# Patient Record
Sex: MALE | Race: WHITE | Employment: UNEMPLOYED | URBAN - METROPOLITAN AREA
[De-identification: names, ages, dates, MRNs, and addresses within clinical notes are randomized per-mention and may not be internally consistent; named-entity substitution may affect disease eponyms.]

---

## 2017-05-27 ENCOUNTER — HOSPITAL ENCOUNTER (INPATIENT)
Facility: CLINIC | Age: 32
LOS: 54 days | Discharge: HOME | End: 2017-07-21
Attending: EMERGENCY MEDICINE | Admitting: PSYCHIATRY & NEUROLOGY
Payer: MEDICAID

## 2017-05-27 DIAGNOSIS — Z72.0 TOBACCO ABUSE: ICD-10-CM

## 2017-05-27 DIAGNOSIS — F29 PSYCHOSIS, UNSPECIFIED PSYCHOSIS TYPE (H): ICD-10-CM

## 2017-05-27 DIAGNOSIS — T50.905A MEDICATION REACTION, INITIAL ENCOUNTER: Primary | ICD-10-CM

## 2017-05-27 DIAGNOSIS — F22 DELUSION (H): ICD-10-CM

## 2017-05-27 DIAGNOSIS — R09.81 NASAL CONGESTION: ICD-10-CM

## 2017-05-27 DIAGNOSIS — I10 BENIGN ESSENTIAL HYPERTENSION: ICD-10-CM

## 2017-05-27 DIAGNOSIS — H04.123 DRY EYES: ICD-10-CM

## 2017-05-27 LAB
ANION GAP SERPL CALCULATED.3IONS-SCNC: 8 MMOL/L (ref 3–14)
BUN SERPL-MCNC: 11 MG/DL (ref 7–30)
CALCIUM SERPL-MCNC: 9.4 MG/DL (ref 8.5–10.1)
CHLORIDE SERPL-SCNC: 107 MMOL/L (ref 94–109)
CO2 SERPL-SCNC: 25 MMOL/L (ref 20–32)
CREAT SERPL-MCNC: 0.76 MG/DL (ref 0.66–1.25)
GFR SERPL CREATININE-BSD FRML MDRD: ABNORMAL ML/MIN/1.7M2
GLUCOSE SERPL-MCNC: 147 MG/DL (ref 70–99)
POTASSIUM SERPL-SCNC: 3.7 MMOL/L (ref 3.4–5.3)
SODIUM SERPL-SCNC: 140 MMOL/L (ref 133–144)

## 2017-05-27 PROCEDURE — 25000128 H RX IP 250 OP 636

## 2017-05-27 PROCEDURE — 99285 EMERGENCY DEPT VISIT HI MDM: CPT | Mod: 25 | Performed by: EMERGENCY MEDICINE

## 2017-05-27 PROCEDURE — 99284 EMERGENCY DEPT VISIT MOD MDM: CPT | Mod: Z6 | Performed by: EMERGENCY MEDICINE

## 2017-05-27 PROCEDURE — 12400003 ZZH R&B MH CRITICAL UMMC

## 2017-05-27 PROCEDURE — 80048 BASIC METABOLIC PNL TOTAL CA: CPT | Performed by: EMERGENCY MEDICINE

## 2017-05-27 PROCEDURE — 25000128 H RX IP 250 OP 636: Performed by: EMERGENCY MEDICINE

## 2017-05-27 PROCEDURE — 96374 THER/PROPH/DIAG INJ IV PUSH: CPT | Performed by: EMERGENCY MEDICINE

## 2017-05-27 RX ORDER — LORAZEPAM 2 MG/ML
2 INJECTION INTRAMUSCULAR ONCE
Status: DISCONTINUED | OUTPATIENT
Start: 2017-05-27 | End: 2017-05-28

## 2017-05-27 RX ORDER — OLANZAPINE 10 MG/2ML
10 INJECTION, POWDER, FOR SOLUTION INTRAMUSCULAR ONCE
Status: DISCONTINUED | OUTPATIENT
Start: 2017-05-27 | End: 2017-05-27

## 2017-05-27 RX ORDER — HYDROXYZINE HYDROCHLORIDE 25 MG/1
25-50 TABLET, FILM COATED ORAL EVERY 4 HOURS PRN
Status: DISCONTINUED | OUTPATIENT
Start: 2017-05-27 | End: 2017-07-21 | Stop reason: HOSPADM

## 2017-05-27 RX ORDER — LORAZEPAM 2 MG/ML
INJECTION INTRAMUSCULAR
Status: COMPLETED
Start: 2017-05-27 | End: 2017-05-27

## 2017-05-27 RX ORDER — HALOPERIDOL 5 MG/ML
INJECTION INTRAMUSCULAR
Status: COMPLETED
Start: 2017-05-27 | End: 2017-05-27

## 2017-05-27 RX ORDER — HALOPERIDOL 5 MG/ML
5 INJECTION INTRAMUSCULAR ONCE
Status: COMPLETED | OUTPATIENT
Start: 2017-05-27 | End: 2017-05-27

## 2017-05-27 RX ADMIN — LORAZEPAM 2 MG: 2 INJECTION INTRAMUSCULAR; INTRAVENOUS at 21:00

## 2017-05-27 RX ADMIN — HALOPERIDOL 5 MG: 5 INJECTION INTRAMUSCULAR at 21:00

## 2017-05-27 RX ADMIN — HALOPERIDOL LACTATE 5 MG: 5 INJECTION, SOLUTION INTRAMUSCULAR at 21:00

## 2017-05-27 ASSESSMENT — ENCOUNTER SYMPTOMS
VOMITING: 0
AGITATION: 1
SHORTNESS OF BREATH: 0
NAUSEA: 0

## 2017-05-27 NOTE — IP AVS SNAPSHOT
MRN:[id number]                      After Visit Summary   5/27/2017    Jv Pate    MRN: [id number]           Thank you!     Thank you for choosing [facility] for your care. Our goal is always to provide you with excellent care.        Patient Information     Date Of Birth          1985        Designated Caregiver       Most Recent Value    Caregiver    Will someone help with your care after discharge? yes    Name of designated caregiver Mother    Phone number of caregiver unwilling to provide    Caregiver address unwilling to provide      About your hospital stay     You were admitted on:  May 27, 2017 You last received care in the:  [facility]    You were discharged on:  July 21, 2017       Who to Call     For medical emergencies, please call 911.  For non-urgent questions about your medical care, please call your primary care provider or clinic, None          Attending Provider     Provider Specialty    Elvin Gonzalez MD Emergency Medicine    Karan Diana MD Psychiatry    Mario Shah MD Psychiatry       Primary Care Provider    None Specified      Further instructions from your care team       Behavioral Discharge Planning and Instructions      Summary:  You were admitted to the [facility] after exhibiting mental health symptoms including homicidal ideation.  During your hospitalization, you met daily with the staff and were encouraged to attend all therapeutic programming. You met with the Clinical Treatment Coordinator and participated in your discharge planning. Medications were adjusted.      You were dually committed to the [facility] and the Commissioner of Human Services on June 16th, 2017.  You are being discharged on a Provisional Discharge Agreement to a medical unit until you are medically stable enough to transfer back to us.  You are also court ordered to take the medications the doctor ordered for  you.       Primary Diagnoses:   1.  Schizophrenia, paranoid type   2.  Noncompliance, nonadherence.   3.  Premorbid cluster B with likely antisocial traits.    Mental Health Follow-Up:  -At this time you are being Provisionally Discharged from [facility] to have your medical needs attended to.  However, you continue to be under commitment and will be transferred back to our unit upon medical stabilization.  At that time, we will determine when you are able to discharge.     - You have been committed through [facility] as Mentally Ill.  You have also been assigned a [facility] .    :   Jenna Khalil  Phone: [phone]  Fax:   HUC TO FAX AVS    Psychiatry:  You will see An Barahona upon discharge. (lin Dsouza is no longer there)   [facility]  [address] [place] [address]  Phone: [phone]  Fax: [phone]  HUC TO FAX AVS    Attend all scheduled appointments with your outpatient providers. Call at least 24 hours in advance if you need to reschedule an appointment to ensure continued access to your outpatient providers.   Major Treatments, Procedures and Findings:  You were provided with: a psychiatric assessment, medication evaluation and/or management, group therapy and milieu management    Symptoms to Report: feeling more aggressive, increased confusion, losing more sleep, mood getting worse or thoughts of suicide    Early warning signs can include: increased depression or anxiety sleep disturbances increased thoughts or behaviors of suicide or self-harm  increased unusual thinking, such as paranoia or hearing voices    Safety and Wellness:  Take all medicines as directed.  Make no changes unless your doctor suggests them.      Follow treatment recommendations.  Refrain from alcohol and non-prescribed drugs.  Ask your support system to help you reduce your access to items that could harm yourself or others. Items could include:  Firearms  Medicines  "(both prescribed and over-the-counter)  Knives and other sharp objects  Ropes and like materials  Car keys  If there is a concern for safety, call 911. If there is a concern for safety, call 911.    Resources:   Crisis Intervention: 329.268.3564 or 177-525-1434 (TTY: 676.609.7941).  Call anytime for help.  National [place] on Mental Illness (www.[place].eileen.org): 496.765.4250 or 263-478-1502.  [place] Association for Children's Mental Health (www.mac.org): 689.213.8842.  Alcoholics Anonymous (www.alcoholics-anonymous.org): Check your phone book for your local chapter.  Suicide Awareness Voices of Education (SAVE) (www.save.org): 898-847-GARH (8070)  National Suicide Prevention Line (www.mentalhealthmn.org): 438-200-TWJV (0286)  Mental Health Consumer/Survivor Network of [place] (www.mhcsn.net): 175.549.3038 or 878-220-3935  Mental Health Association of [place] (www.mentalhealth.org): 615.918.7084 or 507-185-1252  Self- Management and Recovery Training., [employer]-- Toll free: 570.878.5601  www.[employer].org  [facility] Crisis (COPE) Response - Adult 762 979-6725  [facility] Crisis Response - Adult 069 558-4631  [facility] Crisis Response 807 221-7286  Text 4 Life: txt \"LIFE\" to 97850 for immediate support and crisis intervention  Crisis text line: Text \"START\" to 034-562. Free, confidential, 24/7.  Crisis Intervention: 385.612.1999 or 180-028-6870. Call anytime for help.   [facility] Mental Health Crisis Team - Child: 630.527.1983  [facility] Mental Health Crisis Response Team - Child: [phone]  [facility] Mental Health Crisis: 2-107-383-7225     The treatment team has appreciated the opportunity to work with you. If you have any questions or concerns our unit number is [phone].          Pending Results     Date and Time Order Name Status Description    7/21/2017 1355 EKG 12-lead, complete Preliminary     7/21/2017 0834 HHV6 DNA Quant PCR In process     7/21/2017 0834 EBV " "DNA PCR Quantitative Whole Blood In process     2017 0834 CMV DNA quantification In process     2017 0030 Carbamazepine and epoxide free and total In process     2017 0030 Carbamazepine and 10 11 epoxide In process             Statement of Approval     Ordered          17 1505  I have reviewed and agree with all the recommendations and orders detailed in this document.  EFFECTIVE NOW     Approved and electronically signed by:  Karan Diana MD             Admission Information     Date & Time Provider Department Dept. Phone    2017 Mario Shah MD [address] [phone]      Your Vitals Were     Blood Pressure Pulse Temperature Respirations Height Weight    121/64 99 98.7  F (37.1  C) (Tympanic) 14 1.905 m (6' 3\") 108.4 kg (239 lb)    Pulse Oximetry BMI (Body Mass Index)                100% 29.87 kg/m2          MyChart Information     [employer] lets you send messages to your doctor, view your test results, renew your prescriptions, schedule appointments and more. To sign up, go to [email or web address] . Click on \"Log in\" on the left side of the screen, which will take you to the Welcome page. Then click on \"Sign up Now\" on the right side of the page.     You will be asked to enter the access code listed below, as well as some personal information. Please follow the directions to create your username and password.     Your access code is: [email or web address]  Expires: 2017  8:53 PM     Your access code will  in 90 days. If you need help or a new code, please call your [facility] clinic or [phone].        Care EveryWhere ID     This is your Care EveryWhere ID. This could be used by other organizations to access your [facility] medical records  [id number]        Equal Access to Services     PAULINE GEORGE : Megha Pedraza, guido kinsey, susy taylor. So [facility] [phone].    ATENCIÓN: Si mario mckeon " a brut disposición servicios gratuitos de asistencia lingüística. Ruchi calderon [phone].    We comply with applicable federal civil rights laws and [place] laws. We do not discriminate on the basis of race, color, national origin, age, disability sex, sexual orientation or gender identity.               Review of your medicines      UNREVIEWED medicines. Ask your doctor about these medicines        Dose / Directions    * ZYPREXA 15 MG tablet   Generic drug:  OLANZapine        1 TABLET DAILY   Refills:  0       * ZYPREXA 15 MG tablet   Generic drug:  OLANZapine        1 TABLET DAILY   Refills:  0       * Notice:  This list has 2 medication(s) that are the same as other medications prescribed for you. Read the directions carefully, and ask your doctor or other care provider to review them with you.             Protect others around you: Learn how to safely use, store and throw away your medicines at www.disposemymeds.org.             Medication List: This is a list of all your medications and when to take them. Check marks below indicate your daily home schedule. Keep this list as a reference.      Medications           Morning Afternoon Evening Bedtime As Needed    * ZYPREXA 15 MG tablet   1 TABLET DAILY   Generic drug:  OLANZapine                                * ZYPREXA 15 MG tablet   1 TABLET DAILY   Generic drug:  OLANZapine                                * Notice:  This list has 2 medication(s) that are the same as other medications prescribed for you. Read the directions carefully, and ask your doctor or other care provider to review them with you.

## 2017-05-27 NOTE — IP AVS SNAPSHOT
` `     [facility]: [phone]            Medication Administration Report for Jv Pate as of 07/21/17 1719   Legend:    Given Hold Not Given Due Canceled Entry Other Actions    Time Time (Time) Time  Time-Action       Inactive    Active    Linked        Medications 07/15/17 07/16/17 07/17/17 07/18/17 07/19/17 07/20/17 07/21/17    acetaminophen (TYLENOL) tablet 650 mg  Dose: 650 mg Freq: EVERY 4 HOURS PRN Route: PO  PRN Reasons: mild pain,fever  Start: 06/03/17 1248   Admin Instructions: Maximum acetaminophen dose from all sources = 75 mg/kg/day not to exceed 4 grams/day.               aspirin tablet 325 mg  Dose: 325 mg Freq: 2 TIMES DAILY PRN Route: PO  PRN Reason: moderate pain  Start: 06/06/17 1237              benztropine (COGENTIN) tablet 0.5-1 mg  Dose: 0.5-1 mg Freq: 2 TIMES DAILY PRN Route: PO  PRN Reason: agitation  PRN Comment: EPS  Start: 06/09/17 1422              carboxymethylcellulose (CELLUVISC/REFRESH LIQUIGEL) 1 % ophthalmic solution 1 drop  Dose: 1 drop Freq: 3 TIMES DAILY PRN Route: Both Eyes  PRN Reason: dry eyes  Start: 06/09/17 1430     2037 (1 drop)-Given        1425 (1 drop)-Given        0027 (1 drop)-Given        0310 (1 drop)-Given        2328 (1 drop)-Given            dimethicone-zinc oxide (EUCERIN) cream  Freq: 2 TIMES DAILY PRN Route: Top  PRN Reasons: other,dry skin  Start: 06/14/17 1036   Admin Instructions: Apply to right Forearm               diphenhydrAMINE (BENADRYL) injection 50 mg  Dose: 50 mg Freq: 3 TIMES DAILY PRN Route: IM  PRN Reasons: itching,other  PRN Comment: for agitation, give along with haldol and ativan  Start: 07/17/17 1538                                         Or  diphenhydrAMINE (BENADRYL) capsule 50 mg  Dose: 50 mg Freq: 3 TIMES DAILY PRN Route: PO  PRN Reason: itching  PRN Comment: for agitation, along with haldol and ativan  Start: 07/17/17 1538       0027 (50 mg)-Given        0215 (50 mg)-Given        1956 (50 mg)-Given        0904 (50 mg)-Given            haloperidol (HALDOL) tablet 5-10 mg  Dose: 5-10 mg Freq: 3 TIMES DAILY PRN Route: PO  PRN Reason: agitation  Start: 06/05/17 1546       0027 (10 mg)-Given             Or  haloperidol lactate (HALDOL) injection 5-10 mg  Dose: 5-10 mg Freq: 3 TIMES DAILY PRN Route: IM  PRN Reason: agitation  Start: 06/05/17 1546                     hydrocortisone (CORTAID) 1 % cream  Freq: 3 TIMES DAILY PRN Route: Top  PRN Reasons: rash,itching  Start: 07/18/17 2036   Admin Instructions: Apply to rash        2103 ( )-Given        0310 (10 g)-Given         0418 ( )-Given           hydrOXYzine (ATARAX) tablet 25-50 mg  Dose: 25-50 mg Freq: EVERY 4 HOURS PRN Route: PO  PRN Reason: anxiety  Start: 05/27/17 2213        (0104)-Not Given             lisinopril (PRINIVIL/ZESTRIL) tablet 20 mg  Dose: 20 mg Freq: DAILY Route: PO  Start: 06/01/17 1630   Admin Instructions: Hold for SBP <110     0848 (20 mg)-Given        0948 (20 mg)-Given        0823 (20 mg)-Given        0903 (20 mg)-Given        0856 (20 mg)-Given        0847 (20 mg)-Given        0901 (20 mg)-Given           lurasidone (LATUDA) tablet 40 mg  Dose: 40 mg Freq: DAILY Route: PO  Start: 07/15/17 0800    0848 (40 mg)-Given        0948 (40 mg)-Given        0824 (40 mg)-Given        0903 (40 mg)-Given        0857 (40 mg)-Given        0847 (40 mg)-Given        0902 (40 mg)-Given           multivitamin, therapeutic (THERA-VIT) tablet 1 tablet  Dose: 1 tablet Freq: DAILY Route: PO  Start: 06/24/17 0800    0848 (1 tablet)-Given        0948 (1 tablet)-Given        0824 (1 tablet)-Given        0903 (1 tablet)-Given        0856 (1 tablet)-Given        0847 (1 tablet)-Given        0901 (1 tablet)-Given           nicotine polacrilex (NICORETTE) gum 2 mg  Dose: 2 mg Freq: EVERY 1 HOUR PRN Route: BU  PRN Reason: smoking cessation  Start: 05/29/17 1735   Admin Instructions: Gum should be chewed slowly until it tingles, then placed between cheek and gum:  when tingle gone, repeat process  until tingle gone (about 30 minutes).      1643 (2 mg)-Given        2152 (2 mg)-Given        2054 (2 mg)-Given        0602 (2 mg)-Given       1959 (2 mg)-Given             predniSONE (DELTASONE) tablet 40 mg  Dose: 40 mg Freq: 2 TIMES DAILY WITH MEALS Route: PO  Start: 07/21/17 1800          [ ] 1800           QUEtiapine (SEROquel) tablet 200 mg  Dose: 200 mg Freq: AT BEDTIME Route: PO  Start: 07/10/17 2200    2055 (200 mg)-Given               2016 (200 mg)-Given        2055 (200 mg)-Given        2102 (200 mg)-Given        1959 (200 mg)-Given        1954 (200 mg)-Given        [ ] 2000           QUEtiapine (SEROquel) tablet 200 mg  Dose: 200 mg Freq: AT BEDTIME PRN Route: PO  PRN Comment: sleep  Start: 07/10/17 1057       0027 (200 mg)-Given        0105 (200 mg)-Given             sodium chloride (OCEAN) 0.65 % nasal spray 1 spray  Dose: 1 spray Freq: EVERY 1 HOUR PRN Route: BOTH NOSTRIL  PRN Reason: congestion  Start: 06/07/17 0840             Discontinued Medications  Medications 07/15/17 07/16/17 07/17/17 07/18/17 07/19/17 07/20/17 07/21/17         Dose: 200 mg Freq: 2 TIMES DAILY Route: PO  Start: 06/13/17 0800   End: 07/20/17 1130   Admin Instructions: DO NOT CRUSH.     0847 (200 mg)-Given       2000 (200 mg)-Given        0948 (200 mg)-Given       2016 (200 mg)-Given        0824 (200 mg)-Given       2055 (200 mg)-Given        0903 (200 mg)-Given       2102 (200 mg)-Given        0856 (200 mg)-Given       1959 (200 mg)-Given        0847 (200 mg)-Given       1130-Med Discontinued          Freq: 3 TIMES DAILY PRN Route: Top  PRN Reasons: rash,itching  Start: 07/18/17 1121   End: 07/18/17 2037   Admin Instructions: Apply to rash        2037-Med Discontinued

## 2017-05-28 LAB
ALBUMIN UR-MCNC: NEGATIVE MG/DL
AMPHETAMINES UR QL SCN: ABNORMAL
APPEARANCE UR: CLEAR
BACTERIA #/AREA URNS HPF: ABNORMAL /HPF
BARBITURATES UR QL: ABNORMAL
BENZODIAZ UR QL: ABNORMAL
BILIRUB UR QL STRIP: NEGATIVE
CANNABINOIDS UR QL SCN: ABNORMAL
COCAINE UR QL: ABNORMAL
COLOR UR AUTO: ABNORMAL
ETHANOL UR QL SCN: ABNORMAL
GLUCOSE UR STRIP-MCNC: NEGATIVE MG/DL
HGB UR QL STRIP: NEGATIVE
KETONES UR STRIP-MCNC: NEGATIVE MG/DL
LEUKOCYTE ESTERASE UR QL STRIP: ABNORMAL
NITRATE UR QL: NEGATIVE
OPIATES UR QL SCN: ABNORMAL
PH UR STRIP: 6.5 PH (ref 5–7)
RBC #/AREA URNS AUTO: 0 /HPF (ref 0–2)
SP GR UR STRIP: 1 (ref 1–1.03)
SQUAMOUS #/AREA URNS AUTO: 1 /HPF (ref 0–1)
URN SPEC COLLECT METH UR: ABNORMAL
UROBILINOGEN UR STRIP-MCNC: NORMAL MG/DL (ref 0–2)
WBC #/AREA URNS AUTO: 16 /HPF (ref 0–2)

## 2017-05-28 PROCEDURE — 81001 URINALYSIS AUTO W/SCOPE: CPT | Performed by: EMERGENCY MEDICINE

## 2017-05-28 PROCEDURE — 80307 DRUG TEST PRSMV CHEM ANLYZR: CPT | Performed by: EMERGENCY MEDICINE

## 2017-05-28 PROCEDURE — 80320 DRUG SCREEN QUANTALCOHOLS: CPT | Performed by: EMERGENCY MEDICINE

## 2017-05-28 PROCEDURE — 25000132 ZZH RX MED GY IP 250 OP 250 PS 637: Performed by: PSYCHIATRY & NEUROLOGY

## 2017-05-28 PROCEDURE — 12400003 ZZH R&B MH CRITICAL UMMC

## 2017-05-28 RX ORDER — ASENAPINE 5 MG/1
5 TABLET SUBLINGUAL 2 TIMES DAILY
Status: DISCONTINUED | OUTPATIENT
Start: 2017-05-28 | End: 2017-05-28

## 2017-05-28 RX ORDER — ASENAPINE 10 MG/1
10 TABLET SUBLINGUAL AT BEDTIME
Status: DISCONTINUED | OUTPATIENT
Start: 2017-05-28 | End: 2017-06-09

## 2017-05-28 RX ADMIN — ASENAPINE MALEATE 10 MG: 10 TABLET SUBLINGUAL at 19:04

## 2017-05-28 ASSESSMENT — ACTIVITIES OF DAILY LIVING (ADL)
DRESS: SCRUBS (BEHAVIORAL HEALTH)
LAUNDRY: UNABLE TO COMPLETE
DRESS: SCRUBS (BEHAVIORAL HEALTH)
GROOMING: HANDWASHING;INDEPENDENT
GROOMING: INDEPENDENT
ORAL_HYGIENE: INDEPENDENT
ORAL_HYGIENE: INDEPENDENT
LAUNDRY: UNABLE TO COMPLETE

## 2017-05-28 ASSESSMENT — ENCOUNTER SYMPTOMS
PHOTOPHOBIA: 0
ABDOMINAL PAIN: 0
DIAPHORESIS: 0
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
## 2017-05-28 NOTE — H&P
"CHIEF COMPLAINT:  Psychiatric issues including acting strange for a couple of days.  Homicidal ideation and intention, verbalized he \"could kill someone.\"      HISTORY OF PRESENT MEDICAL ILLNESS:  Jv Pate is a 32-year-old who resides [place] the family farm in [facility].  Father  in  from medical condition.  He was 30 years older than the mother at age 82.  His mother is still in the household.      THE PREMORBID AND ESTABLISHED MEDICAL AND PSYCHIATRIC DIAGNOSES:   1.  Schizophrenia, paranoid type, since .   2.  Psychosis, not otherwise specified, is the one patient prefers.   3.  He also offers homicidal ideation \"because of  year\" that and shows overt paranoid ideation of a delusional and grandiose and paranoid nature, that he is surrounded by terrorists and everything is monitored.  He does identify the homicidal thoughts toward \"the enemy.\"      Denies suicidal or self-injurious behavior.      It would appear the patient has additional medical condition of Lyme's disease.  Convinced she has this condition.      PAST MEDICAL HISTORY:  Not defined.        PAST PSYCHIATRIC HISTORY:  Schizophrenia, paranoid type.      Chart reviewed.  Previous hospitalization in 2007 by Dr. Li with the patient discharged directly to an IRTS facility under court commitment.  The patient had been in [facility] under commitment.  It would appear that this was extended and was renewed on at least 3 other occasions therefore for 2 years.      Remainder of the physical examination.  Heart disease in paternal grandfather.      PAST SURGICAL HISTORY:  None.      MEDICATIONS:     1.  Zyprexa indicates 50 mg b.i.d., has not been adherent to this.  Insisted he went off it because of weight gain.   2.  More recently, has been on Saphris at 20 mg per day.     3.  Additionally, also had a trial previously of Haldol and Ativan.      With reluctance admits to the fact that in the last weeks to months, " "\"occasionally I would have auditory hallucinations but nothing severe.  I would just hear voices, some people prodding me and asking if they were praying for me.\"      REVIEW OF SYSTEMS:  From a medical standpoint, 10-point system is entirely negative, but for the vital signs.        PROVISIONAL LABORATORY DATA:  Within normal limits.      Additionally, the patient has since admission information accrued from collateral information including the patient's family who stated that he was acting strange.  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  This is his father's land.  Supposed to be put in trust for his benefit, although he feels the mother is going behind his back.      He identifies that his outpatient evaluation and treatment has been followed by the following:  Rae Lawson, nurse practitioner and also that his primary care has been following him.  It is Dr. Cowan at [facility] that has diagnosed him to have Lyme's disease.      FAMILY DESCRIPTION AND CONSTELLATION:  The patient not prepared to discuss any additional items indicating that the father had 4 children from a previous relationship that never completely acknowledged him.  There are ages 50-65.  He also has a sister (age 33) from the same marriage.  No mention of her presence or whereabouts.      His education, occupation, finances and legal issues:  Not working.  Indicates he did go to high school in [place] in 2004.  Then, [employer] but never finished and the school closed.      LEGAL ISSUES:  Commitment only.      ETHNIC, SPIRITUAL AS WELL AS  HISTORY:  None.      He identifies his outpatient " psychiatrist, and then Rae Lawson NP, is the prescriber at [address] [place] [address], [phone].  Next appointment is in August.      FORMAL MENTAL STATUS EXAMINATION:  Showed the patient in his current status, isolative and avoidant in his room, always rather expansive and talking and communicating.  He does show himself in a rather quiet and reserved manner and thought.  No motor activity.  Handshake is rather weak and sweaty palms.  Level of activity appears reduced and his speech shows some degree of some latency and slowness and his volume is certainly reduced in articulation.  English is his primary language.  His affect and mood are certainly suppressed and repressed.  Sensorium:  He is alert and oriented in all 3 spheres to person, place and time.  Intellectual functioning is adequate.  Abstract ability is comprehensive.  His thought processes are well associated and structured.  Thought content shows overt delusional ideation with admission of auditory hallucination.  He has no suicidal but certainly homicidal ideation and intention.  Judgment, foresight and planning appears undermined.      DIAGNOSES DSM-5/ICD 10 INCLUDES:     1.  Schizophrenia, paranoid type with treatment-resistant psychosis.   2.  Noncompliance, nonadherence.   3.  Premorbid personality not identified.      ADMISSION SYMPTOMS:  The patient was restarted on his current medication regimen and the medications restarted include the followin.  Zyprexa 50 mg 1 q.a.m.     2.  The patient also insists that he actually has more recently been on Saphris and that the Zyprexa was discontinued because of the weight gain.  Thus, will start the patient on Saphris at 5 mg b.i.d.      Consideration for petition for commitment because of the patient's resistance to being cooperative and compliant and perhaps an attached Ross.         NEMESIO FERNANDEZ MD             D: 2017 14:20   T: 2017 16:47   MT:       Name:      IVETTE VIRGEN   MRN:      [id number]        Account:      [id number]   :      1985           Admitted:     [id number]      Document: [id number]

## 2017-05-28 NOTE — PLAN OF CARE
Problem: General Plan of Care (Inpatient Behavioral)  Goal: Team Discussion  Team Plan:   BEHAVIORAL TEAM DISCUSSION     Continued Stay Criteria/Rationale: Patient is newly admitted with symptoms of psychosis and homicidal ideation. Evaluation in process.  Plan: Provide a safe environment and therapeutic milieu. Encourage participation in unit program. Develop appropriate aftercare plan.  Participants: Rossy Lopez [facility]; Ernestina Knox RN  Summary/Recommendation: Psychiatric evaluation; medication evaluation; continued observation.  Medical/Physical: stable  Progress: minimal     Illness Management Recovery model: Personal Plan of Care     Patient has not yet completed Personal Plan of Care

## 2017-05-28 NOTE — ED PROVIDER NOTES
"  History     Chief Complaint   Patient presents with     Psychiatric Evaluation     acting strange for the last couple of days.      Homicidal Ideation     per report patient verbalized how would kill someone.     HPI  Jv Pate is a 32 year old male who presents to the Emergency Department via EMS for evaluation of psychosis and homicidal ideation. Patient states that he is here because \"the police ordered me here\". He is concerned that he is surrounded by terrorists and everything is monitored. He denies homicidal ideation except towards \"the enemy\". He denies pain or thoughts of self harm. He is prescribed Zyprexa which he takes as needed. He denies a history of psychosis and states that his previous behaviors were secondary to lyme's disease. He denies nausea, vomiting, or chest pain stating that he \"feels fine\".     PAST MEDICAL HISTORY  Past Medical History:   Diagnosis Date     Schizophrenia (H)      PAST SURGICAL HISTORY  History reviewed. No pertinent surgical history.  FAMILY HISTORY  Family History   Problem Relation Age of Onset     Neurologic Disorder Father      HEART DISEASE Paternal Grandfather      attack     SOCIAL HISTORY  Social History   Substance Use Topics     Smoking status: Current Every Day Smoker     Packs/day: 1.50     Smokeless tobacco: Not on file     Alcohol use No     MEDICATIONS  Current Facility-Administered Medications   Medication     LORazepam (ATIVAN) injection 2 mg     hydrOXYzine (ATARAX) tablet 25-50 mg     Current Outpatient Prescriptions   Medication     ZYPREXA 15 MG OR TABS     ZYPREXA 15 MG OR TABS     ALLERGIES  Allergies   Allergen Reactions     No Known Drug Allergies        I have reviewed the Medications, Allergies, Past Medical and Surgical History, and Social History in the Epic system.    Review of Systems   Constitutional: Negative for diaphoresis.   Eyes: Negative for photophobia.   Respiratory: Negative for shortness of breath.    Cardiovascular: " Negative for chest pain.   Gastrointestinal: Negative for abdominal pain, nausea and vomiting.   Psychiatric/Behavioral: Positive for agitation and behavioral problems. Negative for self-injury and suicidal ideas.       Physical Exam      Physical Exam   Constitutional: He appears well-developed and well-nourished. No distress.   Eyes: Right eye exhibits no discharge. Left eye exhibits no discharge.   Cardiovascular: Normal rate.    No murmur heard.  Pulmonary/Chest: Effort normal. No stridor. No respiratory distress. He has no wheezes.   Abdominal: Soft. There is no tenderness. There is no guarding.   Neurological: He is alert. No cranial nerve deficit.   Skin: He is not diaphoretic. No erythema.   Psychiatric: His mood appears anxious. His affect is angry. His speech is rapid and/or pressured. He is agitated, aggressive (verbally) and hyperactive. Thought content is paranoid. He expresses no homicidal and no suicidal ideation.       ED Course     Procedures    Results for orders placed or performed during the hospital encounter of 05/27/17 (from the past 24 hour(s))   Basic metabolic panel   Result Value Ref Range    Sodium 140 133 - 144 mmol/L    Potassium 3.7 3.4 - 5.3 mmol/L    Chloride 107 94 - 109 mmol/L    Carbon Dioxide 25 20 - 32 mmol/L    Anion Gap 8 3 - 14 mmol/L    Glucose 147 (H) 70 - 99 mg/dL    Urea Nitrogen 11 7 - 30 mg/dL    Creatinine 0.76 0.66 - 1.25 mg/dL    GFR Estimate >90  Non  GFR Calc   >60 mL/min/1.7m2    GFR Estimate If Black >90   GFR Calc   >60 mL/min/1.7m2    Calcium 9.4 8.5 - 10.1 mg/dL       Assessments & Plan (with Medical Decision Making)   Jv Pate is a 32 year old male who is presenting to the ED with symptoms consistent with psychosis. He denies SI/HI, but he is clearly not safe to leave the ED in the setting of his significant delusion.    Electrolytes wnl.    I have reviewed the nursing notes.    I have reviewed the findings, diagnosis,  plan and need for follow up with the patient.    New Prescriptions    No medications on file       Final diagnoses:   Psychosis, unspecified psychosis type   Delusion (H)   IDanica, am serving as a trained medical scribe to document services personally performed by Elvin Gonzalez MD, based on the provider's statements to me.   Elvin EAGLE MD, was physically present and have reviewed and verified the accuracy of this note documented by Danica Cedillo.      5/27/2017   [facility], [place], EMERGENCY DEPARTMENT     Elvin Gonzalez MD  05/28/17 0115

## 2017-05-28 NOTE — PROGRESS NOTES
05/28/17 0158   Patient Belongings   Did you bring any home meds/supplements to the hospital?  No   Patient Belongings clothing   Disposition of Belongings In patient locker: Black sweatshirt, black t shirt, boxers, socks, and belt.   Clothing Search Yes   Second Staff David     ADMISSION:  I am responsible for any personal items that are not sent to the safe or pharmacy. [facility] is not responsible for loss, theft or damage of any property in my possession.    Items Added 6/25/2017: sealed pack of cigarettes, sealed envelope containing $20, cigarette lighter.  Patient Signature _____________________ Date/Time _____________________    Staff Signature _______________________ Date/Time _____________________    2nd Staff person, if patient is unable/unwilling to sign  ___________________________________ Date/Time _____________________    DISCHARGE:  My personal items have been returned to me.   Patient Signature _____________________ Date/Time _____________________

## 2017-05-28 NOTE — PLAN OF CARE
Problem: Individualization  Goal: Patient Preferences  Illness Management Recovery model: Objectives     Patient will identify reason(s) for hospitalization from their perspective.  Patient will identify a minimum of three goals for discharge.  Patient will identify a minimum of three triggers that may increase their symptoms.  Patient will identify a minimum of three coping skills they can do to stay well.  Patient will identify their support system to demonstrate readiness for discharge.

## 2017-05-28 NOTE — PROGRESS NOTES
Early in the day patient appeared tense and agitated talking about the circumstances of his hold and when he would be able to leave the hospital. Staff was able to calm him and the remainder of the day he remained pleasant actively socializing and playing cards in the milieu.     05/28/17 1400   Behavioral Health   Hallucinations other (see comment)  (none noted)   Thinking confused;distractable   Orientation person: oriented;place: oriented;date: oriented;time: oriented;situation, disoriented   Memory baseline memory   Insight poor   Judgement impaired   Eye Contact at examiner   Affect full range affect;tense   Mood mood is calm;irritable;anxious   Physical Appearance/Attire attire appropriate to age and situation   Hygiene neglected grooming - unclean body, hair, teeth   Suicidality other (see comments)  (none stated or observed)   Self Injury other (see comment)  (none stated or observed)   Activity isolative;withdrawn   Speech clear;coherent   Medication Sensitivity no stated side effects;no observed side effects   Psychomotor / Gait balanced;steady   Sleep/Rest/Relaxation   Day/Evening Time Hours up all shift   Coping/Psychosocial   Verbalized Emotional State frustration   Supportive Measures active listening utilized;positive reinforcement provided;relaxation techniques promoted;problem solving facilitated;verbalization of feelings encouraged   Trust Relationship/Rapport questions encouraged;reassurance provided;questions answered;emotional support provided;empathic listening provided;care explained;choices provided;thoughts/feelings acknowledged   Activities of Daily Living   Hygiene/Grooming handwashing;independent   Oral Hygiene independent   Dress scrubs (behavioral health)   Laundry unable to complete   Room Organization independent   Activity   Activity Level of Assistance independent
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
## 2017-05-29 PROCEDURE — 12400003 ZZH R&B MH CRITICAL UMMC

## 2017-05-29 PROCEDURE — 25000132 ZZH RX MED GY IP 250 OP 250 PS 637: Performed by: PSYCHIATRY & NEUROLOGY

## 2017-05-29 RX ORDER — QUETIAPINE FUMARATE 200 MG/1
200 TABLET, FILM COATED ORAL EVERY 4 HOURS PRN
Status: DISCONTINUED | OUTPATIENT
Start: 2017-05-29 | End: 2017-06-07

## 2017-05-29 RX ADMIN — QUETIAPINE FUMARATE 200 MG: 200 TABLET, FILM COATED ORAL at 20:52

## 2017-05-29 RX ADMIN — QUETIAPINE FUMARATE 200 MG: 200 TABLET, FILM COATED ORAL at 02:30

## 2017-05-29 RX ADMIN — ASENAPINE MALEATE 10 MG: 10 TABLET SUBLINGUAL at 20:52

## 2017-05-29 ASSESSMENT — ACTIVITIES OF DAILY LIVING (ADL)
ORAL_HYGIENE: INDEPENDENT
DRESS: SCRUBS (BEHAVIORAL HEALTH)
GROOMING: HANDWASHING;SHOWER;INDEPENDENT
LAUNDRY: UNABLE TO COMPLETE
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
## 2017-05-29 NOTE — PROGRESS NOTES
"Pt has been awake and quite restless.  He has repeatedly filled his glass with ice water and has used the toilet X5 over the pas 90 minutes.  He was approached regarding taking medication to help him sleep and pt responded by saying \"shut the hell up\".  After a short time he came out asking \"so what are you going to give me for sleep?\"  He was offered Vistaril which he adamantly refused.  He was questioned about what has helped him in the past and he responded by saying \"how the hell should I know I'm not a pharmacist\".  Staff attempted to name various antipsychotic medications that he may have taken in the past however he claimed to have an allergy to each named med.  He was asked if he had ever been on Seroquel and would he be willing to take this medication.  Pt was agreeable.  Dr Lord was called and medications were reviewed. An order for Seroquel was obtained.  Pt examined sealed medication packet before accepting the Seroquel PRN.  Will continue to monitor pt for decreased agitation.    "

## 2017-05-30 PROCEDURE — 99232 SBSQ HOSP IP/OBS MODERATE 35: CPT | Performed by: PSYCHIATRY & NEUROLOGY

## 2017-05-30 PROCEDURE — 25000132 ZZH RX MED GY IP 250 OP 250 PS 637: Performed by: PSYCHIATRY & NEUROLOGY

## 2017-05-30 PROCEDURE — 12400003 ZZH R&B MH CRITICAL UMMC

## 2017-05-30 PROCEDURE — 90853 GROUP PSYCHOTHERAPY: CPT

## 2017-05-30 PROCEDURE — 97150 GROUP THERAPEUTIC PROCEDURES: CPT | Mod: GO

## 2017-05-30 RX ORDER — ASENAPINE 5 MG/1
5 TABLET SUBLINGUAL DAILY
Status: DISCONTINUED | OUTPATIENT
Start: 2017-05-31 | End: 2017-06-04

## 2017-05-30 RX ADMIN — QUETIAPINE FUMARATE 200 MG: 200 TABLET, FILM COATED ORAL at 08:10

## 2017-05-30 RX ADMIN — ASENAPINE MALEATE 10 MG: 10 TABLET SUBLINGUAL at 20:45

## 2017-05-30 ASSESSMENT — ACTIVITIES OF DAILY LIVING (ADL)
GROOMING: INDEPENDENT
ORAL_HYGIENE: INDEPENDENT
LAUNDRY: UNABLE TO COMPLETE
DRESS: STREET CLOTHES;INDEPENDENT
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
## 2017-05-30 NOTE — PROGRESS NOTES
05/30/17 2015   Debriefing   Debriefing DO     Patient was able to contract for safety. He took po PRN medications. He continues to make delusional statements.
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
## 2017-05-30 NOTE — PROGRESS NOTES
"Pt has been awake since 0100 with nearly continuous requests for food, various fluids, blankets, use of the BR, etc.  He has also spent time pacing the hallway.  He has been offered PRN medication X2 but has refused PRN Seroquel saying that he has already slept for the night.  When he was asked to return to his room pt became very hostile and loud.  He made additional requests in demanding, intimidating tone which were denied due to pt's behavior.  Pt was asked to remain in his room because his behavior seemed to be escalating he responded by yelling \"stay out of my bubble\".  After shouting pt spit at this staff.  Additional staff were called at this point as a show of force and limits were placed any further aggression.  Pt is now lying on his bed quietly.    "

## 2017-05-30 NOTE — PROGRESS NOTES
This writer called in the petition to [facility] for MI commitment.  At this time we are not doing a almanzar.  Dr. Shah may chose to at a later date.  Exhibit A and Examiner's statement, along with collateral faxed into [facility] at Fax: [phone].

## 2017-05-30 NOTE — PROGRESS NOTES
"This patient refused to sign an POLLY for anyone (asked for one for his mother), \"unless it's discharge papers he won't sign anything.\"   "

## 2017-05-30 NOTE — PROGRESS NOTES
"Pt stated that he has \"been in this experiment for years.\" Pt asked writer \"Do you believe in Chani Valero?\"   "

## 2017-05-30 NOTE — PLAN OF CARE
Problem: Psychotic Symptoms  Intervention: Social and Therapeutic Interv (Psychotic Symptoms)     Pt attended 2 of 2 OT groups.  Little to say during discussion, stating he was planning to sign a 12 hour intent to leave.  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  Engaged in activity, which was a repetitive mandala painting, and seemed to enjoy this.  Was not angry or agitated in group, though states he has his medical team outside of here, and he doesn't need to be here.

## 2017-05-30 NOTE — PROGRESS NOTES
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 I don't know what the outside world is like but she put me in here when she was old.\"   "

## 2017-05-30 NOTE — PROGRESS NOTES
"[facility], [place]   Psychiatric Progress Note        Interim History:   The patient's care was discussed with the treatment team during the daily team meeting and/or staff's chart notes were reviewed.  Staff report patient continue to have bout of agitation and responding to internal stimuli. Not cooperative with care but compliant with medications. Demanding discharge, refused to sign POLLY. Slept 2 and 3 hours past 2 nights. Tense and threatening. Poor insight and dismissive of symptoms.     Met patient with [facility] and staff. The patient was tense and evasive. Exhibited very poor insight, believes that he takes Saphris for sleep, demanded discharge, dismissive of symptoms and events led to admission. Refused to sign POLLY and not willing to permit staff to call his mother to discuss safety. Dismissive of proposed medication changes. Denied symptoms and mental illness. Denied dep, anx, SI and HI. Denied hallucinations and racing thoughts. Became argumentative about discharge. I ended the encounter to avoid further escalation.     Discussed medications and care plan.       Medications:       [START ON 5/31/2017] asenapine  5 mg Sublingual Daily     asenapine  10 mg Sublingual At Bedtime          Allergies:     Allergies   Allergen Reactions     No Known Drug Allergies           Labs:   No results found for this or any previous visit (from the past 24 hour(s)).       Psychiatric Examination:     Vitals:    05/27/17 2132 05/27/17 2351 05/28/17 0100 05/29/17 0740   BP: (!) 155/104 149/81 151/87    Pulse: 104      Resp: 20 16 16 16   Temp: 97.7  F (36.5  C) 98.9  F (37.2  C) 98.9  F (37.2  C) 97.8  F (36.6  C)   TempSrc: Oral Oral Oral Tympanic   SpO2: 95% 99% 98%    Weight: 109.3 kg (241 lb)  108.9 kg (240 lb)    Height:   1.905 m (6' 3\")        Weight is 240 lbs 0 oz  Body mass index is 30 kg/(m^2).    Appearance: adequately groomed, appeared as age stated, awake, alert and mild " "distress  Attitude:  evasive, guarded and somewhat cooperative  Eye Contact:  intense  Mood:  \"OK\"  Affect:  intensity is exaggerated and guarded  Speech:  clear, coherent and normal prosody  Psychomotor Behavior:  no evidence of tardive dyskinesia, dystonia, or tics and intact station, gait and muscle tone  Throught Process:  illogical and concrete  Associations:  no loose associations  Thought Content:  no evidence of suicidal ideation or homicidal ideation, no auditory hallucinations present, no visual hallucinations present, patient appears to be responding to internal stimuli and paranoia suspected.   Insight:  limited  Judgement:  limited  Oriented to:  time, person, and place  Attention Span and Concentration:  intact  Recent and Remote Memory:  intact         Precautions:     Behavioral Orders   Procedures     Code 1 - Restrict to Unit     Routine Programming     As clinically indicated     Sexual precautions     Status 15     Every 15 minutes.          Diagnoses:     1.  Schizophrenia, paranoid type   2.  Noncompliance, nonadherence.   3.  Premorbid cluster B with likely antisocial traits.           Plan:     Medications:  1.  Saphris: restated on admission at 10 mg daily. Add 5 mg qhs.   2.  PRNs: Vistaril for anxiety and Seroquel of agitation.     Legal Status and Disposition:  1.  The patient was admitted on a 72hr hold. Petition for civil commitment was filed. May consider Ross if refused medications changes.  2.  Disposition to be determined pending clinical response.          Addendum:  -  Placed on a 72hr hold as hold paper from ED department could not be located.   "

## 2017-05-30 NOTE — PROGRESS NOTES
"Early in the evening patient appeared tense and was asking staff for the phone number for SSI, stating \"I am getting screwed out of my SSI\" but he wasn't able to further elaborate and seemed to calm when staff reassured him to speak with his  about the concern. The remainder of the evening he was social with staff and peers in the milieu and playing board games often smiling. He was dismissive of his psychiatric symptoms and was unwilling to talk about his hospitalization and kept mentioning he will be leaving as soon as his hold is up.     05/29/17 2101   Behavioral Health   Hallucinations denies / not responding to hallucinations   Thinking intact   Orientation person: oriented;place: oriented;date: oriented;time: oriented;situation, disoriented   Memory baseline memory   Insight poor   Judgement impaired   Eye Contact at examiner   Affect tense;full range affect;irritable   Mood mood is calm   Physical Appearance/Attire attire appropriate to age and situation   Hygiene well groomed   Suicidality other (see comments)  (none stated or observed)   Self Injury other (see comment)  (none stated or observed)   Activity other (see comment)  (social in [facility])   Speech clear;coherent   Medication Sensitivity no stated side effects;no observed side effects   Psychomotor / Gait balanced;steady;paces   Sleep/Rest/Relaxation   Day/Evening Time Hours up all shift   Coping/Psychosocial   Verbalized Emotional State disbelief;frustration   Supportive Measures active listening utilized;positive reinforcement provided;relaxation techniques promoted;verbalization of feelings encouraged   Trust Relationship/Rapport care explained;choices provided;emotional support provided;empathic listening provided;questions answered;questions encouraged;reassurance provided;thoughts/feelings acknowledged   Daily Care   Activity up ad jomar   Patient Performed Hygiene shower;shampoo   Activities of Daily Living   Hygiene/Grooming " handwashing;shower;independent   Oral Hygiene independent   Dress scrubs (behavioral health)   Laundry unable to complete   Room Organization independent   Activity   Activity Level of Assistance independent   Behavioral Health Interventions   Psychotic Symptoms maintain safety precautions;maintain safe secure environment;simple, clear language;monitor confusion, memory loss, decision making ability and reorient / intervent as needed

## 2017-05-30 NOTE — PROGRESS NOTES
"Pt asked this writer \"So when did you sign up to work this experiment? Have you ever been in a psychological experiment?\" Pt repeatedly asking when he is getting out and when the doctor will be in to see him. Pt asked \"Is this room supposed to hold people?\"   "

## 2017-05-30 NOTE — PROGRESS NOTES
"Pt was in the milieu.  He remains frustrated at being here.  \"Now they want to commit me!  I don't get it, why they think I need to be committed.  I didn't do anything wrong.\"  "

## 2017-05-31 PROCEDURE — 90853 GROUP PSYCHOTHERAPY: CPT

## 2017-05-31 PROCEDURE — 12400003 ZZH R&B MH CRITICAL UMMC

## 2017-05-31 PROCEDURE — 25000132 ZZH RX MED GY IP 250 OP 250 PS 637: Performed by: PSYCHIATRY & NEUROLOGY

## 2017-05-31 PROCEDURE — 97150 GROUP THERAPEUTIC PROCEDURES: CPT | Mod: GO

## 2017-05-31 PROCEDURE — 99232 SBSQ HOSP IP/OBS MODERATE 35: CPT | Performed by: PSYCHIATRY & NEUROLOGY

## 2017-05-31 RX ADMIN — ASENAPINE MALEATE 10 MG: 10 TABLET SUBLINGUAL at 20:31

## 2017-05-31 ASSESSMENT — ACTIVITIES OF DAILY LIVING (ADL)
LAUNDRY: UNABLE TO COMPLETE
DRESS: INDEPENDENT
GROOMING: INDEPENDENT
ORAL_HYGIENE: INDEPENDENT
DRESS: INDEPENDENT
HYGIENE/GROOMING: INDEPENDENT
ORAL_HYGIENE: INDEPENDENT

## 2017-05-31 NOTE — PROGRESS NOTES
[facility], [place]   Psychiatric Progress Note        Interim History:   The patient's care was discussed with the treatment team during the daily team meeting and/or staff's chart notes were reviewed.  Staff report patient continues to have bout of agitation and responding to internal stimuli. Paranoid and guarded. Made threatening statement toward this provider and exhibited threatening demeanor. Not cooperative with care and refused Saphris this AM. Mother spoke with RN and expressed concerns about Lyme disease. IM team consulted. Affect is tense and irritable. Demanding discharge, poor insight and denies mental illness. Sleeping poorly. Demanding coffee frequently.     Code green was called and met patient with several team member due to threats to harm this provider. The patient initially denied making threats but later offered an apology when denied when confronted. He was very dismissive and demanded discharge. He had a very poor insight and difficulty comprehending current legal status. He denied dep, anxi, racing thoughts and hallucinations. Internal stimuli and paranoia suspected. He believes that he is taking Saphris for insomnia. I reviewed diagnosis and recommended management. He agree to increase Saphris but not fully receptive to other medication changes. He denied SI, LOYDA and HI. I explained current legal status and informed him that he is waiting to be evaluated by court examiner.     Discussed medications and care plan.       Medications:       asenapine  5 mg Sublingual Daily     asenapine  10 mg Sublingual At Bedtime          Allergies:     Allergies   Allergen Reactions     No Known Drug Allergies           Labs:   No results found for this or any previous visit (from the past 24 hour(s)).       Psychiatric Examination:     Vitals:    05/27/17 2132 05/27/17 2351 05/28/17 0100 05/29/17 0740   BP: (!) 155/104 149/81 151/87    Pulse: 104      Resp: 20 16 16 16   Temp:  "97.7  F (36.5  C) 98.9  F (37.2  C) 98.9  F (37.2  C) 97.8  F (36.6  C)   TempSrc: Oral Oral Oral Tympanic   SpO2: 95% 99% 98%    Weight: 109.3 kg (241 lb)  108.9 kg (240 lb)    Height:   1.905 m (6' 3\")        Weight is 240 lbs 0 oz  Body mass index is 30 kg/(m^2).    Appearance: adequately groomed, appeared as age stated, awake, alert and mild distress  Attitude:  evasive, guarded and somewhat cooperative  Eye Contact:  intense  Mood:  \"Fine\"  Affect:  intensity is heightened and guarded  Speech:  clear, coherent and normal prosody  Psychomotor Behavior:  no evidence of tardive dyskinesia, dystonia, or tics and intact station, gait and muscle tone  Throught Process:  illogical and concrete  Associations:  no loose associations  Thought Content:  no evidence of suicidal ideation or homicidal ideation, no auditory hallucinations present, no visual hallucinations present, patient appears to be responding to internal stimuli and paranoia suspected.   Insight:  limited  Judgement:  limited  Oriented to:  time, person, and place  Attention Span and Concentration:  intact  Recent and Remote Memory:  intact         Precautions:     Behavioral Orders   Procedures     Code 1 - Restrict to Unit     Routine Programming     As clinically indicated     Sexual precautions     Status 15     Every 15 minutes.          Diagnoses:     1.  Schizophrenia, paranoid type   2.  Noncompliance, nonadherence.   3.  Premorbid cluster B with likely antisocial traits.           Plan:     Medications:  1.  Saphris: restated on admission at 10 mg daily. Add 5 mg qhs.   2.  PRNs: Vistaril for anxiety and Seroquel of agitation.     Legal Status and Disposition:  1.  The patient was admitted on a 72hr hold. Petition for civil commitment was filed. May consider Ross if refused medications changes.  2.  Disposition to be determined pending clinical response.            "

## 2017-05-31 NOTE — PROGRESS NOTES
[facility] screener came to the unit to screen the patient.  She informed me that the pt's address fall's within [facility]'s jurisdiction.  I called this in yesterday, provided the address etc, it showed as [facility] on our directory. They apologized about their  not catching this mistake.      This writer called [facility] to screen the pt and then faxed all of the information to them.  They will staff this and get back to us about when a screener will be coming to meet with the pt.     They apologized for not catching this prior to sending someone.

## 2017-05-31 NOTE — PROGRESS NOTES
"This writer was walking to go see a different patient when Jv blocked my path stating, \"You need to get the papers to sign me out of here.\"  I told him that I was actually on my way to speak with a patient and would speak with him after that. He said, \"No, you'll speak to me now.\"  He claims I haven't talked to him at all.  I said that I was with the doctor yesterday when they met.  He said, \"yeah, but you didn't talk.\"   I ended the conversation and wasn't able to see the patient I was on my way to see because of his agitation.     He is demanding to leave.   "

## 2017-05-31 NOTE — PROGRESS NOTES
Spoke with pt's mom Silvana.  She was calling to speak with her son but ended up sharing a great deal of information with writer.  She reports that patient has Lyme Disease.  Most recently she reports that pt has been complaining of not feeling well physically and also was having difficulty sleeping. He is seen at [facility] in [place] @ [phone].  He was seeing Rae Lawson but she is no longer at that clinic.  He reportedly liked working with her.  Mom is uncertain of the name of the staff member at [facility] that saw him most recently.  Jv did talk to the clinic about sleep and they ordered Zyprexa.  Pt has also recently developed a rash that is on both sides of his forehead. The cause was unknown and he eventually received acupuncture which help clear it up a little. Jv saw Dr Figueroa for his Lyme disease and he is one of the Drs who prescribed antibiotics.  He is currently seeing Drs Jana and  Luca (unknown spelling) in [place] for the Lyme disease treatment.Mom reported that when Jv was on antibiotics for the Lyme disease he did not have problems with his mental health status.  She reports that he received the antibiotic Rocephin.  Mom talked at length about the Lyme disease and her hopes for possible treatments in the future. She discussed Stem Cell surgery.    Overall mom talked very positively about Jv and described many years of him living with her and things going very well.

## 2017-05-31 NOTE — PLAN OF CARE
Problem: Psychotic Symptoms  Intervention: Social and Therapeutic Interv (Psychotic Symptoms)     Pt attended 2 of 2 OT groups today, even coming, and staying in group when he was the only person.  Polite and cooperative, telling writer he really enjoys group time.  Has continued to paint mandala's on rocks, commenting again he finds them really relaxing and he likes the creativity.  Has not made any complaints about being in the hospital to writer today.    Goal: Work with pt to increase awareness of how symptoms can impact performance on goal-directed tasks and life management skills. Will provide opportunities to build on coping strategies to manage symptoms during hospitalization and after d/c.

## 2017-05-31 NOTE — SIGNIFICANT EVENT
"In conversation with this writer Jv demanded to leave the care of Dr. Shah stating \"He's not my doctor.\" Jv proceeded to make a specific threat against Dr. Shah, stating \"I'll whip his ass for him. You keep him away from me.\"  "

## 2017-05-31 NOTE — PROGRESS NOTES
"Pt refused his scheduled morning medication. Pt stated \"I only take that medication for sleep.\" RN explained that the medication was prescribed by his doctor and was not for sleep. Pt stated, \"He's not my doctor. I'm just waiting to see my .\" RN tried again and pt continued to refuse.   "

## 2017-06-01 PROCEDURE — 25000132 ZZH RX MED GY IP 250 OP 250 PS 637: Performed by: NURSE PRACTITIONER

## 2017-06-01 PROCEDURE — 97150 GROUP THERAPEUTIC PROCEDURES: CPT | Mod: GO

## 2017-06-01 PROCEDURE — 99223 1ST HOSP IP/OBS HIGH 75: CPT | Performed by: NURSE PRACTITIONER

## 2017-06-01 PROCEDURE — 25000132 ZZH RX MED GY IP 250 OP 250 PS 637: Performed by: PSYCHIATRY & NEUROLOGY

## 2017-06-01 PROCEDURE — 99232 SBSQ HOSP IP/OBS MODERATE 35: CPT | Performed by: PSYCHIATRY & NEUROLOGY

## 2017-06-01 PROCEDURE — 12400003 ZZH R&B MH CRITICAL UMMC

## 2017-06-01 PROCEDURE — 99207 ZZC CONSULT E&M CHANGED TO INITIAL LEVEL: CPT | Performed by: NURSE PRACTITIONER

## 2017-06-01 RX ORDER — LISINOPRIL 20 MG/1
20 TABLET ORAL DAILY
Status: DISCONTINUED | OUTPATIENT
Start: 2017-06-01 | End: 2017-07-21 | Stop reason: HOSPADM

## 2017-06-01 RX ADMIN — QUETIAPINE FUMARATE 200 MG: 200 TABLET, FILM COATED ORAL at 21:46

## 2017-06-01 RX ADMIN — LISINOPRIL 20 MG: 20 TABLET ORAL at 16:28

## 2017-06-01 RX ADMIN — ASENAPINE MALEATE 10 MG: 10 TABLET SUBLINGUAL at 21:46

## 2017-06-01 RX ADMIN — ASENAPINE MALEATE 5 MG: 5 TABLET SUBLINGUAL at 07:45

## 2017-06-01 ASSESSMENT — ACTIVITIES OF DAILY LIVING (ADL)
HYGIENE/GROOMING: INDEPENDENT
DRESS: INDEPENDENT;SCRUBS (BEHAVIORAL HEALTH)
LAUNDRY: UNABLE TO COMPLETE
DRESS: SCRUBS (BEHAVIORAL HEALTH);INDEPENDENT
HYGIENE/GROOMING: INDEPENDENT
ORAL_HYGIENE: INDEPENDENT
ORAL_HYGIENE: INDEPENDENT

## 2017-06-01 NOTE — PROGRESS NOTES
Pt awake most of the night. Several requests for snacks and beverages. Easily directable (encouraged to get some sleep). Pleasant and conversational at times, slightly confrontational at others (asked why staff have certain privileges that pt's don't). Appears tense, hypervigilant. Sitting in chair watching hallway when out of room. Declined several offers of med for sleep and/or anxiety. Total sleep overnight 1 hour. q 15 min checks, assault and sexual precautions.

## 2017-06-01 NOTE — PROGRESS NOTES
Pt's /99 and HR 70. Pt denies any symptoms. Internal medicine paged. Lisinopril ordered. Will continue to assess closely.

## 2017-06-01 NOTE — PROGRESS NOTES
Pt received Lisinopril 20 mg and his BP improved slightly after 90 minutes. He denies any s/s.  Will continue to assess closely.

## 2017-06-01 NOTE — PROGRESS NOTES
Pt became confrontational with RN when he was asked to pace in lounge 2 only. Required firm redirection with 3 staff standing by for pt to step back so pod doors could be closed.

## 2017-06-01 NOTE — PROGRESS NOTES
This writer provided petition to impose treatment and medication to the screener Jenna Khalil while she was here to screen the pt. Copy placed in chart and on scanning board.

## 2017-06-01 NOTE — PROGRESS NOTES
"[facility], [place]   Psychiatric Progress Note        Interim History:   The patient's care was discussed with the treatment team during the daily team meeting and/or staff's chart notes were reviewed.  Staff report patient continues to be tense and irritable. Preoccupied, responding to internal stimuli, but thoughts a bit more organized. No major outbursts and redirectable. Compliant with medications but need significant prompting. Slept 1 hr last night. Poor insight and denies mental illness.     Met patient with staff. He was significantly more calm and offered an apology for earlier demeanor. Dismissive of symptoms and mental illness. Noted tolerating medications well. Denied all symptoms and alleged that he is sleeping well. Receptive to continuing medications. Denied SI, LOYDA and HI. Focused on discharge. Believes that meeting with pre-petition examiner went well.     Discussed medications and care plan.       Medications:       asenapine  5 mg Sublingual Daily     asenapine  10 mg Sublingual At Bedtime          Allergies:     Allergies   Allergen Reactions     No Known Drug Allergies           Labs:   No results found for this or any previous visit (from the past 24 hour(s)).       Psychiatric Examination:     Vitals:    05/27/17 2132 05/27/17 2351 05/28/17 0100 05/29/17 0740   BP: (!) 155/104 149/81 151/87    Pulse: 104      Resp: 20 16 16 16   Temp: 97.7  F (36.5  C) 98.9  F (37.2  C) 98.9  F (37.2  C) 97.8  F (36.6  C)   TempSrc: Oral Oral Oral Tympanic   SpO2: 95% 99% 98%    Weight: 109.3 kg (241 lb)  108.9 kg (240 lb)    Height:   1.905 m (6' 3\")        Weight is 240 lbs 0 oz  Body mass index is 30 kg/(m^2).    Appearance: adequately groomed, appeared as age stated, awake, alert and mild distress  Attitude:  evasive, guarded and more cooperative  Eye Contact:  intense  Mood:  \"OK\".  Affect:  less tense.  and guarded  Speech:  clear, coherent and normal prosody  Psychomotor " Behavior:  no evidence of tardive dyskinesia, dystonia, or tics and intact station, gait and muscle tone  Throught Process:  concrete and evasive  Associations:  no loose associations  Thought Content:  no evidence of suicidal ideation or homicidal ideation, no auditory hallucinations present, no visual hallucinations present, patient appears to be responding to internal stimuli and paranoia suspected.   Insight:  limited  Judgement:  limited  Oriented to:  time, person, and place  Attention Span and Concentration:  intact  Recent and Remote Memory:  intact         Precautions:     Behavioral Orders   Procedures     Code 1 - Restrict to Unit     Routine Programming     As clinically indicated     Sexual precautions     Status 15     Every 15 minutes.          Diagnoses:     1.  Schizophrenia, paranoid type   2.  Noncompliance, nonadherence.   3.  Premorbid cluster B with likely antisocial traits.           Plan:     Medications:  1.  Saphris: restated on admission at 10 mg daily. Add 5 mg qhs.   2.  PRNs: Vistaril for anxiety and Seroquel of agitation.     -- IM consult, re: Lyme management.     Legal Status and Disposition:  1.  The patient was admitted on a 72hr hold. Petition for civil commitment with Ross was filed. Ross-ed medications included Saphris, Zyprexa, Risperdal and Haldol.    2.  Disposition to be determined pending clinical response. Discharge will be granted once active psychosis resolved and safety established in the community.

## 2017-06-01 NOTE — CONSULTS
[facility]  Internal Medicine Initial Visit      Jv Pate MRN# [id number]   YOB: 1985 Age: 32 year old   Date of Admission: 5/27/2017  PCP: Rae Lawson  Date of Service: 6/1/2017    Referring Provider: Behavioral Health - Mario Shah MD  Reason for Visit: Concern about neurologic lyme, facial rash.          Assessment and Recommendations:     Jv Pate is a 32 year old male with a history of schizophrenia, paranoid type, premorbid cluster B likely antisocial traits who was admitted and being followed by psychiatry for HI and behavioral changes. IM was asked to see this patient for patient being concerned about lyme disease.     1. Schizophrenia, paranoid type with acute decompensation   Defer to primary psychiatry team    2. H/o lymes disease. No early or early disseminated findings such as viral prodrome, erythema migrans or lymphadenopathy noted on exam to suggest need for antibiotic therapies. Patient with a diagnosis of neurologic lyme disease and recommendation previously has been made for spinal tap to analyze CSF for antibodies or DNA. Cannot find records that this has been completed and per neurology note from 2013 it was recommended not to pursue this. Patient lacks numbness, weakness, facial nerve palsy, visual disturbances, HAs or neck pains to suggest acute exacerbation of neurologic lyme at this time.   - Per curbside with ID and up to date, serologic evaluation of lyme titer not beneficial without early lyme disease symptoms.  - Per ID, no link between lyme and schizophrenia.   - If psychiatry feels psychiatric decline does not seem to match mental health picture, further organic w/u may be indicated. This would consist of brain MRI, neuro consult for spinal tap. Based on clinical exam, do not feel neurologic lyme is impacting current mental state.     3. H/o contact dermatitis. Per patient, MAGDALENA shampoo to left side of forehead, face. Symptoms  "present \"years ago\". Resolved without further use of MAGDALENA products. No active rash today.      4. HTN, poorly controlled. OP records reveal use of lisinopril in past. SBP 140s-150s this admission. Elevation could be r/t adrenergic stress response as a result of psychiatry decompensation.   - Vitals per unit routine. Contact IM if SBP > 160 or DBP > 100    Currently, medically stable and I will be happy to follow up and see again for any intercurrent medical issues. Thank you for the opportunity to be a part of this patient's care. Attempted to call patient's mother x 2 to discuss my findings but unable to connect.     IM will sign off.     Marge Diallo, JASON, CNP  [facility] [place]  Hospitalist Service           History of Present Illness:     History is obtained from the patient and medical record.     Jv Pate is a 32 year old male with a history of schizophrenia, paranoid type, premorbid cluster B likely antisocial traits and h/o lymes disease who was admitted and being followed by psychiatry for HI and behavioral changes. IM was asked to see this patient for patient being concerned about lyme disease causing his schizophrenia.     Per chart review, patient has a diagnosis of lyme disease and neurologic lyme syndrome. Per chart, managed by a Jessica Caputo in [place], [place]. Per H&P, PCP has been following. Unable to find any recent PCP visits since 2013. Unclear when first diagnosed but appears to have had rocephin interventions via PICC in 2008 and 2009. Per psychiatry, patient and mother state sig improvement in mental health symptoms with this intervention. Subsequently had evaluation at [place] by ID 2011 with antibiotic treatment and recommendation for f/u CSF evaluation. S/p neurology evaluation 2013 at [facility] with findings of past MRI 2005 without findings suggestive of CNS lyme and given the paucity of symptoms, did not feel spinal tap necessary.     Per patient, he states " "he feels \"great\". He does not feel he is having any problems with lyme. Denies any paresthesias, HA, neuropathy, neck pain, tremors, visual changes, difficulty with speech or swallowing or pain symptoms. No recent fevers. Denies body aches, chills. No recent tick bites. Last bit on lower back and no rash noted following. Unclear how long ago this tick bit was. Verbalizes that this medical exam is \"not necessary\".           Review of Systems:     Constitutional: negative for fever/chills or recent weight changes   Eyes: negative for vision changes   Ears/Nose/Throat: negative for ear pain, sore throat, nasal or sinus congestion   Cardiovascular: negative for chest pain or palpitations   Respiratory: negative for cough or shortness of breath   Gastrointestinal: negative for abdominal pain, N/V, diarrhea or constipation   Genitourinary: negative for dysuria, urinary urgency or frequency   Musculoskeletal: negative for joint pains   Neurologic: negative for headaches. No numbness, tingling, weakness of extremities   Skin: negative for acute rashes or wounds. States reaction to MAGDALENA shampoo years ago affecting his left face and forehead.   Endocrine: negative for polydipsia, polyphagia, polyuria; negative for heat/cold intolerance           Past Medical History:   PMH has been reviewed and updated with visit today.     Past Medical History:   Diagnosis Date     Cluster B personality disorder     antisocial traits.      H/o Lyme disease     Working with Dr. Karan Caputo from [place], [place]. [facility] with diagnosis of neurologic lyme disease.      Schizophrenia (H)     paranoid type             Past Surgical History:   PSH has been reviewed and updated with visit today.     Past Surgical History:   Procedure Laterality Date     NO HISTORY OF SURGERY               Social History:   Patient's social history has been reviewed and updated today.     Social History     Social History     Marital status: Single     Spouse name: N/A     " Number of children: N/A     Years of education: N/A     Occupational History     Not on file.     Social History Main Topics     Smoking status: Current Every Day Smoker     Packs/day: 1.50     Smokeless tobacco: Not on file     Alcohol use No     Drug use: No     Sexual activity: Not on file     Other Topics Concern     Not on file     Social History Narrative    Intake 17:         Information obtained from chart review.               His education, occupation, finances and legal issues:  Not working.  Indicates he did go to high school in [place] in .  Then, [employer] but never finished and the school closed.        Records indicate tobacco use.     Tox on admission + THC.               Family History:   Patient's family history has been reviewed and updated today.     Family History   Problem Relation Age of Onset     Neurologic Disorder Father      HEART DISEASE Paternal Grandfather      attack     Family Status   Relation Status     Mother Alive     Father Alive     Maternal Grandmother Alive     Maternal Grandfather      Paternal Grandmother      Paternal Grandfather      Brother Alive     Brother Alive     Brother Alive     Brother Alive             Allergies:   Patient's allergies have been reviewed and updated today.     Allergies   Allergen Reactions     No Known Drug Allergies              Medications:     Current Facility-Administered Medications   Medication     asenapine (SAPHRIS) sublingual tablet 5 mg     QUEtiapine (SEROquel) tablet 200 mg     nicotine polacrilex (NICORETTE) gum 2 mg     asenapine (SAPHRIS) sublingual tablet 10 mg     hydrOXYzine (ATARAX) tablet 25-50 mg            Physical Exam:     Vitals are reviewed. No vitals since  noted. Intemrittent tachycardia present. SBP borderline elevated 140s-150s.     GENERAL: Non-toxic appearing in NAMD.   HEENT: Anicteric sclera. PERRLa intact. Mucous membranes moist. No thrush. Normal neck  movements. No lymphadenopathy.   CV: RRR. S1, S2. No murmurs appreciated.   RESPIRATORY: Effort normal at rest and with talking activity. Lungs CTAB with no wheezing, rales, rhonchi. On RA.   GI: Abdomen soft and not distended.   NEUROLOGICAL: No involuntary movements. A&Ox 3. Calm. Facial symmetry intact. No confusion. Very disinterested in exam. Adequate sensation. Tinels and phalens sign negative. Unable to complete DTRs. No nuchal rigidity.   MUSC: Skeletalconfiguration appears normal. Muscle mass appropriate for age. Joint appearance without acute swelling, warmth, redness. Ambulates with ease. Hand grasp equal bilaterally.   EXTREMITIES: No peripheral edema.   SKIN: No jaundice. No rashes or sores to exposed body areas and to back.           Data:   CBC:  Recent Labs   Lab Test  12/10/10   1240   WBC  8.7   RBC  5.59   HGB  17.4   HCT  50.9   MCV  91   MCH  31.1   MCHC  34.2   RDW  13.2   PLT  162       CMP:  Recent Labs   Lab Test  05/27/17   2134   12/10/10   0555   NA  140   < >  139   POTASSIUM  3.7   < >  3.9   CHLORIDE  107   < >  102   EB  9.4   < >  9.7   CO2  25   < >  25   BUN  11   < >  17   CR  0.76   < >  0.79   GLC  147*   < >  96   AST   --    --   25   ALT   --    --   12   BILITOTAL   --    --   0.3   ALBUMIN   --    --   4.6   PROTTOTAL   --    --   7.2   ALKPHOS   --    --   78    < > = values in this interval not displayed.       TSH:  TSH   Date Value Ref Range Status   04/13/2006 1.45 0.4 - 5.0 mU/L Final     Tox screen: + THC  HCG: N/A    Unresulted Labs Ordered in the Past 30 Days of this Admission     No orders found from 3/28/2017 to 5/28/2017.

## 2017-06-01 NOTE — PLAN OF CARE
"Problem: Psychotic Symptoms  Intervention: Social and Therapeutic Interv (Psychotic Symptoms)     Pt attended the morning OT group, eager to work on a project, though said he probably wouldn't keep it as \"I don't really like to look at things and have memories of this place\" with a laugh.  Pleasant, generally quiet, though responds in a friendly way when spoken to.        "

## 2017-06-01 NOTE — PLAN OF CARE
"Problem: Psychotic Symptoms  Goal: Psychotic Symptoms  Signs and symptoms of listed problems will be absent or manageable.   Outcome: Improving  Pt was out in the milieu for most of the shift. Pt appears flat and blunted but when engaging in conversation pt appears brighter and more talkative. Writer played several games of cards with Jv. Pt seems to understand the games but at times was a little slow to respond. Pt says \"I am depressed about being here and anxious because I just want to go home.\" Pt did not rate anxiety or depression. Pt was calm and pleasant. Pt denies SI/SIB and HI. Pt said \"I would never hurt anyone unless they hurt me first.\" Pt was med compliant. Pt also asked writer \"If I'm released tomorrow could you give me a ride home?\" Explained to pt that is not allowed here, pt was accepting of this answer. Will continue to monitor.       "

## 2017-06-02 PROCEDURE — 25000132 ZZH RX MED GY IP 250 OP 250 PS 637: Performed by: PSYCHIATRY & NEUROLOGY

## 2017-06-02 PROCEDURE — 90853 GROUP PSYCHOTHERAPY: CPT

## 2017-06-02 PROCEDURE — 99207 ZZC NO CHARGE VISIT/PATIENT NOT SEEN: CPT | Performed by: NURSE PRACTITIONER

## 2017-06-02 PROCEDURE — 12400003 ZZH R&B MH CRITICAL UMMC

## 2017-06-02 PROCEDURE — 25000132 ZZH RX MED GY IP 250 OP 250 PS 637: Performed by: NURSE PRACTITIONER

## 2017-06-02 RX ADMIN — LISINOPRIL 20 MG: 20 TABLET ORAL at 08:38

## 2017-06-02 RX ADMIN — QUETIAPINE FUMARATE 200 MG: 200 TABLET, FILM COATED ORAL at 21:41

## 2017-06-02 RX ADMIN — ASENAPINE MALEATE 10 MG: 10 TABLET SUBLINGUAL at 20:01

## 2017-06-02 RX ADMIN — ASENAPINE MALEATE 5 MG: 5 TABLET SUBLINGUAL at 08:38

## 2017-06-02 ASSESSMENT — ACTIVITIES OF DAILY LIVING (ADL)
ORAL_HYGIENE: INDEPENDENT
GROOMING: INDEPENDENT
DRESS: SCRUBS (BEHAVIORAL HEALTH)
GROOMING: INDEPENDENT
ORAL_HYGIENE: INDEPENDENT
DRESS: SCRUBS (BEHAVIORAL HEALTH)

## 2017-06-02 NOTE — PROGRESS NOTES
Pt's /101 HR 95. Pt denies any s/s or any physical discomfort. Doctor Gustavo was paged and he told writer to recheck vital signs in an hour or two, and to call him if BP is still elevated. Will continue to monitor closely for mental and physical status change.

## 2017-06-02 NOTE — PROGRESS NOTES
06/01/17 1965   Behavioral Health   Hallucinations denies / not responding to hallucinations   Thinking poor concentration;distractable   Orientation person: oriented;place: oriented   Memory baseline memory   Insight poor   Judgement impaired   Eye Contact at examiner   Affect full range affect   Mood mood is calm   Physical Appearance/Attire appears stated age;attire appropriate to age and situation;neat   Hygiene well groomed   Suicidality other (see comments)  (none noted)   Self Injury other (see comment)  (none noted)   Activity other (see comment)  (normal activity)   Speech clear;coherent   Medication Sensitivity no stated side effects;no observed side effects   Psychomotor / Gait balanced;steady   Activities of Daily Living   Hygiene/Grooming independent   Oral Hygiene independent   Dress scrubs (behavioral health);independent   Laundry unable to complete   Room Organization independent     Pt was present in milieu and social with peers and staff. Pt stated that he feels better. Denied, SI, SIB, hallucinations and HI. No behavioral concerns.

## 2017-06-02 NOTE — PROGRESS NOTES
[facility] paperwork was faxed to us and served upon the pt.  He has a copy.   Preliminary examination:  @ 9am  Final Hearin/16 @ 1:30pm.     Pt on a court hold.

## 2017-06-02 NOTE — PLAN OF CARE
Problem: Psychotic Symptoms  Goal: Psychotic Symptoms  Signs and symptoms of listed problems will be absent or manageable.   Outcome: Declining  Pt continues to have symptoms of psychosis. Pt has declined from presentation yesterday. Pt was tense and irritable this morning. Pt was also very paranoid. He was asking bizarre, demanding questions of the staff. Pt was irritable and uncooperative. Pt slammed his door. Pt also made some threatening gestures. Pt's memory seemed impaired as he asked the same questions multiple times. Pt took his morning medications but was very suspicious throughout. Pt has intense, staring eye contact. Pt became more calm after this. Pt was generally appropriate and more calm throughout the rest of the shift. Pt was given his court hold paperwork and was offered an opportunity to discuss it, which he declined. Pt refused further interview at this time. Pt reports no physical health issues or side effects from medications at this time. Pt's BP remains high, medicine is following.

## 2017-06-02 NOTE — PROGRESS NOTES
Brief Internal Medicine Note:    Patient with known h/o HTN and past lisinopril use. BP with elevation since admission but peaked last evening 181/99 with lisinopril resumption 6/1/17. Patient asymptomatic.     A/P  HTN. BP much improved.   - Continue lisinopril.   - Medicine will follow with you.     Marge Diallo DNP, CNP  [facility]

## 2017-06-02 NOTE — PROGRESS NOTES
"Highly paranoid b/h this AM. Approached for morning vitals with glaring stare and tense body posture. Began demanding what the phone on the wall was for: \"Is that for sending code?!\" Why aren't you wearing gloves? I\"ll forgive you this time.\" Walked out of room and began intimidating female staff member who was monitoring another pt. As this author approached, he again began glaring and refused to move away from staff he had been intimidating. At this point, he eventually stood, after being redirected to his room to calm, slowly walked past staff, glaring and posturing. RN informed. Hostile when informed his AM meds were available. Began arguing with RN regarding the timing of his meds and meals. Was offered his breakfast first to alleviate his concerns. Was intimidating and belligerent upon entering room to deliver his tray. Made threatening comment about staff being in his room.      "

## 2017-06-03 PROCEDURE — 25000132 ZZH RX MED GY IP 250 OP 250 PS 637: Performed by: PSYCHIATRY & NEUROLOGY

## 2017-06-03 PROCEDURE — 25000132 ZZH RX MED GY IP 250 OP 250 PS 637: Performed by: EMERGENCY MEDICINE

## 2017-06-03 PROCEDURE — 25000132 ZZH RX MED GY IP 250 OP 250 PS 637: Performed by: NURSE PRACTITIONER

## 2017-06-03 PROCEDURE — 12400003 ZZH R&B MH CRITICAL UMMC

## 2017-06-03 RX ORDER — ACETAMINOPHEN 325 MG/1
650 TABLET ORAL EVERY 4 HOURS PRN
Status: DISCONTINUED | OUTPATIENT
Start: 2017-06-03 | End: 2017-07-21 | Stop reason: HOSPADM

## 2017-06-03 RX ADMIN — ASENAPINE MALEATE 5 MG: 5 TABLET SUBLINGUAL at 08:45

## 2017-06-03 RX ADMIN — QUETIAPINE FUMARATE 200 MG: 200 TABLET, FILM COATED ORAL at 02:45

## 2017-06-03 RX ADMIN — LISINOPRIL 20 MG: 20 TABLET ORAL at 08:45

## 2017-06-03 RX ADMIN — QUETIAPINE FUMARATE 200 MG: 200 TABLET, FILM COATED ORAL at 15:08

## 2017-06-03 RX ADMIN — QUETIAPINE FUMARATE 200 MG: 200 TABLET, FILM COATED ORAL at 20:14

## 2017-06-03 RX ADMIN — ASENAPINE MALEATE 10 MG: 10 TABLET SUBLINGUAL at 18:52

## 2017-06-03 RX ADMIN — HYDROXYZINE HYDROCHLORIDE 25 MG: 25 TABLET ORAL at 10:48

## 2017-06-03 RX ADMIN — QUETIAPINE FUMARATE 200 MG: 200 TABLET, FILM COATED ORAL at 08:02

## 2017-06-03 ASSESSMENT — ACTIVITIES OF DAILY LIVING (ADL)
GROOMING: INDEPENDENT
LAUNDRY: UNABLE TO COMPLETE
DRESS: SCRUBS (BEHAVIORAL HEALTH)
GROOMING: HANDWASHING;INDEPENDENT
DRESS: SCRUBS (BEHAVIORAL HEALTH);INDEPENDENT
ORAL_HYGIENE: INDEPENDENT
LAUNDRY: WITH SUPERVISION

## 2017-06-03 NOTE — PLAN OF CARE
"Problem: Psychotic Symptoms  Goal: Psychotic Symptoms  Signs and symptoms of listed problems will be absent or manageable.   Outcome: No Change  RN ASSESSMENT   Patient presents visible in the milieu, and only social with select peers. He continues to demonstrates periodic agitation and hostility toward staff. Dismissive, derogatory,  and rude remarks towards staff.  When redirected patient defers his behavior to mental illness stating, \" I am the mentally ill here! I have rights!\"  PRN of seroquel given early in a.m, due to significant agitation and threatening remarks towards staff. Refused VS assessment. Denied suicidal and homicidal thoughts. Mood irritable. Insight into situation remains poor.  Sleep remains poor.  Per chart review patient has been sleeping average of 5.5 hours per night.   Current legal status court hold. Refer to case manger notes for discharge planing and recommendations. Continue with current treatment plan and recommendations. Continue to monitor and reassess symptoms. Monitor response to medications. Monitor progress towards treatment goals. Encourage groups and participation.      "

## 2017-06-03 NOTE — PROGRESS NOTES
"Pt restless at 0200. Asked for snack. Still awake at 0230. Offered med for sleep, pt chose Seroquel 200 mg but was then reluctant to take it. \"I'm allergic to this you know, it doesn't help and it makes you hungry and fat. I'm very uncomfortable with this. I've been here more than three days you know\". Emphatic and irritable but not confrontational. Pt took the Seroquel at 0245. Appeared to be asleep by 0315. Total sleep overnight 5.5 hours. q15 min checks, assault and sexual precautions.  "

## 2017-06-03 NOTE — PROGRESS NOTES
"Jv was visible in the milieu. Jv and this writer sat down to play a card game; Jv then reported that he was too tired and not able to concentrate on the game. In conversation Jv stated \"I just need to listen to the doctors, take my meds, and listen to the staff. Then I can go back to living my life.\" Jv was not verbally aggressive during this shift. He made odd statements and stared intensely at female staff members.       06/02/17 7370   Behavioral Health   Hallucinations denies / not responding to hallucinations   Thinking poor concentration   Orientation person: oriented;place: oriented;date: oriented;time: oriented   Memory baseline memory   Insight admits / accepts   Judgement impaired   Eye Contact at examiner;staring   Affect full range affect   Mood mood is calm   Physical Appearance/Attire attire appropriate to age and situation   Hygiene well groomed   Suicidality other (see comments)  (none reported)   Self Injury other (see comment)  (none observed)   Activity withdrawn   Speech clear;coherent   Coping/Psychosocial   Verbalized Emotional State acceptance   Activities of Daily Living   Hygiene/Grooming independent   Oral Hygiene independent   Dress scrubs (behavioral health)   Room Organization independent     "

## 2017-06-03 NOTE — PROGRESS NOTES
"Agreed to vitals this AM but once sitting became combative, hostile and verbally abusive. Rambling, nonsensical statements. Author tried to calm and reassure. Pt yelled, \"Don't talk!\", then left the room.  "

## 2017-06-03 NOTE — PROGRESS NOTES
"Another staff member brought breakfast to his room per his request. Upon entering room pt threatened: \"Don't come in her-next time-or else!\"  "

## 2017-06-04 PROCEDURE — 99207 ZZC NO CHARGE VISIT/PATIENT NOT SEEN: CPT | Performed by: NURSE PRACTITIONER

## 2017-06-04 PROCEDURE — 25000132 ZZH RX MED GY IP 250 OP 250 PS 637: Performed by: NURSE PRACTITIONER

## 2017-06-04 PROCEDURE — 12400003 ZZH R&B MH CRITICAL UMMC

## 2017-06-04 PROCEDURE — 25000132 ZZH RX MED GY IP 250 OP 250 PS 637: Performed by: EMERGENCY MEDICINE

## 2017-06-04 PROCEDURE — 25000132 ZZH RX MED GY IP 250 OP 250 PS 637: Performed by: PSYCHIATRY & NEUROLOGY

## 2017-06-04 RX ORDER — HALOPERIDOL 5 MG/1
5 TABLET ORAL
Status: COMPLETED | OUTPATIENT
Start: 2017-06-04 | End: 2017-06-04

## 2017-06-04 RX ORDER — ASENAPINE 10 MG/1
10 TABLET SUBLINGUAL 2 TIMES DAILY
Status: DISCONTINUED | OUTPATIENT
Start: 2017-06-04 | End: 2017-06-04

## 2017-06-04 RX ORDER — DIPHENHYDRAMINE HCL 50 MG
50 CAPSULE ORAL
Status: COMPLETED | OUTPATIENT
Start: 2017-06-04 | End: 2017-06-04

## 2017-06-04 RX ORDER — LORAZEPAM 2 MG/1
2 TABLET ORAL
Status: COMPLETED | OUTPATIENT
Start: 2017-06-04 | End: 2017-06-04

## 2017-06-04 RX ORDER — ASENAPINE 10 MG/1
10 TABLET SUBLINGUAL DAILY
Status: DISCONTINUED | OUTPATIENT
Start: 2017-06-04 | End: 2017-06-05

## 2017-06-04 RX ADMIN — HALOPERIDOL 5 MG: 5 TABLET ORAL at 18:21

## 2017-06-04 RX ADMIN — DIPHENHYDRAMINE HYDROCHLORIDE 50 MG: 50 CAPSULE ORAL at 18:21

## 2017-06-04 RX ADMIN — ASENAPINE MALEATE 10 MG: 10 TABLET SUBLINGUAL at 20:15

## 2017-06-04 RX ADMIN — ACETAMINOPHEN 650 MG: 325 TABLET, FILM COATED ORAL at 08:11

## 2017-06-04 RX ADMIN — ASENAPINE MALEATE 5 MG: 5 TABLET SUBLINGUAL at 08:11

## 2017-06-04 RX ADMIN — QUETIAPINE FUMARATE 200 MG: 200 TABLET, FILM COATED ORAL at 17:09

## 2017-06-04 RX ADMIN — LISINOPRIL 20 MG: 20 TABLET ORAL at 08:11

## 2017-06-04 RX ADMIN — QUETIAPINE FUMARATE 200 MG: 200 TABLET, FILM COATED ORAL at 03:27

## 2017-06-04 RX ADMIN — LORAZEPAM 2 MG: 2 TABLET ORAL at 18:21

## 2017-06-04 ASSESSMENT — ACTIVITIES OF DAILY LIVING (ADL)
GROOMING: HANDWASHING;INDEPENDENT
LAUNDRY: WITH SUPERVISION
ORAL_HYGIENE: INDEPENDENT
DRESS: SCRUBS (BEHAVIORAL HEALTH);INDEPENDENT

## 2017-06-04 NOTE — PROGRESS NOTES
"Verbally abusive, frequent derogatory statements directed at staff, demanding, and hostile. Insults loudly over staff when attempts are made to redirect his intimidating b/h. Yells \"stop staring\" when no one is looking at him. When attempts are made to de-escalate his b/h, he states: \"I'm disabled, you can't tell me what to do. Stop talking!\"  "

## 2017-06-04 NOTE — PLAN OF CARE
"Problem: Psychotic Symptoms  Goal: Psychotic Symptoms  Signs and symptoms of listed problems will be absent or manageable.   Outcome: No Change  RN ASSESSMENT   No changes since previous assessment. Remains hostile and abrasive with staff. Needed multiple redirections for yelling and cursing at staff, and flicking staff off unprovoked.  Affect blunted and tense. Denies need to be hospitalized and having any mental health concerns. Stated angrily, \" You are all grossly misinformed about my diagnosis! Schizophrenia? \"  Denied suicidal and homicidal thoughts. Mood labile, mostly irritable. Insight into situation remains poor. Per chart review patient has been sleeping average of 5 hours per night. Compliant with scheduled medications. Patient signed POLLY for his mother this shift.  Current legal status Court Hold.  Refer to case manger notes for discharge planing and recommendations. Continue with current treatment plan and recommendations. Continue to monitor and reassess symptoms. Monitor response to medications. Monitor progress towards treatment goals. Encourage groups and participation.      "

## 2017-06-04 NOTE — PROGRESS NOTES
"   06/04/17 1746   Seclusion or Restraint Order   In Person Face to Face Assessment Conducted Yes-Eval of pt's immediate situation, reaction to intervention, complete review of systems assessment, behavioral assessment & review/assessment of hx, drugs & meds, recent labs, etc, behavioral condition, need to continue/terminate restraint/seclusion   Patient Experienced No adverse physical outcome from seclusion/restraint initiation   Continuation of Seclus/Restraint indicated at this time Yes     Pt unable to acknowledge his intimidating, threatening and hostile behaviors. States \"staff are just mad because someone with mental illness is far superior to them.\" Demands to be discharged to \"long-term or [facility].\" States he would rather be at either of those facilities and notes he is going to court tomorrow, and going to [place] after. When writer attempts to explain the court process to pt, and remind him he will most likely be returning to the facility, pt states \"Not if I do something to get put in long-term.\" When writer asks for clarification pt yawns, and smiles, and tells writer \"you can go now.\" Assigned RN notified of pts vague threat and concerns for aggression at court.   "

## 2017-06-04 NOTE — PROGRESS NOTES
"Mother contacted the unit. Inquired about patients discharge planning. 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 She expressed concern that provider has not been in coordination with patients OP providers. 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 Patient mother stated that when Jv was treated with antibiotics he did not had any symptoms. Mother would like provider to contact her ( Silvana Pate [phone]) on Monday to discuss disease management plan.   "

## 2017-06-04 NOTE — PROGRESS NOTES
Brief internal medicine note    H/o HTN with lisinopril use. Elevated BP this admission but not on therapy. With lisinopril resumption, BP much improved.    IM will sign off.     Please page the internal medicine job code pager if intercurrent medical issues arise during this hospitalization. Thank you for the involving me in the care of this patient.   Marge Diallo CNP  [facility]

## 2017-06-05 PROCEDURE — 12400003 ZZH R&B MH CRITICAL UMMC

## 2017-06-05 PROCEDURE — 99232 SBSQ HOSP IP/OBS MODERATE 35: CPT | Performed by: PSYCHIATRY & NEUROLOGY

## 2017-06-05 PROCEDURE — 25000132 ZZH RX MED GY IP 250 OP 250 PS 637: Performed by: PSYCHIATRY & NEUROLOGY

## 2017-06-05 PROCEDURE — 25000132 ZZH RX MED GY IP 250 OP 250 PS 637: Performed by: NURSE PRACTITIONER

## 2017-06-05 RX ORDER — OLANZAPINE 5 MG/1
5-10 TABLET ORAL 3 TIMES DAILY PRN
Status: DISCONTINUED | OUTPATIENT
Start: 2017-06-05 | End: 2017-06-05

## 2017-06-05 RX ORDER — DIPHENHYDRAMINE HCL 50 MG
50 CAPSULE ORAL 3 TIMES DAILY PRN
Status: DISCONTINUED | OUTPATIENT
Start: 2017-06-05 | End: 2017-07-17

## 2017-06-05 RX ORDER — OLANZAPINE 10 MG/2ML
5-10 INJECTION, POWDER, FOR SOLUTION INTRAMUSCULAR 3 TIMES DAILY PRN
Status: DISCONTINUED | OUTPATIENT
Start: 2017-06-05 | End: 2017-06-05

## 2017-06-05 RX ORDER — HALOPERIDOL 5 MG/1
5 TABLET ORAL 3 TIMES DAILY PRN
Status: DISCONTINUED | OUTPATIENT
Start: 2017-06-05 | End: 2017-06-05

## 2017-06-05 RX ORDER — LORAZEPAM 0.5 MG/1
0.5 TABLET ORAL 3 TIMES DAILY PRN
Status: DISCONTINUED | OUTPATIENT
Start: 2017-06-05 | End: 2017-06-07

## 2017-06-05 RX ORDER — DIPHENHYDRAMINE HYDROCHLORIDE 50 MG/ML
50 INJECTION INTRAMUSCULAR; INTRAVENOUS 3 TIMES DAILY PRN
Status: DISCONTINUED | OUTPATIENT
Start: 2017-06-05 | End: 2017-07-17

## 2017-06-05 RX ORDER — HALOPERIDOL 5 MG/ML
5 INJECTION INTRAMUSCULAR 3 TIMES DAILY PRN
Status: DISCONTINUED | OUTPATIENT
Start: 2017-06-05 | End: 2017-06-05

## 2017-06-05 RX ORDER — HALOPERIDOL 5 MG/ML
5-10 INJECTION INTRAMUSCULAR 3 TIMES DAILY PRN
Status: DISCONTINUED | OUTPATIENT
Start: 2017-06-05 | End: 2017-07-21 | Stop reason: HOSPADM

## 2017-06-05 RX ORDER — LURASIDONE HYDROCHLORIDE 20 MG/1
20 TABLET, FILM COATED ORAL DAILY
Status: DISCONTINUED | OUTPATIENT
Start: 2017-06-05 | End: 2017-06-06

## 2017-06-05 RX ORDER — LORAZEPAM 2 MG/ML
0.5 INJECTION INTRAMUSCULAR 3 TIMES DAILY PRN
Status: DISCONTINUED | OUTPATIENT
Start: 2017-06-05 | End: 2017-06-07

## 2017-06-05 RX ORDER — HALOPERIDOL 5 MG/1
5-10 TABLET ORAL 3 TIMES DAILY PRN
Status: DISCONTINUED | OUTPATIENT
Start: 2017-06-05 | End: 2017-07-21 | Stop reason: HOSPADM

## 2017-06-05 RX ADMIN — QUETIAPINE FUMARATE 200 MG: 200 TABLET, FILM COATED ORAL at 03:58

## 2017-06-05 RX ADMIN — QUETIAPINE FUMARATE 200 MG: 200 TABLET, FILM COATED ORAL at 11:25

## 2017-06-05 RX ADMIN — LISINOPRIL 20 MG: 20 TABLET ORAL at 07:23

## 2017-06-05 RX ADMIN — ASENAPINE MALEATE 10 MG: 10 TABLET SUBLINGUAL at 07:23

## 2017-06-05 RX ADMIN — LURASIDONE HYDROCHLORIDE 20 MG: 20 TABLET, FILM COATED ORAL at 14:07

## 2017-06-05 RX ADMIN — ASENAPINE MALEATE 10 MG: 10 TABLET SUBLINGUAL at 19:47

## 2017-06-05 RX ADMIN — QUETIAPINE FUMARATE 200 MG: 200 TABLET, FILM COATED ORAL at 16:12

## 2017-06-05 ASSESSMENT — ACTIVITIES OF DAILY LIVING (ADL)
GROOMING: HANDWASHING;INDEPENDENT
DRESS: INDEPENDENT;SCRUBS (BEHAVIORAL HEALTH)
ORAL_HYGIENE: INDEPENDENT
LAUNDRY: UNABLE TO COMPLETE

## 2017-06-05 NOTE — PROGRESS NOTES
"Patient approached writer and stated that he had had a positive mantoux test \"back in 2009 or 2010\" when he was \"in care home\", and requested a repeat mantoux. Patient does not show any symptoms of tuberculosis. Writer informed internal med. Will continue to monitor and assess.   "

## 2017-06-05 NOTE — PROGRESS NOTES
"After report, writer attempted to assess patient for tachycardia, elevated temperature and complaints of \"feeling like crap all over.\" Patient was pacing the loya, attempting to take a shower even though his ride for court was due any minute. Patient was not following redirection attempts by writer to assess patient. Patient was presenting as angry, hostile, swearing at and disrespectful toward staff, demanding that writer put \"into writing\" that writer did not let patient shower. Patient's ride then showed up and patient left with them before writer could alert physician to physical symptoms. Patient had declined PRN tylenol.   "

## 2017-06-05 NOTE — PROGRESS NOTES
"Pt calmed down, appeared sedated and took prn haldol 5 mg, benadryl 50 mg and ativan 2 mg shortly after his bedtime medication. He contracted for safety and promised to have appropriate behavior in the [facility] area. Pt verbalized understanding of the reason why he was put into seclusion, and said that he would prevent such behavior from repeating by \" Listen to and follow directions.\"     "

## 2017-06-05 NOTE — PLAN OF CARE
Problem: General Plan of Care (Inpatient Behavioral)  Goal: Team Discussion  Team Plan:   BEHAVIORAL TEAM DISCUSSION     Participants: Dr. Mario Shah, Sandy Everett RN, Francoise WARE, [facility] and Rae Rowan OT/R  Progress: pt continues to be belligerent and hostile on the unit.  Psychosis still present  Continued Stay Criteria/Rationale: pt is court process for MI w/ almanzar.  Pt had his preliminary exam this morning.   Medical/Physical: see medical chart  Precautions:   Behavioral Orders   Procedures     Code 1 - Restrict to Unit     Routine Programming       As clinically indicated     Sexual precautions     Status 15       Every 15 minutes.     Plan: continue to assess and provide care through court process.  Pt will need aftercare follow-up.   Rationale for change in precautions or plan: no change warranted at this time.

## 2017-06-05 NOTE — PROGRESS NOTES
1. What PRN did patient receive? Anti-Psychotic (Zyprexa/Thorazine/Haldol/Risperdal/Seroquel/Abilify)    2. What was the patient doing that led to the PRN medication? Agitation and Other (threats toward others)    3. Did they require R/S? NO    4. Side effects to PRN medication? None    5. After 1 Hour, patient appeared: Calmer

## 2017-06-05 NOTE — PROGRESS NOTES
"[facility], [place]   Psychiatric Progress Note        Interim History:   The patient's care was discussed with the treatment team during the daily team meeting and/or staff's chart notes were reviewed.  Staff report patient is irritable and belligerent. Code was called due to increased agitation yesterday. Compliant with scheduled medications with prompting. Sleeping poorly, 3 and 5.5hrs past 2 nights. attended court exam today. Dismissive of symptoms. Demanding, threatening and pressured.    Met patient with staff. He was significantly more calm and cooperative. Frustrated with court hearing. Dismissive of symptoms and noted that he is doing well. Denied SI, Edith and HI. Denied hallucinations and paranoia. Discussed several neuroleptics and past trials. Eventually agreed to consider Latuda.  Focussed on discharge.      Discussed medications and care plan.       Medications:       asenapine  10 mg Sublingual Daily     lisinopril  20 mg Oral Daily     asenapine  10 mg Sublingual At Bedtime          Allergies:     Allergies   Allergen Reactions     No Known Drug Allergies           Labs:   No results found for this or any previous visit (from the past 24 hour(s)).       Psychiatric Examination:     Vitals:    06/01/17 2041 06/02/17 0800 06/04/17 0811 06/05/17 0723   BP: 140/84  134/85 138/82   Pulse:       Resp:  18  16   Temp:  97.5  F (36.4  C)  99.2  F (37.3  C)   TempSrc:  Tympanic  Tympanic   SpO2:    97%   Weight:       Height:           Weight is 240 lbs 0 oz  Body mass index is 30 kg/(m^2).    Appearance: awake, alert, adequately groomed, appeared as age stated and no apparent distress  Attitude:  cooperative, evasive and guarded  Eye Contact:  intense  Mood:  \"OK\".  Affect:  less tense and a bit more engaged.   Speech:  clear, coherent and normal prosody  Psychomotor Behavior:  no evidence of tardive dyskinesia, dystonia, or tics and intact station, gait and muscle tone  Throught " Process:  linear but evasive  Associations:  no loose associations  Thought Content:  no evidence of suicidal ideation or homicidal ideation, no auditory hallucinations present, no visual hallucinations present, patient appears to be responding to internal stimuli and paranoia suspected.   Insight:  limited  Judgement:  limited  Oriented to:  time, person, and place  Attention Span and Concentration:  intact  Recent and Remote Memory:  intact         Precautions:     Behavioral Orders   Procedures     Code 1 - Restrict to Unit     Routine Programming     As clinically indicated     Sexual precautions     Status 15     Every 15 minutes.          Diagnoses:     1.  Schizophrenia, paranoid type   2.  Noncompliance, nonadherence.   3.  Premorbid cluster B with likely antisocial traits.           Plan:     Medications:  1.  Saphris: restated on admission and titrated to 10 mg BID. Dose was later lowered with plan to cross taper to Latuda due to lack of efficacy.    2.  PRNs: Vistaril for anxiety and Seroquel for restlessness and haldol with Benadryl and Ativan for agitation and psychosis.   3.  Latuda: start at 20 mg daily, with goals to titrate pending clinical response and tolerability.     -- IM consult consult completed,  re: Lyme management.     Legal Status and Disposition:  1.  The patient was admitted on a 72hr hold. Petition for civil commitment with [place] was filed. Ross-ed medications included Saphris, Zyprexa, Risperdal and Haldol (will add Latuda to Ross)   2.  Disposition to be determined pending clinical response. Discharge will be granted once active psychosis resolved and safety established in the community.

## 2017-06-06 LAB — WBC # BLD AUTO: 6.2 10E9/L (ref 4–11)

## 2017-06-06 PROCEDURE — 25000132 ZZH RX MED GY IP 250 OP 250 PS 637: Performed by: PSYCHIATRY & NEUROLOGY

## 2017-06-06 PROCEDURE — 85048 AUTOMATED LEUKOCYTE COUNT: CPT | Performed by: NURSE PRACTITIONER

## 2017-06-06 PROCEDURE — 25000132 ZZH RX MED GY IP 250 OP 250 PS 637: Performed by: NURSE PRACTITIONER

## 2017-06-06 PROCEDURE — 99232 SBSQ HOSP IP/OBS MODERATE 35: CPT | Performed by: PSYCHIATRY & NEUROLOGY

## 2017-06-06 PROCEDURE — 36415 COLL VENOUS BLD VENIPUNCTURE: CPT | Performed by: NURSE PRACTITIONER

## 2017-06-06 PROCEDURE — 90853 GROUP PSYCHOTHERAPY: CPT

## 2017-06-06 PROCEDURE — 12400003 ZZH R&B MH CRITICAL UMMC

## 2017-06-06 RX ORDER — LURASIDONE HYDROCHLORIDE 40 MG/1
40 TABLET, FILM COATED ORAL DAILY
Status: DISCONTINUED | OUTPATIENT
Start: 2017-06-07 | End: 2017-06-07

## 2017-06-06 RX ORDER — ASPIRIN 325 MG
325 TABLET ORAL 2 TIMES DAILY PRN
Status: DISCONTINUED | OUTPATIENT
Start: 2017-06-06 | End: 2017-07-21 | Stop reason: HOSPADM

## 2017-06-06 RX ADMIN — ACETAMINOPHEN 650 MG: 325 TABLET, FILM COATED ORAL at 05:41

## 2017-06-06 RX ADMIN — QUETIAPINE FUMARATE 200 MG: 200 TABLET, FILM COATED ORAL at 01:32

## 2017-06-06 RX ADMIN — ASENAPINE MALEATE 10 MG: 10 TABLET SUBLINGUAL at 19:35

## 2017-06-06 RX ADMIN — ASPIRIN 325 MG ORAL TABLET 325 MG: 325 PILL ORAL at 12:58

## 2017-06-06 RX ADMIN — ASPIRIN 325 MG ORAL TABLET 325 MG: 325 PILL ORAL at 23:02

## 2017-06-06 RX ADMIN — LORAZEPAM 0.5 MG: 0.5 TABLET ORAL at 12:58

## 2017-06-06 RX ADMIN — LORAZEPAM 0.5 MG: 0.5 TABLET ORAL at 23:02

## 2017-06-06 RX ADMIN — QUETIAPINE FUMARATE 200 MG: 200 TABLET, FILM COATED ORAL at 16:17

## 2017-06-06 RX ADMIN — QUETIAPINE FUMARATE 200 MG: 200 TABLET, FILM COATED ORAL at 10:16

## 2017-06-06 RX ADMIN — DIPHENHYDRAMINE HYDROCHLORIDE 50 MG: 50 CAPSULE ORAL at 16:51

## 2017-06-06 RX ADMIN — LISINOPRIL 20 MG: 20 TABLET ORAL at 07:04

## 2017-06-06 RX ADMIN — LURASIDONE HYDROCHLORIDE 20 MG: 20 TABLET, FILM COATED ORAL at 07:04

## 2017-06-06 ASSESSMENT — ACTIVITIES OF DAILY LIVING (ADL)
ORAL_HYGIENE: INDEPENDENT
DRESS: SCRUBS (BEHAVIORAL HEALTH)
GROOMING: INDEPENDENT
ORAL_HYGIENE: INDEPENDENT
DRESS: SCRUBS (BEHAVIORAL HEALTH);INDEPENDENT
GROOMING: INDEPENDENT
LAUNDRY: UNABLE TO COMPLETE

## 2017-06-06 NOTE — PROGRESS NOTES
[facility], [place]   Psychiatric Progress Note        Interim History:   The patient's care was discussed with the treatment team during the daily team meeting and/or staff's chart notes were reviewed.  Staff report patient continue to be labile. calm but irritable and verbally hostile at times. Slept only 1.5hrs. Compliant with medications. Demanding, attention seeking and somatic. Verbally hostile toward selected staff.     Met patient with staff. He had several complaint about unit's staff and rules. He asked for a pass to see the dentist but refused offer to set up a f/u appointment. He noted that he is tolerating medications well and receptive to proposed changes. He noted feeling dep and anx but denied racing thoughts, hallucinations and paranoia. He believes that he is sleeping well. He had difficulty comprehending current legal status and argumentative about events let to hospitalization. He denied SI, LOYDA and HI.       I spoke with patient's mother at length. She demanded to start the patient on IV antibiotic for Lyme disease. I reviewed clinical finding and IM team recommendations. She was quite argumentative and demanded to speak with IM team provider. She believes that patient has chronic lyme diease and does not believe that he suffer from mental illness. I reviewed care plan and medications with her. I also offered to set up f/us with outpateint providers.       Discussed medications and care plan.       Medications:       lurasidone  20 mg Oral Daily     lisinopril  20 mg Oral Daily     asenapine  10 mg Sublingual At Bedtime          Allergies:     Allergies   Allergen Reactions     No Known Drug Allergies           Labs:   No results found for this or any previous visit (from the past 24 hour(s)).       Psychiatric Examination:     Vitals:    06/04/17 0811 06/05/17 0723 06/05/17 1418 06/06/17 0659   BP: 134/85 138/82  143/72   Pulse:   126 71   Resp:  16  16   Temp:   99.2  F (37.3  C)  99.3  F (37.4  C)   TempSrc:  Tympanic  Oral   SpO2:  97%     Weight:       Height:           Weight is 240 lbs 0 oz  Body mass index is 30 kg/(m^2).    Appearance: awake, alert, adequately groomed, appeared as age stated and no apparent distress  Attitude:  cooperative, evasive and guarded  Eye Contact:  intense  Mood:  anxious and depressed  Affect:  less tense and a bit more engaged.   Speech:  clear, coherent and normal prosody  Psychomotor Behavior:  no evidence of tardive dyskinesia, dystonia, or tics and intact station, gait and muscle tone  Throught Process:  linear but evasive  Associations:  no loose associations  Thought Content:  no evidence of suicidal ideation or homicidal ideation, no auditory hallucinations present, no visual hallucinations present, patient appears to be responding to internal stimuli and dismissive of delusional thoughts and paranoia suspected.   Insight:  limited  Judgement:  limited  Oriented to:  time, person, and place  Attention Span and Concentration:  intact  Recent and Remote Memory:  intact         Precautions:     Behavioral Orders   Procedures     Assault precautions     Code 1 - Restrict to Unit     Routine Programming     As clinically indicated     Sexual precautions     Status 15     Every 15 minutes.          Diagnoses:     1.  Schizophrenia, paranoid type   2.  Noncompliance, nonadherence.   3.  Premorbid cluster B with likely antisocial traits.           Plan:     Medications:  1.  Saphris: restated on admission and titrated to 10 mg BID. Dose was later lowered with plan to cross taper to Latuda due to lack of efficacy.    2.  PRNs: Vistaril for anxiety and Seroquel for restlessness and haldol with Benadryl and Ativan for agitation and psychosis.   3.  Latuda: started and increased to 40 mg daily, with goals to titrate pending clinical response and tolerability.     -- IM consult consult completed,  re: Lyme management.     Legal Status and  Disposition:  1.  The patient was admitted on a 72hr hold. Petition for civil commitment with [place] was filed. Ross-ed medications included Saphris, Zyprexa, Risperdal and Haldol (will add Latuda to Ross)   2.  Disposition to be determined pending clinical response. Discharge will be granted once active psychosis resolved and safety established in the community.

## 2017-06-06 NOTE — PROGRESS NOTES
This writer at the request of Dr. Shah sent in an amended Ross order.  He wanted to add Latuda.  We took off Risperdal and added Latuda to the Ross.  This was [facility]ed [facility] the [facility] on 6/5/17

## 2017-06-06 NOTE — PROGRESS NOTES
This writer returned call to pt's Mother Silvana Huff - [phone].  The pt called her and told her that we were transferring him to [facility].  I told her that there is no word to us about this at all.  It could be a delusion on his part.  She also went on to discuss her opinion about the pt's symptoms and Lyme's disease.  I explained that an internal medicine doctor had seen him.  At that point.  Dr. Shah was available and had a long conversation with her and explained everything to her at length.      He had to end the conversation as she continued to go on about the Lyme disease vs mental illness.

## 2017-06-06 NOTE — PROGRESS NOTES
"This writer spoke to pt's mother Silvana Pate for 23 minutes this afternoon.  This is the 2nd time I have spoken to her today and she has called multiple times today and spoke with nursing staff.  She also spoke to Dr. Shah earlier.  Dr. Hayward pointed her in the direction of internal medicine and she had a conversation with that doctor as well.  Therefore, multiple medical staff have had conversations with her and answered questions.     She was very accusatory on the phone about us not wanting to hear about his symptoms from 2008 and having his history.  I told her that we do have the records and know about his mental health and medical history etc.  She has the impression that if we \"would just treat the Lyme disease his psychosis would disappear and he wouldn't need commitment.\"  I explained again, that I am not a nurse or a doctor and cannot answer about those things.      When I asked if she would like me to have nursing call her she would go onto another topic changing the subject multiple times without answering the question.     She asked this writer to collect all of the pt's medical records from other hospitals/psychiatrists for him. I explained that was not my role.  She said, \"Don't you care about his hx?\"  I explained that the doctor was treating his current symptoms and if he felt it was appropriate he could request those.  She did not seem pleased with this answer.    When I ended the conversation I told her I could speak with daily to update her.  She stated, \"well it seems like you don't want to talk to me.\"  I told her I am happy to speak with her, but reiterated that I am unable to answer things about medication and questions that are more appropriate for the medical staff.  This writer had to end the conversation by thanking her and saying, \"I'm hanging up now.\"  She was still talking.   "

## 2017-06-06 NOTE — PROGRESS NOTES
"Pt interrupted this writer when I was speaking with another patient stating, \"your gonna talk to me now.  I've been waiting to see you.\"  I asked him to wait until I finished my conversation. He went back to his room.  I then knocked on his door and asked what he wanted to discuss. He said, \"I want to talk out in the hallway.\"  He said, \"I want you go get me an POLLY.\"  (pt filled out multiple POLLY's yesterday and was fixated on this with the nurse.)  I asked what the POLLY was for and how I could help with tx planning.  He said, \"Your my  right?  You do what I tell you to do.  I need you to fill out POLLY's for me.\"  I told him I would give him the POLLY's to fill out.  He then said disdainfully, \"I'm disabled you have to do it for me.\"  I explained that there are a lot of disabled people that are able to fill out POLLY''s.  He spit out, \"Your fired as my .\"    He has been belligerent, rude and dismissive every time I have talked to him.  He has been somewhat targeting me while on the unit.  He has blocked my path, has been impolite and intrusive whenever he sees me.    "

## 2017-06-06 NOTE — PROGRESS NOTES
Pt's mother called again earlier this afternoon, and spoke with SAIDA Caro. She was asking about pt's u tox results, as well as reiterating her concerns of earlier today.

## 2017-06-06 NOTE — PROGRESS NOTES
"Pt's mother called earlier this am. She is requesting a medical work up for pt-asking about his WBC, and if his Lyme Disease has been re evaluated. She is wondering if he should be put back on an atb, and taken off of the antipsychotics-to evaluate if the psychosis is d/t the Lyme's. Reassured mom that this info would be given to pt's Dr. She is requesting a call from him. Pt is also requesting \"some better medication for my tooth pain, and a dentist appt;\" he also wants eye gtts. Will notify  at team mtg.  "

## 2017-06-06 NOTE — PROGRESS NOTES
"Pt remains irritable.  Pt is upset at being here.  Believes that \"locking me up is totally unjustifiable.  I've done nothing wrong.\"  Pt denies anx, dep, SI, SIB.  Pt is in the commitment process.  He has tooth pain + is upset he is not allowed to see a dentist.  Pt is argumentative + attempts to engage staff in power struggles. Pt continued to talk disrespectfully to staff.  Pt rates anx/dep 7 of 10.  \"It's pretty bad.\"  "

## 2017-06-06 NOTE — PLAN OF CARE
"Problem: Psychotic Symptoms  Intervention: Social and Therapeutic Interv (Psychotic Symptoms)     Pt attended the morning OT group, coming a few minutes late.  Generally remains friendly with writer, thanking me occasionally for having group, and letting me know I'm a \"ray of sunshine\" around this place.  Presents as tense with other patients, staff, and nursing student.       There was only one other person in group, Jv immediately asks him if he's an American, \"because you don't look it\".  The peer replies he's half Vietnamese, and pt comments something about [place], and starts asking if he's a citizen, how he learned English, etc, and pt tells Jv he was born here.  Pt was then redirected as the peer appeared uncomfortable.     Toward the end of group, nursing student mentioned she needed to go meet her 'cohort', and pt started asking about the meaning of the word, plural version, which led to questions more about grammar.  Intense with what would have been more of a light-hearted conversation.  Pt then started asking opinion-type questions, but when an opinion was given, pt would respond as if the opinion was wrong, and it was more of a fact type question.  Overall, not hostile, yet didn't appear relaxed.     Pt makes mention [place] ([facility]) and that he was removed from his house, taken here, and heading there.  When asked why he thinks that, he just rambled on not making clear sense.        "

## 2017-06-06 NOTE — PROGRESS NOTES
Visible in milieu majority of the evening appeared calm and occasionally social with staff and peers. He was active playing cards and chess throughout the evening which he stated as a good coping mechanism     06/05/17 2101   Behavioral Health   Hallucinations denies / not responding to hallucinations   Thinking distractable;paranoid   Orientation person: oriented;place: oriented;date: oriented;time: oriented   Memory baseline memory   Insight poor   Judgement impaired   Eye Contact at examiner   Affect full range affect   Mood mood is calm;irritable   Physical Appearance/Attire attire appropriate to age and situation   Hygiene well groomed   Suicidality other (see comments)  (none stated or observed)   Self Injury other (see comment)  (none stated or observed)   Activity isolative;other (see comment)  (often keeping to self in milieu but was active in games )   Speech clear;coherent   Medication Sensitivity no stated side effects;no observed side effects   Psychomotor / Gait balanced;steady   Overt Aggression Scale   Verbal Aggression 0   Aggression against Property 0   Auto-Aggression 0   Physical Aggression 0   Overt Aggression Total Score 0   Sleep/Rest/Relaxation   Day/Evening Time Hours up all shift   Coping/Psychosocial   Verbalized Emotional State frustration   Supportive Measures active listening utilized;positive reinforcement provided;problem solving facilitated;verbalization of feelings encouraged   Trust Relationship/Rapport care explained;questions encouraged;questions answered;empathic listening provided;emotional support provided;reassurance provided;thoughts/feelings acknowledged   Daily Care   Activity up ad jomar   Activities of Daily Living   Hygiene/Grooming handwashing;independent   Oral Hygiene independent   Dress independent;scrubs (behavioral health)   Laundry unable to complete   Room Organization independent   Activity   Activity Level of Assistance independent   Behavioral Health  Interventions   Psychotic Symptoms maintain safety precautions;maintain safe secure environment;simple, clear language;monitor confusion, memory loss, decision making ability and reorient / intervent as needed   Social and Therapeutic Interventions (Psychotic Symptoms) encourage socialization with peers;encourage participation in therapeutic groups and milieu activities   . When staff attempted to check in with patient about his care he appeared tense and dismissive requesting that he did not want to talk about it. When staff changed the subject he seemed to calm quickly. He stated he is frustrated that he has to wait here so long and was concerned that his future hearing will not go in his favor to be released from the hospital.

## 2017-06-06 NOTE — DISCHARGE INSTRUCTIONS
Behavioral Discharge Planning and Instructions      Summary:  You were admitted to the [facility] after exhibiting mental health symptoms including homicidal ideation.  During your hospitalization, you met daily with the staff and were encouraged to attend all therapeutic programming. You met with the Clinical Treatment Coordinator and participated in your discharge planning. Medications were adjusted.      You were dually committed to the [facility] and the [facility] of [facility] Services on June 16th, 2017.  You are being discharged on a Provisional Discharge Agreement to a medical unit until you are medically stable enough to transfer back to us.  You are also court ordered to take the medications the doctor ordered for you.       Primary Diagnoses:   1.  Schizophrenia, paranoid type   2.  Noncompliance, nonadherence.   3.  Premorbid cluster B with likely antisocial traits.    Mental Health Follow-Up:  -At this time you are being Provisionally Discharged from [facility] to have your medical needs attended to.  However, you continue to be under commitment and will be transferred back to our unit upon medical stabilization.  At that time, we will determine when you are able to discharge.     - You have been committed through [facility] as Mentally Ill.  You have also been assigned a [facility] .    :   Jenna Khalil  Phone: [phone]  Fax:   [facility] [place] [facility]    Psychiatry:  You will see An Barahona upon discharge. (lin sDouza is no longer there)   [facility]  [address] [place] [address]  Phone: [phone]  Fax: [phone]  [facility] [place] [facility]    Attend all scheduled appointments with your outpatient providers. Call at least 24 hours in advance if you need to reschedule an appointment to ensure continued access to your outpatient providers.   Major Treatments, Procedures and Findings:  You  were provided with: a psychiatric assessment, medication evaluation and/or management, group therapy and milieu management    Symptoms to Report: feeling more aggressive, increased confusion, losing more sleep, mood getting worse or thoughts of suicide    Early warning signs can include: increased depression or anxiety sleep disturbances increased thoughts or behaviors of suicide or self-harm  increased unusual thinking, such as paranoia or hearing voices    Safety and Wellness:  Take all medicines as directed.  Make no changes unless your doctor suggests them.      Follow treatment recommendations.  Refrain from alcohol and non-prescribed drugs.  Ask your support system to help you reduce your access to items that could harm yourself or others. Items could include:  Firearms  Medicines (both prescribed and over-the-counter)  Knives and other sharp objects  Ropes and like materials  Car keys  If there is a concern for safety, call 911. If there is a concern for safety, call 911.    Resources:   Crisis Intervention: 287.502.5188 or 114-788-9545 (TTY: 751.458.1742).  Call anytime for help.  National [place] on Mental Illness (www.[place].eileen.org): 695.744.3415 or 741-070-4572.  [place] Association for Children's Mental Health (www.mac.org): 141.608.8165.  Alcoholics Anonymous (www.alcoholics-anonymous.org): Check your phone book for your local chapter.  Suicide Awareness Voices of Education (SAVE) (www.save.org): 715-707-WLHQ (9577)  National Suicide Prevention Line (www.mentalhealthmn.org): 761-716-ZNSP (4109)  Mental Health Consumer/Survivor Network of [place] (www.mhcsn.net): 712.329.5564 or 323-196-1745  Mental Health Association of [place] (www.mentalhealth.org): 168.698.6954 or 035-632-2614  Self- Management and Recovery Training., SMART-- Toll free: 353.671.7910  www.[employer].org  [facility] [place] Crisis (COPE) Response - Adult 485 935-4427  [facility] Crisis Response - Adult 499 933-7742  [facility] Crisis  "Response 715 211-0710  Text 4 Life: txt \"LIFE\" to 99928 for immediate support and crisis intervention  Crisis text line: Text \"START\" to 018-955. Free, confidential, 24/7.  Crisis Intervention: 137.410.5436 or 946-213-8100. Call anytime for help.   [facility] Mental [facility] Crisis Team - Child: [phone]  [facility] Mental Health Crisis Response Team - Child: [phone]  [facility] Mental [facility] Crisis: [phone]     The treatment team has appreciated the opportunity to work with you. If you have any questions or concerns our unit number is [phone].        "

## 2017-06-06 NOTE — PROVIDER NOTIFICATION
Patient is requested natural tears (normal saline) eye drop for his dry eyes overnight. He would be grateful if you did order. Patient slept only 1.5 hours overnight!    Patient's mother called this morning and requested an audience/consultation with his provider; she has a signed  release of information and wants to discuss patient's history of present illness as well as his prognosis. She also expressed some theories that led to the exacerbation of symptoms that she would rather discuss with his doctor.

## 2017-06-06 NOTE — PROGRESS NOTES
"Patient's mother called and is requesting to read Jv's medical record. She asked, \"how can I read the records if visiting is from 7 to 8 and the medical records is not open at that time.\" It was explained to her that they patient can request his records when he leaves and that she cannot do this for him unless she is his guardian.   "

## 2017-06-07 PROCEDURE — 25000132 ZZH RX MED GY IP 250 OP 250 PS 637: Performed by: NURSE PRACTITIONER

## 2017-06-07 PROCEDURE — 25000132 ZZH RX MED GY IP 250 OP 250 PS 637: Performed by: PSYCHIATRY & NEUROLOGY

## 2017-06-07 PROCEDURE — 12400003 ZZH R&B MH CRITICAL UMMC

## 2017-06-07 PROCEDURE — 90853 GROUP PSYCHOTHERAPY: CPT

## 2017-06-07 PROCEDURE — 99232 SBSQ HOSP IP/OBS MODERATE 35: CPT | Performed by: PSYCHIATRY & NEUROLOGY

## 2017-06-07 RX ORDER — LURASIDONE HYDROCHLORIDE 80 MG/1
80 TABLET, FILM COATED ORAL DAILY
Status: DISCONTINUED | OUTPATIENT
Start: 2017-06-08 | End: 2017-06-09

## 2017-06-07 RX ORDER — CARBAMAZEPINE 200 MG/1
200 TABLET ORAL 2 TIMES DAILY
Status: DISCONTINUED | OUTPATIENT
Start: 2017-06-07 | End: 2017-06-07

## 2017-06-07 RX ORDER — QUETIAPINE FUMARATE 50 MG/1
50 TABLET, FILM COATED ORAL EVERY 4 HOURS PRN
Status: DISCONTINUED | OUTPATIENT
Start: 2017-06-07 | End: 2017-06-26

## 2017-06-07 RX ORDER — CARBAMAZEPINE 100 MG/1
100 TABLET, EXTENDED RELEASE ORAL 2 TIMES DAILY
Status: DISCONTINUED | OUTPATIENT
Start: 2017-06-07 | End: 2017-06-12

## 2017-06-07 RX ADMIN — LURASIDONE HYDROCHLORIDE 40 MG: 40 TABLET, FILM COATED ORAL at 08:08

## 2017-06-07 RX ADMIN — ASENAPINE MALEATE 10 MG: 10 TABLET SUBLINGUAL at 19:26

## 2017-06-07 RX ADMIN — CARBAMAZEPINE 100 MG: 100 TABLET, EXTENDED RELEASE ORAL at 19:27

## 2017-06-07 RX ADMIN — LORAZEPAM 0.5 MG: 0.5 TABLET ORAL at 05:36

## 2017-06-07 RX ADMIN — ASPIRIN 325 MG ORAL TABLET 325 MG: 325 PILL ORAL at 05:36

## 2017-06-07 RX ADMIN — QUETIAPINE FUMARATE 50 MG: 50 TABLET, FILM COATED ORAL at 21:48

## 2017-06-07 RX ADMIN — LISINOPRIL 20 MG: 20 TABLET ORAL at 08:08

## 2017-06-07 RX ADMIN — CARBOXYMETHYLCELLULOSE SODIUM 1 DROP: 10 GEL OPHTHALMIC at 11:06

## 2017-06-07 RX ADMIN — QUETIAPINE FUMARATE 200 MG: 200 TABLET, FILM COATED ORAL at 13:27

## 2017-06-07 RX ADMIN — QUETIAPINE FUMARATE 200 MG: 200 TABLET, FILM COATED ORAL at 09:07

## 2017-06-07 RX ADMIN — CARBOXYMETHYLCELLULOSE SODIUM 1 DROP: 10 GEL OPHTHALMIC at 19:26

## 2017-06-07 RX ADMIN — QUETIAPINE FUMARATE 200 MG: 200 TABLET, FILM COATED ORAL at 00:45

## 2017-06-07 ASSESSMENT — ACTIVITIES OF DAILY LIVING (ADL)
DRESS: SCRUBS (BEHAVIORAL HEALTH);INDEPENDENT
ORAL_HYGIENE: INDEPENDENT
GROOMING: INDEPENDENT
ORAL_HYGIENE: INDEPENDENT
DRESS: SCRUBS (BEHAVIORAL HEALTH)
LAUNDRY: UNABLE TO COMPLETE
GROOMING: INDEPENDENT

## 2017-06-07 NOTE — PLAN OF CARE
Problem: Psychotic Symptoms  Goal: Psychotic Symptoms  Signs and symptoms of listed problems will be absent or manageable.   Outcome: No Change  Pt continues to be rude and demanding of staff.  When his requests are not immediately met pt verbally insults staff.  He once again told staff that he did not get what he order for dinner.  When writer showed pt a copy of the menu that he filled out, and how the food that he ordered was on his tray, pt told writer that wasn't his menu and that I clearly had no idea what I was talking about.  Pt requests PRN's regularly, saying that he's anxious or in pain, but is unable to clarify where the pain is, or what specific symptoms of anxiety that he's experiencing.  Pt becomes agitated when writer tries to clarify why he needs his requested PRN's.  Pt unwilling to further process with writer.

## 2017-06-07 NOTE — PROGRESS NOTES
"Patient continues to be oppositional, defiant, angry, and attempts to intimidate staff. This writer redirected his intrusive behaviors. Later when writer inquired about having a 1:1 with patient,  patient responded: \"I've got nothing to talk to you about.\" He then added: \"For $3,000.00 dollars a day I would expect someone to talk to me.\"     06/07/17 1422   Behavioral Health   Hallucinations denies / not responding to hallucinations   Thinking paranoid;other (see comment)  (\"I don't want him to do my vitals\")   Orientation person: oriented;place: oriented   Memory baseline memory   Insight poor   Judgement impaired   Eye Contact staring;at examiner  (With intent to intimidate)   Affect angry;blunted, flat;tense;irritable   Mood anxious;labile;irritable;grandiose   Physical Appearance/Attire untidy;disheveled   Hygiene neglected grooming - unclean body, hair, teeth   Suicidality other (see comments)  (Will not speak with staff)   Self Injury other (see comment)  (None observed)   Activity restless;isolative   Speech clear   Medication Sensitivity no stated side effects;no observed side effects   Psychomotor / Gait balanced;steady   Substance Withdrawal Interventions   Social and Therapeutic Interventions (Substance Withdrawal) encourage effective boundaries with peers   Overt Aggression Scale   Verbal Aggression 0   Aggression against Property 0   Auto-Aggression 0   Physical Aggression 0   Overt Aggression Total Score 0   Sleep/Rest/Relaxation   Day/Evening Time Hours resting in bed;napping   Number of hours napping 2 hours   Number of hours resting in bed 2 hours   Psycho Education   Type of Intervention 1:1 intervention   Response refuses   Hours 0.5   Treatment Detail Triggers   Daily Care   Activity up ad jomar   Activities of Daily Living   Hygiene/Grooming independent   Oral Hygiene independent   Dress scrubs (behavioral health)   Laundry unable to complete   Room Organization independent   Activity   Activity " Level of Assistance independent   Behavioral Health Interventions   Psychotic Symptoms redirection of intrusive behaviors   Social and Therapeutic Interventions (Psychotic Symptoms) encourage effective boundaries with peers

## 2017-06-07 NOTE — PROGRESS NOTES
8:30am: Pt s Mother left a lengthy vm on my phone.  She requested a phone call from the attending Psychiatrist.  She stated that she wants him to consult with up to 5 different doctors ( experts ) on Lyme Disease and Psychosis.   She has already spoken to the attending Psychiatrist about these concerns.    Mother also brought up multiple journal articles regarding Lyme disease.  She again requested his medical records from years ago be reviewed.  She also wants copies of all of his records.  I have  explained to her that she needs to go through medical records in order to obtain anything.  She is also not his legal guardian and he is an adult and it is doubtful they will give her anything unless the pt okay s this.      3:05pm:  Pt's mother left another lengthy message and I returned her phone call.   I expressed that I was happy to answer questions regarding his aftercare plan and appointments.  She asked what they were and when so I explained them.   Pt's mother again asked me to pass on the numerous doctors she left on my voicemail to Dr. Shah as she hopes he will speak with them.  The expert that she is referring to is Dr. Mickey Monte from [facility] in [place].   He is the director of the [facility] and director of the [facility] at [facility].    I explained that I would pass on the information to the attending psychiatrist which I have done.  Mother asked about discharge.  I explained he would be here at least until his final commitment hearing which is on 6/16 @ 1:30pm.  His discharge will depend on his symptomology next week and the findings of the court.  Mother confirmed understanding of this.

## 2017-06-07 NOTE — PROGRESS NOTES
[facility], [place]   Psychiatric Progress Note        Interim History:   The patient's care was discussed with the treatment team during the daily team meeting and/or staff's chart notes were reviewed.  Staff report patient continues to be labile and verbally hostile toward staff, but often receptive to verbal de-escalation and PRN medications.Slept 3hrs last night. Compliant with medications. Demanding, attention seeking and attempted to instigate others. Eating well. The patient's mother calling often and demanding.     Met patient with staff. He was dismissive of symptoms and behavior on the unit. He denied dep, racing thoughts and irritability. Noted feeling anxious and stated that Seroquel been helpful. He denied hallucinations and paranoia. Denied SI, LOYDA and HI. The patient exhibited medications seeking behavior. He believes that he slept well and appetite intact. He tells me that he did well on Depakote on the past but developed GI symptoms including diarrhea. He agreed to start on Tegretol after I reviewed medication profile. He noted tolerating current medications well and receptive to increasing Latuda dose.    Discussed medications and care plan.       Medications:       carboxymethylcellulose  1 drop Both Eyes TID     [START ON 6/8/2017] lurasidone  80 mg Oral Daily     carBAMazepine  100 mg Oral BID     lisinopril  20 mg Oral Daily     asenapine  10 mg Sublingual At Bedtime          Allergies:     Allergies   Allergen Reactions     No Known Drug Allergies           Labs:   No results found for this or any previous visit (from the past 24 hour(s)).       Psychiatric Examination:     Vitals:    06/05/17 0723 06/05/17 1418 06/06/17 0659 06/06/17 2004   BP: 138/82  143/72 100/43   Pulse:  126 71 83   Resp: 16  16    Temp: 99.2  F (37.3  C)  99.3  F (37.4  C)    TempSrc: Tympanic  Oral    SpO2: 97%      Weight:       Height:           Weight is 240 lbs 0 oz  Body mass index is  30 kg/(m^2).    Appearance: awake, alert, adequately groomed, appeared as age stated and no apparent distress  Attitude:  cooperative and evasive  Eye Contact:  intense  Mood:  anxious  Affect:  less tense and a bit more engaged.   Speech:  clear, coherent and normal prosody  Psychomotor Behavior:  no evidence of tardive dyskinesia, dystonia, or tics and intact station, gait and muscle tone  Throught Process:  goal oriented and  evasive  Associations:  no loose associations  Thought Content:  no evidence of suicidal ideation or homicidal ideation, no auditory hallucinations present, no visual hallucinations present, patient appears to be responding to internal stimuli and dismissive of delusional thoughts and paranoia suspected.   Insight:  limited  Judgement:  limited  Oriented to:  time, person, and place  Attention Span and Concentration:  intact  Recent and Remote Memory:  intact         Precautions:     Behavioral Orders   Procedures     Assault precautions     Code 1 - Restrict to Unit     Routine Programming     As clinically indicated     Sexual precautions     Status 15     Every 15 minutes.          Diagnoses:     1.  Schizophrenia, paranoid type. (r/o bipolar type 1, rodrigo with psychosis)  2.  Noncompliance, nonadherence.   3.  cluster B with likely antisocial traits.           Plan:     Medications:  1.  Saphris: restated on admission and titrated to 10 mg BID. Dose was later lowered with plan to cross taper to Latuda due to lack of efficacy.    2.  PRNs: Vistaril for anxiety and Seroquel for restlessness and haldol with Benadryl for agitation and psychosis. PRN ativan was discontinued.   3.  Latuda: started and titrated to 80 mg daily, with goals to titrate pending clinical response and tolerability.   4.  Tegretol: start at 100 mg BID. Plan to obtain level in 4-5 days and adjust accordingly.     -- IM consult consult completed,  re: Lyme management.     Legal Status and Disposition:  1.  The patient  was admitted on a 72hr hold. Petition for civil commitment with [place] was filed. Ross-ed medications included Saphris, Zyprexa, Risperdal and Haldol (will add Latuda to Ross)   2.  Disposition to be determined pending clinical response. Discharge will be granted once active psychosis resolved and safety established in the community.

## 2017-06-07 NOTE — PROGRESS NOTES
Brief Medicine Note    Asked by Psychiatry to call patient's mother, Silvana Pate in regards to her concern about Lyme's disease in patient. Internal Medicine was consulted on this patient on 6/1 (please see comprehensive note by Janine Diallo DNP on that date for details).  In short, sneha has a medical history of schizophrenia, paranoid type as well as premorbid cluster B likely antisocial personality traits who is being admitted for HI and behavioral changes.  In terms of his history of Lymes disease, he demonstrates no early or early disseminated findings such as viral prodrome, erythema migrans or lymphadenopathy on exam.  WBC checked and WNL: (6.2).  Afebrile.  I spoke with patient;s mother >45 minutes in regards to his prior diagnosis of neurologic Lyme disease.  She is insistent that his current sym[toms are fully 2/2 untreated vs a recurrence of his neurologic Lymes disease.  She is requesting the psychiatry team to take the patient off of his stabilizing psychiatric medications and for the IM team to start the patient on IV Rocephin for a 6 week course to monitor his MS at that time.  She is convinced that his psychiatric symptoms will resolve with the addition of Rocephin IV.  Upon extensive chart review, it does appear that the patient carries a history of neurologic lyme disease for which he was intermittently treated with IV Ceftriaxone and courses of oral Minocycline.  At one time, it was recommended that patient undergo an LP to analyze CSF for antibodies or DNA, per mother, the patient refused this test and per neurology note in 2013, it was recommended not to pursue this.  Per MARY ALICE Diallo's note on 6/1, she discussed this case with ID consultation and they felt there was no role for IV or PO antibiotics and that serologic evaluation of lyme titer would not be beneficial without early Lymes disease symptoms.  They also noted that there is no link between schizophrenia and Lymes disease at this time.  Despite  reinforcing this to the mother, she continued to insist that we put her son on IV antibiotics and take him off of his psychiatric medications while he is admitted on station 12.  I politely reinforced that his current clinical symptoms, VS and labs do not reflect an active infection for Lymes disease and that his current presentation does not warrant IV antibiotics.  In addition, it would be unsafe for the patient to be taken off of his stabilizing psychiatric medications as he presented with HI and behavior changes.      I spoke with ID again regarding patient.  They again reinforced that unless there are clinical signs of early Lymes disease, serology would not be helpful and that treatment with IV or PO antibiotics would not be indicated.  I spoke with psychiatry staff who reinforced that he believes the patient;s current mood and mental state are fully 2/2 his psychiatric illness rather than an untreated medical problem.  If, at any time, patient;s clinical status were to change (ie he spiked a fever or WBC or began to exhibit clinical signs of Lymes disease) we would pursue further diagnostic studies.  Also, if psychiatry felt, at any time, that patient;s psychiatric decline were 2/2 anything other than a primary phychiatric illness, we would also pursue further workup which would include MRI brain, neuro consult ID consult, and likely LP for CSF analysis.  Based on current clinical exam, again do not feel that neurologic lyme is impacting current mental state.  Despite this, mother again insisted on IV antibiotics.  I offered to refer her to our ID specialists as an OP but she declined.  She asked me to call a specialist in [place], [place] in order to get this physicians opinion.  She was unable to give me the name of this MD and I informed her that that type of discussion would be a violation of patient;s right to privacy under HIPPA.  I referred her to [facility] OP ID clinic if she would like them to see her son on  "an OP basis and discuss the issue further.  I also encouraged her to get a second opinion once patient is discharged if she feels this is necessary.  Of note, she continued to mention a Dr. Karan Caputo with  \"[facility] [facility] as the provider he most recently saw regarding his Lymes disease.      Nichol Antonio, [facility]  Hospitalist Service  Pager [phone]    "

## 2017-06-08 PROCEDURE — 25000132 ZZH RX MED GY IP 250 OP 250 PS 637: Performed by: PSYCHIATRY & NEUROLOGY

## 2017-06-08 PROCEDURE — 12400003 ZZH R&B MH CRITICAL UMMC

## 2017-06-08 PROCEDURE — 25000132 ZZH RX MED GY IP 250 OP 250 PS 637: Performed by: NURSE PRACTITIONER

## 2017-06-08 PROCEDURE — 97150 GROUP THERAPEUTIC PROCEDURES: CPT | Mod: GO

## 2017-06-08 RX ADMIN — CARBOXYMETHYLCELLULOSE SODIUM 1 DROP: 10 GEL OPHTHALMIC at 08:00

## 2017-06-08 RX ADMIN — LISINOPRIL 20 MG: 20 TABLET ORAL at 08:00

## 2017-06-08 RX ADMIN — DIPHENHYDRAMINE HYDROCHLORIDE 50 MG: 50 CAPSULE ORAL at 00:38

## 2017-06-08 RX ADMIN — QUETIAPINE FUMARATE 50 MG: 50 TABLET, FILM COATED ORAL at 05:34

## 2017-06-08 RX ADMIN — ASPIRIN 325 MG ORAL TABLET 325 MG: 325 PILL ORAL at 00:37

## 2017-06-08 RX ADMIN — ASENAPINE MALEATE 10 MG: 10 TABLET SUBLINGUAL at 19:21

## 2017-06-08 RX ADMIN — CARBAMAZEPINE 100 MG: 100 TABLET, EXTENDED RELEASE ORAL at 19:21

## 2017-06-08 RX ADMIN — LURASIDONE HYDROCHLORIDE 80 MG: 80 TABLET, FILM COATED ORAL at 08:00

## 2017-06-08 RX ADMIN — CARBAMAZEPINE 100 MG: 100 TABLET, EXTENDED RELEASE ORAL at 08:00

## 2017-06-08 RX ADMIN — HALOPERIDOL 10 MG: 5 TABLET ORAL at 00:59

## 2017-06-08 ASSESSMENT — ACTIVITIES OF DAILY LIVING (ADL)
GROOMING: INDEPENDENT
LAUNDRY: UNABLE TO COMPLETE
DRESS: SCRUBS (BEHAVIORAL HEALTH)
ORAL_HYGIENE: INDEPENDENT

## 2017-06-08 NOTE — PLAN OF CARE
Problem: Psychotic Symptoms  Intervention: Social and Therapeutic Interv (Psychotic Symptoms)     Pt attended the morning OT group, generally kept to himself.  Thanked peers when complimented on his project, though didn't initiate any conversation.

## 2017-06-08 NOTE — PLAN OF CARE
Problem: Psychotic Symptoms  Goal: Psychotic Symptoms  Signs and symptoms of listed problems will be absent or manageable.   Outcome: Therapy, progress toward functional goals is gradual  RN Assessment     Patient has had several increased requests of staff mainly on the evening shifts. He has decreased his insulting behaviors towards staff. He has not slept adequately and has been increasingly sleeping during the day.  He denies having any mental health symptoms. Denies AH/VH. Denies depression/SI/SIB. Jv offers no acute medical concerns.  Eating and drinking in good amounts. Patient has been mediation compliant.

## 2017-06-08 NOTE — PROGRESS NOTES
Pt has been in and out of milieu.  Social with select peers.  Pt irritable with staff if staff did not do exactly what he wanted, when he wanted.  Pt would try to be nice to staff to get his way; if it did not work, he would escalate verbally.  Pt had difficulty concentrating to play card game with peer.     06/07/17 2200   Behavioral Health   Hallucinations denies / not responding to hallucinations   Thinking paranoid;poor concentration  (unable to focus to play card game)   Orientation person: oriented;place: oriented;date: oriented   Memory baseline memory   Insight poor   Judgement impaired   Eye Contact staring   Affect blunted, flat;angry;irritable   Mood anxious;labile;irritable   Physical Appearance/Attire untidy;appears stated age   Hygiene neglected grooming - unclean body, hair, teeth   Suicidality (denies)   Self Injury (denies)   Activity restless  (in and out of milieu, social with select peers)   Speech clear   Medication Sensitivity no observed side effects   Psychomotor / Gait balanced;steady

## 2017-06-08 NOTE — PROGRESS NOTES
Pt had received 50 mg Seroquel at about 2200 last lindsey, pt reported little effect from it and believed that he had received hydroxyzine in error. Asking for help with sleep, anxiety and pain at 0015. Given 325 mg ASA for R knee pain and 50 mg Benadryl for sleep at 0040. Pt returned shortly after with report of increased anxiety. Pt pacing, restless and frustrated. Given 10 mg Haldol to assist with sleep, anxiety and frustration. Pt reported no relief of pain, stated that Tylenol is not helpful and he believes he should not take it for liver health. Pt had beverages, in and out of room frequently, appeared to be attempting to sleep. Given 50 mg Seroquel at 0530 per request. Total sleep overnight, zero hours. Pt stated this AM that Seroquel helps him with anxiety and pain and therefore facilitates sleep. He also believes that better pain control would help him sleep. Has been cooperative and easily directable. Appears tired, moving slowly. Frustrated but polite and appreciative. Stated that he is working on anxiety control tips that he has learned. 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

## 2017-06-09 PROCEDURE — 99232 SBSQ HOSP IP/OBS MODERATE 35: CPT | Performed by: PSYCHIATRY & NEUROLOGY

## 2017-06-09 PROCEDURE — 25000132 ZZH RX MED GY IP 250 OP 250 PS 637: Performed by: NURSE PRACTITIONER

## 2017-06-09 PROCEDURE — 25000132 ZZH RX MED GY IP 250 OP 250 PS 637: Performed by: PSYCHIATRY & NEUROLOGY

## 2017-06-09 PROCEDURE — 12400003 ZZH R&B MH CRITICAL UMMC

## 2017-06-09 RX ORDER — BENZTROPINE MESYLATE 0.5 MG/1
.5-1 TABLET ORAL 2 TIMES DAILY PRN
Status: DISCONTINUED | OUTPATIENT
Start: 2017-06-09 | End: 2017-07-21 | Stop reason: HOSPADM

## 2017-06-09 RX ADMIN — CARBAMAZEPINE 100 MG: 100 TABLET, EXTENDED RELEASE ORAL at 19:34

## 2017-06-09 RX ADMIN — LISINOPRIL 20 MG: 20 TABLET ORAL at 07:22

## 2017-06-09 RX ADMIN — QUETIAPINE FUMARATE 50 MG: 50 TABLET, FILM COATED ORAL at 22:02

## 2017-06-09 RX ADMIN — DIPHENHYDRAMINE HYDROCHLORIDE 50 MG: 50 CAPSULE ORAL at 07:28

## 2017-06-09 RX ADMIN — CARBOXYMETHYLCELLULOSE SODIUM 1 DROP: 10 GEL OPHTHALMIC at 14:13

## 2017-06-09 RX ADMIN — LURASIDONE HYDROCHLORIDE 80 MG: 80 TABLET, FILM COATED ORAL at 07:21

## 2017-06-09 RX ADMIN — DIPHENHYDRAMINE HYDROCHLORIDE 50 MG: 50 CAPSULE ORAL at 23:04

## 2017-06-09 RX ADMIN — CARBAMAZEPINE 100 MG: 100 TABLET, EXTENDED RELEASE ORAL at 07:21

## 2017-06-09 ASSESSMENT — ACTIVITIES OF DAILY LIVING (ADL)
ORAL_HYGIENE: INDEPENDENT
ORAL_HYGIENE: INDEPENDENT
DRESS: SCRUBS (BEHAVIORAL HEALTH)
DRESS: SCRUBS (BEHAVIORAL HEALTH)
GROOMING: INDEPENDENT
GROOMING: INDEPENDENT

## 2017-06-09 NOTE — PROGRESS NOTES
"Jv reports being in a \"pretty good\" mood. His affect appeared guarded at times. No verbal aggression or signs of disorganized thinking observed by this writer. Jv reports some memory loss, stating that it is hard for him to remember names.        06/09/17 1716   Behavioral Health   Hallucinations denies / not responding to hallucinations   Thinking poor concentration   Orientation person: oriented;place: oriented;date: oriented;time: oriented   Memory new learning, recall loss   Insight admits / accepts   Judgement impaired   Eye Contact staring   Affect tense   Mood mood is calm   Physical Appearance/Attire attire appropriate to age and situation   Hygiene well groomed   Suicidality other (see comments)  (none reported)   Self Injury other (see comment)  (none observed)   Activity withdrawn   Speech clear;coherent   Coping/Psychosocial   Verbalized Emotional State acceptance   Activities of Daily Living   Hygiene/Grooming independent   Oral Hygiene independent   Dress scrubs (behavioral health)   Room Organization independent     "

## 2017-06-09 NOTE — PROGRESS NOTES
"[facility], [place]   Psychiatric Progress Note        Interim History:   The patient's care was discussed with the treatment team during the daily team meeting and/or staff's chart notes were reviewed.  Staff report patient continues to be attenetion seeking and verbally hostile toward staff but his demeanor improved greatly today. His mother continues to call and very demanding. Slept only 3hrs, but staff reported that patient sleeps on and off thoughts out the day and evening shifts. Compliant with medications. Less distracted and more on task. Often responds to verbal de-escalation and PRN medications. Eating well.     The patient is greatly more calm today and was pleasant and polite. Noted that he is feeling better and \"I feel more calm\" and racing thoughts subsided. Tolerating medications well, but noted that he has had some \"twiching\". Agreed to proposed medications changes and care plan. Denied hallucinations and paranoia. Racing thoughts subsided and anxiety improved. No dep, SI, LOYDA and HI. Sleep is better, and appetite is intact.     Discussed medications and care plan. I spoke with  and we discussed care plan and provided updates.        Medications:       [START ON 6/10/2017] lurasidone  120 mg Oral Daily     carboxymethylcellulose  1 drop Both Eyes TID     carBAMazepine  100 mg Oral BID     lisinopril  20 mg Oral Daily          Allergies:     Allergies   Allergen Reactions     No Known Drug Allergies           Labs:   No results found for this or any previous visit (from the past 24 hour(s)).       Psychiatric Examination:     Vitals:    06/06/17 0659 06/06/17 2004 06/08/17 0700 06/09/17 0700   BP: 143/72 100/43     Pulse: 71 83  67   Resp: 16  16    Temp: 99.3  F (37.4  C)  98.1  F (36.7  C) 97.6  F (36.4  C)   TempSrc: Oral  Tympanic Tympanic   SpO2:       Weight:       Height:           Weight is 240 lbs 0 oz  Body mass index is 30 " kg/(m^2).    Appearance: awake, alert, adequately groomed, appeared as age stated and no apparent distress  Attitude:  cooperative and evasive  Eye Contact:  intense  Mood:  anxious  Affect:  less tense and a bit more engaged.   Speech:  clear, coherent and normal prosody  Psychomotor Behavior:  no evidence of tardive dyskinesia, dystonia, or tics and intact station, gait and muscle tone  Throught Process:  goal oriented and  evasive  Associations:  no loose associations  Thought Content:  no evidence of suicidal ideation or homicidal ideation, no auditory hallucinations present, no visual hallucinations present, patient appears to be responding to internal stimuli and dismissive of delusional thoughts and paranoia suspected.   Insight:  limited  Judgement:  limited  Oriented to:  time, person, and place  Attention Span and Concentration:  intact  Recent and Remote Memory:  intact         Precautions:     Behavioral Orders   Procedures     Assault precautions     Code 1 - Restrict to Unit     Routine Programming     As clinically indicated     Sexual precautions     Status 15     Every 15 minutes.          Diagnoses:     1.  Schizophrenia, paranoid type. (r/o bipolar type 1, rodrigo with psychosis)  2.  Noncompliance, nonadherence.   3.  cluster B with likely antisocial traits.           Plan:     Medications:  1.  Saphris: restated on admission and titrated to 10 mg BID but later cross tapered to Latuda due to lack of efficacy.    2.  PRNs: Vistaril for anxiety and Seroquel for restlessness and haldol with Benadryl for agitation and psychosis. PRN ativan was discontinued.   3.  Latuda: started and titrated to 120 mg daily, with goals to further titrate pending clinical response and tolerability.   4.  Tegretol: start at 100 mg BID.    5.  Add Cogentin 0.5-1 mg BID PRN for EPS.     -- IM consult consult completed,  re: Lyme management.   -- Tegretol, CBC and CMP on 6/11. Please call this provider with results.      Legal Status and Disposition:  1.  The patient was admitted on a 72hr hold. Petition for civil commitment with [facility] was filed. Ross-ed medications included Saphris, Zyprexa, Risperdal and Haldol (will add Latuda to Ross). Final hearing is on 6/16.   2.  Disposition to be determined pending clinical response. Discharge will be granted once active psychosis resolved and safety established in the community.

## 2017-06-09 NOTE — PROGRESS NOTES
"Received a call from patient's mother, Silvana, who requested an update on his current presentation, progress toward stabilization, and current medication regimen.  Update given.  Silvana has many concerns related to Jv's care, and she is requesting a call back from Dr. Shah to review her multitude of questions and concerns.  She is reachable at [phone] (she prefers to be reached on this line, as it has \"message taping capabilities\"); [phone] (home); [phone] (cell).    "

## 2017-06-10 PROCEDURE — 12400003 ZZH R&B MH CRITICAL UMMC

## 2017-06-10 PROCEDURE — 25000132 ZZH RX MED GY IP 250 OP 250 PS 637: Performed by: NURSE PRACTITIONER

## 2017-06-10 PROCEDURE — 90853 GROUP PSYCHOTHERAPY: CPT

## 2017-06-10 PROCEDURE — 25000132 ZZH RX MED GY IP 250 OP 250 PS 637: Performed by: PSYCHIATRY & NEUROLOGY

## 2017-06-10 RX ADMIN — CARBAMAZEPINE 100 MG: 100 TABLET, EXTENDED RELEASE ORAL at 08:18

## 2017-06-10 RX ADMIN — DIPHENHYDRAMINE HYDROCHLORIDE 50 MG: 50 CAPSULE ORAL at 20:37

## 2017-06-10 RX ADMIN — QUETIAPINE FUMARATE 50 MG: 50 TABLET, FILM COATED ORAL at 22:04

## 2017-06-10 RX ADMIN — LISINOPRIL 20 MG: 20 TABLET ORAL at 08:18

## 2017-06-10 RX ADMIN — CARBOXYMETHYLCELLULOSE SODIUM 1 DROP: 10 GEL OPHTHALMIC at 22:17

## 2017-06-10 RX ADMIN — LURASIDONE HYDROCHLORIDE 120 MG: 80 TABLET, FILM COATED ORAL at 08:18

## 2017-06-10 RX ADMIN — CARBAMAZEPINE 100 MG: 100 TABLET, EXTENDED RELEASE ORAL at 20:36

## 2017-06-10 ASSESSMENT — ACTIVITIES OF DAILY LIVING (ADL)
ORAL_HYGIENE: INDEPENDENT
GROOMING: INDEPENDENT
DRESS: SCRUBS (BEHAVIORAL HEALTH)
ORAL_HYGIENE: INDEPENDENT
HYGIENE/GROOMING: INDEPENDENT
DRESS: SCRUBS (BEHAVIORAL HEALTH);INDEPENDENT
LAUNDRY: UNABLE TO COMPLETE

## 2017-06-10 NOTE — PROGRESS NOTES
06/09/17 2200   Behavioral Health   Hallucinations denies / not responding to hallucinations   Thinking poor concentration   Orientation person: oriented;place: oriented   Memory baseline memory   Insight poor   Judgement impaired   Eye Contact staring   Affect tense   Mood mood is calm   Physical Appearance/Attire attire appropriate to age and situation   Hygiene other (see comment)  (adequate )   Suicidality other (see comments)  (none stated nor observed )   Self Injury other (see comment)  (none stated nor observed )   Activity other (see comment)  (in milieu)   Speech clear;coherent   Medication Sensitivity no stated side effects   Psychomotor / Gait steady;balanced   Jv was less tense this evening than on the previous shifts. He was respectful to staff and was social in the milieu playing cards with peers. He did seem withdrawn though. No behavioral concerns from Jv this evening.

## 2017-06-10 NOTE — PLAN OF CARE
"Problem: Psychotic Symptoms  Intervention: Social and Therapeutic Interv (Psychotic Symptoms)     Pt attended the morning OT group.  Appeared more genuinely relaxed, pt commented he's doing well, and \"I think the doctor really got it right with my medications, I feel like I'm getting back to myself\".  Friendly and easy going attitude and demeanor.         "

## 2017-06-11 LAB
ALBUMIN SERPL-MCNC: 4.2 G/DL (ref 3.4–5)
ALP SERPL-CCNC: 81 U/L (ref 40–150)
ALT SERPL W P-5'-P-CCNC: 72 U/L (ref 0–70)
ANION GAP SERPL CALCULATED.3IONS-SCNC: 12 MMOL/L (ref 3–14)
AST SERPL W P-5'-P-CCNC: 33 U/L (ref 0–45)
BASOPHILS # BLD AUTO: 0 10E9/L (ref 0–0.2)
BASOPHILS NFR BLD AUTO: 0.3 %
BILIRUB SERPL-MCNC: 0.7 MG/DL (ref 0.2–1.3)
BUN SERPL-MCNC: 12 MG/DL (ref 7–30)
CALCIUM SERPL-MCNC: 9.5 MG/DL (ref 8.5–10.1)
CARBAMAZEPINE SERPL-MCNC: 3.9 MG/L (ref 4–12)
CHLORIDE SERPL-SCNC: 104 MMOL/L (ref 94–109)
CO2 SERPL-SCNC: 23 MMOL/L (ref 20–32)
CREAT SERPL-MCNC: 0.71 MG/DL (ref 0.66–1.25)
DIFFERENTIAL METHOD BLD: NORMAL
EOSINOPHIL # BLD AUTO: 0.2 10E9/L (ref 0–0.7)
EOSINOPHIL NFR BLD AUTO: 2.2 %
ERYTHROCYTE [DISTWIDTH] IN BLOOD BY AUTOMATED COUNT: 12.6 % (ref 10–15)
GFR SERPL CREATININE-BSD FRML MDRD: ABNORMAL ML/MIN/1.7M2
GLUCOSE SERPL-MCNC: 94 MG/DL (ref 70–99)
HCT VFR BLD AUTO: 49.6 % (ref 40–53)
HGB BLD-MCNC: 16.6 G/DL (ref 13.3–17.7)
IMM GRANULOCYTES # BLD: 0 10E9/L (ref 0–0.4)
IMM GRANULOCYTES NFR BLD: 0.3 %
LYMPHOCYTES # BLD AUTO: 3.1 10E9/L (ref 0.8–5.3)
LYMPHOCYTES NFR BLD AUTO: 41.7 %
MCH RBC QN AUTO: 29.4 PG (ref 26.5–33)
MCHC RBC AUTO-ENTMCNC: 33.5 G/DL (ref 31.5–36.5)
MCV RBC AUTO: 88 FL (ref 78–100)
MONOCYTES # BLD AUTO: 0.8 10E9/L (ref 0–1.3)
MONOCYTES NFR BLD AUTO: 10.4 %
NEUTROPHILS # BLD AUTO: 3.3 10E9/L (ref 1.6–8.3)
NEUTROPHILS NFR BLD AUTO: 45.1 %
NRBC # BLD AUTO: 0 10*3/UL
NRBC BLD AUTO-RTO: 0 /100
PLATELET # BLD AUTO: 234 10E9/L (ref 150–450)
POTASSIUM SERPL-SCNC: 4.1 MMOL/L (ref 3.4–5.3)
PROT SERPL-MCNC: 7.8 G/DL (ref 6.8–8.8)
RBC # BLD AUTO: 5.64 10E12/L (ref 4.4–5.9)
SODIUM SERPL-SCNC: 139 MMOL/L (ref 133–144)
WBC # BLD AUTO: 7.4 10E9/L (ref 4–11)

## 2017-06-11 PROCEDURE — 36415 COLL VENOUS BLD VENIPUNCTURE: CPT | Performed by: PSYCHIATRY & NEUROLOGY

## 2017-06-11 PROCEDURE — 12400003 ZZH R&B MH CRITICAL UMMC

## 2017-06-11 PROCEDURE — 80053 COMPREHEN METABOLIC PANEL: CPT | Performed by: PSYCHIATRY & NEUROLOGY

## 2017-06-11 PROCEDURE — 25000132 ZZH RX MED GY IP 250 OP 250 PS 637: Performed by: PSYCHIATRY & NEUROLOGY

## 2017-06-11 PROCEDURE — 85025 COMPLETE CBC W/AUTO DIFF WBC: CPT | Performed by: PSYCHIATRY & NEUROLOGY

## 2017-06-11 PROCEDURE — 80156 ASSAY CARBAMAZEPINE TOTAL: CPT | Performed by: PSYCHIATRY & NEUROLOGY

## 2017-06-11 PROCEDURE — 25000132 ZZH RX MED GY IP 250 OP 250 PS 637: Performed by: NURSE PRACTITIONER

## 2017-06-11 RX ADMIN — DIPHENHYDRAMINE HYDROCHLORIDE 50 MG: 50 CAPSULE ORAL at 22:07

## 2017-06-11 RX ADMIN — CARBOXYMETHYLCELLULOSE SODIUM 1 DROP: 10 GEL OPHTHALMIC at 16:53

## 2017-06-11 RX ADMIN — CARBAMAZEPINE 100 MG: 100 TABLET, EXTENDED RELEASE ORAL at 22:06

## 2017-06-11 RX ADMIN — CARBAMAZEPINE 100 MG: 100 TABLET, EXTENDED RELEASE ORAL at 08:23

## 2017-06-11 RX ADMIN — LISINOPRIL 20 MG: 20 TABLET ORAL at 08:23

## 2017-06-11 RX ADMIN — LURASIDONE HYDROCHLORIDE 120 MG: 80 TABLET, FILM COATED ORAL at 08:23

## 2017-06-11 RX ADMIN — QUETIAPINE FUMARATE 50 MG: 50 TABLET, FILM COATED ORAL at 22:07

## 2017-06-11 ASSESSMENT — ACTIVITIES OF DAILY LIVING (ADL)
DRESS: SCRUBS (BEHAVIORAL HEALTH)
GROOMING: INDEPENDENT
LAUNDRY: UNABLE TO COMPLETE
DRESS: SCRUBS (BEHAVIORAL HEALTH)
ORAL_HYGIENE: INDEPENDENT
ORAL_HYGIENE: INDEPENDENT
HYGIENE/GROOMING: INDEPENDENT

## 2017-06-11 NOTE — PROGRESS NOTES
06/10/17 7110   Behavioral Health   Hallucinations denies / not responding to hallucinations   Thinking intact   Orientation person: oriented;place: oriented   Memory baseline memory   Insight poor   Judgement impaired   Eye Contact at examiner;staring   Affect blunted, flat   Mood mood is calm   Physical Appearance/Attire appears stated age;attire appropriate to age and situation;neat   Hygiene well groomed   Suicidality other (see comments)  (none noted)   Self Injury other (see comment)  (none noted)   Activity withdrawn   Speech clear;coherent   Medication Sensitivity no stated side effects;no observed side effects   Psychomotor / Gait balanced;steady   Overt Aggression Scale   Verbal Aggression 0   Aggression against Property 0   Auto-Aggression 0   Physical Aggression 0   Overt Aggression Total Score 0   Activities of Daily Living   Hygiene/Grooming independent   Oral Hygiene independent   Dress scrubs (behavioral health);independent   Laundry unable to complete   Room Organization independent     Pt was in and out of the milieu during the evening. Pt was withdrawn, although respectful and cooperative upon approach. Pt presented as calm in affect. No behavioral concerns.

## 2017-06-12 PROCEDURE — 12400003 ZZH R&B MH CRITICAL UMMC

## 2017-06-12 PROCEDURE — 25000132 ZZH RX MED GY IP 250 OP 250 PS 637: Performed by: PSYCHIATRY & NEUROLOGY

## 2017-06-12 PROCEDURE — 25000132 ZZH RX MED GY IP 250 OP 250 PS 637: Performed by: NURSE PRACTITIONER

## 2017-06-12 PROCEDURE — 99232 SBSQ HOSP IP/OBS MODERATE 35: CPT | Performed by: PSYCHIATRY & NEUROLOGY

## 2017-06-12 PROCEDURE — 97150 GROUP THERAPEUTIC PROCEDURES: CPT | Mod: GO

## 2017-06-12 RX ORDER — CARBAMAZEPINE 100 MG/1
200 TABLET, EXTENDED RELEASE ORAL 2 TIMES DAILY
Status: DISCONTINUED | OUTPATIENT
Start: 2017-06-13 | End: 2017-07-20

## 2017-06-12 RX ADMIN — CARBAMAZEPINE 100 MG: 100 TABLET, EXTENDED RELEASE ORAL at 08:26

## 2017-06-12 RX ADMIN — LISINOPRIL 20 MG: 20 TABLET ORAL at 08:26

## 2017-06-12 RX ADMIN — CARBAMAZEPINE 100 MG: 100 TABLET, EXTENDED RELEASE ORAL at 20:05

## 2017-06-12 RX ADMIN — LURASIDONE HYDROCHLORIDE 120 MG: 80 TABLET, FILM COATED ORAL at 08:26

## 2017-06-12 ASSESSMENT — ACTIVITIES OF DAILY LIVING (ADL)
DRESS: INDEPENDENT
ORAL_HYGIENE: INDEPENDENT
GROOMING: INDEPENDENT

## 2017-06-12 NOTE — PROGRESS NOTES
Slightly less acerbic in conversations with staff. When speaking with peers he appears to dominate the conversation and becomes irritable when he is spoken over.     06/11/17 7018   Behavioral Health   Hallucinations denies / not responding to hallucinations   Thinking intact   Orientation person: oriented;place: oriented;date: oriented   Memory baseline memory   Insight poor   Judgement impaired   Eye Contact at examiner  (Some intensity)   Affect irritable   Mood irritable   Physical Appearance/Attire neat   Hygiene well groomed   Suicidality other (see comments)  (Denies)   Self Injury other (see comment)  (No SIB)   Activity restless;other (see comment)  (in milieu)   Speech clear;coherent   Medication Sensitivity no stated side effects;no observed side effects   Psychomotor / Gait balanced;steady   Substance Withdrawal Interventions   Social and Therapeutic Interventions (Substance Withdrawal) encourage effective boundaries with peers   Overt Aggression Scale   Verbal Aggression 0   Aggression against Property 0   Auto-Aggression 0   Physical Aggression 0   Overt Aggression Total Score 0   Sleep/Rest/Relaxation   Day/Evening Time Hours up all shift   Psycho Education   Type of Intervention 1:1 intervention   Response participates, initiates socially appropriate   Hours 0.5   Treatment Detail Self Reflection   Activities of Daily Living   Hygiene/Grooming independent   Oral Hygiene independent   Dress scrubs (behavioral health)   Laundry unable to complete   Room Organization independent   Activity   Activity Level of Assistance independent   Behavioral Health Interventions   Psychotic Symptoms reality orientation;simple, clear language;decrease environmental stimulation   Social and Therapeutic Interventions (Psychotic Symptoms) encourage effective boundaries with peers

## 2017-06-12 NOTE — PROGRESS NOTES
"[facility], [place]   Psychiatric Progress Note        Interim History:   The patient's care was discussed with the treatment team during the daily team meeting and/or staff's chart notes were reviewed.  Staff report patient is significantly more calm and irritability resolved. Compliant with medications and care. Over heard saying that he is trying to drink too much water to \"flush out\" medications. Behavior is more appropriate. Slept 5-6 hrs. No SI or LOYDA. No overt psychosis or confusion. Independent with ADL.     The patient was pleasant and cooperative. Noted that he is feeling better. Denied hallucinations and paranoia. Noted tolerating medications well. No SI, LOYDA or HI. Concerned about court hearing. Receptive to proposed medication changes. Noted sleeping better and appetite intact.     Discussed medications and care plan.           lurasidone  120 mg Oral Daily     carBAMazepine  100 mg Oral BID     lisinopril  20 mg Oral Daily          Allergies:     Allergies   Allergen Reactions     No Known Drug Allergies           Labs:   No results found for this or any previous visit (from the past 24 hour(s)).       Psychiatric Examination:     Vitals:    06/10/17 0700 06/11/17 0823 06/11/17 0834 06/12/17 0727   BP: 135/90 (!) 149/98     Pulse: 75      Resp:   16 16   Temp: 97.5  F (36.4  C)  97.4  F (36.3  C) 97.7  F (36.5  C)   TempSrc: Tympanic  Tympanic Tympanic   SpO2:       Weight:    110.2 kg (243 lb)   Height:           Weight is 243 lbs 0 oz  Body mass index is 30.37 kg/(m^2).    Appearance: awake, alert, adequately groomed, appeared as age stated and no apparent distress  Attitude:  cooperative  Eye Contact:  fair  Mood:  better  Affect:  appropriate and in normal range and intensity is normal  Speech:  clear, coherent and normal prosody  Psychomotor Behavior:  no evidence of tardive dyskinesia, dystonia, or tics and intact station, gait and muscle tone  Throught Process:  goal " oriented  Associations:  no loose associations  Thought Content:  no evidence of suicidal ideation or homicidal ideation, no auditory hallucinations present, no visual hallucinations present and paranoia subsided.   Insight:  fair  Judgement:  fair  Oriented to:  time, person, and place  Attention Span and Concentration:  intact  Recent and Remote Memory:  intact         Precautions:     Behavioral Orders   Procedures     Assault precautions     Code 1 - Restrict to Unit     Routine Programming     As clinically indicated     Sexual precautions     Status 15     Every 15 minutes.          Diagnoses:     1.  Schizophrenia, paranoid type. (r/o bipolar type 1, rodrigo with psychosis)  2.  Noncompliance, nonadherence.   3.  cluster B with likely antisocial traits.           Plan:     Medications:  1.  Saphris: restated on admission and titrated to 10 mg BID but later cross tapered to Latuda due to lack of efficacy.    2.  PRNs: Vistaril for anxiety and Seroquel for restlessness and haldol with Benadryl for agitation and psychosis. PRN ativan was discontinued.   3.  Latuda: started and titrated to 120 mg daily, with goals to further titrate pending clinical response and tolerability.   4.  Tegretol: start at 100 mg BID, level was 5/1 on 6/11 and dose increased to 200 mg BID.    5.  Cogentin was added at 0.5-1 mg BID PRN for EPS.     -- IM consult consult completed,  re: Lyme management.     Legal Status and Disposition:  1.  The patient was admitted on a 72hr hold. Petition for civil commitment with [employer] was filed. Ross-ed medications included Saphris, Zyprexa, Risperdal and Haldol (will add Latuda to Ross). Final hearing is on 6/16.   2.  Disposition to be determined pending clinical response. Discharge will be granted once active psychosis resolved and safety established in the community.

## 2017-06-13 PROCEDURE — 25000132 ZZH RX MED GY IP 250 OP 250 PS 637: Performed by: NURSE PRACTITIONER

## 2017-06-13 PROCEDURE — 99232 SBSQ HOSP IP/OBS MODERATE 35: CPT | Performed by: PSYCHIATRY & NEUROLOGY

## 2017-06-13 PROCEDURE — 25000132 ZZH RX MED GY IP 250 OP 250 PS 637: Performed by: PSYCHIATRY & NEUROLOGY

## 2017-06-13 PROCEDURE — 12400003 ZZH R&B MH CRITICAL UMMC

## 2017-06-13 RX ADMIN — QUETIAPINE FUMARATE 50 MG: 50 TABLET, FILM COATED ORAL at 00:41

## 2017-06-13 RX ADMIN — QUETIAPINE FUMARATE 50 MG: 50 TABLET, FILM COATED ORAL at 22:08

## 2017-06-13 RX ADMIN — DIPHENHYDRAMINE HYDROCHLORIDE 50 MG: 50 CAPSULE ORAL at 00:41

## 2017-06-13 RX ADMIN — LURASIDONE HYDROCHLORIDE 120 MG: 80 TABLET, FILM COATED ORAL at 09:00

## 2017-06-13 RX ADMIN — CARBAMAZEPINE 200 MG: 100 TABLET, EXTENDED RELEASE ORAL at 20:30

## 2017-06-13 RX ADMIN — CARBAMAZEPINE 200 MG: 100 TABLET, EXTENDED RELEASE ORAL at 09:00

## 2017-06-13 RX ADMIN — LISINOPRIL 20 MG: 20 TABLET ORAL at 09:00

## 2017-06-13 RX ADMIN — DIPHENHYDRAMINE HYDROCHLORIDE 50 MG: 50 CAPSULE ORAL at 22:08

## 2017-06-13 ASSESSMENT — ACTIVITIES OF DAILY LIVING (ADL)
ORAL_HYGIENE: INDEPENDENT
DRESS: SCRUBS (BEHAVIORAL HEALTH);INDEPENDENT
GROOMING: INDEPENDENT
LAUNDRY: WITH SUPERVISION
DRESS: SCRUBS (BEHAVIORAL HEALTH)
GROOMING: HANDWASHING;SHOWER;INDEPENDENT
ORAL_HYGIENE: INDEPENDENT

## 2017-06-13 NOTE — PLAN OF CARE
Problem: Psychotic Symptoms  Goal: Psychotic Symptoms  Signs and symptoms of listed problems will be absent or manageable.      Pt spends his time between his room and the lounge; describes his mood as good. Pt reports that he wants to go home where he can grow and cook his own food. Pt also reports that he feels like the haldol, benadryl and ativan that he received has helped him with his thinking. Pt affect is blunted but brightens upon approach. Denies any auditory hallucinations and suicidal ideations. Pt does not appear to be responding to internal stimuli. Denies any anxiety and depressive symptoms. Denies any physical issues. No behavioral issues noted. No medication s/e reported or observed.   Plan: Status 15s; Build trust with pt. Continue to build on strengths. Encourage healthy coping.

## 2017-06-13 NOTE — PROGRESS NOTES
This writer spoke to Jv. He was pleasant and cooperative.  He asked if I would check on his psychiatric provider appointment for him through [employer].  I explained that I have already set up new Psychiatry appointments for him and he will have a copy of that when he d/c's.  He thanked me and would like to check in again tomorrow.

## 2017-06-13 NOTE — PROGRESS NOTES
"[facility], [place]   Psychiatric Progress Note        Interim History:   The patient's care was discussed with the treatment team during the daily team meeting and/or staff's chart notes were reviewed.  Staff report patient is calm and irritability resolved. Compliant with care and medications. Sleeping  well. Behavior is generally appropriate. Attending groups sporadically. Focused on discharge.  Independent with ADL.     The patient noted that he is doing \"better\". Tolerating medications well. No depr, anxiety or racing thoughts. Denied SI, LOYDA and HI. Denied hallucinations and paranoia. Believes that he is sleeping well and stated that Benadryl is helpful. Appetite intact. Hoping to discharge after court hearing.     Discussed medications and care plan.           carBAMazepine  200 mg Oral BID     lurasidone  120 mg Oral Daily     lisinopril  20 mg Oral Daily          Allergies:     Allergies   Allergen Reactions     No Known Drug Allergies           Labs:   No results found for this or any previous visit (from the past 24 hour(s)).       Psychiatric Examination:     Vitals:    06/11/17 0834 06/12/17 0727 06/12/17 1906 06/13/17 0806   BP:   143/80    Pulse:   99    Resp: 16 16  16   Temp: 97.4  F (36.3  C) 97.7  F (36.5  C) 98.6  F (37  C) 97.1  F (36.2  C)   TempSrc: Tympanic Tympanic Tympanic Tympanic   SpO2:   100%    Weight:  110.2 kg (243 lb)     Height:           Weight is 243 lbs 0 oz  Body mass index is 30.37 kg/(m^2).    Appearance: awake, alert, adequately groomed, appeared as age stated and no apparent distress  Attitude:  cooperative  Eye Contact:  good  Mood:  better  Affect:  appropriate and in normal range and intensity is normal  Speech:  clear, coherent and normal prosody  Psychomotor Behavior:  no evidence of tardive dyskinesia, dystonia, or tics and intact station, gait and muscle tone  Throught Process:  goal oriented  Associations:  no loose associations  Thought " Content:  no evidence of suicidal ideation or homicidal ideation, no auditory hallucinations present, no visual hallucinations present and paranoia resolved.   Insight:  fair  Judgement:  fair  Oriented to:  time, person, and place  Attention Span and Concentration:  intact  Recent and Remote Memory:  intact         Precautions:     Behavioral Orders   Procedures     Assault precautions     Code 1 - Restrict to Unit     Routine Programming     As clinically indicated     Sexual precautions     Status 15     Every 15 minutes.          Diagnoses:     1.  Schizophrenia, paranoid type. (r/o bipolar type 1, rodrigo with psychosis)  2.  Noncompliance, nonadherence.   3.  cluster B with likely antisocial traits.           Plan:     Medications:  1.  Saphris: restated on admission and titrated to 10 mg BID but later cross tapered to Latuda due to lack of efficacy.    2.  PRNs: Vistaril for anxiety and Seroquel for restlessness and haldol with Benadryl for agitation and psychosis. PRN ativan was discontinued.   3.  Latuda: started and titrated to 120 mg daily, with goals to further titrate pending clinical response and tolerability.   4.  Tegretol: start at 100 mg BID, level was 5/1 on 6/11 and dose increased to 200 mg BID.    5.  Cogentin was added at 0.5-1 mg BID PRN for EPS.     -- recheck Tegretol, CMP and CBC on 6/16     -- IM consult consult completed,  re: Lyme management.     Legal Status and Disposition:  1.  The patient was admitted on a 72hr hold. Petition for civil commitment with [employer] was filed. Ross-ed medications included Saphris, Zyprexa, Risperdal and Haldol (will add Latuda to Ross). Final hearing is on 6/16.   2.  Disposition to be determined pending clinical response. Discharge will be granted once active psychosis resolved and safety established in the community.

## 2017-06-14 PROCEDURE — 99232 SBSQ HOSP IP/OBS MODERATE 35: CPT | Performed by: PSYCHIATRY & NEUROLOGY

## 2017-06-14 PROCEDURE — 12400003 ZZH R&B MH CRITICAL UMMC

## 2017-06-14 PROCEDURE — 25000132 ZZH RX MED GY IP 250 OP 250 PS 637: Performed by: NURSE PRACTITIONER

## 2017-06-14 PROCEDURE — 90853 GROUP PSYCHOTHERAPY: CPT

## 2017-06-14 PROCEDURE — 25000132 ZZH RX MED GY IP 250 OP 250 PS 637: Performed by: PSYCHIATRY & NEUROLOGY

## 2017-06-14 PROCEDURE — 97150 GROUP THERAPEUTIC PROCEDURES: CPT | Mod: GO

## 2017-06-14 RX ADMIN — CARBAMAZEPINE 200 MG: 100 TABLET, EXTENDED RELEASE ORAL at 08:08

## 2017-06-14 RX ADMIN — LISINOPRIL 20 MG: 20 TABLET ORAL at 08:08

## 2017-06-14 RX ADMIN — CARBAMAZEPINE 200 MG: 100 TABLET, EXTENDED RELEASE ORAL at 20:40

## 2017-06-14 RX ADMIN — LURASIDONE HYDROCHLORIDE 120 MG: 80 TABLET, FILM COATED ORAL at 08:08

## 2017-06-14 ASSESSMENT — ACTIVITIES OF DAILY LIVING (ADL)
DRESS: INDEPENDENT
DRESS: SCRUBS (BEHAVIORAL HEALTH)
ORAL_HYGIENE: INDEPENDENT
GROOMING: INDEPENDENT
ORAL_HYGIENE: INDEPENDENT
GROOMING: INDEPENDENT

## 2017-06-14 NOTE — PLAN OF CARE
Problem: General Plan of Care (Inpatient Behavioral)  Goal: Team Discussion  Team Plan:   BEHAVIORAL TEAM DISCUSSION     Participants: Dr. Mario Shah, Mohsen Mendoza RN, [facility] WARE, [facility] HANS and Rae Rowan OT/R  Progress: Pt symptoms have cleared some, he is less paranoid and irritable.   Continued Stay Criteria/Rationale: Pt has his final commitment hearing on 6/16.    Medical/Physical: see medical chart  Precautions:   Behavioral Orders   Procedures     Assault precautions     Code 1 - Restrict to Unit     Routine Programming       As clinically indicated     Sexual precautions     Status 15       Every 15 minutes.     Plan: pt to go to final hearing, assess after that. He will follow-up with Psychiatry and d/c home is likely  Rationale for change in precautions or plan: no change warranted at this time.

## 2017-06-14 NOTE — PROGRESS NOTES
[facility], [place]   Psychiatric Progress Note        Interim History:   The patient's care was discussed with the treatment team during the daily team meeting and/or staff's chart notes were reviewed.  Staff report patient is calm and irritability resolved. Visible and engaged on approach. No behavioral issues. Compliant with care and medications. Slept 6hrs.  Attending groups sporadically.  Independent with ADL.     The patient was pleasant and engaged. Noted that irritability and racing thoughts resolved. Denied dep, anx and hallucinations. He believes that he is ready to return home and willing to continue outpatient care and medications. Denied SI and LOYDA. Noted tolerating medications and well. Sleep and appetite intact.     Discussed medications and care plan.           carBAMazepine  200 mg Oral BID     lurasidone  120 mg Oral Daily     lisinopril  20 mg Oral Daily          Allergies:     Allergies   Allergen Reactions     No Known Drug Allergies           Labs:   No results found for this or any previous visit (from the past 24 hour(s)).       Psychiatric Examination:     Vitals:    06/12/17 0727 06/12/17 1906 06/13/17 0806 06/14/17 0738   BP:  143/80     Pulse:  99     Resp: 16  16 16   Temp: 97.7  F (36.5  C) 98.6  F (37  C) 97.1  F (36.2  C) 98.4  F (36.9  C)   TempSrc: Tympanic Tympanic Tympanic Tympanic   SpO2:  100%     Weight: 110.2 kg (243 lb)   111.1 kg (245 lb)   Height:           Weight is 245 lbs 0 oz  Body mass index is 30.62 kg/(m^2).    Appearance: awake, alert, adequately groomed, appeared as age stated and no apparent distress  Attitude:  cooperative  Eye Contact:  good  Mood:  good  Affect:  appropriate and in normal range and intensity is normal  Speech:  clear, coherent and normal prosody  Psychomotor Behavior:  no evidence of tardive dyskinesia, dystonia, or tics and intact station, gait and muscle tone  Throught Process:  goal oriented  Associations:  no  loose associations  Thought Content:  no evidence of suicidal ideation or homicidal ideation, no auditory hallucinations present, no visual hallucinations present and paranoia resolved.   Insight:  fair and improving  Judgement:  fair and improving  Oriented to:  time, person, and place  Attention Span and Concentration:  intact  Recent and Remote Memory:  intact         Precautions:     Behavioral Orders   Procedures     Assault precautions     Code 1 - Restrict to Unit     Routine Programming     As clinically indicated     Sexual precautions     Status 15     Every 15 minutes.          Diagnoses:     1.  Schizophrenia, paranoid type. (r/o bipolar type 1, rodrigo with psychosis)  2.  Noncompliance, nonadherence.   3.  cluster B with likely antisocial traits.           Plan:     Medications:  1.  Saphris: restated on admission and titrated to 10 mg BID but later cross tapered to Latuda due to lack of efficacy.    2.  PRNs: Vistaril for anxiety and Seroquel for restlessness and haldol with Benadryl for agitation and psychosis. PRN ativan was discontinued.   3.  Latuda: started and titrated to 120 mg daily, with goals to further titrate pending clinical response and tolerability.   4.  Tegretol: start at 100 mg BID, level was 5/1 on 6/11 and dose increased to 200 mg BID.    5.  Cogentin was added at 0.5-1 mg BID PRN for EPS.     -- recheck Tegretol, CMP and CBC on 6/16     -- IM consult consult completed,  re: Lyme management.     Legal Status and Disposition:  1.  The patient was admitted on a 72hr hold. Petition for civil commitment with [employer] was filed. Ross-ed medications included Saphris, Zyprexa, Risperdal and Haldol (will add Latuda to Ross). Final hearing is on 6/16.   2.  Disposition to be determined pending clinical response. Discharge will be granted once active psychosis resolved and safety established in the community.
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
## 2017-06-14 NOTE — PROGRESS NOTES
06/13/17 2200   Behavioral Health   Hallucinations denies / not responding to hallucinations   Thinking intact   Orientation person: oriented;place: oriented;date: oriented   Memory baseline memory   Insight poor   Judgement impaired   Eye Contact at examiner   Affect blunted, flat;tense   Mood mood is calm   Physical Appearance/Attire attire appropriate to age and situation   Hygiene other (see comment)  (adequate )   Suicidality other (see comments)  (none stated nor observed )   Self Injury other (see comment)  (none stated nor observed )   Activity withdrawn   Speech clear;coherent   Medication Sensitivity no stated side effects   Psychomotor / Gait balanced;steady   Jv was social in the milieu this evening with his peers, but still seemed withdrawn. He was tense at times. No SI or SIB stated or observed. No inappropriate behavior or concerns this evening from Jv. His affect was blunted, flat and tense.

## 2017-06-14 NOTE — PLAN OF CARE
"Problem: Psychotic Symptoms  Intervention: Social and Therapeutic Interv (Psychotic Symptoms)     Pt attended 3 of 3 OT groups.  Appears to be in a good mood, though when he speaks, tone is voice is very tracy.  Gives unsolicited advice to peers, though instead of presenting things as a suggestion, he speaks as if there is only one way to do whatever he is explaining.     While talking 1:1, pt would often refer to \"my people\" and with clarification these are the people who give him orders.  This went into a long conversation about ancestors that was difficult to follow, and it seemed as if pt was making things up as the story went on.        "

## 2017-06-15 PROCEDURE — 25000132 ZZH RX MED GY IP 250 OP 250 PS 637: Performed by: NURSE PRACTITIONER

## 2017-06-15 PROCEDURE — 25000132 ZZH RX MED GY IP 250 OP 250 PS 637: Performed by: PSYCHIATRY & NEUROLOGY

## 2017-06-15 PROCEDURE — H2032 ACTIVITY THERAPY, PER 15 MIN: HCPCS

## 2017-06-15 PROCEDURE — 12400003 ZZH R&B MH CRITICAL UMMC

## 2017-06-15 PROCEDURE — 97150 GROUP THERAPEUTIC PROCEDURES: CPT | Mod: GO

## 2017-06-15 PROCEDURE — 90853 GROUP PSYCHOTHERAPY: CPT

## 2017-06-15 RX ADMIN — QUETIAPINE FUMARATE 50 MG: 50 TABLET, FILM COATED ORAL at 01:02

## 2017-06-15 RX ADMIN — LISINOPRIL 20 MG: 20 TABLET ORAL at 08:32

## 2017-06-15 RX ADMIN — DIPHENHYDRAMINE HYDROCHLORIDE 50 MG: 50 CAPSULE ORAL at 01:02

## 2017-06-15 RX ADMIN — CARBAMAZEPINE 200 MG: 100 TABLET, EXTENDED RELEASE ORAL at 20:05

## 2017-06-15 RX ADMIN — CARBAMAZEPINE 200 MG: 100 TABLET, EXTENDED RELEASE ORAL at 08:32

## 2017-06-15 RX ADMIN — LURASIDONE HYDROCHLORIDE 120 MG: 80 TABLET, FILM COATED ORAL at 08:32

## 2017-06-15 RX ADMIN — CARBOXYMETHYLCELLULOSE SODIUM 1 DROP: 10 GEL OPHTHALMIC at 07:16

## 2017-06-15 ASSESSMENT — ACTIVITIES OF DAILY LIVING (ADL)
DRESS: SCRUBS (BEHAVIORAL HEALTH)
GROOMING: INDEPENDENT
ORAL_HYGIENE: INDEPENDENT
DRESS: SCRUBS (BEHAVIORAL HEALTH);INDEPENDENT
GROOMING: INDEPENDENT
ORAL_HYGIENE: INDEPENDENT

## 2017-06-15 NOTE — PROGRESS NOTES
Pt visible in milieu all shift, withdrawn. His goal is to stay calm in preparation for court tomorrow. Pt hope to discharge tomorrow after court so he could smoke.  Reports feeling optimistic about life, denies SI/SIB. States  He feels a little anxious about going to court tomorrow, rated his anxiety at a 2 or 3/10. Pt also reports feeling agitated due to not being able to go outside and smoke, rated his agitation at a 2 or 3/10.  No nutritional concerns.        06/15/17 1434   Behavioral Health   Hallucinations denies / not responding to hallucinations   Thinking delusional;poor concentration   Orientation person: oriented;place: oriented;date: oriented;time: oriented   Memory baseline memory   Insight poor   Judgement impaired   Eye Contact at examiner   Affect blunted, flat   Mood mood is calm   Physical Appearance/Attire attire appropriate to age and situation   Hygiene well groomed   Suicidality other (see comments)  (Pt denies.)   Self Injury other (see comment)  (Pt denies.)   Activity withdrawn;other (see comment)  (Pt visible in milieu, pacing.)   Speech clear;coherent   Medication Sensitivity no stated side effects;no observed side effects   Psychomotor / Gait balanced;steady   Psycho Education   Type of Intervention 1:1 intervention   Response participates, initiates socially appropriate   Hours 0.5   Treatment Detail (1:1 check in.)   Activities of Daily Living   Hygiene/Grooming independent   Oral Hygiene independent   Dress scrubs (behavioral health)   Room Organization independent   Activity   Activity Level of Assistance independent   Behavioral Health Interventions   Psychotic Symptoms maintain safety precautions   Social and Therapeutic Interventions (Psychotic Symptoms) encourage participation in therapeutic groups and milieu activities

## 2017-06-15 NOTE — PROGRESS NOTES
P/C l/m to schedule appointment for aftercare with Rae Dsouza NP, [phone]., she is no longer at [facility] .  S has appointments, but they do not have appointments at Rea Dsouza's office.    P/C with Mother, she reported Rae Dsouza has left [facility] she gave new number [phone].  Call mother back after above contact with Rae Dosuza's office.  Told her waiting to hear back from Rae Dsouza's office.  She does not know the name of the clinic.    Rae Dsouza's office returned call schedule appointment.

## 2017-06-15 NOTE — PROGRESS NOTES
P/C CCM Jenna Khalil from [facility].  Discuss medications.  They are going for full commitment based upon their examiner's recommendation.

## 2017-06-15 NOTE — PLAN OF CARE
"Problem: Psychotic Symptoms  Goal: Psychotic Symptoms  Signs and symptoms of listed problems will be absent or manageable.   Outcome: Improving     Pt presents was in and out of his room. Called this writer and said \" I feel bored in here. I do just fine at home What am I doing here, anyway? \" Pt was receptive to reassurances. Pt denies visual, auditory and suicidal ideations. Denies any physical issues. Pt has a calm mood and takes his medications readily. No behavioral issues noted. No medication s/e reported or observed.   Plan: Status 15s; Build trust with pt. Continue to build on strengths. Encourage adequate hygiene and healthy coping.              "
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
## 2017-06-16 LAB
ALBUMIN SERPL-MCNC: 4.1 G/DL (ref 3.4–5)
ALP SERPL-CCNC: 85 U/L (ref 40–150)
ALT SERPL W P-5'-P-CCNC: 34 U/L (ref 0–70)
ANION GAP SERPL CALCULATED.3IONS-SCNC: 10 MMOL/L (ref 3–14)
AST SERPL W P-5'-P-CCNC: 14 U/L (ref 0–45)
BASOPHILS # BLD AUTO: 0 10E9/L (ref 0–0.2)
BASOPHILS NFR BLD AUTO: 0.3 %
BILIRUB SERPL-MCNC: 0.4 MG/DL (ref 0.2–1.3)
BUN SERPL-MCNC: 10 MG/DL (ref 7–30)
CALCIUM SERPL-MCNC: 9.4 MG/DL (ref 8.5–10.1)
CARBAMAZEPINE SERPL-MCNC: 6.9 MG/L (ref 4–12)
CHLORIDE SERPL-SCNC: 106 MMOL/L (ref 94–109)
CO2 SERPL-SCNC: 25 MMOL/L (ref 20–32)
CREAT SERPL-MCNC: 0.71 MG/DL (ref 0.66–1.25)
DIFFERENTIAL METHOD BLD: NORMAL
EOSINOPHIL # BLD AUTO: 0.2 10E9/L (ref 0–0.7)
EOSINOPHIL NFR BLD AUTO: 3 %
ERYTHROCYTE [DISTWIDTH] IN BLOOD BY AUTOMATED COUNT: 12.4 % (ref 10–15)
GFR SERPL CREATININE-BSD FRML MDRD: NORMAL ML/MIN/1.7M2
GLUCOSE SERPL-MCNC: 80 MG/DL (ref 70–99)
HCT VFR BLD AUTO: 47.4 % (ref 40–53)
HGB BLD-MCNC: 16.2 G/DL (ref 13.3–17.7)
IMM GRANULOCYTES # BLD: 0 10E9/L (ref 0–0.4)
IMM GRANULOCYTES NFR BLD: 0.2 %
LYMPHOCYTES # BLD AUTO: 2.5 10E9/L (ref 0.8–5.3)
LYMPHOCYTES NFR BLD AUTO: 41.2 %
MCH RBC QN AUTO: 30.2 PG (ref 26.5–33)
MCHC RBC AUTO-ENTMCNC: 34.2 G/DL (ref 31.5–36.5)
MCV RBC AUTO: 88 FL (ref 78–100)
MONOCYTES # BLD AUTO: 0.7 10E9/L (ref 0–1.3)
MONOCYTES NFR BLD AUTO: 10.7 %
NEUTROPHILS # BLD AUTO: 2.7 10E9/L (ref 1.6–8.3)
NEUTROPHILS NFR BLD AUTO: 44.6 %
NRBC # BLD AUTO: 0 10*3/UL
NRBC BLD AUTO-RTO: 0 /100
PLATELET # BLD AUTO: 220 10E9/L (ref 150–450)
POTASSIUM SERPL-SCNC: 4 MMOL/L (ref 3.4–5.3)
PROT SERPL-MCNC: 7.5 G/DL (ref 6.8–8.8)
RBC # BLD AUTO: 5.37 10E12/L (ref 4.4–5.9)
SODIUM SERPL-SCNC: 141 MMOL/L (ref 133–144)
WBC # BLD AUTO: 6.1 10E9/L (ref 4–11)

## 2017-06-16 PROCEDURE — 80156 ASSAY CARBAMAZEPINE TOTAL: CPT | Performed by: PSYCHIATRY & NEUROLOGY

## 2017-06-16 PROCEDURE — 12400003 ZZH R&B MH CRITICAL UMMC

## 2017-06-16 PROCEDURE — 25000132 ZZH RX MED GY IP 250 OP 250 PS 637: Performed by: PSYCHIATRY & NEUROLOGY

## 2017-06-16 PROCEDURE — 99232 SBSQ HOSP IP/OBS MODERATE 35: CPT | Performed by: PSYCHIATRY & NEUROLOGY

## 2017-06-16 PROCEDURE — 25000132 ZZH RX MED GY IP 250 OP 250 PS 637: Performed by: NURSE PRACTITIONER

## 2017-06-16 PROCEDURE — 80053 COMPREHEN METABOLIC PANEL: CPT | Performed by: PSYCHIATRY & NEUROLOGY

## 2017-06-16 PROCEDURE — 85025 COMPLETE CBC W/AUTO DIFF WBC: CPT | Performed by: PSYCHIATRY & NEUROLOGY

## 2017-06-16 PROCEDURE — 36415 COLL VENOUS BLD VENIPUNCTURE: CPT | Performed by: PSYCHIATRY & NEUROLOGY

## 2017-06-16 RX ADMIN — DIPHENHYDRAMINE HYDROCHLORIDE 50 MG: 50 CAPSULE ORAL at 02:14

## 2017-06-16 RX ADMIN — QUETIAPINE FUMARATE 50 MG: 50 TABLET, FILM COATED ORAL at 02:14

## 2017-06-16 RX ADMIN — CARBAMAZEPINE 200 MG: 100 TABLET, EXTENDED RELEASE ORAL at 09:25

## 2017-06-16 RX ADMIN — LISINOPRIL 20 MG: 20 TABLET ORAL at 09:25

## 2017-06-16 RX ADMIN — LURASIDONE HYDROCHLORIDE 120 MG: 80 TABLET, FILM COATED ORAL at 09:25

## 2017-06-16 RX ADMIN — CARBAMAZEPINE 200 MG: 100 TABLET, EXTENDED RELEASE ORAL at 21:09

## 2017-06-16 ASSESSMENT — ACTIVITIES OF DAILY LIVING (ADL)
DRESS: SCRUBS (BEHAVIORAL HEALTH)
ORAL_HYGIENE: INDEPENDENT
GROOMING: INDEPENDENT

## 2017-06-16 NOTE — PROGRESS NOTES
"Behavioral Health  Note    Behavioral Health  Spirituality Group Note    [address]    Name: Jv Pate YOB: 1985   MRN: [id number] Age: 32 year old      Patient attended -led group, which included discussion of spirituality, coping with illness and building resilience.    Patient attended group for 0.5 hrs.    The patient actively participated in group discussion. Jv and I did an activity about his spiritual framework and a loving-kindness blessing. 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 In the loving-kindness blessing he repeated words like \"strong\" \"change\" and \"win\" as goals/hopes. He shared that he is Muslim via writing a Muslim fish symbol on his paper, and was sightly vague/secretive in discussion around this topic. He did share that he only follow [employer] when he needs help, and \"relaxes\" when he is doing better.      Marge Fournier MDiv, [facility]  Staff   Pager [phone]          "
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
## 2017-06-16 NOTE — PROGRESS NOTES
"During check-in with Pt grecia, Pt told elaborate stories that were somewhat difficult to follow.  He was also unable to answer questions when asked further about topics he was discussing.    \"The police issued me an order for ALS.  ALS is Advanced Life Support.  Its from the .  They train ALS to go ahead of them.\"  When discussing where Pt will live upon discharge, he discussed living with his mom until he saves enough money to buy farmland.  He then stated, \"A farm accident is a good way to spread a body.  Farm accidents happen all the time and kill people.  Like in a wood  and make it look like an old farm accident, then spread it over 40 acres so no one wants to buy it because there's a body in it.  Then there's not an owner and no one wants it and I can buy it cheap.\"  When directly asked if he has ever done this or plans to do something like this, Pt refused to answer, instead just staring writer down, as if to intimidate.  "

## 2017-06-16 NOTE — PLAN OF CARE
Problem: Psychotic Symptoms  Goal: Psychotic Symptoms  Signs and symptoms of listed problems will be absent or manageable.   Outcome: No Change  Pt continues to have symptoms of psychosis. Pt has no change in the last 48 hours. Pt has been appropriate and social this shift. Pt left for court and has not returned at change of shift. Pt endorsed anxiety about the court appearance and a strong desire to leave the hospital soon. Pt's affect is blunted and he has remained calm. Pt's thinking seems generally intact this shift. Pt's insight remains poor and his judgement impaired. Pt has made no aggressive or threatening statements this shift. Pt endorsed no physical health symptoms or side effects from medications this shift. Pt declined vitals assessment this shift.

## 2017-06-16 NOTE — PLAN OF CARE
"Problem: Psychotic Symptoms  Goal: Psychotic Symptoms  Signs and symptoms of listed problems will be absent or manageable.      Pt was quiet, isolative to his room for most of the shift. Pt appears responsive to internal stimuli but he denies it. Pt reports that he wants to live near his mother upon discharge. Pt had a conversation with a nursing staff in which he told her that he wanted to murder a farmer who lives near his mother so he can buy his farmland for cheap upon discharge. The following paragraph is the nursing staff's recollection of the conversation she had with the patient:      \" When discussing where Pt will live upon discharge, he discussed living with his mom until he saves enough money to buy farmland.  He then stated, \"A farm accident is a good way to spread a body.  Farm accidents happen all the time and kill people.  Like in a wood  and make it look like an old farm accident, then spread it over 40 acres so no one wants to buy it because there's a body in it.  Then there's not an owner and no one wants it and I can buy it cheap.\"  When directly asked if he has ever done this or plans to do something like this, Pt refused to answer, instead just staring writer down, as if to intimidate.\"      Pt's attending doctor was notified of this.       "

## 2017-06-17 PROCEDURE — 25000132 ZZH RX MED GY IP 250 OP 250 PS 637: Performed by: PSYCHIATRY & NEUROLOGY

## 2017-06-17 PROCEDURE — 12400003 ZZH R&B MH CRITICAL UMMC

## 2017-06-17 PROCEDURE — 25000132 ZZH RX MED GY IP 250 OP 250 PS 637: Performed by: NURSE PRACTITIONER

## 2017-06-17 RX ADMIN — CARBOXYMETHYLCELLULOSE SODIUM 1 DROP: 10 GEL OPHTHALMIC at 02:52

## 2017-06-17 RX ADMIN — DIPHENHYDRAMINE HYDROCHLORIDE 50 MG: 50 CAPSULE ORAL at 01:51

## 2017-06-17 RX ADMIN — QUETIAPINE FUMARATE 50 MG: 50 TABLET, FILM COATED ORAL at 22:20

## 2017-06-17 RX ADMIN — CARBAMAZEPINE 200 MG: 100 TABLET, EXTENDED RELEASE ORAL at 20:08

## 2017-06-17 RX ADMIN — QUETIAPINE FUMARATE 50 MG: 50 TABLET, FILM COATED ORAL at 01:51

## 2017-06-17 RX ADMIN — HALOPERIDOL 10 MG: 5 TABLET ORAL at 01:51

## 2017-06-17 RX ADMIN — LURASIDONE HYDROCHLORIDE 120 MG: 80 TABLET, FILM COATED ORAL at 08:36

## 2017-06-17 RX ADMIN — LISINOPRIL 20 MG: 20 TABLET ORAL at 08:36

## 2017-06-17 RX ADMIN — DIPHENHYDRAMINE HYDROCHLORIDE 50 MG: 50 CAPSULE ORAL at 22:20

## 2017-06-17 RX ADMIN — CARBOXYMETHYLCELLULOSE SODIUM 1 DROP: 10 GEL OPHTHALMIC at 08:35

## 2017-06-17 RX ADMIN — CARBAMAZEPINE 200 MG: 100 TABLET, EXTENDED RELEASE ORAL at 08:36

## 2017-06-17 ASSESSMENT — ACTIVITIES OF DAILY LIVING (ADL)
LAUNDRY: UNABLE TO COMPLETE
DRESS: SCRUBS (BEHAVIORAL HEALTH)
GROOMING: INDEPENDENT
ORAL_HYGIENE: INDEPENDENT

## 2017-06-17 NOTE — PROGRESS NOTES
Pt woke and came to nursing shortly before 0200 requesting medication for pain, sleep, and anxiety. Pt calm and appropriate, however appeared tense. Bendaryl, Tylenol, and Haldol PRNs given. Pt then returned an hour later requesting eye drops. Pt slept for 5 hours on night shift. No behavioral concerns.

## 2017-06-18 PROCEDURE — 12400003 ZZH R&B MH CRITICAL UMMC

## 2017-06-18 PROCEDURE — 25000132 ZZH RX MED GY IP 250 OP 250 PS 637: Performed by: PSYCHIATRY & NEUROLOGY

## 2017-06-18 PROCEDURE — 25000132 ZZH RX MED GY IP 250 OP 250 PS 637: Performed by: NURSE PRACTITIONER

## 2017-06-18 RX ADMIN — CARBOXYMETHYLCELLULOSE SODIUM 1 DROP: 10 GEL OPHTHALMIC at 21:19

## 2017-06-18 RX ADMIN — LURASIDONE HYDROCHLORIDE 120 MG: 80 TABLET, FILM COATED ORAL at 08:43

## 2017-06-18 RX ADMIN — CARBAMAZEPINE 200 MG: 100 TABLET, EXTENDED RELEASE ORAL at 19:45

## 2017-06-18 RX ADMIN — HALOPERIDOL 10 MG: 5 TABLET ORAL at 13:07

## 2017-06-18 RX ADMIN — DIPHENHYDRAMINE HYDROCHLORIDE 50 MG: 50 CAPSULE ORAL at 21:16

## 2017-06-18 RX ADMIN — LISINOPRIL 20 MG: 20 TABLET ORAL at 08:43

## 2017-06-18 RX ADMIN — DIPHENHYDRAMINE HYDROCHLORIDE 50 MG: 50 CAPSULE ORAL at 13:07

## 2017-06-18 RX ADMIN — QUETIAPINE FUMARATE 50 MG: 50 TABLET, FILM COATED ORAL at 21:16

## 2017-06-18 RX ADMIN — CARBAMAZEPINE 200 MG: 100 TABLET, EXTENDED RELEASE ORAL at 08:43

## 2017-06-18 ASSESSMENT — ACTIVITIES OF DAILY LIVING (ADL)
DRESS: SCRUBS (BEHAVIORAL HEALTH)
ORAL_HYGIENE: INDEPENDENT
DRESS: SCRUBS (BEHAVIORAL HEALTH);INDEPENDENT
GROOMING: INDEPENDENT
LAUNDRY: UNABLE TO COMPLETE
ORAL_HYGIENE: INDEPENDENT
GROOMING: INDEPENDENT

## 2017-06-18 NOTE — PROGRESS NOTES
Jv was isolative in his room and was  minimally interactive with peers and staff when he was out of his room. Pt was giggling and laughing to himself in his room.        06/17/17 2155   Behavioral Health   Hallucinations appears responding   Thinking poor concentration   Orientation place: oriented;person: oriented;date: oriented   Memory baseline memory   Insight poor   Judgement impaired   Eye Contact at examiner   Affect other (see comments);blunted, flat   Mood depressed;mood is calm   Physical Appearance/Attire attire appropriate to age and situation   Hygiene other (see comment)  (Adequate)   Suicidality other (see comments)  (None stated none observed)   Self Injury other (see comment)  (None stated none observed)   Activity isolative;withdrawn   Speech clear;coherent   Psychomotor / Gait balanced;steady   Activities of Daily Living   Hygiene/Grooming independent   Oral Hygiene independent   Dress scrubs (behavioral health)   Laundry unable to complete   Room Organization independent   Behavioral Health Interventions   Psychotic Symptoms maintain safety precautions;monitor need to revise level of observation;maintain safe secure environment;reality orientation;simple, clear language;decrease environmental stimulation;redirection of intrusive behaviors;redirection of aggressive behaviors;establish therapeutic relationship;provide emotional support;build upon strengths;monitor need for prn medication   Social and Therapeutic Interventions (Psychotic Symptoms) encourage socialization with peers;encourage effective boundaries with peers

## 2017-06-18 NOTE — PROGRESS NOTES
"   06/18/17 1517   Debriefing   Debriefing DO   Does patient understand why the event happened? Yes   Does patient agree to safe behaviors? Yes   What can we do differently so this doesn't happen again? Other (comment)  (Patient states, \"I'll stay quiet until I'm discharged!\".)   Plan of care reviewed and modified Yes     Patient continues to deny that he was acting in an aggressive manner; however, he is now willing to agree to safe behaviors.  He reports that he has \"no hard feelings\" toward unit staff.  He no longer appears agitated, and he appears less physically tense.  Will continue to monitor closely.  "

## 2017-06-18 NOTE — PROGRESS NOTES
"Attempted to process patient out of seclusion. Jv stated, \"Well I am just getting bored here so I will stay in here until I go to detention. I haven't threatened anyone here, staff are liars.\"  Jv was again given discontinuation criteria. He responded. \"Remeber Alessandra, we can still be friends and secrets don't keep friends.\"   "

## 2017-06-18 NOTE — PROGRESS NOTES
Standing at door berating staff and continuing to attempt to draw into a debate. Defiant about events leading to BCS and continues to blame staff. Pt loudly talking over staff when attempt is made to respond to his concerns. Appears to continue to have minimal insight into his current mental health status or behaviors that would make it safe for him to return to the unit.

## 2017-06-18 NOTE — PROGRESS NOTES
"   06/18/17 1315   Justification   Clinical Justification Others     Patient presents as hostile, threatening, and demanding.  He asked about his lunch, and he became quite agitated when informed that the meal cart arrived on the unit about 30 minutes ago.  He stated, \"What the hell!  Why didn't you tell me?  It's your responsibility to let me know when my food gets here!\".  Attempted to provide verbal de-escalation, reassurance, and support; however, patient is quite dismissive of unit staff and was not receptive to this intervention.  He was offered a PRN medication to reduce his mounting agitation, and this offer was met with a refusal.  Observed patient walk over to the meal cart and then return to his room without a meal tray in hand.  Checked the meal cart and noted that patient had already eaten his lunch.  Knocked on patient's door and asked him whether he had enough to eat.  At this point, the patient quickly emerged from his room and began to make menacing gestures (pointing a finger inches from writer's face while asking, \"do you want to fight?\" and informing writer that \"you better get the fuck away from me!\") and posturing in an aggressive manner in very close proximity to writer, invading writer's space and continuing to invade this space as writer quickly backed away from patient. Attempted to verbally redirect patient to refrain from this aggressive, threatening behavior.  Patient would not respond to this directive, and he continued to approach writer in an aggressive manner.  At this point, a \"code 21 with security alert\" was paged overhead.  Patient cooperated with walking to seclusion, and he was given oral doses of PRN Haldol 10 mg and Benadryl 50 mg.  While walking to seclusion, patient continued to stare intensely at writer and engage in menacing gestures.  Notified on-call psychiatrist, Dr. Madden.  Order for seclusion obtained with 13:15 start time.  No apparent injury occurred to staff or " patient during the initiation of seclusion.  Will continue to monitor closely.

## 2017-06-18 NOTE — PROGRESS NOTES
06/18/17 1315   Seclusion or Restraint Order   In Person Face to Face Assessment Conducted Yes-Eval of pt's immediate situation, reaction to intervention, complete review of systems assessment, behavioral assessment & review/assessment of hx, drugs & meds, recent labs, etc, behavioral condition, need to continue/terminate restraint/seclusion   Patient walked to seclusion without incident. No s/s of acute distress noted.

## 2017-06-18 NOTE — PROGRESS NOTES
"Chart review of seclusion documentation reveals that patient has presented as calm and quiet.  As such, attempted to assess patient for dangerousness and engage in patient debriefing; however, patient continues to present as agitated and threatening, taking no accountability for the aggressive behaviors leading up to his seclusion event.  Patient stated, \"Why did you threaten me?  Why were you trying to intimidate me?\".  When attempting to answer his questions, he would immediately talk over writer and demand that writer \"stop talking\" and \"shut up\".  His agitation was clearly mounting.  He was staring at writer intensely.  He then stated, \"You better stop trying to instigate me or I'll come right through that door!\".  Given his current presentation (persistent threats and refusal to take any accountability for his aggressive behaviors), it is clear that patient continues to meet criteria to remain in seclusion.  "

## 2017-06-19 PROCEDURE — 25000132 ZZH RX MED GY IP 250 OP 250 PS 637: Performed by: PSYCHIATRY & NEUROLOGY

## 2017-06-19 PROCEDURE — 25000132 ZZH RX MED GY IP 250 OP 250 PS 637: Performed by: NURSE PRACTITIONER

## 2017-06-19 PROCEDURE — 99233 SBSQ HOSP IP/OBS HIGH 50: CPT | Performed by: PSYCHIATRY & NEUROLOGY

## 2017-06-19 PROCEDURE — 25000128 H RX IP 250 OP 636: Performed by: PSYCHIATRY & NEUROLOGY

## 2017-06-19 PROCEDURE — 25000132 ZZH RX MED GY IP 250 OP 250 PS 637: Performed by: EMERGENCY MEDICINE

## 2017-06-19 PROCEDURE — 12400003 ZZH R&B MH CRITICAL UMMC

## 2017-06-19 RX ADMIN — LURASIDONE HYDROCHLORIDE 120 MG: 80 TABLET, FILM COATED ORAL at 08:28

## 2017-06-19 RX ADMIN — CARBAMAZEPINE 200 MG: 100 TABLET, EXTENDED RELEASE ORAL at 19:42

## 2017-06-19 RX ADMIN — LISINOPRIL 20 MG: 20 TABLET ORAL at 08:28

## 2017-06-19 RX ADMIN — HALOPERIDOL LACTATE 10 MG: 5 INJECTION, SOLUTION INTRAMUSCULAR at 09:53

## 2017-06-19 RX ADMIN — CARBAMAZEPINE 200 MG: 100 TABLET, EXTENDED RELEASE ORAL at 08:28

## 2017-06-19 RX ADMIN — DIPHENHYDRAMINE HYDROCHLORIDE 50 MG: 50 CAPSULE ORAL at 20:23

## 2017-06-19 RX ADMIN — QUETIAPINE FUMARATE 50 MG: 50 TABLET, FILM COATED ORAL at 20:23

## 2017-06-19 RX ADMIN — DIPHENHYDRAMINE HYDROCHLORIDE 50 MG: 50 INJECTION, SOLUTION INTRAMUSCULAR; INTRAVENOUS at 09:53

## 2017-06-19 RX ADMIN — HALOPERIDOL 10 MG: 5 TABLET ORAL at 21:18

## 2017-06-19 ASSESSMENT — ACTIVITIES OF DAILY LIVING (ADL)
LAUNDRY: UNABLE TO COMPLETE
GROOMING: INDEPENDENT
ORAL_HYGIENE: INDEPENDENT
DRESS: SCRUBS (BEHAVIORAL HEALTH)

## 2017-06-19 NOTE — PROVIDER NOTIFICATION
06/19/17 0940   Seclusion or Restraint Order   In Person Face to Face Assessment Conducted Yes-Eval of pt's immediate situation, reaction to intervention, complete review of systems assessment, behavioral assessment & review/assessment of hx, drugs & meds, recent labs, etc, behavioral condition, need to continue/terminate restraint/seclusion   Patient Experienced No adverse physical outcome from seclusion/restraint initiation   Continuation of Seclus/Restraint indicated at this time Yes   RN face to face assessment completed. Patient verbally threatening to staff and displaying posturing gestures. Multiple attempts to de escalate patient were attempted. Seclusion warranted at this time for safety. No s/s of injury observed. MD notifed. Continue to monitor and assess.

## 2017-06-19 NOTE — PROGRESS NOTES
"   06/19/17 0930   Justification   Clinical Justification Others     Patient became verbally aggressive and threatening while demanding immediate discharge.  He was perseverating on the expiration of his 72-hour hold- indicating significant thought disorganization given that he has been on a court hold for several weeks and is now committed and Jarvised.  Unit staff attempted to offer reassurance, support, and verbal de-escalation; however, he was not receptive to these interventions.  He was becoming increasingly agitated and threatening.  When unit staff advised him that his verbally aggressive statements and posturing demeanor appeared to be quite threatening, he stated \"I don't care\" while continuing to make demands to be immediately discharged.  When this writer, who is the Charge RN on the unit, attempted to verbally de-escalate patient and encourage him to await the administration of PRN medications in his room, he began to charge at writer while making menacing gestures and stating \"You don't talk! You get the hell away from me!\".  It became quite clear that least restrictive measures were not sufficient to reduce his aggressive behavior.  As such, a \"code 21 with security alert\" was paged overhead.  Once responding staff arrived, the patient was given the opportunity to walk to seclusion; however, he refused to cooperate with this process and security staff were forced to take control using BCS interventions.  The patient was then placed on the backboard and gurney and taken to seclusion.  Once in the seclusion room, he received IM injections of PRN Haldol 10 mg and Benadryl 50 mg.  No apparent injury occurred during the initiation of seclusion.  Notified Dr. Diana, obtained order for seclusion with 09:30 start time.  Will continue to monitor closely.  "

## 2017-06-19 NOTE — PROVIDER NOTIFICATION
Seclusion discontinued at this time. Debriefing with patient occurred, and patient agrees to safe behaviors, and verbalizes the events that led up to seclusion, and agrees to utilize staff and available resources in the future.

## 2017-06-19 NOTE — PROGRESS NOTES
After patient released from seclusion he went directly to bed and has been sleeping since.     06/19/17 1417   Behavioral Health   Hallucinations other (see comment)  (Did not endorse)   Thinking paranoid;poor concentration   Orientation person: oriented;place: oriented;date: oriented   Memory baseline memory   Insight poor   Judgement impaired   Eye Contact staring;at examiner  (Intense)   Affect angry;blunted, flat;tense;irritable   Mood anxious;labile;irritable   Physical Appearance/Attire untidy   Hygiene neglected grooming - unclean body, hair, teeth;other (see comment)  (Patient able to provide adequate ADl's)   Suicidality other (see comments)  (No SI comments)   Self Injury other (see comment)  (No SIB observed)   Activity restless   Speech pressured;other (see comments)  (Angry)   Medication Sensitivity no stated side effects;no observed side effects   Psychomotor / Gait balanced;steady   Substance Withdrawal Interventions   Social and Therapeutic Interventions (Substance Withdrawal) encourage effective boundaries with peers   Overt Aggression Scale   Verbal Aggression 1   Aggression against Property 0   Auto-Aggression 0   Physical Aggression 4   Overt Aggression Total Score 5   Sleep/Rest/Relaxation   Day/Evening Time Hours napping;resting in bed   Number of hours napping 4 hours  (2 in seclusion)   Number of hours resting in bed 2 hours   Psycho Education   Type of Intervention 1:1 intervention   Response unavailable   Hours 0.5   Treatment Detail Triggers  (In seclusion then sleeping after)   Daily Care   Activity up ad jomar   Activities of Daily Living   Hygiene/Grooming independent   Oral Hygiene independent   Dress scrubs (behavioral health)   Laundry unable to complete   Room Organization independent   Activity   Activity Level of Assistance independent   Significant Event   Significant Event Aggressive/violent behavior   Behavioral Health Interventions   Psychotic Symptoms maintain safety  precautions;decrease environmental stimulation;redirection of intrusive behaviors   Social and Therapeutic Interventions (Psychotic Symptoms) encourage effective boundaries with peers

## 2017-06-19 NOTE — PROGRESS NOTES
Pt struggled with sleep on night shift. Pt was awake when overnight staff arrived, was pacing loya and talking with various staff. Pt appeared tense, however no behavioral issues were seen. Pt slept briefly during the night, 1.5 hours, then woke and resumes pacing of hallways.

## 2017-06-19 NOTE — PROGRESS NOTES
Early in the evening patient was able to process out of seclusion. He was still dismissive of the events that led up to the seclusion but agreed to stay safe in the milieu. He was active in the milieu playing cards and socializing with peers and staff. The majority of the conversations he had with other patients were friendly and sometimes empathetic but it was noticed that as the conversations progressed he often seemed to change to the direction of the conversation often seeming more demeaning and interrogating which often seemed to make other patients uncomfortable. He was accepting of checking in with staff but was dismissive and showed little interest in talking about his care and would change the subject.     06/18/17 2207   Behavioral Health   Hallucinations other (see comment)  (none stated or observed)   Thinking paranoid;poor concentration   Orientation place: oriented;date: oriented;person: oriented;time: oriented   Memory baseline memory   Insight poor   Judgement impaired   Eye Contact at examiner   Affect tense;irritable   Mood irritable   Physical Appearance/Attire attire appropriate to age and situation   Hygiene well groomed   Suicidality other (see comments)  (none stated or observed)   Self Injury other (see comment)  (none stated or observed)   Activity (active and social in the milieu)   Speech clear;coherent   Medication Sensitivity no stated side effects;no observed side effects   Psychomotor / Gait balanced;steady   Overt Aggression Scale   Verbal Aggression 0   Aggression against Property 0   Auto-Aggression 0   Physical Aggression 0   Overt Aggression Total Score 0   Sleep/Rest/Relaxation   Day/Evening Time Hours up all shift   Coping/Psychosocial   Verbalized Emotional State acceptance;frustration   Supportive Measures active listening utilized;decision-making supported;positive reinforcement provided;relaxation techniques promoted;problem solving facilitated;verbalization of feelings  encouraged   Trust Relationship/Rapport care explained;choices provided;emotional support provided;empathic listening provided;questions answered;questions encouraged;reassurance provided;thoughts/feelings acknowledged   Daily Care   Activity up ad jomar   Patient Performed Hygiene shower;dressed   Activities of Daily Living   Hygiene/Grooming independent   Oral Hygiene independent   Dress scrubs (behavioral health)   Laundry unable to complete   Room Organization independent   Activity   Activity Level of Assistance independent   Behavioral Health Interventions   Psychotic Symptoms maintain safety precautions;maintain safe secure environment;simple, clear language;encourage participation / independence with adls;monitor confusion, memory loss, decision making ability and reorient / intervent as needed

## 2017-06-20 PROCEDURE — 97150 GROUP THERAPEUTIC PROCEDURES: CPT | Mod: GO

## 2017-06-20 PROCEDURE — 12400003 ZZH R&B MH CRITICAL UMMC

## 2017-06-20 PROCEDURE — 36415 COLL VENOUS BLD VENIPUNCTURE: CPT | Performed by: PSYCHIATRY & NEUROLOGY

## 2017-06-20 PROCEDURE — 25000132 ZZH RX MED GY IP 250 OP 250 PS 637: Performed by: NURSE PRACTITIONER

## 2017-06-20 PROCEDURE — 90853 GROUP PSYCHOTHERAPY: CPT

## 2017-06-20 PROCEDURE — 25000132 ZZH RX MED GY IP 250 OP 250 PS 637: Performed by: PSYCHIATRY & NEUROLOGY

## 2017-06-20 PROCEDURE — 99232 SBSQ HOSP IP/OBS MODERATE 35: CPT | Performed by: PSYCHIATRY & NEUROLOGY

## 2017-06-20 RX ADMIN — CARBAMAZEPINE 200 MG: 100 TABLET, EXTENDED RELEASE ORAL at 19:51

## 2017-06-20 RX ADMIN — LURASIDONE HYDROCHLORIDE 120 MG: 80 TABLET, FILM COATED ORAL at 08:27

## 2017-06-20 RX ADMIN — HALOPERIDOL 10 MG: 5 TABLET ORAL at 21:02

## 2017-06-20 RX ADMIN — DIPHENHYDRAMINE HYDROCHLORIDE 50 MG: 50 CAPSULE ORAL at 19:51

## 2017-06-20 RX ADMIN — LISINOPRIL 20 MG: 20 TABLET ORAL at 08:27

## 2017-06-20 RX ADMIN — QUETIAPINE FUMARATE 50 MG: 50 TABLET, FILM COATED ORAL at 19:51

## 2017-06-20 RX ADMIN — CARBAMAZEPINE 200 MG: 100 TABLET, EXTENDED RELEASE ORAL at 08:27

## 2017-06-20 ASSESSMENT — ACTIVITIES OF DAILY LIVING (ADL)
HYGIENE/GROOMING: INDEPENDENT
LAUNDRY: UNABLE TO COMPLETE
DRESS: SCRUBS (BEHAVIORAL HEALTH);INDEPENDENT
ORAL_HYGIENE: INDEPENDENT
GROOMING: INDEPENDENT
DRESS: SCRUBS (BEHAVIORAL HEALTH)
ORAL_HYGIENE: INDEPENDENT

## 2017-06-20 NOTE — PROGRESS NOTES
Mom of patient (Silvana) called asking about how the remainder of Jv's day went once seclusion was completed.  Advised her per notes in chart.  Mom spoke for several minutes as to how she felt antibiotics could help Jv as she feels the psychotic behavior could be related to lyme disease.  States Rocephin has helped with the psychosis in the past.  Talks about Dr. Mickey Monte at [facility] and his work with antibiotics and lyme disease.  Listened to Silvana and also advised her to call Heidi MAXWELL with further questions and concerns.

## 2017-06-20 NOTE — PROGRESS NOTES
Patient has been isolative and withdrawn this unit. Was somewhat restless and pacing the halls. He is irritable at times. He did ask for PRNS this evening and believed the first ones did not work and asked for more. He was cooperative when asking him to wait and was pleasant when given meds. He ate dinner and retreated back to his room for awhile. Asked how his day was and he said he had a tough morning (he was in seclusion), but had been sleeping and it was getting better for him. Overall a good evening. Will continue to monitor and assess.

## 2017-06-20 NOTE — PLAN OF CARE
Problem: Psychotic Symptoms  Goal: Social and Therapeutic (Psychotic Symptoms)  Signs and symptoms of listed problems will be absent or manageable.         Pt attended 2.5 of 3.0 scheduled OT sessions today.  Pt.attended a Goal Setting session and actively participated in a discussion of guidelines for setting goals.  Pt. Identified a priority goal and action steps to accomplish that goal. Pt. Actively participated in goal directed task session. The task group involved using unfamiliar supplies to create individual projects.   Pt was instructed on the sequence of how to use the materials.  Pt was involved in the sharing of supplies. Pt was organized and creative in his efforts.  Pt.was cooperative and pleasant throughout session.

## 2017-06-20 NOTE — PROGRESS NOTES
This writer called pt's newly assigned [facility]  - Jenna Khalil.  Left vm. Asked her to call me to discuss case aftercare etc.  Her direct line is [phone]

## 2017-06-20 NOTE — PROGRESS NOTES
"[facility], [place]   Psychiatric Progress Note        Interim History:   The patient's care was discussed with the treatment team during the daily team meeting and/or staff's chart notes were reviewed.  Staff reports that patient continues to be appear tense, but seems to be working hard to be pleasant.    Patient reports that he is fine and ready to go. When I indicate that I have concerns due to recent seclusions and his homicidal statements last week, he dismisses this. He states that he did not say anything. He then states, \"well, I'm a schizophrenic and on meds now, so I'm okay.\" He has very little insight and takes no ownership for his current situation.         carBAMazepine  200 mg Oral BID     lurasidone  120 mg Oral Daily     lisinopril  20 mg Oral Daily          Allergies:     Allergies   Allergen Reactions     No Known Drug Allergies           Labs:   No results found for this or any previous visit (from the past 24 hour(s)).       Psychiatric Examination:     Vitals:    06/18/17 0800 06/18/17 0843 06/19/17 0700 06/20/17 0700   BP:  140/89     Pulse:       Resp: 14   16   Temp: 98.4  F (36.9  C)  97.6  F (36.4  C) 98  F (36.7  C)   TempSrc:   Tympanic    SpO2:       Weight: 111.6 kg (246 lb)   111.1 kg (245 lb)   Height:           Weight is 245 lbs 0 oz  Body mass index is 30.62 kg/(m^2).    Appearance: awake, alert, adequately groomed, dressed in hospital scrubs and appeared as age stated  Attitude:  somewhat cooperative  Eye Contact:  fair  Mood:  better  Affect:  more controlled  Speech:  clear, coherent and normal prosody  Psychomotor Behavior:  no evidence of tardive dyskinesia, dystonia, or tics and intact station, gait and muscle tone  Throught Process:  goal oriented  Associations:  no loose associations  Thought Content:  no auditory hallucinations present, no visual hallucinations present and ongoing delusional beliefs  Insight:  limited  Judgement:  " limited  Oriented to:  time, person, and place  Attention Span and Concentration:  intact  Recent and Remote Memory:  intact         Precautions:     Behavioral Orders   Procedures     Assault precautions     Code 1 - Restrict to Unit     Routine Programming     As clinically indicated     Sexual precautions     Status 15     Every 15 minutes.          Diagnoses:     1.  Schizophrenia, paranoid type. (r/o bipolar type 1, jaz with psychosis). Jaz resolving.   2.  Noncompliance, nonadherence.   3.  cluster B with likely antisocial traits.           Plan:     Medications:  1.  Saphris: restated on admission and titrated to 10 mg BID but later cross tapered to Latuda due to lack of efficacy.  2.  PRNs: Vistaril for anxiety and Seroquel for restlessness and haldol with Benadryl for agitation and psychosis. PRN ativan was discontinued.   3.  Latuda: started and titrated to 120 mg daily, with goals to further titrate pending clinical response and tolerability.   Currently unclear benefit from lurasidone as well. Will likely change when [facility] order is returned.  4.  Tegretol: start at 100 mg BID, level was 5.1 on 6/11 and dose increased to 200 mg BID.  Tegretol level was 6.9 on 6/16.  5.  Cogentin was added at 0.5-1 mg BID PRN for EPS.        -- IM consult consult completed,  re: Lyme management. They have recommended to intervention at this time due to lack of neurological findings. This was based on their conversation with infectious disease specialist.    Legal Status and Disposition:  1.  The patient was admitted on a 72hr hold. Petition for civil commitment with [facility] was filed. [facility]-ed medications included Saphris, Zyprexa, Risperdal and Haldol (will add Latuda to [facility]). Final hearing was on 6/16. Await paperwork from court.  2.  Disposition to be determined pending clinical response. Discharge will be granted once active psychosis resolved and safety established in the community.          Addendum: I  attempted to phone the patient's mother at the number provided by staff and after several rings it went to a fax machine. I also use the number in the chart but there was no answer.

## 2017-06-20 NOTE — PROGRESS NOTES
[facility], [place]   Psychiatric Progress Note        Interim History:   The patient's care was discussed with the treatment team during the daily team meeting and/or staff's chart notes were reviewed.  Staff indicate that the patient continues to be agitated an threatening. He required seclusion over the weekend and again this AM due to threatening behavior while demanding to leave due to believe that 72-hour hold was over.    I saw the patient while in seclusion. He indicated that his hold was over. He denied that he was committed and upon realizing he wasn't going to convince me that he could leave, he told me that I am not his doctor and refused to speak to me further.         carBAMazepine  200 mg Oral BID     lurasidone  120 mg Oral Daily     lisinopril  20 mg Oral Daily          Allergies:     Allergies   Allergen Reactions     No Known Drug Allergies           Labs:   No results found for this or any previous visit (from the past 24 hour(s)).       Psychiatric Examination:     Vitals:    06/17/17 1900 06/18/17 0800 06/18/17 0843 06/19/17 0700   BP: 136/84  140/89    Pulse: 98      Resp: 16 14     Temp:  98.4  F (36.9  C)  97.6  F (36.4  C)   TempSrc:    Tympanic   SpO2:       Weight:  111.6 kg (246 lb)     Height:           Weight is 246 lbs 0 oz  Body mass index is 30.75 kg/(m^2).    Appearance: awake, alert, adequately groomed, dressed in hospital scrubs and appeared as age stated  Attitude:  somewhat cooperative  Eye Contact:  fair  Mood:  angry  Affect:  labile and reactive  Speech:  clear, coherent and normal prosody  Psychomotor Behavior:  no evidence of tardive dyskinesia, dystonia, or tics and intact station, gait and muscle tone  Throught Process:  goal oriented  Associations:  no loose associations  Thought Content:  no auditory hallucinations present, no visual hallucinations present and ongoing delusional beliefs  Insight:  limited  Judgement:  limited  Oriented to:   time, person, and place  Attention Span and Concentration:  intact  Recent and Remote Memory:  intact         Precautions:     Behavioral Orders   Procedures     Assault precautions     Code 1 - Restrict to Unit     Routine Programming     As clinically indicated     Sexual precautions     Status 15     Every 15 minutes.          Diagnoses:     1.  Schizophrenia, paranoid type. (r/o bipolar type 1, jaz with psychosis). Jaz resolving.   2.  Noncompliance, nonadherence.   3.  cluster B with likely antisocial traits.           Plan:     Medications:  1.  Saphris: restated on admission and titrated to 10 mg BID but later cross tapered to Latuda due to lack of efficacy.  2.  PRNs: Vistaril for anxiety and Seroquel for restlessness and haldol with Benadryl for agitation and psychosis. PRN ativan was discontinued.   3.  Latuda: started and titrated to 120 mg daily, with goals to further titrate pending clinical response and tolerability.   Currently unclear benefit from lurasidone as well. Will likely change when [facility] order is returned.  4.  Tegretol: start at 100 mg BID, level was 5.1 on 6/11 and dose increased to 200 mg BID.  Tegretol level was 6.9 on 6/16.  5.  Cogentin was added at 0.5-1 mg BID PRN for EPS.        -- IM consult consult completed,  re: Lyme management.     Legal Status and Disposition:  1.  The patient was admitted on a 72hr hold. Petition for civil commitment with [facility] was filed. [facility]-ed medications included Saphris, Zyprexa, Risperdal and Haldol (will add Latuda to [facility]). Final hearing on 6/16. Await paperwork from court.  2.  Disposition to be determined pending clinical response. Discharge will be granted once active psychosis resolved and safety established in the community.

## 2017-06-20 NOTE — PROGRESS NOTES
10:46am - at this time this writer has received 12 calls and voicemails from this pt's Mother Silvana Pate.     Starting at 6:53am was the first message - each message she ran out of time on the voicemail.  It sounds as if she is reading from a book about encephalitis and lyme disease and symptoms.  She talks about antibiotic treatment for lymes etc.     I have told her in the past that I am not a medical professional and cannot answer these questions for her. She continues to call me over and over even after I have told her I cannot answer these questions for her.      Dr. Diana the attending Psychiatrist is aware of the situation. I also forwarded messages to Alyce Slade and Glenna Valdez; (this writer's supervisors) as I am unable to continue to assist her with the medical questions she has.

## 2017-06-21 PROCEDURE — 90853 GROUP PSYCHOTHERAPY: CPT

## 2017-06-21 PROCEDURE — 12400003 ZZH R&B MH CRITICAL UMMC

## 2017-06-21 PROCEDURE — 25000132 ZZH RX MED GY IP 250 OP 250 PS 637: Performed by: EMERGENCY MEDICINE

## 2017-06-21 PROCEDURE — 25000132 ZZH RX MED GY IP 250 OP 250 PS 637: Performed by: PSYCHIATRY & NEUROLOGY

## 2017-06-21 PROCEDURE — 97150 GROUP THERAPEUTIC PROCEDURES: CPT | Mod: GO

## 2017-06-21 PROCEDURE — 99232 SBSQ HOSP IP/OBS MODERATE 35: CPT | Performed by: PSYCHIATRY & NEUROLOGY

## 2017-06-21 PROCEDURE — 25000132 ZZH RX MED GY IP 250 OP 250 PS 637: Performed by: NURSE PRACTITIONER

## 2017-06-21 RX ADMIN — QUETIAPINE FUMARATE 50 MG: 50 TABLET, FILM COATED ORAL at 21:02

## 2017-06-21 RX ADMIN — QUETIAPINE FUMARATE 50 MG: 50 TABLET, FILM COATED ORAL at 04:29

## 2017-06-21 RX ADMIN — HALOPERIDOL 10 MG: 5 TABLET ORAL at 04:29

## 2017-06-21 RX ADMIN — CARBAMAZEPINE 200 MG: 100 TABLET, EXTENDED RELEASE ORAL at 21:02

## 2017-06-21 RX ADMIN — HYDROXYZINE HYDROCHLORIDE 50 MG: 25 TABLET ORAL at 13:35

## 2017-06-21 RX ADMIN — LURASIDONE HYDROCHLORIDE 120 MG: 80 TABLET, FILM COATED ORAL at 08:34

## 2017-06-21 RX ADMIN — DIPHENHYDRAMINE HYDROCHLORIDE 50 MG: 50 CAPSULE ORAL at 04:29

## 2017-06-21 RX ADMIN — LISINOPRIL 20 MG: 20 TABLET ORAL at 08:34

## 2017-06-21 RX ADMIN — CARBAMAZEPINE 200 MG: 100 TABLET, EXTENDED RELEASE ORAL at 08:36

## 2017-06-21 ASSESSMENT — ACTIVITIES OF DAILY LIVING (ADL)
ORAL_HYGIENE: INDEPENDENT
DRESS: SCRUBS (BEHAVIORAL HEALTH)
HYGIENE/GROOMING: INDEPENDENT
LAUNDRY: UNABLE TO COMPLETE

## 2017-06-21 NOTE — PROGRESS NOTES
[facility], [place]   Psychiatric Progress Note        Interim History:   The patient's care was discussed with the treatment team during the daily team meeting and/or staff's chart notes were reviewed.  Staff reports that patient has been in better control since his seclusion on 6/19. No acute issues overnight. He continues to minimize symptoms and demonstrates a lack of insight.    Patient reports that he will sometimes hear voices. He reports that most recently they were more like prayers and internal thoughts. He states that he first noticed these voices around age 19. He is much more calm and conversational today. He hopes that we can work on a discharge plan soon.         carBAMazepine  200 mg Oral BID     lurasidone  120 mg Oral Daily     lisinopril  20 mg Oral Daily          Allergies:     Allergies   Allergen Reactions     No Known Drug Allergies           Labs:   No results found for this or any previous visit (from the past 24 hour(s)).       Psychiatric Examination:     Vitals:    06/18/17 0843 06/19/17 0700 06/20/17 0700 06/21/17 0700   BP: 140/89      Pulse:       Resp:   16 14   Temp:  97.6  F (36.4  C) 98  F (36.7  C) 97.8  F (36.6  C)   TempSrc:  Tympanic     SpO2:       Weight:   111.1 kg (245 lb)    Height:           Weight is 245 lbs 0 oz  Body mass index is 30.62 kg/(m^2).    Appearance: awake, alert, adequately groomed, dressed in hospital scrubs and appeared as age stated  Attitude:  cooperative  Eye Contact:  good  Mood:  euthymic  Affect:  less guarded, more controlled  Speech:  clear, coherent and normal prosody  Psychomotor Behavior:  no evidence of tardive dyskinesia, dystonia, or tics and intact station, gait and muscle tone  Throught Process:  goal oriented  Associations:  no loose associations  Thought Content:  endorses some intermittent hallucinations. He denies suicidal or homicidal thoughts.  Insight:  limited  Judgement:  improving  Oriented to:  time,  person, and place  Attention Span and Concentration:  intact  Recent and Remote Memory:  intact   Fund of Knowledge: Adequate         Precautions:     Behavioral Orders   Procedures     Assault precautions     Code 1 - Restrict to Unit     Routine Programming     As clinically indicated     Sexual precautions     Status 15     Every 15 minutes.          Diagnoses:     1.  Schizophrenia, paranoid type.   2.  Noncompliance, nonadherence.   3.  Cluster B with antisocial traits.           Plan:     Medications:  1.  Saphris: restated on admission and titrated to 10 mg BID but later cross tapered to Latuda due to lack of efficacy.  2.  PRNs: Vistaril for anxiety and Seroquel for restlessness and haldol with Benadryl for agitation and psychosis. PRN ativan was discontinued.   3.  Latuda: started and titrated to 120 mg daily, with goals to further titrate pending clinical response and tolerability. He appears to be improving as of 6/20.  4.  Tegretol: start at 100 mg BID, level was 5.1 on 6/11 and dose increased to 200 mg BID.  Tegretol level was 6.9 on 6/16.  5.  Cogentin was added at 0.5-1 mg BID PRN for EPS.        -- IM consult consult completed,  re: Lyme management. They have recommended to intervention at this time due to lack of neurological findings. This was based on their conversation with infectious disease specialist.    Legal Status and Disposition:  1.  Commitment with [facility] for Saphris, Zyprexa, haloperidol, Latuda, and risperidone.  2.  Will work with county  on [facility] placement.

## 2017-06-21 NOTE — PROGRESS NOTES
Court paperwork arrived today from [facility] regarding the MI/CD Commitment w/ jenelle.  Pt will need to be provisionally discharged from our unit when he is d/c'd.  Copy of court paperwork given to the pt.

## 2017-06-21 NOTE — PLAN OF CARE
"Problem: Psychotic Symptoms  Goal: Psychotic Symptoms  Signs and symptoms of listed problems will be absent or manageable.   Outcome: Improving  Patient presents as guarded and paranoid with a blunted affect.  He is much less irritable on approach today.  He provides very minimal information during the mental status examination, providing mostly one or two-word replies to questions.  He denies that he is experiencing any psychotic symptoms.  No overtly delusional or grandiose statements were overheard today.  Jv was more visible in the milieu, social with select peers.  At one point, he was observed sitting down with a couple peers to play a game of cards.  No episodes of agitation or aggressive behavior today, although he was placed in seclusion for aggressive/ threatening behavior as recently as yesterday, 6/19/17.  He accepted his HS medication, Tegretol, without incident.  He requested PRN medications (Benadryl, Seroquel) \"to help stay calm\".  He later approached the medication room window, requested additional medication, and was given a dose of PRN Haldol 10 mg.  He denies any adverse side effects from his current regimen.  He denies any acute physical concerns.  His only request this evening was that Dr. Diana get in contact with his mother.  Advised vJ that Dr. Diana attempted to reach her on two different numbers today without success.      "

## 2017-06-21 NOTE — PLAN OF CARE
Problem: Psychotic Symptoms  Goal: Social and Therapeutic (Psychotic Symptoms)  Signs and symptoms of listed problems will be absent or manageable.      Pt attended 30 minute session of OT Clinic today.  Organized in his efforts with structured tasks.  Pt.was cooperative and pleasant throughout session.

## 2017-06-21 NOTE — PROGRESS NOTES
Late Entry from yesterday's phone calls received from Jv's mother Silvana Pate. This writer consulted with her direct supervisors and Gavi from [employer] about the frequency of calls and length of calls left on my voicemail.      Silvana Pate started leaving me voicemails yesterday morning (6/20/2017) at 6:53am. She left 12 voicemails in total, one right after the other until she ran out of time on each call. My voicemail box is full from her messages.  She calls, talks incessantly until she runs out of time, calls right back and does the same thing; one after the other. She would talk until the voicemail cut her off and then call back immediatly and start where she left off.  She is perseverating on articles from the infectious disease society and reading them on my voicemail.  She engaged in perseverative rambling about lyme's disease, encephalopathy and IV antibiotics.      This writer has relayed to the Mother multiple times that I am not a medical professional, I am a  and cannot continue to have her call and fill my voicemail box with information from infectious diseases society articles etc. Dr. Diana has tried to reach her and her number answered like a fax machine.  The OP CCM also stated that when she tried to reach the Mother that it was a fax number.  When Jv was first admitted this writer told the Mother I would speak to her 1x/day (when she was calling often).  However, the pt is stabilizing and this writer feels that it is more appropriate at this time to work with the patient directly and his outpatient  on his outpatient plan.     It is unproductive for this writer to continue to have conversations with mother about medical issues that are out of my scope of practice.

## 2017-06-21 NOTE — PROGRESS NOTES
This writer spoke to pt's CCM Jenna Khalil (Phone: [phone]).  She is making referrals to [facility] and also some [facility].  [facility] is ideal, but wait time is lengthy sometimes.  She will call me at the end of the day to inform me of where he is on the wait list.

## 2017-06-22 PROCEDURE — 12400003 ZZH R&B MH CRITICAL UMMC

## 2017-06-22 PROCEDURE — H2032 ACTIVITY THERAPY, PER 15 MIN: HCPCS

## 2017-06-22 PROCEDURE — 25000132 ZZH RX MED GY IP 250 OP 250 PS 637: Performed by: PSYCHIATRY & NEUROLOGY

## 2017-06-22 PROCEDURE — 25000132 ZZH RX MED GY IP 250 OP 250 PS 637: Performed by: NURSE PRACTITIONER

## 2017-06-22 PROCEDURE — 97150 GROUP THERAPEUTIC PROCEDURES: CPT | Mod: GO

## 2017-06-22 RX ADMIN — LISINOPRIL 20 MG: 20 TABLET ORAL at 08:52

## 2017-06-22 RX ADMIN — CARBAMAZEPINE 200 MG: 100 TABLET, EXTENDED RELEASE ORAL at 19:34

## 2017-06-22 RX ADMIN — QUETIAPINE FUMARATE 50 MG: 50 TABLET, FILM COATED ORAL at 21:22

## 2017-06-22 RX ADMIN — DIPHENHYDRAMINE HYDROCHLORIDE 50 MG: 50 CAPSULE ORAL at 22:26

## 2017-06-22 RX ADMIN — CARBAMAZEPINE 200 MG: 100 TABLET, EXTENDED RELEASE ORAL at 08:51

## 2017-06-22 RX ADMIN — LURASIDONE HYDROCHLORIDE 120 MG: 80 TABLET, FILM COATED ORAL at 08:52

## 2017-06-22 RX ADMIN — QUETIAPINE FUMARATE 50 MG: 50 TABLET, FILM COATED ORAL at 01:58

## 2017-06-22 RX ADMIN — HALOPERIDOL 10 MG: 5 TABLET ORAL at 22:26

## 2017-06-22 ASSESSMENT — ACTIVITIES OF DAILY LIVING (ADL)
ORAL_HYGIENE: INDEPENDENT
GROOMING: INDEPENDENT
DRESS: SCRUBS (BEHAVIORAL HEALTH)
LAUNDRY: UNABLE TO COMPLETE
GROOMING: INDEPENDENT
ORAL_HYGIENE: INDEPENDENT
DRESS: SCRUBS (BEHAVIORAL HEALTH)

## 2017-06-22 NOTE — PROGRESS NOTES
Jv was calm, cooperative, and respectful entire shift.  Another patient who is quite mentally impaired came up and spilled his coffee on his leg, Jv responded very calmly.  He didn't get upset at all. He merely requested to take a shower.

## 2017-06-22 NOTE — PROGRESS NOTES
"Patients mother contacted the unit. She expressed concern that patient is becoming confused and stated that she spoke with the pharmacist who informed her that confusion was one of the side effects of Latuda. No acute confusion was noted upon my interactions with patient. Mother spoke in lengths about patients misdiagnosis with psychiatric disorder and psychiatric symptoms masking the Lyme disease. Stated, \" If  Dr. 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 She also attempted to obtain the number to inpatient infectious disease provider.   "

## 2017-06-22 NOTE — PROGRESS NOTES
Pt overall had a good shift this evening, and was pleasant and cooperative in the milieu and with staff. Pt paced the halls and was social with other patients. Pt was overhead speaking on the phone making grandiose and delusional statements. No behavioral concerns to be noted and pt stated he is looking forward to possible discharge in a week or two.        06/21/17 2100   Behavioral Health   Hallucinations denies / not responding to hallucinations   Thinking paranoid;delusional   Orientation person: oriented;place: oriented;date: oriented;time: oriented   Memory baseline memory   Insight poor   Judgement impaired   Eye Contact at examiner   Affect blunted, flat;tense   Mood mood is calm   Physical Appearance/Attire untidy   Hygiene neglected grooming - unclean body, hair, teeth   Suicidality (WDL) WDL   Self Injury (WDL) WDL   Activity (WDL) WDL   Speech (WDL) WDL   Psychomotor Gait (WDL) WDL   Activities of Daily Living   Hygiene/Grooming independent   Oral Hygiene independent   Dress scrubs (behavioral health)   Laundry unable to complete   Room Organization independent

## 2017-06-22 NOTE — PLAN OF CARE
"Problem: Psychotic Symptoms  Goal: Psychotic Symptoms  Signs and symptoms of listed problems will be absent or manageable.   Outcome: No Change  Pt presents as calm and pleasant, interacting appropriately with a peer. Still displaying some delusions and grandiosity, tells writer is growing \"tons of tobacco to sell to a famous cigarette \" . Denies psychotic symptoms. Ask writer plan for discharge and indicates would prefer to return home but open to recommendations. Denies paranoia, denies SI, HI. Reports good sleep and appetite. Noted adequate hygiene.Behavior not a problem tonight.      "

## 2017-06-22 NOTE — PROGRESS NOTES
Jv  participated in 30 minutes of OT clinic this a.m and was able to initiate task and ask for help as needed. Worked with an art tool that he was familiar with and enjoys (Spi[employer]raph). Socialized casually with peer and this writer. Agreeable and appropriate while present in the group.

## 2017-06-22 NOTE — PROGRESS NOTES
This writer called [employer] [facility] and moved pt's Psychiatric Appointment back.  He had an appt next week that I moved to July 6th @ 3pm.

## 2017-06-22 NOTE — PLAN OF CARE
Problem: General Plan of Care (Inpatient Behavioral)  Goal: Team Discussion  Team Plan:   BEHAVIORAL TEAM DISCUSSION     Participants: Paris Moy RN, HANS- Francoise Newberry LMFT, [facility]   Progress: Pt has stabilized some on medication, still needs   Continued Stay Criteria/Rationale: pt still needs to stabilized mental health symptoms further  Medical/Physical: see medical chart  Precautions:   Behavioral Orders   Procedures     Assault precautions     Code 1 - Restrict to Unit     Routine Programming       As clinically indicated     Sexual precautions     Status 15       Every 15 minutes.     Plan: work with OP team to find appropriate options for this pt  Rationale for change in precautions or plan: no change warranted

## 2017-06-23 PROCEDURE — 99232 SBSQ HOSP IP/OBS MODERATE 35: CPT | Performed by: PSYCHIATRY & NEUROLOGY

## 2017-06-23 PROCEDURE — 12400003 ZZH R&B MH CRITICAL UMMC

## 2017-06-23 PROCEDURE — 97150 GROUP THERAPEUTIC PROCEDURES: CPT | Mod: GO

## 2017-06-23 PROCEDURE — 25000132 ZZH RX MED GY IP 250 OP 250 PS 637: Performed by: PSYCHIATRY & NEUROLOGY

## 2017-06-23 PROCEDURE — 90853 GROUP PSYCHOTHERAPY: CPT

## 2017-06-23 PROCEDURE — 25000132 ZZH RX MED GY IP 250 OP 250 PS 637: Performed by: NURSE PRACTITIONER

## 2017-06-23 PROCEDURE — 99207 ZZC CDG-MDM COMPONENT: MEETS LOW - DOWN CODED: CPT | Performed by: PSYCHIATRY & NEUROLOGY

## 2017-06-23 PROCEDURE — H2032 ACTIVITY THERAPY, PER 15 MIN: HCPCS

## 2017-06-23 RX ADMIN — CARBAMAZEPINE 200 MG: 100 TABLET, EXTENDED RELEASE ORAL at 08:53

## 2017-06-23 RX ADMIN — LISINOPRIL 20 MG: 20 TABLET ORAL at 08:53

## 2017-06-23 RX ADMIN — LURASIDONE HYDROCHLORIDE 120 MG: 80 TABLET, FILM COATED ORAL at 08:53

## 2017-06-23 RX ADMIN — CARBAMAZEPINE 200 MG: 100 TABLET, EXTENDED RELEASE ORAL at 19:38

## 2017-06-23 RX ADMIN — QUETIAPINE FUMARATE 50 MG: 50 TABLET, FILM COATED ORAL at 19:38

## 2017-06-23 ASSESSMENT — ACTIVITIES OF DAILY LIVING (ADL)
HYGIENE/GROOMING: INDEPENDENT
ORAL_HYGIENE: INDEPENDENT
DRESS: SCRUBS (BEHAVIORAL HEALTH)
LAUNDRY: UNABLE TO COMPLETE

## 2017-06-23 NOTE — PROGRESS NOTES
This writer spoke to Jenna Khalil - phone: [phone].  Pt was placed on [facility] on Wednesday, June 21st.  At that time he was 7th on the priority list.  She stated that this moves rather quickly most times, she anticipates possibly 1-2 weeks for him to get in. She plans to check in Monday w/ them to see if the list has moved at all.  If it hasn't then we will also refer to [facility] for back up.

## 2017-06-23 NOTE — PLAN OF CARE
Problem: Psychotic Symptoms  Goal: Social and Therapeutic (Psychotic Symptoms)  Signs and symptoms of listed problems will be absent or manageable.      Attended 1.0  Life Skills activity group. Comprehended direction, participated appropriately, cooperative.

## 2017-06-23 NOTE — PROGRESS NOTES
Patient appears calm and social with others. Patient denies SI/SIB. However, patient told he is concerned about other patient, and some of the patient in unit is avoiding him. Staff told patient to focus more on self. Patient states he is depressed (5/10) and anxious (7/10) to hear from his mother that his animals (geese/ducks) are dead as no one was there to take care of them. Patient added he wants to smoke when he is in stress, but does not want any nicotine patch or gums, which are available in the unit. Patient does not want to be in this unit so is trying his best to get out this unit, by attending and participating in groups.

## 2017-06-23 NOTE — PROGRESS NOTES
This writer returned pt's Mother's phone call. She left me a voicemail wanting to verify that the pt's psychiatry appt was set at his Psychiatrist's new clinic vs where she used to work ([facility]).  Informed her that yes he is set up in [place] and provided date and time of appt.  Explained as well that Jv is stabilizing nicely and I can speak with and work with him on his d/c plan.

## 2017-06-24 PROCEDURE — 25000132 ZZH RX MED GY IP 250 OP 250 PS 637: Performed by: NURSE PRACTITIONER

## 2017-06-24 PROCEDURE — 25000132 ZZH RX MED GY IP 250 OP 250 PS 637: Performed by: PSYCHIATRY & NEUROLOGY

## 2017-06-24 PROCEDURE — 12400003 ZZH R&B MH CRITICAL UMMC

## 2017-06-24 RX ORDER — MULTIVITAMIN,THERAPEUTIC
1 TABLET ORAL DAILY
Status: DISCONTINUED | OUTPATIENT
Start: 2017-06-24 | End: 2017-07-21 | Stop reason: HOSPADM

## 2017-06-24 RX ADMIN — LISINOPRIL 20 MG: 20 TABLET ORAL at 09:34

## 2017-06-24 RX ADMIN — CARBAMAZEPINE 200 MG: 100 TABLET, EXTENDED RELEASE ORAL at 09:34

## 2017-06-24 RX ADMIN — QUETIAPINE FUMARATE 50 MG: 50 TABLET, FILM COATED ORAL at 20:37

## 2017-06-24 RX ADMIN — LURASIDONE HYDROCHLORIDE 120 MG: 80 TABLET, FILM COATED ORAL at 09:34

## 2017-06-24 RX ADMIN — HALOPERIDOL 10 MG: 5 TABLET ORAL at 23:19

## 2017-06-24 RX ADMIN — DIPHENHYDRAMINE HYDROCHLORIDE 50 MG: 50 CAPSULE ORAL at 23:19

## 2017-06-24 RX ADMIN — CARBOXYMETHYLCELLULOSE SODIUM 1 DROP: 10 GEL OPHTHALMIC at 21:04

## 2017-06-24 RX ADMIN — CARBAMAZEPINE 200 MG: 100 TABLET, EXTENDED RELEASE ORAL at 19:49

## 2017-06-24 RX ADMIN — MULTIPLE VITAMINS W/ MINERALS TAB 1 TABLET: TAB at 09:34

## 2017-06-24 ASSESSMENT — ACTIVITIES OF DAILY LIVING (ADL)
GROOMING: INDEPENDENT
LAUNDRY: UNABLE TO COMPLETE
ORAL_HYGIENE: INDEPENDENT
DRESS: SCRUBS (BEHAVIORAL HEALTH)

## 2017-06-24 NOTE — PROGRESS NOTES
[facility], [place]   Psychiatric Progress Note        Interim History:   The patient's care was discussed with the treatment team during the daily team meeting and/or staff's chart notes were reviewed.  Staff reports that the patient has been in good control. He appears to be showing improvement over the last few days.    I met with patient who indicates that he remains upbeat about [facility] placement. He hopes that he will be able to get fresh air and possibly smoke at these facilities.    I returned the mother's phone call. This time I was able to get in touch with her. Total conversation time was 45 minutes. The mother was fixated on the patient's issues being due to Lyme disease. She reports that every time he is getting antibiotics he clears, and not too long after stopping he gets worse. She claims to have spoke to various experts around the [place]. When I asked if she expected us to treat with antibiotics on a continuous and indefinite basis, she indicates that 6 weeks on and 6 weeks off is likely fine, but again, provides numerous accounts of the patient decompensating just a week after months of treatment. I indicate that I have read recent articles that discuss treatment of chronic Lyme without corresponding lab and physical findings leading to increases in complications including C. Diff or other antibiotic resistant infections. She dismisses this. She claims that the only way to prove that the antibiotics are the treatment of choice is to try them to show that he improves. I indicate that he is not currently taking them and that the ID team was called by internal medicine, and they recommend not further workup or treatment. I inform her that he is improving now with the neuroleptics, so by her criteria of a successful diagnosis by medication trial, we have discovered the cause. Again she is dismissive of this and only seems to think that antibiotics can be tested in this  manner. She wants an MRI to show enlarged ventricles, which she thinks indicates Lyme. I inform her that this can be a finding in schizophrenia as well and therefore, this would not be a diagnostic finding. For the second half of our conversation I repeatedly informed the patient's mother that I will not be prescribing antibiotics due to his improvement and the recommendations of our medicine and infectious disease service. Despite this, she continually brought this up as her belief of the cause. She even went so far as to hint that I should stop the neuroleptics and then treat the psychosis with antibiotics since this would be unsafe outside of the hospital. I informed her that I will not stop a treatment that appears to be effective. She also indicated that during a past hospitalization he did not show improvement for 2.5 weeks on neuroleptics, but then improved after several days of antibiotics. I informed her that this would be consistent with response to a neuroleptic medication as they often take several weeks to take effect. She then expressed that concern that our charting was incorrect that her son was not dangerous. I read her the account on 6/15 of his plan to save money then kill the neighbor in order to get the land cheap. She indicated that he saw this in a movie and was just describing a what he saw. I told her that he was quite clear that this was something he was contemplating and that the source of the idea was not important as he was claiming this is something that he thought about, not something that he simply saw in a movie. She again indicated that we were simply wrong in our charting and wanted to know what the training level of the person was that wrote it and claimed that me believing it was hearsay and that I should take his word for it. Then she circled back to Lyme disease. I eventually had to terminate the phone call due to lack of progress and time.         carBAMazepine  200 mg Oral  BID     lurasidone  120 mg Oral Daily     lisinopril  20 mg Oral Daily          Allergies:     Allergies   Allergen Reactions     No Known Drug Allergies           Labs:   No results found for this or any previous visit (from the past 24 hour(s)).       Psychiatric Examination:     Vitals:    06/22/17 0900 06/23/17 0735 06/23/17 0853 06/23/17 1920   BP:   (!) 140/93 120/82   Pulse:    102   Resp: 16 16     Temp: 98.1  F (36.7  C) 98.5  F (36.9  C)     TempSrc: Oral Tympanic     SpO2:       Weight:       Height:           Weight is 245 lbs 0 oz  Body mass index is 30.62 kg/(m^2).    Appearance: awake, alert, adequately groomed, dressed in hospital scrubs and appeared as age stated  Attitude:  cooperative  Eye Contact:  good  Mood:  euthymic  Affect:  some mild guarding, but overall improved  Speech:  clear, coherent and normal prosody  Psychomotor Behavior:  no evidence of tardive dyskinesia, dystonia, or tics and intact station, gait and muscle tone  Throught Process:  goal oriented  Associations:  no loose associations  Thought Content:  endorses some intermittent hallucinations. He denies suicidal or homicidal thoughts.  Insight:  limited  Judgement:  improving  Oriented to:  time, person, and place  Attention Span and Concentration:  intact  Recent and Remote Memory:  intact   Fund of Knowledge: Adequate         Precautions:     Behavioral Orders   Procedures     Assault precautions     Code 1 - Restrict to Unit     Routine Programming     As clinically indicated     Sexual precautions     Status 15     Every 15 minutes.          Diagnoses:     1.  Schizophrenia, paranoid type.   2.  Noncompliance, nonadherence.   3.  Cluster B with antisocial traits.           Plan:     Medications:  1.  Saphris: restated on admission and titrated to 10 mg BID but later cross tapered to Latuda due to lack of efficacy.  2.  PRNs: Vistaril for anxiety and Seroquel for restlessness and haldol with Benadryl for agitation and  psychosis. PRN ativan was discontinued.   3.  Latuda: started and titrated to 120 mg daily, with goals to further titrate pending clinical response and tolerability. He appears to be improving as of 6/20.  4.  Tegretol: start at 100 mg BID, level was 5.1 on 6/11 and dose increased to 200 mg BID.  Tegretol level was 6.9 on 6/16.  5.  Cogentin was added at 0.5-1 mg BID PRN for EPS.        -- IM consult consult completed,  re: Lyme management. They have recommended to intervention at this time due to lack of neurological findings. This was based on their conversation with infectious disease specialist.    Legal Status and Disposition:  1.  Commitment with [facility] for Saphris, Zyprexa, haloperidol, Latuda, and risperidone.  2.  Will work with [facility]  on [facility] placement. Given his statements about homicidal thoughts toward neighbor, I would be uncomfortable discharging this patient home so soon after improvement of symptoms. Time in the hospital to allow full effect of the medications is warranted.

## 2017-06-24 NOTE — PLAN OF CARE
Problem: Psychotic Symptoms  Goal: Psychotic Symptoms  Signs and symptoms of listed problems will be absent or manageable.   Outcome: Improving  48 nursing assessment completed. Patient awake and visible throughout the shift. Patient attending and participating in groups, and social with staff and peers in the milieu. Patient presents with a bright, full range affect. No perseveration on discharge, or dysregulated behavior observed. Medication compliant. Denies SI/SIB. Continue to monitor and assess.

## 2017-06-25 PROCEDURE — 25000132 ZZH RX MED GY IP 250 OP 250 PS 637: Performed by: NURSE PRACTITIONER

## 2017-06-25 PROCEDURE — 25000132 ZZH RX MED GY IP 250 OP 250 PS 637: Performed by: EMERGENCY MEDICINE

## 2017-06-25 PROCEDURE — 12400003 ZZH R&B MH CRITICAL UMMC

## 2017-06-25 PROCEDURE — 25000132 ZZH RX MED GY IP 250 OP 250 PS 637: Performed by: PSYCHIATRY & NEUROLOGY

## 2017-06-25 RX ADMIN — QUETIAPINE FUMARATE 50 MG: 50 TABLET, FILM COATED ORAL at 21:50

## 2017-06-25 RX ADMIN — DIPHENHYDRAMINE HYDROCHLORIDE 50 MG: 50 CAPSULE ORAL at 23:02

## 2017-06-25 RX ADMIN — LISINOPRIL 20 MG: 20 TABLET ORAL at 08:06

## 2017-06-25 RX ADMIN — CARBAMAZEPINE 200 MG: 100 TABLET, EXTENDED RELEASE ORAL at 08:03

## 2017-06-25 RX ADMIN — HALOPERIDOL 5 MG: 5 TABLET ORAL at 01:02

## 2017-06-25 RX ADMIN — HALOPERIDOL 5 MG: 5 TABLET ORAL at 23:02

## 2017-06-25 RX ADMIN — CARBAMAZEPINE 200 MG: 100 TABLET, EXTENDED RELEASE ORAL at 20:53

## 2017-06-25 RX ADMIN — MULTIPLE VITAMINS W/ MINERALS TAB 1 TABLET: TAB at 08:06

## 2017-06-25 RX ADMIN — LURASIDONE HYDROCHLORIDE 120 MG: 80 TABLET, FILM COATED ORAL at 08:04

## 2017-06-25 RX ADMIN — QUETIAPINE FUMARATE 50 MG: 50 TABLET, FILM COATED ORAL at 00:25

## 2017-06-25 RX ADMIN — DIPHENHYDRAMINE HYDROCHLORIDE 50 MG: 50 CAPSULE ORAL at 01:02

## 2017-06-25 ASSESSMENT — ACTIVITIES OF DAILY LIVING (ADL)
ORAL_HYGIENE: INDEPENDENT
ORAL_HYGIENE: INDEPENDENT
GROOMING: INDEPENDENT
GROOMING: INDEPENDENT;HANDWASHING
DRESS: SCRUBS (BEHAVIORAL HEALTH)
DRESS: SCRUBS (BEHAVIORAL HEALTH);INDEPENDENT
LAUNDRY: UNABLE TO COMPLETE

## 2017-06-25 NOTE — PROGRESS NOTES
Pt present and active in milieu this evening. Pt paced halls most of evening and was social with peers. No behavioral concerns to report this evening.      06/24/17 2056   Behavioral Health   Hallucinations denies / not responding to hallucinations   Thinking poor concentration;distractable   Orientation person: oriented;place: oriented;date: oriented;time: oriented   Memory baseline memory   Insight poor   Judgement impaired   Eye Contact at examiner   Affect full range affect   Mood mood is calm   Physical Appearance/Attire attire appropriate to age and situation;appears stated age   Hygiene well groomed   Suicidality other (see comments)  (none noted)   Self Injury other (see comment)  (none noted)   Activity restless   Speech clear;tangential   Medication Sensitivity no observed side effects   Psychomotor / Gait balanced;steady;paces   Activities of Daily Living   Hygiene/Grooming independent   Oral Hygiene independent   Dress scrubs (behavioral health)   Laundry unable to complete   Room Organization independent   Behavioral Health Interventions   Psychotic Symptoms maintain safety precautions;monitor need to revise level of observation;maintain safe secure environment;provide emotional support;establish therapeutic relationship;assist with developing & utilizing healthy coping strategies;build upon strengths;monitor need for prn medication   Social and Therapeutic Interventions (Psychotic Symptoms) encourage socialization with peers;encourage effective boundaries with peers;encourage participation in therapeutic groups and milieu activities

## 2017-06-25 NOTE — PROGRESS NOTES
"   06/25/17 1330   Significant Event   Significant Event Other (see comments)  (shift summary)   Pt had a positive shift. He was out in the milieu and social with peers and staff. He was pleasant and polite. Speech content was not notable for any bizarre content. He laughed appropriately. Pt was encouraging of other peers and showed insight when he explained to another pt, \"I'm schizophrenic.\" Pt inquired as to why Dr. Shah was no longer listed as his doctor, and stated that he wanted it to be \"passed along\" that he would like Dr. Shah to remain his doctor. Pt had no other requests or complaints.   "

## 2017-06-26 PROCEDURE — 25000132 ZZH RX MED GY IP 250 OP 250 PS 637: Performed by: PSYCHIATRY & NEUROLOGY

## 2017-06-26 PROCEDURE — 90853 GROUP PSYCHOTHERAPY: CPT

## 2017-06-26 PROCEDURE — 25000132 ZZH RX MED GY IP 250 OP 250 PS 637: Performed by: EMERGENCY MEDICINE

## 2017-06-26 PROCEDURE — 25000132 ZZH RX MED GY IP 250 OP 250 PS 637: Performed by: NURSE PRACTITIONER

## 2017-06-26 PROCEDURE — 12400003 ZZH R&B MH CRITICAL UMMC

## 2017-06-26 PROCEDURE — H2032 ACTIVITY THERAPY, PER 15 MIN: HCPCS

## 2017-06-26 PROCEDURE — 99233 SBSQ HOSP IP/OBS HIGH 50: CPT | Performed by: PSYCHIATRY & NEUROLOGY

## 2017-06-26 RX ORDER — QUETIAPINE FUMARATE 100 MG/1
100 TABLET, FILM COATED ORAL EVERY 4 HOURS PRN
Status: DISCONTINUED | OUTPATIENT
Start: 2017-06-26 | End: 2017-07-10

## 2017-06-26 RX ADMIN — DIPHENHYDRAMINE HYDROCHLORIDE 50 MG: 50 CAPSULE ORAL at 22:16

## 2017-06-26 RX ADMIN — MULTIPLE VITAMINS W/ MINERALS TAB 1 TABLET: TAB at 08:45

## 2017-06-26 RX ADMIN — CARBAMAZEPINE 200 MG: 100 TABLET, EXTENDED RELEASE ORAL at 08:45

## 2017-06-26 RX ADMIN — QUETIAPINE FUMARATE 50 MG: 50 TABLET, FILM COATED ORAL at 01:38

## 2017-06-26 RX ADMIN — HYDROXYZINE HYDROCHLORIDE 50 MG: 25 TABLET ORAL at 22:47

## 2017-06-26 RX ADMIN — CARBAMAZEPINE 200 MG: 100 TABLET, EXTENDED RELEASE ORAL at 20:28

## 2017-06-26 RX ADMIN — ASPIRIN 325 MG ORAL TABLET 325 MG: 325 PILL ORAL at 17:38

## 2017-06-26 RX ADMIN — QUETIAPINE FUMARATE 100 MG: 100 TABLET, FILM COATED ORAL at 21:37

## 2017-06-26 RX ADMIN — CARBOXYMETHYLCELLULOSE SODIUM 1 DROP: 10 GEL OPHTHALMIC at 20:28

## 2017-06-26 RX ADMIN — LURASIDONE HYDROCHLORIDE 120 MG: 80 TABLET, FILM COATED ORAL at 08:45

## 2017-06-26 RX ADMIN — LISINOPRIL 20 MG: 20 TABLET ORAL at 08:45

## 2017-06-26 RX ADMIN — CARBOXYMETHYLCELLULOSE SODIUM 1 DROP: 10 GEL OPHTHALMIC at 21:37

## 2017-06-26 RX ADMIN — HALOPERIDOL 10 MG: 5 TABLET ORAL at 22:47

## 2017-06-26 ASSESSMENT — ACTIVITIES OF DAILY LIVING (ADL)
ORAL_HYGIENE: INDEPENDENT
DRESS: SCRUBS (BEHAVIORAL HEALTH)
LAUNDRY: UNABLE TO COMPLETE
ORAL_HYGIENE: INDEPENDENT
GROOMING: INDEPENDENT
HYGIENE/GROOMING: INDEPENDENT;SHOWER
DRESS: SCRUBS (BEHAVIORAL HEALTH)

## 2017-06-26 NOTE — PLAN OF CARE
"Problem: Psychotic Symptoms  Intervention: Social and Therapeutic Interv (Psychotic Symptoms)     Pt attended 1 of 2 OT groups today.  Cooperative in group, stated he was hoping to d/c home, which he thinks Dr. Cerda would have approved, but states \"Dr. Diana didn't like that I was talking about things I saw on Netflix\".     Pleasant, though uses a tracy voice as he makes suggestions to peers.  Writer needs to often interject as more vulnerable peers tend to listen to whatever he says, even with insignificant things.        "

## 2017-06-26 NOTE — PROGRESS NOTES
Up and visible in the milieu majority of the evening. He was isolative with staff and other patients only having short verbal interactions but was pleasant, calm, and approachable. He spent the majority of the evening pacing stating that it was good exercise. He was short upon check in with staff and admits and accepts to his mental illness but not very responsive into his progress or symptoms today.     06/25/17 6693   Behavioral Health   Hallucinations denies / not responding to hallucinations   Thinking distractable   Orientation person: oriented;place: oriented;date: oriented;time: oriented   Memory baseline memory   Insight admits / accepts;poor   Judgement impaired   Eye Contact at examiner   Affect blunted, flat   Mood mood is calm   Physical Appearance/Attire attire appropriate to age and situation   Hygiene well groomed   Suicidality other (see comments)  (none stated or observed)   Self Injury other (see comment)  (none stated or observed)   Activity isolative;restless   Speech clear;coherent   Medication Sensitivity no stated side effects;no observed side effects   Psychomotor / Gait paces;balanced;steady   Overt Aggression Scale   Verbal Aggression 0   Aggression against Property 0   Auto-Aggression 0   Physical Aggression 0   Overt Aggression Total Score 0   Sleep/Rest/Relaxation   Day/Evening Time Hours up all shift   Coping/Psychosocial   Supportive Measures active listening utilized;relaxation techniques promoted   Trust Relationship/Rapport empathic listening provided;emotional support provided;questions answered;questions encouraged;reassurance provided;thoughts/feelings acknowledged   Daily Care   Activity up ad jomar   Patient Performed Hygiene shower   Activities of Daily Living   Hygiene/Grooming independent;handwashing   Oral Hygiene independent   Dress scrubs (behavioral health);independent   Laundry unable to complete   Room Organization independent   Activity   Activity Level of Assistance  independent   Behavioral Health Interventions   Psychotic Symptoms maintain safety precautions;maintain safe secure environment;simple, clear language;monitor confusion, memory loss, decision making ability and reorient / intervent as needed   Social and Therapeutic Interventions (Psychotic Symptoms) encourage socialization with peers;encourage effective boundaries with peers;encourage participation in therapeutic groups and milieu activities

## 2017-06-26 NOTE — PLAN OF CARE
Problem: Psychotic Symptoms  Goal: Psychotic Symptoms  Signs and symptoms of listed problems will be absent or manageable.   Outcome: No Change  Pt continues to have symptoms of psychosis. Pt's symptoms are generally negative in nature at this time. Pt has shown no evidence of overt delusions, hallucinations, paranoia or agitation this shift. Pt has been socially appropriate this shift. Pt has been active in the milieu. Pt was calm and cooperative. Pt was medication compliant. Pt's thinking seems intact with limitations in concentration. Pt's insight remains poor but improved over previous days working with this RN. Pt seems more accepting of his illness and need for medication. Pt's judgement is potentially still impaired. Pt denies physical health symptoms or side effects from medication at this time. Pt VS were WDL this shift.

## 2017-06-27 PROCEDURE — 25000132 ZZH RX MED GY IP 250 OP 250 PS 637: Performed by: PSYCHIATRY & NEUROLOGY

## 2017-06-27 PROCEDURE — 12400003 ZZH R&B MH CRITICAL UMMC

## 2017-06-27 PROCEDURE — 25000132 ZZH RX MED GY IP 250 OP 250 PS 637: Performed by: NURSE PRACTITIONER

## 2017-06-27 PROCEDURE — 90853 GROUP PSYCHOTHERAPY: CPT

## 2017-06-27 PROCEDURE — 99233 SBSQ HOSP IP/OBS HIGH 50: CPT | Performed by: PSYCHIATRY & NEUROLOGY

## 2017-06-27 RX ORDER — QUETIAPINE FUMARATE 200 MG/1
400 TABLET, FILM COATED ORAL
Status: DISCONTINUED | OUTPATIENT
Start: 2017-06-27 | End: 2017-07-10

## 2017-06-27 RX ADMIN — LISINOPRIL 20 MG: 20 TABLET ORAL at 08:09

## 2017-06-27 RX ADMIN — DIPHENHYDRAMINE HYDROCHLORIDE 50 MG: 50 CAPSULE ORAL at 21:30

## 2017-06-27 RX ADMIN — CARBAMAZEPINE 200 MG: 100 TABLET, EXTENDED RELEASE ORAL at 19:55

## 2017-06-27 RX ADMIN — LURASIDONE HYDROCHLORIDE 120 MG: 80 TABLET, FILM COATED ORAL at 08:08

## 2017-06-27 RX ADMIN — ASPIRIN 325 MG ORAL TABLET 325 MG: 325 PILL ORAL at 16:56

## 2017-06-27 RX ADMIN — QUETIAPINE FUMARATE 400 MG: 200 TABLET ORAL at 21:45

## 2017-06-27 RX ADMIN — MULTIPLE VITAMINS W/ MINERALS TAB 1 TABLET: TAB at 08:09

## 2017-06-27 RX ADMIN — CARBOXYMETHYLCELLULOSE SODIUM 1 DROP: 10 GEL OPHTHALMIC at 14:28

## 2017-06-27 RX ADMIN — CARBAMAZEPINE 200 MG: 100 TABLET, EXTENDED RELEASE ORAL at 08:09

## 2017-06-27 ASSESSMENT — ACTIVITIES OF DAILY LIVING (ADL)
DRESS: INDEPENDENT
ORAL_HYGIENE: INDEPENDENT
ORAL_HYGIENE: INDEPENDENT
DRESS: SCRUBS (BEHAVIORAL HEALTH)
LAUNDRY: UNABLE TO COMPLETE
LAUNDRY: UNABLE TO COMPLETE
GROOMING: INDEPENDENT
HYGIENE/GROOMING: INDEPENDENT

## 2017-06-27 NOTE — PROGRESS NOTES
This writer met with Jv today and explained to him what we are hopeful for with the [facility] and he is waiting patiently at this point.

## 2017-06-27 NOTE — PROGRESS NOTES
[facility], [place]   Psychiatric Progress Note        Interim History:   The patient's care was discussed with the treatment team during the daily team meeting and/or staff's chart notes were reviewed.  Staff reports that the patient appears tense and blunted with some guarding, but overall much improved from a week or so ago.    The patient is hopeful that he will be able to leave soon. He asks about going home. I remind him of the plan for [facility] and why. He doesn't like this plan much, but understands and agrees. He was asking RN for increase in Seroquel over weekend per verbal report to me today.         multivitamin, therapeutic  1 tablet Oral Daily     carBAMazepine  200 mg Oral BID     lurasidone  120 mg Oral Daily     lisinopril  20 mg Oral Daily          Allergies:     Allergies   Allergen Reactions     No Known Drug Allergies           Labs:   No results found for this or any previous visit (from the past 24 hour(s)).       Psychiatric Examination:     Vitals:    06/23/17 1920 06/24/17 0752 06/25/17 0822 06/26/17 0845   BP: 120/82 133/79     Pulse: 102 82     Resp:  16 16 16   Temp:  97.9  F (36.6  C) 98.4  F (36.9  C) 98.3  F (36.8  C)   TempSrc:  Tympanic Tympanic Oral   SpO2:       Weight:  111.1 kg (245 lb)     Height:           Weight is 245 lbs 0 oz  Body mass index is 30.62 kg/(m^2).    Appearance: awake, alert, adequately groomed, dressed in hospital scrubs and appeared as age stated  Attitude:  cooperative  Eye Contact:  good  Mood:  euthymic  Affect:  guarded  Speech:  clear, coherent and normal prosody  Psychomotor Behavior:  no evidence of tardive dyskinesia, dystonia, or tics and intact station, gait and muscle tone  Throught Process:  goal oriented  Associations:  no loose associations  Thought Content:  endorses some intermittent hallucinations. He denies suicidal or homicidal thoughts.  Insight:  limited  Judgement:  improving  Oriented to:  time, person, and  place  Attention Span and Concentration:  intact  Recent and Remote Memory:  intact   Fund of Knowledge: Adequate         Precautions:     Behavioral Orders   Procedures     Assault precautions     Code 1 - Restrict to Unit     Routine Programming     As clinically indicated     Sexual precautions     Status 15     Every 15 minutes.          Diagnoses:     1.  Schizophrenia, paranoid type.   2.  Noncompliance, nonadherence.   3.  Cluster B with antisocial traits.           Plan:     Medications:  1.  Saphris: restated on admission and titrated to 10 mg BID but later cross tapered to Latuda due to lack of efficacy.  2.  PRNs: Vistaril for anxiety and Seroquel for restlessness and haldol with Benadryl for agitation and psychosis. PRN ativan was discontinued.  --Increase Seroquel PRN on 6/26  3.  Latuda: started and titrated to 120 mg daily, with goals to further titrate pending clinical response and tolerability. He appears to be improving as of 6/20.  4.  Tegretol: start at 100 mg BID, level was 5.1 on 6/11 and dose increased to 200 mg BID.  Tegretol level was 6.9 on 6/16.  5.  Cogentin was added at 0.5-1 mg BID PRN for EPS.        -- IM consult consult completed,  re: Lyme management. They have recommended to intervention at this time due to lack of neurological findings. This was based on their conversation with infectious disease specialist.    Legal Status and Disposition:  1.  Commitment with [facility] for Saphris, Zyprexa, haloperidol, Latuda, and risperidone.  2.  Will work with county  on [facility] placement. Given his statements about homicidal thoughts toward neighbor, I would be uncomfortable discharging this patient home so soon after improvement of symptoms. Time in the hospital (or [facility]) to allow full effect of the medications is warranted.

## 2017-06-27 NOTE — PROGRESS NOTES
[facility], [place]   Psychiatric Progress Note        Interim History:   The patient's care was discussed with the treatment team during the daily team meeting and/or staff's chart notes were reviewed.  Staff reports that the patient is growing more upset over duration of his stay.    Patient indicates that he feels somewhat irritable about being here. He states that in the past he has done well with Seroquel 400 mg TID. He would like his evening dose increased and to remain on oral lurasidone.         multivitamin, therapeutic  1 tablet Oral Daily     carBAMazepine  200 mg Oral BID     lurasidone  120 mg Oral Daily     lisinopril  20 mg Oral Daily          Allergies:     Allergies   Allergen Reactions     No Known Drug Allergies           Labs:   No results found for this or any previous visit (from the past 24 hour(s)).       Psychiatric Examination:     Vitals:    06/24/17 0752 06/25/17 0822 06/26/17 0845 06/27/17 0739   BP: 133/79      Pulse: 82      Resp: 16 16 16 16   Temp: 97.9  F (36.6  C) 98.4  F (36.9  C) 98.3  F (36.8  C) 97.9  F (36.6  C)   TempSrc: Tympanic Tympanic Oral Tympanic   SpO2:       Weight: 111.1 kg (245 lb)      Height:           Weight is 245 lbs 0 oz  Body mass index is 30.62 kg/(m^2).    Appearance: awake, alert, adequately groomed, dressed in hospital scrubs and appeared as age stated  Attitude:  cooperative  Eye Contact:  good  Mood:  irritable  Affect:  guarded and tense  Speech:  clear, coherent and normal prosody  Psychomotor Behavior:  no evidence of tardive dyskinesia, dystonia, or tics, intact station, gait and muscle tone and pacing the unit  Throught Process:  goal oriented  Associations:  no loose associations  Thought Content:  endorses some intermittent hallucinations. He denies suicidal or homicidal thoughts.  Insight:  limited  Judgement:  fair  Oriented to:  time, person, and place  Attention Span and Concentration:  intact  Recent and Remote  Memory:  intact   Fund of Knowledge: Adequate         Precautions:     Behavioral Orders   Procedures     Assault precautions     Code 1 - Restrict to Unit     Routine Programming     As clinically indicated     Sexual precautions     Status 15     Every 15 minutes.          Diagnoses:     1.  Schizophrenia, paranoid type.   2.  Noncompliance, nonadherence.   3.  Cluster B with antisocial traits.           Plan:     Medications:  1.  Saphris: restated on admission and titrated to 10 mg BID but later cross tapered to Latuda due to lack of efficacy.  2.  PRNs: Vistaril for anxiety and Seroquel for restlessness and haldol with Benadryl for agitation and psychosis. PRN ativan was discontinued.  --Add Seroquel 400 mg qHS PRN for sleep.  3.  Latuda: started and titrated to 120 mg daily, with goals to further titrate pending clinical response and tolerability. He appears to be improving as of 6/20.  4.  Tegretol: start at 100 mg BID, level was 5.1 on 6/11 and dose increased to 200 mg BID.  Tegretol level was 6.9 on 6/16.  5.  Cogentin was added at 0.5-1 mg BID PRN for EPS.        -- IM consult consult completed,  re: Lyme management. They have recommended to intervention at this time due to lack of neurological findings. This was based on their conversation with infectious disease specialist.    Legal Status and Disposition:  1.  Commitment with [facility] for Saphris, Zyprexa, haloperidol, Latuda, and risperidone.  2.  Will work with [facility]  on [facility] placement. Given his statements about homicidal thoughts toward neighbor, I would be uncomfortable discharging this patient home so soon after improvement of symptoms. Time in the hospital (or [facility]) to allow full effect of the medications is warranted.

## 2017-06-27 NOTE — PROGRESS NOTES
Pt's mood was calm. Pt spent a majority of the shift in the milieu socializing with others. Pt had appropriate behaviors with staff and others. Pt ate dinner in the milieu. Pt showered.      06/26/17 2115   Behavioral Health   Hallucinations denies / not responding to hallucinations   Thinking delusional;poor concentration   Orientation person: oriented;place: oriented;date: oriented;time: oriented   Memory baseline memory   Insight poor   Judgement impaired   Eye Contact at examiner   Affect full range affect   Mood mood is calm   Physical Appearance/Attire attire appropriate to age and situation   Hygiene well groomed   Suicidality other (see comments)  (none reported)   Self Injury other (see comment)  (none reported/none observed)   Activity other (see comment)  (normal, sociable)   Speech coherent;clear   Medication Sensitivity no observed side effects;no stated side effects   Psychomotor / Gait balanced;steady   Activities of Daily Living   Hygiene/Grooming independent;shower   Oral Hygiene independent   Dress scrubs (behavioral health)   Laundry unable to complete   Room Organization independent   Activity   Activity Level of Assistance independent   Behavioral Health Interventions   Psychotic Symptoms maintain safety precautions;monitor need to revise level of observation;maintain safe secure environment;reality orientation;simple, clear language;decrease environmental stimulation;redirection of intrusive behaviors;redirection of aggressive behaviors;assist patient in developing safety plan;assist patient in following safety plan;encourage nutrition and hydration;encourage participation / independence with adls;provide emotional support;establish therapeutic relationship;assist with developing & utilizing healthy coping strategies;build upon strengths;assess patient response to medication;monitor need for prn medication;monitor confusion, memory loss, decision making ability and reorient / intervent as  needed   Social and Therapeutic Interventions (Psychotic Symptoms) encourage participation in therapeutic groups and milieu activities;encourage effective boundaries with peers;encourage socialization with peers

## 2017-06-27 NOTE — PROGRESS NOTES
Pt's mood was calm. Pt had appropriate behaviors with staff and others. Pt ate lunch and breakfast in the milieu. Pt spent a majority of the shift socializing with others in the milieu. Pt was observed smiling to self in while pacing the halls.      06/27/17 1440   Behavioral Health   Hallucinations denies / not responding to hallucinations   Thinking intact;poor concentration   Orientation time: oriented;date: oriented;place: oriented;person: oriented   Memory baseline memory   Insight poor   Judgement impaired   Eye Contact at examiner   Affect full range affect   Mood mood is calm   Physical Appearance/Attire attire appropriate to age and situation   Hygiene well groomed   Suicidality other (see comments)  (none reported)   Self Injury other (see comment)  (none reported/none observed)   Activity other (see comment)  (sociable)   Speech coherent;clear   Medication Sensitivity no observed side effects;no stated side effects   Psychomotor / Gait balanced;steady   Activities of Daily Living   Hygiene/Grooming independent   Oral Hygiene independent   Dress scrubs (behavioral health)   Laundry unable to complete   Room Organization independent   Activity   Activity Level of Assistance independent   Behavioral Health Interventions   Psychotic Symptoms maintain safety precautions;monitor need to revise level of observation;maintain safe secure environment;reality orientation;redirection of intrusive behaviors;decrease environmental stimulation;simple, clear language;assist patient in following safety plan;redirection of aggressive behaviors;assist patient in developing safety plan;encourage nutrition and hydration;encourage participation / independence with adls;provide emotional support;establish therapeutic relationship;assist with developing & utilizing healthy coping strategies;build upon strengths;monitor need for prn medication;monitor confusion, memory loss, decision making ability and reorient / intervent as  needed   Social and Therapeutic Interventions (Psychotic Symptoms) encourage participation in therapeutic groups and milieu activities;encourage effective boundaries with peers;encourage socialization with peers

## 2017-06-27 NOTE — PROGRESS NOTES
This writer left a voicemail for Jenna Don pt's CCM to ask if she has heard anything about the [facility] referral and wait list.  Asked for return call.

## 2017-06-28 PROCEDURE — 25000132 ZZH RX MED GY IP 250 OP 250 PS 637: Performed by: PSYCHIATRY & NEUROLOGY

## 2017-06-28 PROCEDURE — 97150 GROUP THERAPEUTIC PROCEDURES: CPT | Mod: GO

## 2017-06-28 PROCEDURE — 99232 SBSQ HOSP IP/OBS MODERATE 35: CPT | Performed by: PSYCHIATRY & NEUROLOGY

## 2017-06-28 PROCEDURE — 12400003 ZZH R&B MH CRITICAL UMMC

## 2017-06-28 PROCEDURE — 90853 GROUP PSYCHOTHERAPY: CPT

## 2017-06-28 PROCEDURE — 25000132 ZZH RX MED GY IP 250 OP 250 PS 637: Performed by: NURSE PRACTITIONER

## 2017-06-28 RX ADMIN — LURASIDONE HYDROCHLORIDE 120 MG: 80 TABLET, FILM COATED ORAL at 07:19

## 2017-06-28 RX ADMIN — LISINOPRIL 20 MG: 20 TABLET ORAL at 07:19

## 2017-06-28 RX ADMIN — MULTIPLE VITAMINS W/ MINERALS TAB 1 TABLET: TAB at 07:19

## 2017-06-28 RX ADMIN — CARBAMAZEPINE 200 MG: 100 TABLET, EXTENDED RELEASE ORAL at 20:11

## 2017-06-28 RX ADMIN — CARBAMAZEPINE 200 MG: 100 TABLET, EXTENDED RELEASE ORAL at 07:19

## 2017-06-28 RX ADMIN — QUETIAPINE FUMARATE 400 MG: 200 TABLET ORAL at 22:25

## 2017-06-28 NOTE — PLAN OF CARE
Problem: Psychotic Symptoms  Intervention: Social and Therapeutic Interv (Psychotic Symptoms)     Pt attended 3 of 3 OT groups.  Participates in verbal groups, though speaks about things in very general terms, and seems unable to give more specific or personal information when asked direct questions.  Seems to focus on one peer during discussion group and will give very direct, though odd feedback until redirected.  Did share he is looking forward to d/c soon, and lamented on having spent nearly 4 years total in the hospital during his twenties.     Interested in learning a new bracelet knotting technique during OT clinic, caught on quickly as he talked about some familiarity with fly fishing knots.

## 2017-06-28 NOTE — PROGRESS NOTES
This writer returned phone call from [facility] where the pt will be seen.  They were asking for the pt's newly assigned [facility] CM.  I provided Jenna Khalil's phone number.

## 2017-06-28 NOTE — PROGRESS NOTES
Patient very appropriate on the unit and pleasant. Patient did introduce himself to this writer several times and revisit conversational topics numerous times as well. Patient visible on the unit and calm. No SI or SIB to report

## 2017-06-28 NOTE — PLAN OF CARE
Problem: Psychotic Symptoms  Goal: Psychotic Symptoms  Signs and symptoms of listed problems will be absent or manageable.   Outcome: Improving  RN Assessment     Jv is improved since admission. He is medication compliant. No episodes of seclusion for 48 hours. He attends groups and participates. He is attentive to his ADL's. Denies depression/SI. Denies AH/VH. He is able to make his needs known. He follows unit rules and staff redirection. He is hopeful for discharge soon. He offers no medical concerns at this time.

## 2017-06-29 PROCEDURE — 12400003 ZZH R&B MH CRITICAL UMMC

## 2017-06-29 PROCEDURE — 25000132 ZZH RX MED GY IP 250 OP 250 PS 637: Performed by: PSYCHIATRY & NEUROLOGY

## 2017-06-29 PROCEDURE — 25000132 ZZH RX MED GY IP 250 OP 250 PS 637: Performed by: NURSE PRACTITIONER

## 2017-06-29 PROCEDURE — 90853 GROUP PSYCHOTHERAPY: CPT

## 2017-06-29 PROCEDURE — 97150 GROUP THERAPEUTIC PROCEDURES: CPT | Mod: GO

## 2017-06-29 PROCEDURE — H2032 ACTIVITY THERAPY, PER 15 MIN: HCPCS

## 2017-06-29 RX ADMIN — CARBAMAZEPINE 200 MG: 100 TABLET, EXTENDED RELEASE ORAL at 20:21

## 2017-06-29 RX ADMIN — MULTIPLE VITAMINS W/ MINERALS TAB 1 TABLET: TAB at 08:10

## 2017-06-29 RX ADMIN — QUETIAPINE FUMARATE 400 MG: 200 TABLET ORAL at 22:58

## 2017-06-29 RX ADMIN — CARBAMAZEPINE 200 MG: 100 TABLET, EXTENDED RELEASE ORAL at 08:10

## 2017-06-29 RX ADMIN — LISINOPRIL 20 MG: 20 TABLET ORAL at 08:10

## 2017-06-29 RX ADMIN — CARBOXYMETHYLCELLULOSE SODIUM 1 DROP: 10 GEL OPHTHALMIC at 20:21

## 2017-06-29 RX ADMIN — DIPHENHYDRAMINE HYDROCHLORIDE 50 MG: 50 CAPSULE ORAL at 22:58

## 2017-06-29 RX ADMIN — LURASIDONE HYDROCHLORIDE 120 MG: 80 TABLET, FILM COATED ORAL at 08:10

## 2017-06-29 ASSESSMENT — ACTIVITIES OF DAILY LIVING (ADL)
ORAL_HYGIENE: INDEPENDENT
DRESS: SCRUBS (BEHAVIORAL HEALTH)
ORAL_HYGIENE: INDEPENDENT
DRESS: INDEPENDENT
GROOMING: INDEPENDENT
HYGIENE/GROOMING: INDEPENDENT

## 2017-06-29 NOTE — PROGRESS NOTES
Behavioral Health  Note    Behavioral Health  Spirituality Group Note    [address]    Name: Jv Pate YOB: 1985   MRN: [id number] Age: 32 year old      Patient attended -led group, which included discussion of spirituality, coping with illness and building resilience.    Patient attended group for 0.5 hrs.    The patient actively participated in group discussion and patient demonstrated an appreciation of topic's application for their personal circumstances.      Marge Fournier MDiv, [facility]  Staff   Pager [phone]

## 2017-06-29 NOTE — PROGRESS NOTES
[facility], [place]   Psychiatric Progress Note        Interim History:   The patient's care was discussed with the treatment team during the daily team meeting and/or staff's chart notes were reviewed.  Staff reports that the patient is doing well. He attends groups. He denies problems.    The patient is thankful for the increased Seroquel at night. He is less intense appearing and reports that he feels more in control today. He thanks me for working with him and trying to help him.         multivitamin, therapeutic  1 tablet Oral Daily     carBAMazepine  200 mg Oral BID     lurasidone  120 mg Oral Daily     lisinopril  20 mg Oral Daily          Allergies:     Allergies   Allergen Reactions     No Known Drug Allergies           Labs:   No results found for this or any previous visit (from the past 24 hour(s)).       Psychiatric Examination:     Vitals:    06/25/17 0822 06/26/17 0845 06/27/17 0739 06/28/17 0730   BP:    134/80   Pulse:    80   Resp: 16 16 16 16   Temp: 98.4  F (36.9  C) 98.3  F (36.8  C) 97.9  F (36.6  C) 97.6  F (36.4  C)   TempSrc: Tympanic Oral Tympanic Tympanic   SpO2:       Weight:       Height:           Weight is 245 lbs 0 oz  Body mass index is 30.62 kg/(m^2).    Appearance: awake, alert, adequately groomed, dressed in hospital scrubs and appeared as age stated  Attitude:  cooperative  Eye Contact:  good  Mood:  better and good  Affect:  less tense  Speech:  clear, coherent and normal prosody  Psychomotor Behavior:  no evidence of tardive dyskinesia, dystonia, or tics, intact station, gait and muscle tone and pacing the unit  Throught Process:  goal oriented  Associations:  no loose associations  Thought Content:  endorses some intermittent hallucinations. He denies suicidal or homicidal thoughts.  Insight:  limited  Judgement:  fair  Oriented to:  time, person, and place  Attention Span and Concentration:  intact  Recent and Remote Memory:  intact   Fund of  Knowledge: Adequate         Precautions:     Behavioral Orders   Procedures     Assault precautions     Code 1 - Restrict to Unit     Routine Programming     As clinically indicated     Sexual precautions     Status 15     Every 15 minutes.          Diagnoses:     1.  Schizophrenia, paranoid type.   2.  Noncompliance, nonadherence.   3.  Cluster B with antisocial traits.           Plan:     Medications:  1.  Saphris: restated on admission and titrated to 10 mg BID but later cross tapered to Latuda due to lack of efficacy.  2.  PRNs: Vistaril for anxiety and Seroquel for restlessness and haldol with Benadryl for agitation and psychosis. PRN ativan was discontinued.  --Continue Seroquel 400 mg qHS PRN for sleep.  3.  Latuda: started and titrated to 120 mg daily, with goals to further titrate pending clinical response and tolerability. He appears to be improving as of 6/20.  4.  Tegretol: start at 100 mg BID, level was 5.1 on 6/11 and dose increased to 200 mg BID.  Tegretol level was 6.9 on 6/16.  5.  Cogentin was added at 0.5-1 mg BID PRN for EPS.        -- IM consult consult completed,  re: Lyme management. They have recommended to intervention at this time due to lack of neurological findings. This was based on their conversation with infectious disease specialist.    Legal Status and Disposition:  1.  Commitment with [facility] for Saphris, Zyprexa, haloperidol, Latuda, and risperidone.  2.  Will work with [facility]  on [facility] placement. Given his statements about homicidal thoughts toward neighbor, I would be uncomfortable discharging this patient home so soon after improvement of symptoms. Time in the hospital (or [facility]) to allow full effect of the medications is warranted.

## 2017-06-29 NOTE — PROGRESS NOTES
06/28/17 6619   Behavioral Health   Hallucinations denies / not responding to hallucinations   Thinking distractable;delusional   Orientation place: oriented;date: oriented   Memory baseline memory   Judgement impaired   Eye Contact at examiner   Affect blunted, flat   Mood mood is calm   Physical Appearance/Attire attire appropriate to age and situation;neat   Hygiene well groomed   Suicidality other (see comments)  (Pt denies)   Self Injury chronic thoughts with no stated plan  (Pt denies)   Activity withdrawn   Speech flight of ideas;rambling;clear   Medication Sensitivity no stated side effects;no observed side effects   Psychomotor / Gait balanced;steady     Pt reports day's goal as reflecting on positive passed experiences to giggle about. He further mentions having a good day. Pt also reports having no side effects from medications and eating all meals offered. He admits awaiting to transfer outside the [facility] to continue treatment. Pt did mention having some negative thoughts in the past but nothing like SI or SIB.    Observationally, pt flat of coherent in thoughts and ideas. He seems obsessed with hand washing and general cleanliness. Overall, Pt had a good napping in between meals and chatting a bit with peers in the lounge.

## 2017-06-29 NOTE — PLAN OF CARE
Problem: Psychotic Symptoms  Intervention: Social and Therapeutic Interv (Psychotic Symptoms)     Pt attended 2 of 2 OT groups.  Cooperative with activity, social with writer and peers, didn't bring up any specific concerns or stressors.

## 2017-06-29 NOTE — PROGRESS NOTES
06/29/17 1500   Behavioral Health   Hallucinations denies / not responding to hallucinations   Thinking distractable   Orientation person: oriented;place: oriented;date: oriented   Memory baseline memory   Insight poor   Judgement impaired   Eye Contact at examiner   Affect blunted, flat   Mood mood is calm   Physical Appearance/Attire attire appropriate to age and situation   Hygiene (adequate)   Suicidality (none reported)   Self Injury (none reported)   Activity (attended group)   Speech clear   Medication Sensitivity no stated side effects   Psychomotor / Gait balanced;steady   Activities of Daily Living   Hygiene/Grooming independent   Oral Hygiene independent   Dress independent   Room Organization independent   Activity   Activity Level of Assistance independent   Pt was visible in milieu and attended groups today.  Pt was calm and cooperative with staff members.  Pt had little interactions with others during group but was present and participated in activity.  Pt reported no SI/SIB, no medication side effects reported.

## 2017-06-30 PROCEDURE — 99232 SBSQ HOSP IP/OBS MODERATE 35: CPT | Performed by: PSYCHIATRY & NEUROLOGY

## 2017-06-30 PROCEDURE — 25000132 ZZH RX MED GY IP 250 OP 250 PS 637: Performed by: PSYCHIATRY & NEUROLOGY

## 2017-06-30 PROCEDURE — 90853 GROUP PSYCHOTHERAPY: CPT

## 2017-06-30 PROCEDURE — 25000132 ZZH RX MED GY IP 250 OP 250 PS 637: Performed by: NURSE PRACTITIONER

## 2017-06-30 PROCEDURE — 12400003 ZZH R&B MH CRITICAL UMMC

## 2017-06-30 RX ADMIN — CARBAMAZEPINE 200 MG: 100 TABLET, EXTENDED RELEASE ORAL at 08:04

## 2017-06-30 RX ADMIN — LURASIDONE HYDROCHLORIDE 120 MG: 80 TABLET, FILM COATED ORAL at 08:05

## 2017-06-30 RX ADMIN — CARBOXYMETHYLCELLULOSE SODIUM 1 DROP: 10 GEL OPHTHALMIC at 23:55

## 2017-06-30 RX ADMIN — CARBAMAZEPINE 200 MG: 100 TABLET, EXTENDED RELEASE ORAL at 20:21

## 2017-06-30 RX ADMIN — DIPHENHYDRAMINE HYDROCHLORIDE 50 MG: 50 CAPSULE ORAL at 23:16

## 2017-06-30 RX ADMIN — MULTIPLE VITAMINS W/ MINERALS TAB 1 TABLET: TAB at 08:05

## 2017-06-30 RX ADMIN — LISINOPRIL 20 MG: 20 TABLET ORAL at 08:05

## 2017-06-30 RX ADMIN — QUETIAPINE FUMARATE 400 MG: 200 TABLET ORAL at 23:16

## 2017-06-30 ASSESSMENT — ACTIVITIES OF DAILY LIVING (ADL)
ORAL_HYGIENE: INDEPENDENT
LAUNDRY: UNABLE TO COMPLETE
DRESS: SCRUBS (BEHAVIORAL HEALTH);INDEPENDENT
HYGIENE/GROOMING: INDEPENDENT

## 2017-06-30 NOTE — PROGRESS NOTES
We have not heard about [facility] at this time.  The pt was asking if he could just d/c home.  I explained that we would hope for him to d/c to the [facility].  He was going to talk to his psychiatrist about it.

## 2017-06-30 NOTE — PROGRESS NOTES
06/29/17 2200   Behavioral [facility]   Hallucinations denies / not responding to hallucinations   Thinking distractable   Orientation time: oriented;date: oriented;place: oriented;person: oriented   Memory baseline memory   Insight poor   Judgement impaired   Eye Contact at examiner   Affect blunted, flat   Mood mood is calm   Physical Appearance/Attire attire appropriate to age and situation   Hygiene well groomed   Suicidality other (see comments)  (none stated or observed)   Self Injury other (see comment)  (none stated or observed)   Activity other (see comment)  (present in milieu)   Speech clear;coherent   Medication Sensitivity no stated side effects;no observed side effects   Psychomotor / Gait balanced;steady   Activities of Daily Living   Hygiene/Grooming independent   Oral Hygiene independent   Dress scrubs (behavioral health)   Room Organization independent   Activity   Activity Level of Assistance independent     Pt stated he was having a good day and was frequently part of the milieu, generally drawing quietly by himself. Pt had no visitors. No SI/SIB observed or stated.

## 2017-06-30 NOTE — PLAN OF CARE
Problem: Psychotic Symptoms  Intervention: Social and Therapeutic Interv (Psychotic Symptoms)     Pt participated in the OT leisure group.  Able to learn new game, demonstrated understanding and was able to use strategy and problem solving skills.  Appropriate social interactions with others.

## 2017-06-30 NOTE — PROGRESS NOTES
[facility], [place]   Psychiatric Progress Note        Interim History:   The patient's care was discussed with the treatment team during the daily team meeting and/or staff's chart notes were reviewed.  Staff reports that the patient has continued to behave appropriately on the unit. Still appears internally preoccupied and blunted at times.    Patient asks if I could write a doctor's recommendation that he have a cigarette on the way to [facility]. I indicate that my recommendation is that he not smoke, so I won't do that. He then asks jokingly if I can give him a recommendation for marijuana then. He denies medication side effects.         multivitamin, therapeutic  1 tablet Oral Daily     carBAMazepine  200 mg Oral BID     lurasidone  120 mg Oral Daily     lisinopril  20 mg Oral Daily          Allergies:     Allergies   Allergen Reactions     No Known Drug Allergies           Labs:   No results found for this or any previous visit (from the past 24 hour(s)).       Psychiatric Examination:     Vitals:    06/27/17 0739 06/28/17 0730 06/29/17 0700 06/30/17 0800   BP:  134/80     Pulse:  80     Resp: 16 16 14 16   Temp: 97.9  F (36.6  C) 97.6  F (36.4  C) 98.3  F (36.8  C) 98.1  F (36.7  C)   TempSrc: Tympanic Tympanic  Tympanic   SpO2:       Weight:   110.7 kg (244 lb)    Height:           Weight is 244 lbs 0 oz  Body mass index is 30.5 kg/(m^2).    Appearance: awake, alert, adequately groomed, dressed in hospital scrubs and appeared as age stated  Attitude:  cooperative  Eye Contact:  good  Mood:  mild irritability under surface  Affect:  superficially happy, but tense under  Speech:  clear, coherent and normal prosody  Psychomotor Behavior:  no evidence of tardive dyskinesia, dystonia, or tics, intact station, gait and muscle tone and pacing the unit  Throught Process:  goal oriented  Associations:  no loose associations  Thought Content:  endorses some intermittent hallucinations. He  denies suicidal or homicidal thoughts.  Insight:  limited  Judgement:  fair  Oriented to:  time, person, and place  Attention Span and Concentration:  intact  Recent and Remote Memory:  intact   Fund of Knowledge: Adequate         Precautions:     Behavioral Orders   Procedures     Assault precautions     Code 1 - Restrict to Unit     Routine Programming     As clinically indicated     Sexual precautions     Status 15     Every 15 minutes.          Diagnoses:     1.  Schizophrenia, paranoid type.   2.  Noncompliance, nonadherence.   3.  Cluster B with antisocial traits.           Plan:     Medications:  1.  Saphris: restated on admission and titrated to 10 mg BID but later cross tapered to Latuda due to lack of efficacy.  2.  PRNs: Vistaril for anxiety and Seroquel for restlessness and haldol with Benadryl for agitation and psychosis. PRN ativan was discontinued.  --Continue Seroquel 400 mg qHS PRN for sleep.  3.  Latuda: started and titrated to 120 mg daily, with goals to further titrate pending clinical response and tolerability. He appears to be improving as of 6/20.  4.  Tegretol: start at 100 mg BID, level was 5.1 on 6/11 and dose increased to 200 mg BID.  Tegretol level was 6.9 on 6/16.  5.  Cogentin was added at 0.5-1 mg BID PRN for EPS.        -- IM consult consult completed,  re: Lyme management. They have recommended to intervention at this time due to lack of neurological findings. This was based on their conversation with infectious disease specialist.    Legal Status and Disposition:  1.  Commitment with [facility] for Saphris, Zyprexa, haloperidol, Latuda, and risperidone.  2.  Will work with [facility]  on [facility] placement. Given his statements about homicidal thoughts toward neighbor, I would be uncomfortable discharging this patient home so soon after improvement of symptoms. Time in the hospital (or [facility]) to allow full effect of the medications is warranted.

## 2017-06-30 NOTE — PLAN OF CARE
Problem: General Plan of Care (Inpatient Behavioral)  Goal: Team Discussion  Team Plan:   BEHAVIORAL TEAM DISCUSSION     Participants: Dr. Karan Diana, Alessandra Archibald RN, Francosie WARE, [facility] and Rae Rowan OT/R  Progress: Pt has made improvements in his presentation and continues to exhibit decent behaviors on the unit.    Continued Stay Criteria/Rationale: Awaiting placement @ [facility] type of setting due to threats he has made regarding his neighbor   Medical/Physical: see medical chart  Precautions:   Behavioral Orders   Procedures     Assault precautions     Code 1 - Restrict to Unit     Routine Programming       As clinically indicated     Sexual precautions     Status 15       Every 15 minutes.     Plan: pt is moving up the list at [facility], we are hopefull for the next 1-2 weeks for placement.  Will continue to check in with CCM   Rationale for change in precautions or plan: no changes warranted at this time

## 2017-07-01 PROCEDURE — 25000132 ZZH RX MED GY IP 250 OP 250 PS 637: Performed by: PSYCHIATRY & NEUROLOGY

## 2017-07-01 PROCEDURE — 12400003 ZZH R&B MH CRITICAL UMMC

## 2017-07-01 PROCEDURE — 25000132 ZZH RX MED GY IP 250 OP 250 PS 637: Performed by: NURSE PRACTITIONER

## 2017-07-01 RX ADMIN — CARBOXYMETHYLCELLULOSE SODIUM 1 DROP: 10 GEL OPHTHALMIC at 16:15

## 2017-07-01 RX ADMIN — DIPHENHYDRAMINE HYDROCHLORIDE 50 MG: 50 CAPSULE ORAL at 21:41

## 2017-07-01 RX ADMIN — CARBAMAZEPINE 200 MG: 100 TABLET, EXTENDED RELEASE ORAL at 19:19

## 2017-07-01 RX ADMIN — CARBAMAZEPINE 200 MG: 100 TABLET, EXTENDED RELEASE ORAL at 08:29

## 2017-07-01 RX ADMIN — LURASIDONE HYDROCHLORIDE 120 MG: 80 TABLET, FILM COATED ORAL at 08:29

## 2017-07-01 RX ADMIN — MULTIPLE VITAMINS W/ MINERALS TAB 1 TABLET: TAB at 08:29

## 2017-07-01 RX ADMIN — CARBOXYMETHYLCELLULOSE SODIUM 1 DROP: 10 GEL OPHTHALMIC at 12:24

## 2017-07-01 RX ADMIN — CARBOXYMETHYLCELLULOSE SODIUM 1 DROP: 10 GEL OPHTHALMIC at 19:19

## 2017-07-01 RX ADMIN — LISINOPRIL 20 MG: 20 TABLET ORAL at 08:29

## 2017-07-01 RX ADMIN — QUETIAPINE FUMARATE 400 MG: 200 TABLET ORAL at 21:41

## 2017-07-01 ASSESSMENT — ACTIVITIES OF DAILY LIVING (ADL)
HYGIENE/GROOMING: INDEPENDENT
DRESS: SCRUBS (BEHAVIORAL HEALTH);INDEPENDENT
LAUNDRY: UNABLE TO COMPLETE
ORAL_HYGIENE: INDEPENDENT
ORAL_HYGIENE: INDEPENDENT
DRESS: SCRUBS (BEHAVIORAL HEALTH);INDEPENDENT
GROOMING: INDEPENDENT

## 2017-07-01 NOTE — PROGRESS NOTES
"   06/30/17 6049   Behavioral Health   Hallucinations denies / not responding to hallucinations   Thinking intact   Orientation person: oriented;place: oriented   Memory baseline memory   Insight poor   Judgement impaired   Eye Contact at examiner   Affect blunted, flat   Mood depressed   Physical Appearance/Attire appears stated age;attire appropriate to age and situation;neat   Hygiene well groomed   Suicidality other (see comments)  (none noted)   Self Injury other (see comment)  (none noted)   Activity withdrawn   Speech clear;coherent   Medication Sensitivity no stated side effects;no observed side effects   Psychomotor / Gait balanced;steady   Overt Aggression Scale   Verbal Aggression 0   Aggression against Property 0   Auto-Aggression 0   Physical Aggression 0   Overt Aggression Total Score 0   Activities of Daily Living   Hygiene/Grooming independent   Oral Hygiene independent   Dress scrubs (behavioral health);independent   Laundry unable to complete   Room Organization independent     Pt was more seen to be isolative to his room, although seen in lounge throughout shift as well. Pt told author that he was \"depressed\" today, due to his mother's health. Respectful and cooperative throughout shift. No behavioral issues.  "

## 2017-07-01 NOTE — PLAN OF CARE
Problem: Psychotic Symptoms  Goal: Psychotic Symptoms  Signs and symptoms of listed problems will be absent or manageable.   Outcome: Improving  Patient is alert and fully oriented.  He is calm, pleasant, and cooperative on approach.  He no longer seems to be struggling with irritability or paranoia.  He denies that he is experiencing any psychotic symptoms.  No overtly delusional statements were overheard this shift.  He tolerated some very intrusive behavior from a male peer (C.J.) this afternoon and was able to refrain from any aggressive behavior; he simply redirected the peer while remaining calm.  He has been compliant with all of his scheduled medications, and no adverse side effects have been reported or observed.  Apart from his c/o dry eyes (relieved by a dose of PRN Celluvisc), he denies any acute physical concerns.

## 2017-07-01 NOTE — PROGRESS NOTES
Patient was awake at about midnight for PRN eye drops and snacks and went back to bed immediately after. He slept for 6.5 hours tonight.

## 2017-07-02 PROCEDURE — 25000132 ZZH RX MED GY IP 250 OP 250 PS 637: Performed by: PSYCHIATRY & NEUROLOGY

## 2017-07-02 PROCEDURE — 25000132 ZZH RX MED GY IP 250 OP 250 PS 637: Performed by: NURSE PRACTITIONER

## 2017-07-02 PROCEDURE — 12400003 ZZH R&B MH CRITICAL UMMC

## 2017-07-02 RX ADMIN — CARBOXYMETHYLCELLULOSE SODIUM 1 DROP: 10 GEL OPHTHALMIC at 20:09

## 2017-07-02 RX ADMIN — MULTIPLE VITAMINS W/ MINERALS TAB 1 TABLET: TAB at 08:36

## 2017-07-02 RX ADMIN — LURASIDONE HYDROCHLORIDE 120 MG: 80 TABLET, FILM COATED ORAL at 08:36

## 2017-07-02 RX ADMIN — QUETIAPINE FUMARATE 400 MG: 200 TABLET ORAL at 22:30

## 2017-07-02 RX ADMIN — CARBAMAZEPINE 200 MG: 100 TABLET, EXTENDED RELEASE ORAL at 08:36

## 2017-07-02 RX ADMIN — LISINOPRIL 20 MG: 20 TABLET ORAL at 08:36

## 2017-07-02 RX ADMIN — CARBOXYMETHYLCELLULOSE SODIUM 1 DROP: 10 GEL OPHTHALMIC at 07:06

## 2017-07-02 RX ADMIN — CARBAMAZEPINE 200 MG: 100 TABLET, EXTENDED RELEASE ORAL at 20:08

## 2017-07-02 RX ADMIN — DIPHENHYDRAMINE HYDROCHLORIDE 50 MG: 50 CAPSULE ORAL at 22:30

## 2017-07-02 ASSESSMENT — ACTIVITIES OF DAILY LIVING (ADL)
DRESS: SCRUBS (BEHAVIORAL HEALTH)
ORAL_HYGIENE: INDEPENDENT
GROOMING: INDEPENDENT
LAUNDRY: UNABLE TO COMPLETE
GROOMING: SHOWER;INDEPENDENT
DRESS: SCRUBS (BEHAVIORAL HEALTH)
ORAL_HYGIENE: INDEPENDENT

## 2017-07-02 NOTE — PROGRESS NOTES
"   07/01/17 2158   Behavioral [facility]   Hallucinations denies / not responding to hallucinations   Thinking intact   Orientation person: oriented;place: oriented   Memory baseline memory   Insight poor   Judgement impaired   Eye Contact at examiner   Affect blunted, flat   Mood mood is calm;depressed   Physical Appearance/Attire appears stated age;attire appropriate to age and situation;neat   Hygiene well groomed   Suicidality other (see comments)  (none noted)   Self Injury other (see comment)  (none noted)   Activity withdrawn   Speech clear;coherent   Medication Sensitivity no stated side effects;no observed side effects   Psychomotor / Gait balanced;steady   Overt Aggression Scale   Verbal Aggression 0   Aggression against Property 0   Auto-Aggression 0   Physical Aggression 0   Overt Aggression Total Score 0   Activities of Daily Living   Hygiene/Grooming independent   Oral Hygiene independent   Dress scrubs (behavioral health);independent   Laundry unable to complete   Room Organization independent     Pt was in and out of the lounge throughout the shift. When asked how he was doing, pt responded, \"Ehh I'm okay.\" Pt presented as depressed, and was fairly guarded.  "

## 2017-07-02 NOTE — PROGRESS NOTES
"   07/02/17 1322   Significant Event   Significant Event Other (see comments)  (shift summary)   Pt had an unremarkable shift. He was out in the lounge watching tv and socializing with peers and staff appropriately. In 1:1 with writer he stated, \"You should chart that I'm ready to go home.\" He had no other complaints or requests. No overt MI sx.   "

## 2017-07-03 PROCEDURE — 25000132 ZZH RX MED GY IP 250 OP 250 PS 637: Performed by: NURSE PRACTITIONER

## 2017-07-03 PROCEDURE — 12400003 ZZH R&B MH CRITICAL UMMC

## 2017-07-03 PROCEDURE — 25000132 ZZH RX MED GY IP 250 OP 250 PS 637: Performed by: PSYCHIATRY & NEUROLOGY

## 2017-07-03 PROCEDURE — 99232 SBSQ HOSP IP/OBS MODERATE 35: CPT | Performed by: PSYCHIATRY & NEUROLOGY

## 2017-07-03 RX ADMIN — QUETIAPINE FUMARATE 400 MG: 200 TABLET ORAL at 21:49

## 2017-07-03 RX ADMIN — QUETIAPINE FUMARATE 100 MG: 100 TABLET, FILM COATED ORAL at 11:47

## 2017-07-03 RX ADMIN — DIPHENHYDRAMINE HYDROCHLORIDE 50 MG: 50 CAPSULE ORAL at 21:48

## 2017-07-03 RX ADMIN — LURASIDONE HYDROCHLORIDE 120 MG: 80 TABLET, FILM COATED ORAL at 08:27

## 2017-07-03 RX ADMIN — CARBAMAZEPINE 200 MG: 100 TABLET, EXTENDED RELEASE ORAL at 08:27

## 2017-07-03 RX ADMIN — LISINOPRIL 20 MG: 20 TABLET ORAL at 08:27

## 2017-07-03 RX ADMIN — MULTIPLE VITAMINS W/ MINERALS TAB 1 TABLET: TAB at 08:27

## 2017-07-03 RX ADMIN — ASPIRIN 325 MG ORAL TABLET 325 MG: 325 PILL ORAL at 08:33

## 2017-07-03 RX ADMIN — CARBAMAZEPINE 200 MG: 100 TABLET, EXTENDED RELEASE ORAL at 19:17

## 2017-07-03 ASSESSMENT — ACTIVITIES OF DAILY LIVING (ADL)
GROOMING: INDEPENDENT
ORAL_HYGIENE: INDEPENDENT
DRESS: SCRUBS (BEHAVIORAL HEALTH)
GROOMING: INDEPENDENT
ORAL_HYGIENE: INDEPENDENT
DRESS: SCRUBS (BEHAVIORAL HEALTH)
LAUNDRY: UNABLE TO COMPLETE

## 2017-07-03 NOTE — PROGRESS NOTES
Patient appears much more subdued this shift with blunted affect and calm mood. Oriented in all spheres. Patient has been isolative to room most of shift, either sleeping or resting.     07/03/17 1353   Behavioral Health   Hallucinations denies / not responding to hallucinations   Thinking other (see comment)  (Guarded)   Orientation person: oriented;place: oriented;date: oriented   Memory baseline memory   Insight poor   Judgement impaired   Eye Contact at examiner  (Less intensity)   Affect blunted, flat   Mood mood is calm   Physical Appearance/Attire neat   Hygiene well groomed   Suicidality other (see comments)  (No SI noted)   Self Injury other (see comment)  (No SIB observed)   Activity isolative;withdrawn   Speech clear;coherent   Medication Sensitivity sedation;no stated side effects   Psychomotor / Gait balanced;steady   Substance Withdrawal Interventions   Social and Therapeutic Interventions (Substance Withdrawal) encourage effective boundaries with peers   Overt Aggression Scale   Verbal Aggression 0   Aggression against Property 0   Auto-Aggression 0   Physical Aggression 0   Overt Aggression Total Score 0   Sleep/Rest/Relaxation   Day/Evening Time Hours napping;resting in bed   Number of hours napping 3 hours   Number of hours resting in bed 2 hours   Psycho Education   Type of Intervention 1:1 intervention   Response participates, initiates socially appropriate   Hours 0.5   Treatment Detail Triggers   Daily Care   Activity up ad jomar   Activities of Daily Living   Hygiene/Grooming independent   Oral Hygiene independent   Dress scrubs (behavioral health)   Laundry unable to complete   Room Organization independent   Activity   Activity Level of Assistance independent   Behavioral Health Interventions   Psychotic Symptoms maintain safety precautions;decrease environmental stimulation   Social and Therapeutic Interventions (Psychotic Symptoms) encourage effective boundaries with peers

## 2017-07-03 NOTE — PROGRESS NOTES
"While in lounge this evening, patient was observed engaging in bizarre behavior. Patient exhibited inappropriate laughter and laughed continuously for over an hour while writer observed. When asked what was causing him to laugh, patient first stated \"My friend, well, this kwame who insists he's my brother, but he just gets way too close to me all the time and I gotta punch him\".  Minutes later, without prompting, patient turned to writer while laughing and said \"I have to clear all these cats from my house. They're kittens.  I have to go and catch them and give them all away\".  When asked why he had to do this, patient nonchalantly responded, \"Because they're going to eat my mom. We don't have enough money to feed them and stuff, so it's only a matter of time\". Patient appears responding. RN notified. Will continue to monitor closely and assess.  "

## 2017-07-03 NOTE — PROGRESS NOTES
[facility], [place]   Psychiatric Progress Note        Interim History:   The patient's care was discussed with the treatment team during the daily team meeting and/or staff's chart notes were reviewed.  Staff reports that the patient is preoccupied, internal stimuli suspected and expressed delusional thoughts. Limited insight and dismissive of symptoms. Slept 7hrs. Compliant with medications. No outbursts and compliant with care. A bit more isolative but visible on the milieu. Independent with ADL.     The patient was calm and cooperative, but dismissive and focused on wanting to return home. He denied all symptoms except for feeling anxious about being here. Denied dep, racing thoughts and hallucinations. Denied SI, LOYDA and HI. Tolerating medications well.     Dicussed medications and care plan.          multivitamin, therapeutic  1 tablet Oral Daily     carBAMazepine  200 mg Oral BID     lurasidone  120 mg Oral Daily     lisinopril  20 mg Oral Daily          Allergies:     Allergies   Allergen Reactions     No Known Drug Allergies           Labs:   No results found for this or any previous visit (from the past 24 hour(s)).       Psychiatric Examination:     Vitals:    07/01/17 1914 07/02/17 0836 07/02/17 1900 07/03/17 0835   BP:  124/79 150/79 128/76   Pulse:  80     Resp: 16   16   Temp: 98.9  F (37.2  C) 97.7  F (36.5  C) 98  F (36.7  C) 98  F (36.7  C)   TempSrc: Tympanic Tympanic Oral Tympanic   SpO2:       Weight:       Height:           Weight is 244 lbs 0 oz  Body mass index is 30.5 kg/(m^2).    Appearance: awake, alert, adequately groomed, dressed in hospital scrubs, appeared as age stated and no apparent distress  Attitude:  cooperative  Eye Contact:  good  Mood:  anxious  Affect:  slightly tense but reactive  Speech:  clear, coherent and normal prosody  Psychomotor Behavior:  no evidence of tardive dyskinesia, dystonia, or tics and intact station, gait and muscle  tone  Throught Process:  goal oriented  Associations:  no loose associations  Thought Content:  denied hallucinations but very dismissive and likely minimizing symptoms. denied SI, LOYDA and HI.   Insight:  limited  Judgement:  fair  Oriented to:  time, person, and place  Attention Span and Concentration:  intact  Recent and Remote Memory:  intact   Fund of Knowledge: Adequate         Precautions:     Behavioral Orders   Procedures     Assault precautions     Code 1 - Restrict to Unit     Routine Programming     As clinically indicated     Sexual precautions     Status 15     Every 15 minutes.          Diagnoses:     1.  Schizophrenia, paranoid type. (r/o schizoaffective Bipolar type)   2.  Noncompliance, nonadherence.   3.  Cluster B with antisocial traits.           Plan:     Medications:  1.  Saphris: restated on admission and titrated to 10 mg BID but later cross tapered to Latuda due to lack of efficacy.  2.  PRNs: Vistaril for anxiety and Seroquel for restlessness and haldol with Benadryl for agitation and psychosis. PRN ativan was discontinued.  --Continue Seroquel 400 mg qHS PRN for sleep and 100 mg qid PRN for restlessness.   3.  Latuda: started and titrated to 120 mg daily, with goals to further titrate pending clinical response and tolerability. He appears to be improving as of 6/20.  4.  Tegretol: start at 100 mg BID, level was 5.1 on 6/11 and dose increased to 200 mg BID.  Tegretol level was 6.9 on 6/16.  5.  Cogentin was added at 0.5-1 mg BID PRN for EPS.        -- IM consult consult completed,  re: Lyme management. They have recommended no intervention at this time due to lack of neurological findings. This was based on their conversation with infectious disease specialist.    Legal Status and Disposition:  1.  Commitment with [facility] for Saphris, Zyprexa, haloperidol, Latuda, and risperidone.  2.  Will work with [facility]  on [facility] placement. Given his statements about homicidal thoughts toward  neighbor, I would be uncomfortable discharging this patient home so soon after improvement of symptoms. Time in the hospital (or [facility]) to allow full effect of the medications is warranted.

## 2017-07-04 PROCEDURE — 12400003 ZZH R&B MH CRITICAL UMMC

## 2017-07-04 PROCEDURE — 25000132 ZZH RX MED GY IP 250 OP 250 PS 637: Performed by: PSYCHIATRY & NEUROLOGY

## 2017-07-04 PROCEDURE — 25000132 ZZH RX MED GY IP 250 OP 250 PS 637: Performed by: NURSE PRACTITIONER

## 2017-07-04 RX ADMIN — LISINOPRIL 20 MG: 20 TABLET ORAL at 10:07

## 2017-07-04 RX ADMIN — CARBAMAZEPINE 200 MG: 100 TABLET, EXTENDED RELEASE ORAL at 10:07

## 2017-07-04 RX ADMIN — LURASIDONE HYDROCHLORIDE 120 MG: 80 TABLET, FILM COATED ORAL at 10:07

## 2017-07-04 RX ADMIN — MULTIPLE VITAMINS W/ MINERALS TAB 1 TABLET: TAB at 10:07

## 2017-07-04 RX ADMIN — CARBOXYMETHYLCELLULOSE SODIUM 1 DROP: 10 GEL OPHTHALMIC at 20:12

## 2017-07-04 RX ADMIN — QUETIAPINE FUMARATE 400 MG: 200 TABLET ORAL at 21:09

## 2017-07-04 RX ADMIN — CARBAMAZEPINE 200 MG: 100 TABLET, EXTENDED RELEASE ORAL at 20:10

## 2017-07-04 RX ADMIN — DIPHENHYDRAMINE HYDROCHLORIDE 50 MG: 50 CAPSULE ORAL at 21:09

## 2017-07-04 ASSESSMENT — ACTIVITIES OF DAILY LIVING (ADL)
LAUNDRY: UNABLE TO COMPLETE
DRESS: SCRUBS (BEHAVIORAL HEALTH)
DRESS: SCRUBS (BEHAVIORAL HEALTH)
ORAL_HYGIENE: INDEPENDENT
ORAL_HYGIENE: INDEPENDENT
GROOMING: INDEPENDENT
GROOMING: INDEPENDENT

## 2017-07-04 NOTE — PLAN OF CARE
"Problem: Psychotic Symptoms  Goal: Psychotic Symptoms  Signs and symptoms of listed problems will be absent or manageable.   Outcome: No Change  Pt continues to be visible in the milieu, though he is withdrawn and selectively social. Affect blunted. Reports that his mood is \"okay.\" No agitation or irritability observed. Denies any symptoms of psychosis, stating that \"I'm feeling much better.\" Pt has made some delusional statements in the past 48 hrs, though none were observed this evening. Appears to have limited insight into his situation, though he states he is hoping for discharge soon. Compliant with medications and denies any adverse effects. Will continue to monitor.      "

## 2017-07-04 NOTE — PROGRESS NOTES
Mother brought in items:    IN LOCKER  1 pair jeans  2 Plaid Shirts  1 White t-shirt  1 pair shorts  1 boxers  1 blue tshirt  Wallet with various ids  Pair shoes  Cell phone  Beard ken  1 pair glasses    2 CREDIT CARDS SENT TO [facility]  EBT *[id number]  Mastercard *[phone]  ADMISSION:  I am responsible for any personal items that are not sent to the safe or pharmacy. [facility] is not responsible for loss, theft or damage of any property in my possession.    Patient Signature _____________________ Date/Time _____________________    Staff Signature _______________________ Date/Time _____________________    2nd Staff person, if patient is unable/unwilling to sign  ___________________________________ Date/Time _____________________  DISCHARGE:  All personal items have been returned to me.    Patient Signature _____________________ Date/Time _____________________    Staff Signature _______________________ Date/Time _____________________

## 2017-07-05 PROCEDURE — 12400003 ZZH R&B MH CRITICAL UMMC

## 2017-07-05 PROCEDURE — 25000132 ZZH RX MED GY IP 250 OP 250 PS 637: Performed by: PSYCHIATRY & NEUROLOGY

## 2017-07-05 PROCEDURE — 99232 SBSQ HOSP IP/OBS MODERATE 35: CPT | Performed by: PSYCHIATRY & NEUROLOGY

## 2017-07-05 PROCEDURE — 90853 GROUP PSYCHOTHERAPY: CPT

## 2017-07-05 PROCEDURE — 25000132 ZZH RX MED GY IP 250 OP 250 PS 637: Performed by: NURSE PRACTITIONER

## 2017-07-05 PROCEDURE — 97150 GROUP THERAPEUTIC PROCEDURES: CPT | Mod: GO

## 2017-07-05 RX ADMIN — QUETIAPINE FUMARATE 400 MG: 200 TABLET ORAL at 22:43

## 2017-07-05 RX ADMIN — DIPHENHYDRAMINE HYDROCHLORIDE 50 MG: 50 CAPSULE ORAL at 22:43

## 2017-07-05 RX ADMIN — CARBAMAZEPINE 200 MG: 100 TABLET, EXTENDED RELEASE ORAL at 21:41

## 2017-07-05 RX ADMIN — MULTIPLE VITAMINS W/ MINERALS TAB 1 TABLET: TAB at 09:01

## 2017-07-05 RX ADMIN — CARBAMAZEPINE 200 MG: 100 TABLET, EXTENDED RELEASE ORAL at 09:01

## 2017-07-05 RX ADMIN — LURASIDONE HYDROCHLORIDE 120 MG: 80 TABLET, FILM COATED ORAL at 09:01

## 2017-07-05 RX ADMIN — LISINOPRIL 20 MG: 20 TABLET ORAL at 09:01

## 2017-07-05 ASSESSMENT — ACTIVITIES OF DAILY LIVING (ADL)
DRESS: SCRUBS (BEHAVIORAL HEALTH)
ORAL_HYGIENE: INDEPENDENT
GROOMING: INDEPENDENT
GROOMING: INDEPENDENT
ORAL_HYGIENE: INDEPENDENT

## 2017-07-05 NOTE — PROGRESS NOTES
[facility], [place]   Psychiatric Progress Note        Interim History:   The patient's care was discussed with the treatment team during the daily team meeting and/or staff's chart notes were reviewed.  Staff reports that the patient has been generally calm and cooperative with care. Visible and active in milieu. Compliant with medications and care. Dismissive of symptoms but less preoccupied and more engaged. Sleeping well.  Independent with ADL.     The patient was polite and engaged. Noted that he is doing well and was dismissive of symptoms. Denied dep, anx, and racing thoughts. Denied hallucinations and paranoia. Denied SI, LOYDA and HI. Sleep and appetite are intact. Tolerating medications well.     Dicussed medications and care plan.          multivitamin, therapeutic  1 tablet Oral Daily     carBAMazepine  200 mg Oral BID     lurasidone  120 mg Oral Daily     lisinopril  20 mg Oral Daily          Allergies:     Allergies   Allergen Reactions     No Known Drug Allergies           Labs:   No results found for this or any previous visit (from the past 24 hour(s)).       Psychiatric Examination:     Vitals:    07/03/17 0835 07/04/17 0700 07/04/17 1600 07/05/17 0800   BP: 128/76  138/84    Pulse:   82    Resp: 16  16 18   Temp: 98  F (36.7  C) 97.4  F (36.3  C) 98.6  F (37  C) 97.8  F (36.6  C)   TempSrc: Tympanic  Oral Tympanic   SpO2:       Weight:  108.9 kg (240 lb)     Height:           Weight is 240 lbs 0 oz  Body mass index is 30 kg/(m^2).    Appearance: awake, alert, adequately groomed, dressed in hospital scrubs, appeared as age stated and no apparent distress  Attitude:  cooperative  Eye Contact:  good  Mood:  better  Affect:  a bit more calm and reactive  Speech:  clear, coherent and normal prosody  Psychomotor Behavior:  no evidence of tardive dyskinesia, dystonia, or tics and intact station, gait and muscle tone  Throught Process:  goal oriented  Associations:  no loose  associations  Thought Content:  denied hallucinations but very dismissive and likely minimizing symptoms. denied SI, LOYDA and HI.   Insight:  limited  Judgement:  fair  Oriented to:  time, person, and place  Attention Span and Concentration:  intact  Recent and Remote Memory:  intact    Fund of Knowledge: Adequate         Precautions:     Behavioral Orders   Procedures     Assault precautions     Code 1 - Restrict to Unit     Routine Programming     As clinically indicated     Sexual precautions     Status 15     Every 15 minutes.          Diagnoses:     1.  Schizophrenia, paranoid type. (r/o schizoaffective Bipolar type)   2.  Noncompliance, nonadherence.   3.  Cluster B with antisocial traits.           Plan:     Medications:  1.  Saphris: restated on admission and titrated to 10 mg BID but later cross tapered to Latuda due to lack of efficacy.  2.  PRNs: Vistaril for anxiety and Seroquel for restlessness and haldol with Benadryl for agitation and psychosis. PRN ativan was discontinued.  --Continue Seroquel 400 mg qHS PRN for sleep and 100 mg qid PRN for restlessness.   3.  Latuda: started and titrated to 120 mg daily, with goals to further titrate pending clinical response and tolerability. He appears to be improving as of 6/20.  4.  Tegretol: start at 100 mg BID, level was 5.1 on 6/11 and dose increased to 200 mg BID.  Tegretol level was 6.9 on 6/16.  5.  Cogentin was added at 0.5-1 mg BID PRN for EPS.        -- IM consult consult completed,  re: Lyme management. They have recommended no intervention at this time due to lack of neurological findings. This was based on their conversation with infectious disease specialist.    Legal Status and Disposition:  1.  Commitment with [facility] for Saphris, Zyprexa, haloperidol, Latuda, and risperidone.  2.  Will work with [facility]  on [facility] placement. Given his statements about homicidal thoughts toward neighbor, I would be uncomfortable discharging this patient  home so soon after improvement of symptoms. Time in the hospital (or [facility]) to allow full effect of the medications is warranted.

## 2017-07-05 NOTE — PROGRESS NOTES
07/04/17 2100   Behavioral Health   Hallucinations denies / not responding to hallucinations   Thinking distractable;delusional   Orientation person: oriented;place: oriented   Memory baseline memory   Insight poor   Judgement impaired   Eye Contact at examiner   Affect full range affect   Mood mood is calm   Physical Appearance/Attire attire appropriate to age and situation   Hygiene other (see comment)  (adequate )   Suicidality other (see comments)  (none stated nor observed )   Self Injury other (see comment)  (none stated nor observed )   Activity other (see comment)  (in milieu )   Speech clear;coherent   Medication Sensitivity no stated side effects;no observed side effects   Psychomotor / Gait balanced;steady   Jv was social in the milieu but seemed withdrawn. He was appropriate to staff and peers. No behavioral concerns this evening from Jv. His affect was full range. Pt denies AVH. No SI, SIB stated or observed this evening.

## 2017-07-05 NOTE — PLAN OF CARE
Problem: Psychotic Symptoms  Goal: Psychotic Symptoms  Signs and symptoms of listed problems will be absent or manageable.   Outcome: No Change  Pt continues to have psychotic symptoms. Symptom presentation is negative in nature this shift. No evidence of paranoid or delusional thinking or hallucinations. Pt's affect and mood seem somewhat improved but pt is generally still blunted overall. Pt attended groups and completed unit routines. Hygeine and ADL completion adequate this shift. Pt continues to have some limitations in insight but seems to understand that he is ill and that the medications do help. Pt's judgement is still possibly impaired. Pt denies side effects from medications or physical health symptoms this shift. Pt's BP was high this morning but WDL on recheck. Other VS were WDL this shift.

## 2017-07-05 NOTE — PLAN OF CARE
"Problem: Psychotic Symptoms  Intervention: Social and Therapeutic Interv (Psychotic Symptoms)     Pt attended 3 of 3 OT groups.  Pt attended the OT discussion group, which involved an abstract thought as patients were given a list of 40 road signs with instruction to select once each to represent their past, present,and future.  While topic was abstract to start with, pts explanations were quite loose:     Past: \"Railroad crossing\" - spoke of how railroads can magnitize cars, and cars getting stuck on the tracks when trains are coming.     Present: \"speed limits\" - spoke of how he used to speed, but is obeying rules and laws now (on topic)     Future: \"deer crossing\" -  Spoke of deer in the yard, which brings hunters and trespassers, though states because of his mental illness, he is not allowed to have guns of his own.  Pt then went on to say how sometimes the \"trespassers\" are kids, and kids take pictures of \"private moments I may have with women in my home\" which is why he needs to build walls and have surveillance - \"because fences don't work\", though kids also use their electronics to create interference with his surveillance...            "

## 2017-07-06 PROCEDURE — 12400003 ZZH R&B MH CRITICAL UMMC

## 2017-07-06 PROCEDURE — 25000132 ZZH RX MED GY IP 250 OP 250 PS 637: Performed by: PSYCHIATRY & NEUROLOGY

## 2017-07-06 PROCEDURE — 25000132 ZZH RX MED GY IP 250 OP 250 PS 637: Performed by: NURSE PRACTITIONER

## 2017-07-06 RX ADMIN — CARBAMAZEPINE 200 MG: 100 TABLET, EXTENDED RELEASE ORAL at 22:11

## 2017-07-06 RX ADMIN — MULTIPLE VITAMINS W/ MINERALS TAB 1 TABLET: TAB at 08:55

## 2017-07-06 RX ADMIN — QUETIAPINE FUMARATE 400 MG: 200 TABLET ORAL at 22:50

## 2017-07-06 RX ADMIN — CARBOXYMETHYLCELLULOSE SODIUM 1 DROP: 10 GEL OPHTHALMIC at 15:53

## 2017-07-06 RX ADMIN — CARBAMAZEPINE 200 MG: 100 TABLET, EXTENDED RELEASE ORAL at 08:55

## 2017-07-06 RX ADMIN — LISINOPRIL 20 MG: 20 TABLET ORAL at 08:55

## 2017-07-06 RX ADMIN — DIPHENHYDRAMINE HYDROCHLORIDE 50 MG: 50 CAPSULE ORAL at 22:50

## 2017-07-06 RX ADMIN — LURASIDONE HYDROCHLORIDE 120 MG: 80 TABLET, FILM COATED ORAL at 08:55

## 2017-07-06 ASSESSMENT — ACTIVITIES OF DAILY LIVING (ADL)
HYGIENE/GROOMING: INDEPENDENT
GROOMING: INDEPENDENT
ORAL_HYGIENE: INDEPENDENT
ORAL_HYGIENE: INDEPENDENT
DRESS: SCRUBS (BEHAVIORAL HEALTH)
DRESS: SCRUBS (BEHAVIORAL HEALTH)

## 2017-07-06 NOTE — PROGRESS NOTES
This writer spoke to Jenna Don Pt's CCM.  She checked in with central pre-admission for this pt's [facility] referral.  He was 7th on the list last week, now he is 15th.  This is due to him being bumped because of people coming from the criminal system/long term.     She wondered about his improvement and possible d/c home.  I explained I would discuss with Dr. Shah, we would likely prefer something like IRTS etc.

## 2017-07-06 NOTE — PROGRESS NOTES
Behavioral Health  Note    Behavioral Health  Spirituality Group Note    [address]     Name: Jv Pate YOB: 1985   MRN: [id number] Age: 32 year old      Patient attended -led group, which included discussion of spirituality, coping with illness and building resilience.    Patient attended group for 0.5 hrs.    The patient actively participated in group discussion and patient demonstrated an appreciation of topic's application for their personal circumstances. Jv completed an activity on peace/calm and coping strategies, and participated in a brief pranayama yoga tutorial.      Marge Fournier MDiv, [facility]  Staff   Pager [phone]

## 2017-07-06 NOTE — PROGRESS NOTES
Pt calm and friendly to staff. Went out of his way to greet every staff. Strange affect and eye contact.

## 2017-07-06 NOTE — PROGRESS NOTES
This writer called and left a voicemail for the pt's CCM - Jenna Khalil (phone: [phone]) to find out if she has heard anything about the pt moving up the list for [facility] programming.  Asked for return call.    Pt has been doing well, cooperative, joining groups and taking meds.

## 2017-07-07 PROCEDURE — H2032 ACTIVITY THERAPY, PER 15 MIN: HCPCS

## 2017-07-07 PROCEDURE — 99232 SBSQ HOSP IP/OBS MODERATE 35: CPT | Performed by: PSYCHIATRY & NEUROLOGY

## 2017-07-07 PROCEDURE — 12400003 ZZH R&B MH CRITICAL UMMC

## 2017-07-07 PROCEDURE — 25000132 ZZH RX MED GY IP 250 OP 250 PS 637: Performed by: PSYCHIATRY & NEUROLOGY

## 2017-07-07 PROCEDURE — 25000132 ZZH RX MED GY IP 250 OP 250 PS 637: Performed by: NURSE PRACTITIONER

## 2017-07-07 RX ORDER — BENZOCAINE/MENTHOL 6 MG-10 MG
LOZENGE MUCOUS MEMBRANE 2 TIMES DAILY
Status: DISPENSED | OUTPATIENT
Start: 2017-07-07 | End: 2017-07-10

## 2017-07-07 RX ADMIN — HYDROCORTISONE: 1 CREAM TOPICAL at 15:02

## 2017-07-07 RX ADMIN — HYDROCORTISONE: 1 CREAM TOPICAL at 21:55

## 2017-07-07 RX ADMIN — HALOPERIDOL 5 MG: 5 TABLET ORAL at 23:48

## 2017-07-07 RX ADMIN — LISINOPRIL 20 MG: 20 TABLET ORAL at 07:45

## 2017-07-07 RX ADMIN — MULTIPLE VITAMINS W/ MINERALS TAB 1 TABLET: TAB at 07:45

## 2017-07-07 RX ADMIN — DIPHENHYDRAMINE HYDROCHLORIDE 50 MG: 50 CAPSULE ORAL at 23:48

## 2017-07-07 RX ADMIN — CARBAMAZEPINE 200 MG: 100 TABLET, EXTENDED RELEASE ORAL at 07:53

## 2017-07-07 RX ADMIN — CARBAMAZEPINE 200 MG: 100 TABLET, EXTENDED RELEASE ORAL at 21:53

## 2017-07-07 RX ADMIN — LURASIDONE HYDROCHLORIDE 120 MG: 80 TABLET, FILM COATED ORAL at 07:45

## 2017-07-07 ASSESSMENT — ACTIVITIES OF DAILY LIVING (ADL)
ORAL_HYGIENE: INDEPENDENT
ORAL_HYGIENE: INDEPENDENT
DRESS: SCRUBS (BEHAVIORAL HEALTH);INDEPENDENT
DRESS: SCRUBS (BEHAVIORAL HEALTH)
GROOMING: INDEPENDENT
LAUNDRY: UNABLE TO COMPLETE
GROOMING: INDEPENDENT

## 2017-07-07 NOTE — PROGRESS NOTES
This writer left vm with pt's Mother Silvana Pate, asked for return call to discuss possible d/c next week.  Asked for return call.    Also left a vm for pt's CCM Jenna Khalil to discuss IRTS referralsa nd aftercare if he d/c's home.     Spoke to [facility] about his upcoming Psychiatry appointment.  They requested a copy of his almanzar order.  This writer faxed it to them.

## 2017-07-07 NOTE — PROGRESS NOTES
"Pt woke up to come to dance/movement therapy (DMT) session, expressing insight into his status stating \"I am here physically, but still trying to get here mentally.\"  He became progressively more socially-engaged and supportive of other patients.  Then, abruptly stated he was \"done and leaving\" with a blunted affect and sense of urgency.  He did not state why he felt he needed to leave so quickly, but with encouragement, he reached out to each group member with a verbal or non-verbal closure.  Then he left the session early.    "

## 2017-07-07 NOTE — PROGRESS NOTES
This writer called [facility] [employer] and spoke to Louisa to do a duty to warn and inform them of the comments that Jv made on 6/16/17.  He had made homicidal statements towards a farmer neighbor that lives near them.  I explained the situation to them, the commitment, the comments etc. I also explained that those comments were made when he was extremely psychotic and he is currently stable and likely to d/c next week on a full commitment.  However we wanted to inform them of the comments.  The person I spoke to (Louisa) said she was going to assign an officer to look into this and they would call if they had further questions.  Duty to Warn made.     She also told me they had his record flagged w/ mental illness and caution when approaching due to his history.  Therefore they have an understanding of his situation.     Please see chart for the comments that the patient made. It is noted in Natalya Hugo note on 6/15/17 as well as Dr. Mario Shah's note on 6/16/17.

## 2017-07-07 NOTE — PROGRESS NOTES
"[facility], [place]   Psychiatric Progress Note        Interim History:   The patient's care was discussed with the treatment team during the daily team meeting and/or staff's chart notes were reviewed.  Staff reports patient is at baseline. He  Is been generally calm and cooperative with care. Visible and active in milieu. Compliant with medications and care.  Less preoccupied and more engaged on approach. Sleeping well.  No overt rodrigo. No SI, LOYDA or HI reported. Independent with ADL.     The patient was cooperative and pleasant. She denied depression, racing thoughts and irritability, but noted feeling \"restless and anxious\" mainly due to ongoing lengthy hospitalizations. He denied hallucinations and paranoia. Noted sleeping well, appetite being intact and tolerating medications well. He was future oriented and adamantly denied SI, LOYDA and HI. He acknowledged that he made homicidal threats but adamantly denied intent of having active thoughts.     Dicussed medications and care plan.          multivitamin, therapeutic  1 tablet Oral Daily     carBAMazepine  200 mg Oral BID     lurasidone  120 mg Oral Daily     lisinopril  20 mg Oral Daily          Allergies:     Allergies   Allergen Reactions     No Known Drug Allergies           Labs:   No results found for this or any previous visit (from the past 24 hour(s)).       Psychiatric Examination:     Vitals:    07/05/17 0800 07/05/17 1431 07/06/17 0828 07/07/17 0723   BP:  138/81     Pulse:       Resp: 18   16   Temp: 97.8  F (36.6  C) 99.2  F (37.3  C) 97.4  F (36.3  C) 97.5  F (36.4  C)   TempSrc: Tympanic Tympanic Tympanic Tympanic   SpO2:       Weight:       Height:           Weight is 240 lbs 0 oz  Body mass index is 30 kg/(m^2).    Appearance: awake, alert, adequately groomed, dressed in hospital scrubs, appeared as age stated and no apparent distress  Attitude:  cooperative  Eye Contact:  good  Mood:  anxious and better  Affect:  " appropriate and in normal range and and more engaged.   Speech:  clear, coherent and normal prosody  Psychomotor Behavior:  no evidence of tardive dyskinesia, dystonia, or tics and intact station, gait and muscle tone  Throught Process:  goal oriented  Associations:  no loose associations  Thought Content:  no auditory hallucinations present, no visual hallucinations present and adamantly denied SI, LOYDA and HI.   Insight:  fair  Judgement:  fair  Oriented to:  time, person, and place  Attention Span and Concentration:  intact  Recent and Remote Memory:  intact    Fund of Knowledge: Adequate         Precautions:     Behavioral Orders   Procedures     Assault precautions     Code 1 - Restrict to Unit     Routine Programming     As clinically indicated     Sexual precautions     Status 15     Every 15 minutes.          Diagnoses:     1.  Schizophrenia, paranoid type. (r/o schizoaffective Bipolar type)   2.  Noncompliance, nonadherence.   3.  Cluster B with antisocial traits.           Plan:     Medications:  1.  Saphris: restated on admission and titrated to 10 mg BID but later cross tapered to Latuda due to lack of efficacy.  2.  PRNs: Vistaril for anxiety and Seroquel for restlessness and haldol with Benadryl for agitation and psychosis. PRN ativan was discontinued.  --Continue Seroquel 400 mg qHS PRN for sleep and 100 mg qid PRN for restlessness.   3.  Latuda: started and titrated to 120 mg daily, with goals to further titrate pending clinical response and tolerability. He appears to be improving as of 6/20.  4.  Tegretol: start at 100 mg BID, level was 5.1 on 6/11 and dose increased to 200 mg BID.  Tegretol level was 6.9 on 6/16.  5.  Cogentin was added at 0.5-1 mg BID PRN for EPS.        -- IM consult consult completed,  re: Lyme management. They have recommended no intervention at this time due to lack of neurological findings. This was based on their conversation with infectious disease specialist.    Legal  Status and Disposition:  1.  Commitment with [facility] for Saphris, Zyprexa, haloperidol, Latuda, and risperidone.  2.  Will work with [facility]  on [facility] placement. Given his statements about homicidal thoughts toward neighbor. May consider discharge home if safety established. [facility] informed local police department of the threats he made earlier in the hospital stay.

## 2017-07-07 NOTE — PLAN OF CARE
Problem: Psychotic Symptoms  Goal: Psychotic Symptoms  Signs and symptoms of listed problems will be absent or manageable.   Outcome: Improving  Patient is showing improvement. He denies that he is having hallucinations.  He does not appear to be responding to internal stimuli although he does appear to be guarded at times and may not always share what it going on with him.  He has overall today been pleasant and cooperative with a slightly blunted affect.  He does tend to gravitate more to staff and will converse with staff regularly.  He is taking his scheduled medications without difficulty.  He denies suicidal and homicidal ideation.

## 2017-07-08 PROCEDURE — 25000132 ZZH RX MED GY IP 250 OP 250 PS 637: Performed by: PSYCHIATRY & NEUROLOGY

## 2017-07-08 PROCEDURE — 25000132 ZZH RX MED GY IP 250 OP 250 PS 637: Performed by: NURSE PRACTITIONER

## 2017-07-08 PROCEDURE — 12400003 ZZH R&B MH CRITICAL UMMC

## 2017-07-08 RX ADMIN — LISINOPRIL 20 MG: 20 TABLET ORAL at 08:35

## 2017-07-08 RX ADMIN — HYDROCORTISONE: 1 CREAM TOPICAL at 08:35

## 2017-07-08 RX ADMIN — MULTIPLE VITAMINS W/ MINERALS TAB 1 TABLET: TAB at 08:35

## 2017-07-08 RX ADMIN — QUETIAPINE FUMARATE 400 MG: 200 TABLET ORAL at 00:54

## 2017-07-08 RX ADMIN — LURASIDONE HYDROCHLORIDE 120 MG: 80 TABLET, FILM COATED ORAL at 08:35

## 2017-07-08 RX ADMIN — CARBAMAZEPINE 200 MG: 100 TABLET, EXTENDED RELEASE ORAL at 20:56

## 2017-07-08 RX ADMIN — DIPHENHYDRAMINE HYDROCHLORIDE 50 MG: 50 CAPSULE ORAL at 21:55

## 2017-07-08 RX ADMIN — CARBOXYMETHYLCELLULOSE SODIUM 1 DROP: 10 GEL OPHTHALMIC at 16:55

## 2017-07-08 RX ADMIN — CARBAMAZEPINE 200 MG: 100 TABLET, EXTENDED RELEASE ORAL at 08:35

## 2017-07-08 RX ADMIN — QUETIAPINE FUMARATE 400 MG: 200 TABLET ORAL at 21:56

## 2017-07-08 RX ADMIN — CARBOXYMETHYLCELLULOSE SODIUM 1 DROP: 10 GEL OPHTHALMIC at 08:24

## 2017-07-08 ASSESSMENT — ACTIVITIES OF DAILY LIVING (ADL)
ORAL_HYGIENE: INDEPENDENT
LAUNDRY: UNABLE TO COMPLETE
DRESS: SCRUBS (BEHAVIORAL HEALTH)
DRESS: SCRUBS (BEHAVIORAL HEALTH)
LAUNDRY: UNABLE TO COMPLETE
GROOMING: INDEPENDENT
ORAL_HYGIENE: INDEPENDENT
GROOMING: INDEPENDENT

## 2017-07-08 NOTE — PROGRESS NOTES
07/07/17 9959   Seclusion or Restraint Order   Patient Experienced No adverse physical outcome from seclusion/restraint initiation   Physician aware of no injury to pt or staff.  No hands on required at the time of restraint.

## 2017-07-08 NOTE — PROGRESS NOTES
Patient denied AH during 1:1. He has been observed laughing to self while in his room. Staff again inquired re: AH and again patient denied. Appears restless, isolative to room much of shift.     07/08/17 1454   Behavioral Health   Hallucinations denies / not responding to hallucinations;appears responding   Thinking other (see comment)  (Guarded)   Orientation person: oriented;place: oriented;date: oriented   Memory baseline memory   Insight poor   Judgement impaired   Eye Contact at examiner   Affect full range affect   Mood anxious;mood is calm   Physical Appearance/Attire posture rigid   Hygiene well groomed   Suicidality other (see comments)  (No SI related)   Self Injury other (see comment)  (No evidence of sib)   Elopement Hypervigilance to activities on and off the unit   Activity isolative;withdrawn;restless   Speech clear;coherent   Medication Sensitivity no stated side effects;no observed side effects   Psychomotor / Gait balanced;steady   Substance Withdrawal Interventions   Social and Therapeutic Interventions (Substance Withdrawal) encourage effective boundaries with peers   Overt Aggression Scale   Verbal Aggression 0   Aggression against Property 0   Auto-Aggression 0   Physical Aggression 0   Overt Aggression Total Score 0   Sleep/Rest/Relaxation   Day/Evening Time Hours resting in bed   Number of hours resting in bed 2 hours   Psycho Education   Type of Intervention 1:1 intervention   Response participates, initiates socially appropriate   Hours 0.5   Treatment Detail Ways to cope   Daily Care   Activity up ad jomar   Activities of Daily Living   Hygiene/Grooming independent   Oral Hygiene independent   Dress scrubs (behavioral health)   Laundry unable to complete   Room Organization independent   Activity   Activity Level of Assistance independent   Behavioral Health Interventions   Psychotic Symptoms maintain safety precautions;reality orientation   Social and Therapeutic Interventions (Psychotic  Symptoms) encourage effective boundaries with peers
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
## 2017-07-09 PROCEDURE — 12400003 ZZH R&B MH CRITICAL UMMC

## 2017-07-09 PROCEDURE — 25000132 ZZH RX MED GY IP 250 OP 250 PS 637: Performed by: PSYCHIATRY & NEUROLOGY

## 2017-07-09 PROCEDURE — 25000132 ZZH RX MED GY IP 250 OP 250 PS 637: Performed by: NURSE PRACTITIONER

## 2017-07-09 RX ADMIN — MULTIPLE VITAMINS W/ MINERALS TAB 1 TABLET: TAB at 08:00

## 2017-07-09 RX ADMIN — DIPHENHYDRAMINE HYDROCHLORIDE 50 MG: 50 CAPSULE ORAL at 21:38

## 2017-07-09 RX ADMIN — LURASIDONE HYDROCHLORIDE 120 MG: 80 TABLET, FILM COATED ORAL at 08:00

## 2017-07-09 RX ADMIN — LISINOPRIL 20 MG: 20 TABLET ORAL at 08:00

## 2017-07-09 RX ADMIN — HYDROCORTISONE: 1 CREAM TOPICAL at 20:25

## 2017-07-09 RX ADMIN — CARBAMAZEPINE 200 MG: 100 TABLET, EXTENDED RELEASE ORAL at 20:24

## 2017-07-09 RX ADMIN — CARBOXYMETHYLCELLULOSE SODIUM 1 DROP: 10 GEL OPHTHALMIC at 10:19

## 2017-07-09 RX ADMIN — HALOPERIDOL 10 MG: 5 TABLET ORAL at 21:38

## 2017-07-09 RX ADMIN — CARBAMAZEPINE 200 MG: 100 TABLET, EXTENDED RELEASE ORAL at 08:00

## 2017-07-09 RX ADMIN — QUETIAPINE FUMARATE 400 MG: 200 TABLET ORAL at 22:42

## 2017-07-09 ASSESSMENT — ACTIVITIES OF DAILY LIVING (ADL)
LAUNDRY: UNABLE TO COMPLETE
ORAL_HYGIENE: INDEPENDENT
GROOMING: INDEPENDENT
GROOMING: INDEPENDENT
ORAL_HYGIENE: INDEPENDENT
DRESS: SCRUBS (BEHAVIORAL HEALTH)
DRESS: SCRUBS (BEHAVIORAL HEALTH)
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
## 2017-07-09 NOTE — PROGRESS NOTES
Pt was present in milieu, withdrawn from peers. Pt pace the halls throughout much of the evening. Pt is pleasant and cooperative. Does not endorse SI or SIB, does not appear responding. No behavioral concerns to report this evening.      07/08/17 1935   Behavioral Health   Hallucinations denies / not responding to hallucinations   Thinking distractable;other (see comment)  (guarded)   Orientation person: oriented;place: oriented;date: oriented;time: oriented   Memory baseline memory   Insight poor   Judgement impaired   Eye Contact at examiner   Affect full range affect   Mood mood is calm   Physical Appearance/Attire attire appropriate to age and situation   Hygiene well groomed   Suicidality other (see comments)  (none noted)   Self Injury other (see comment)  (none noted)   Activity withdrawn   Speech clear;coherent   Medication Sensitivity no observed side effects   Psychomotor / Gait balanced;steady   Activities of Daily Living   Hygiene/Grooming independent   Oral Hygiene independent   Dress scrubs (behavioral health)   Laundry unable to complete   Room Organization independent   Behavioral Health Interventions   Psychotic Symptoms maintain safety precautions;monitor need to revise level of observation;maintain safe secure environment;provide emotional support;establish therapeutic relationship;assist with developing & utilizing healthy coping strategies;build upon strengths;monitor need for prn medication   Social and Therapeutic Interventions (Psychotic Symptoms) encourage socialization with peers;encourage effective boundaries with peers;encourage participation in therapeutic groups and milieu activities

## 2017-07-10 PROCEDURE — 99232 SBSQ HOSP IP/OBS MODERATE 35: CPT | Performed by: PSYCHIATRY & NEUROLOGY

## 2017-07-10 PROCEDURE — 25000132 ZZH RX MED GY IP 250 OP 250 PS 637: Performed by: PSYCHIATRY & NEUROLOGY

## 2017-07-10 PROCEDURE — 25000132 ZZH RX MED GY IP 250 OP 250 PS 637: Performed by: NURSE PRACTITIONER

## 2017-07-10 PROCEDURE — 97150 GROUP THERAPEUTIC PROCEDURES: CPT | Mod: GO

## 2017-07-10 PROCEDURE — 12400003 ZZH R&B MH CRITICAL UMMC

## 2017-07-10 RX ORDER — QUETIAPINE FUMARATE 200 MG/1
200 TABLET, FILM COATED ORAL AT BEDTIME
Status: DISCONTINUED | OUTPATIENT
Start: 2017-07-10 | End: 2017-07-21 | Stop reason: HOSPADM

## 2017-07-10 RX ORDER — QUETIAPINE FUMARATE 200 MG/1
200 TABLET, FILM COATED ORAL
Status: DISCONTINUED | OUTPATIENT
Start: 2017-07-10 | End: 2017-07-21 | Stop reason: HOSPADM

## 2017-07-10 RX ADMIN — CARBOXYMETHYLCELLULOSE SODIUM 1 DROP: 10 GEL OPHTHALMIC at 18:25

## 2017-07-10 RX ADMIN — CARBOXYMETHYLCELLULOSE SODIUM 1 DROP: 10 GEL OPHTHALMIC at 16:24

## 2017-07-10 RX ADMIN — CARBAMAZEPINE 200 MG: 100 TABLET, EXTENDED RELEASE ORAL at 08:07

## 2017-07-10 RX ADMIN — LURASIDONE HYDROCHLORIDE 120 MG: 80 TABLET, FILM COATED ORAL at 08:07

## 2017-07-10 RX ADMIN — ASPIRIN 325 MG ORAL TABLET 325 MG: 325 PILL ORAL at 08:11

## 2017-07-10 RX ADMIN — CARBOXYMETHYLCELLULOSE SODIUM 1 DROP: 10 GEL OPHTHALMIC at 10:00

## 2017-07-10 RX ADMIN — QUETIAPINE FUMARATE 200 MG: 200 TABLET, FILM COATED ORAL at 21:42

## 2017-07-10 RX ADMIN — MULTIPLE VITAMINS W/ MINERALS TAB 1 TABLET: TAB at 08:07

## 2017-07-10 RX ADMIN — LISINOPRIL 20 MG: 20 TABLET ORAL at 08:07

## 2017-07-10 RX ADMIN — CARBAMAZEPINE 200 MG: 100 TABLET, EXTENDED RELEASE ORAL at 21:41

## 2017-07-10 ASSESSMENT — ACTIVITIES OF DAILY LIVING (ADL)
DRESS: SCRUBS (BEHAVIORAL HEALTH);INDEPENDENT
ORAL_HYGIENE: INDEPENDENT
GROOMING: INDEPENDENT

## 2017-07-10 NOTE — PROGRESS NOTES
Pt was present in the milieu, but minimally socializing with staff and peers. Pt could be seen laughing hysterically to himself without provocation. Overall pt was approachable and responsive, no seclusions or restraints.        07/10/17 1430   Behavioral Health   Hallucinations appears responding   Thinking distractable;poor concentration   Orientation person: oriented;place: oriented   Memory baseline memory   Insight poor   Judgement impaired   Eye Contact at examiner   Affect blunted, flat   Mood grandiose;elated   Physical Appearance/Attire disheveled   Hygiene neglected grooming - unclean body, hair, teeth   Suicidality other (see comments)  (not reported )   Self Injury other (see comment)  (not reported or observed )   Activity withdrawn   Speech clear   Psychomotor / Gait balanced;steady   Behavioral Health Interventions   Psychotic Symptoms maintain safety precautions;monitor need to revise level of observation;maintain safe secure environment;reality orientation;simple, clear language;decrease environmental stimulation;redirection of intrusive behaviors;redirection of aggressive behaviors;assist patient in developing safety plan;assist patient in following safety plan;encourage nutrition and hydration;encourage participation / independence with adls;provide emotional support;establish therapeutic relationship;assist with developing & utilizing healthy coping strategies;build upon strengths;assess patient response to medication;assess medication adherance;monitor need for prn medication;monitor confusion, memory loss, decision making ability and reorient / intervent as needed   Social and Therapeutic Interventions (Psychotic Symptoms) encourage socialization with peers;encourage effective boundaries with peers;encourage participation in therapeutic groups and milieu activities

## 2017-07-10 NOTE — PLAN OF CARE
Problem: Psychotic Symptoms  Intervention: Social and Therapeutic Interv (Psychotic Symptoms)     Pt attended the morning OT clinic group.  Appeared more preoccupied than normal, slight looking off to the side and hesitations before responding to writer.  Superficial in conversation, though pt has always been like this when attempting to engage him in any sort of discussion about his hospitalization.

## 2017-07-10 NOTE — PROGRESS NOTES
This pt had deteriorated some over the weekend.  I called and left a vm for pt's CCM to find out what her thoughts are since he has had a rough weekend.  Any other housing/group home/ [facility] options for him?
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
## 2017-07-11 PROCEDURE — 25000132 ZZH RX MED GY IP 250 OP 250 PS 637: Performed by: NURSE PRACTITIONER

## 2017-07-11 PROCEDURE — 25000132 ZZH RX MED GY IP 250 OP 250 PS 637: Performed by: PSYCHIATRY & NEUROLOGY

## 2017-07-11 PROCEDURE — 12400003 ZZH R&B MH CRITICAL UMMC

## 2017-07-11 PROCEDURE — 90853 GROUP PSYCHOTHERAPY: CPT

## 2017-07-11 PROCEDURE — 25000128 H RX IP 250 OP 636: Performed by: PSYCHIATRY & NEUROLOGY

## 2017-07-11 PROCEDURE — 99232 SBSQ HOSP IP/OBS MODERATE 35: CPT | Performed by: PSYCHIATRY & NEUROLOGY

## 2017-07-11 RX ADMIN — ASPIRIN 325 MG ORAL TABLET 325 MG: 325 PILL ORAL at 08:27

## 2017-07-11 RX ADMIN — HALOPERIDOL LACTATE 10 MG: 5 INJECTION, SOLUTION INTRAMUSCULAR at 16:00

## 2017-07-11 RX ADMIN — CARBAMAZEPINE 200 MG: 100 TABLET, EXTENDED RELEASE ORAL at 08:18

## 2017-07-11 RX ADMIN — LURASIDONE HYDROCHLORIDE 120 MG: 80 TABLET, FILM COATED ORAL at 08:18

## 2017-07-11 RX ADMIN — QUETIAPINE FUMARATE 200 MG: 200 TABLET, FILM COATED ORAL at 17:36

## 2017-07-11 RX ADMIN — DIPHENHYDRAMINE HYDROCHLORIDE 50 MG: 50 INJECTION, SOLUTION INTRAMUSCULAR; INTRAVENOUS at 16:00

## 2017-07-11 RX ADMIN — MULTIPLE VITAMINS W/ MINERALS TAB 1 TABLET: TAB at 08:18

## 2017-07-11 RX ADMIN — LISINOPRIL 20 MG: 20 TABLET ORAL at 08:18

## 2017-07-11 RX ADMIN — CARBAMAZEPINE 200 MG: 100 TABLET, EXTENDED RELEASE ORAL at 17:37

## 2017-07-11 RX ADMIN — QUETIAPINE FUMARATE 200 MG: 200 TABLET, FILM COATED ORAL at 00:00

## 2017-07-11 RX ADMIN — QUETIAPINE FUMARATE 200 MG: 200 TABLET, FILM COATED ORAL at 17:35

## 2017-07-11 RX ADMIN — CARBOXYMETHYLCELLULOSE SODIUM 1 DROP: 10 GEL OPHTHALMIC at 11:13

## 2017-07-11 ASSESSMENT — ACTIVITIES OF DAILY LIVING (ADL)
LAUNDRY: WITH SUPERVISION
DRESS: INDEPENDENT
ORAL_HYGIENE: INDEPENDENT
LAUNDRY: WITH SUPERVISION
DRESS: SCRUBS (BEHAVIORAL HEALTH)
GROOMING: INDEPENDENT
GROOMING: INDEPENDENT
ORAL_HYGIENE: INDEPENDENT

## 2017-07-11 NOTE — PROGRESS NOTES
Patient mother called could not figure out why we do room checks, would like that we not disturb his sleep.  Also other questions that I could not answer regarding stem cells and further request medication.

## 2017-07-11 NOTE — PROGRESS NOTES
"Pt asked to speak to writer.  I approached him and he said, \"yeah, I need you to get me a copy of those discharge papers Francoise.\"  I told him we didn't have d/c papers because he wasn't d/c'ing quite yet.  He again said, Francoise I need those papers, get them I need to leave.\"  I ended the conversation.  He has been on edge throughout the weekend and today.    "

## 2017-07-11 NOTE — PROGRESS NOTES
"Irritable and hostile this A.M. Shouted \"Shut up!\" at staff though no one was addressing him at the time. Did apologize later ostensibly for the unprovoked outburst. Stated: \"Sorry, I'm bored.:\" There is a noticeable response delay when asked a question.      07/11/17 1500   Behavioral Health   Hallucinations denies / not responding to hallucinations   Thinking paranoid;distractable;other (see comment)  (thought blocking)   Orientation person: oriented;place: oriented;date: oriented;time: oriented   Insight poor   Judgement impaired   Eye Contact at examiner   Affect irritable   Mood irritable   Physical Appearance/Attire appears stated age   Hygiene other (see comment)  (showered)   Suicidality other (see comments)  (denies current)   Self Injury other (see comment)  (none observed or reported)   Elopement (none)   Activity other (see comment)  (intermittent)   Speech other (see comments)  (thought blocking)   Overt Aggression Scale   Verbal Aggression 1   Aggression against Property 0   Auto-Aggression 0   Physical Aggression 0   Overt Aggression Total Score 1   Psycho Education   Type of Intervention 1:1 intervention   Response participates with encouragement   Hours 0.5   Treatment Detail IMR   Activities of Daily Living   Hygiene/Grooming independent   Oral Hygiene independent   Dress scrubs (behavioral health)   Laundry with supervision   Room Organization independent   Behavioral Health Interventions   Psychotic Symptoms maintain safe secure environment;simple, clear language   Social and Therapeutic Interventions (Psychotic Symptoms) encourage socialization with peers;encourage participation in therapeutic groups and milieu activities     "
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
## 2017-07-11 NOTE — PROGRESS NOTES
"   07/11/17 1740   Restraint Type   Seclusion (BH) Discontinued   Debriefing   Debriefing DO   Does patient understand why the event happened? Yes   Does patient agree to safe behaviors? Yes   What can we do differently so this doesn't happen again? Other (comment)  (Pt states, \"I will try harder to stay calm\")   Plan of care reviewed and modified Yes     "

## 2017-07-11 NOTE — PLAN OF CARE
Problem: Psychotic Symptoms  Intervention: Social and Therapeutic Interv (Psychotic Symptoms)     Pt declined morning groups today, calmly stating he was sitting groups out today.  Approached pt later in the afternoon as no other pts were coming to group, offering him individual time to do an activity, and he did attend for about 30 minutes.  Painted a rock, and mostly just watched writer organize posters.

## 2017-07-11 NOTE — PLAN OF CARE
"Problem: Psychotic Symptoms  Goal: Psychotic Symptoms  Signs and symptoms of listed problems will be absent or manageable.   Outcome: No Change  Patient presents as guarded, paranoid, and irritable.  He denies that he is experiencing any psychotic symptoms; however, he appears internally preoccupied, suspicious of unit staff, and hypervigilant in the milieu.  His affect ranges from blunted to tense/ irritable.  He provides very minimal responses to interview questions.  When he makes requests for medications, he is very demanding and demonstrates verbally aggressive behavior; for example, he became agitated when asking for PRN eye drops for the second time this shift and abruptly cut me off when prompted that the third administration of PRN eye drops would be the last one available to him until tomorrow (\"Just shut up and give me the eye drops!  Do it right now, Shaji!\"). He has not engaged in any physically aggressive or intrusive behaviors this shift.  He has been compliant with all of his scheduled medications, and no adverse side effects have been reported or observed.  Apart from his persistent c/o dry eyes, he denies any acute physical concerns.     Received a phone call from patient's mother, Silvana, who requested an update on patient's progress.  Update given.  Silvana remains focused on Lyme Disease being the culprit in vJ's psychotic symptoms, and she continues to demand that Jv's attending psychiatrist call her for further discussion.        "

## 2017-07-11 NOTE — PROGRESS NOTES
This writer received paperwork from pt's Mother that requested the Dr complete for him to have paperwork filled out regarding his disability and student loans. Dr. Shah signed the paperwork and so did the patient.  I mailed it in the envelope that his Mother Silvana provided.  I placed a copy of her letter to us along with a copy of the the mailed paperwork at their request.

## 2017-07-11 NOTE — PROGRESS NOTES
"This writer spoke to Jv this morning.  He said, \"I just want to get out of here, I've been here too long.\"  I explained to him that I will speak with Jenna Khalil his CCM and ask her to call him.  I tried to tell him that the Dr feels the most appropriate option for him is [facility] and the have a waitlist.  He just started at me.  I ended the conversation and told him I would continue to work on getting him placed somewhere.   "
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
## 2017-07-11 NOTE — PROGRESS NOTES
"   07/11/17 1615   Seclusion or Restraint Order   In Person Face to Face Assessment Conducted Yes-Eval of pt's immediate situation, reaction to intervention, complete review of systems assessment, behavioral assessment & review/assessment of hx, drugs & meds, recent labs, etc, behavioral condition, need to continue/terminate restraint/seclusion   Patient Experienced No adverse physical outcome from seclusion/restraint initiation   Continuation of Seclus/Restraint indicated at this time Yes   Pt pacing in seclusion room during face to face assessment. Affect tense and angry. \"Let me out right now.\" Pt denies any discomfort or injury. Will continue to monitor.   "

## 2017-07-12 LAB — CARBAMAZEPINE SERPL-MCNC: 5.5 MG/L (ref 4–12)

## 2017-07-12 PROCEDURE — 80156 ASSAY CARBAMAZEPINE TOTAL: CPT | Performed by: PSYCHIATRY & NEUROLOGY

## 2017-07-12 PROCEDURE — 36415 COLL VENOUS BLD VENIPUNCTURE: CPT | Performed by: PSYCHIATRY & NEUROLOGY

## 2017-07-12 PROCEDURE — 25000132 ZZH RX MED GY IP 250 OP 250 PS 637: Performed by: NURSE PRACTITIONER

## 2017-07-12 PROCEDURE — 12400003 ZZH R&B MH CRITICAL UMMC

## 2017-07-12 PROCEDURE — 99232 SBSQ HOSP IP/OBS MODERATE 35: CPT | Performed by: PSYCHIATRY & NEUROLOGY

## 2017-07-12 PROCEDURE — 25000132 ZZH RX MED GY IP 250 OP 250 PS 637: Performed by: PSYCHIATRY & NEUROLOGY

## 2017-07-12 RX ORDER — RISPERIDONE 0.5 MG/1
0.5 TABLET ORAL 2 TIMES DAILY
Status: DISCONTINUED | OUTPATIENT
Start: 2017-07-12 | End: 2017-07-14

## 2017-07-12 RX ORDER — LURASIDONE HYDROCHLORIDE 80 MG/1
80 TABLET, FILM COATED ORAL DAILY
Status: DISCONTINUED | OUTPATIENT
Start: 2017-07-13 | End: 2017-07-14

## 2017-07-12 RX ADMIN — CARBAMAZEPINE 200 MG: 100 TABLET, EXTENDED RELEASE ORAL at 08:12

## 2017-07-12 RX ADMIN — HALOPERIDOL 10 MG: 5 TABLET ORAL at 04:21

## 2017-07-12 RX ADMIN — RISPERIDONE 0.5 MG: 0.5 TABLET ORAL at 20:37

## 2017-07-12 RX ADMIN — LISINOPRIL 20 MG: 20 TABLET ORAL at 08:12

## 2017-07-12 RX ADMIN — CARBOXYMETHYLCELLULOSE SODIUM 1 DROP: 10 GEL OPHTHALMIC at 21:47

## 2017-07-12 RX ADMIN — CARBOXYMETHYLCELLULOSE SODIUM 1 DROP: 10 GEL OPHTHALMIC at 03:34

## 2017-07-12 RX ADMIN — QUETIAPINE FUMARATE 200 MG: 200 TABLET, FILM COATED ORAL at 20:37

## 2017-07-12 RX ADMIN — LURASIDONE HYDROCHLORIDE 120 MG: 80 TABLET, FILM COATED ORAL at 08:12

## 2017-07-12 RX ADMIN — CARBAMAZEPINE 200 MG: 100 TABLET, EXTENDED RELEASE ORAL at 20:37

## 2017-07-12 RX ADMIN — MULTIPLE VITAMINS W/ MINERALS TAB 1 TABLET: TAB at 08:12

## 2017-07-12 ASSESSMENT — ACTIVITIES OF DAILY LIVING (ADL)
GROOMING: INDEPENDENT
DRESS: SCRUBS (BEHAVIORAL HEALTH)
ORAL_HYGIENE: INDEPENDENT
LAUNDRY: UNABLE TO COMPLETE
ORAL_HYGIENE: INDEPENDENT
DRESS: SCRUBS (BEHAVIORAL HEALTH);INDEPENDENT
GROOMING: INDEPENDENT

## 2017-07-12 NOTE — PLAN OF CARE
Problem: General Plan of Care (Inpatient Behavioral)  Goal: Team Discussion  Team Plan:   BEHAVIORAL TEAM DISCUSSION     Participants: Dr. Mario Shah, Sandy Everett RN, [facility]- Francoise Newberry LM, [facility]  Progress: Pt has declined in the last several days. He is paranoid, guarded and demanded d/c last evening.   Continued Stay Criteria/Rationale: Pt is exhibiting mental health symptoms that require inpatient hospitalization  Medical/Physical: see medical chart  Precautions:   Behavioral Orders   Procedures     Assault precautions     Code 1 - Restrict to Unit     Elopement precautions     Routine Programming       As clinically indicated     Sexual precautions     Status 15       Every 15 minutes.     Plan: plan is to pursue [facility], [facility] or [facility] at last resort  Rationale for change in precautions or plan: pt is an elopement risk due to high risk

## 2017-07-12 NOTE — PROGRESS NOTES
This writer reached out to Jenna Khalil, pt's CCM.  Explained the last seclusions and deterioration that happened the last few days.  Told her we needed to see additional stabilization before we would d/c him home.  Still looking at [facility] or [facility] at this time.  Asked for return call.
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
## 2017-07-12 NOTE — PROGRESS NOTES
Patient spent about 70% of shift out of his room.  He was very quiet and had blunted affect.  He historically will talk with staff but was not observed doing so today unless staff initiated the conversation.   He ate with good appetite.  He did report having a headache but declined any medication.   spoke with patient about changing his medication regime which patient agreed to do.  Patient did not attend groups today.
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
## 2017-07-12 NOTE — PROGRESS NOTES
07/11/17 2239   Behavioral Health   Hallucinations denies / not responding to hallucinations   Thinking poor concentration;distractable;paranoid   Orientation person: oriented;place: oriented   Memory baseline memory   Insight poor   Judgement impaired   Eye Contact at examiner   Affect angry;full range affect   Mood irritable;grandiose   Physical Appearance/Attire attire appropriate to age and situation   Hygiene well groomed   Suicidality other (see comments)  (pt denies )   Self Injury other (see comment)  (pt denies )   Elopement Statements about wanting to leave   Activity isolative   Speech coherent;clear   Medication Sensitivity no observed side effects;no stated side effects   Psychomotor / Gait balanced;steady   Activities of Daily Living   Hygiene/Grooming independent   Oral Hygiene independent   Dress independent   Laundry with supervision   Room Organization independent   Behavioral Health Interventions   Psychotic Symptoms redirection of aggressive behaviors;maintain safety precautions   Social and Therapeutic Interventions (Psychotic Symptoms) encourage socialization with peers;encourage effective boundaries with peers;encourage participation in therapeutic groups and milieu activities   Code 21 called on patient ar 16:00, see RN note for details. After processing out of seclusion pt stayed in room for the rest of the night.

## 2017-07-13 PROCEDURE — 25000132 ZZH RX MED GY IP 250 OP 250 PS 637: Performed by: PSYCHIATRY & NEUROLOGY

## 2017-07-13 PROCEDURE — 90853 GROUP PSYCHOTHERAPY: CPT

## 2017-07-13 PROCEDURE — 25000132 ZZH RX MED GY IP 250 OP 250 PS 637: Performed by: NURSE PRACTITIONER

## 2017-07-13 PROCEDURE — 12400003 ZZH R&B MH CRITICAL UMMC

## 2017-07-13 RX ADMIN — CARBAMAZEPINE 200 MG: 100 TABLET, EXTENDED RELEASE ORAL at 09:17

## 2017-07-13 RX ADMIN — CARBAMAZEPINE 200 MG: 100 TABLET, EXTENDED RELEASE ORAL at 19:42

## 2017-07-13 RX ADMIN — RISPERIDONE 0.5 MG: 0.5 TABLET ORAL at 19:42

## 2017-07-13 RX ADMIN — LURASIDONE HYDROCHLORIDE 80 MG: 80 TABLET, FILM COATED ORAL at 09:18

## 2017-07-13 RX ADMIN — MULTIPLE VITAMINS W/ MINERALS TAB 1 TABLET: TAB at 09:18

## 2017-07-13 RX ADMIN — RISPERIDONE 0.5 MG: 0.5 TABLET ORAL at 09:18

## 2017-07-13 RX ADMIN — QUETIAPINE FUMARATE 200 MG: 200 TABLET, FILM COATED ORAL at 19:42

## 2017-07-13 RX ADMIN — LISINOPRIL 20 MG: 20 TABLET ORAL at 09:18

## 2017-07-13 ASSESSMENT — ACTIVITIES OF DAILY LIVING (ADL)
LAUNDRY: UNABLE TO COMPLETE
ORAL_HYGIENE: INDEPENDENT
DRESS: SCRUBS (BEHAVIORAL HEALTH)
LAUNDRY: UNABLE TO COMPLETE
GROOMING: INDEPENDENT
GROOMING: INDEPENDENT
ORAL_HYGIENE: INDEPENDENT
DRESS: SCRUBS (BEHAVIORAL HEALTH)

## 2017-07-13 NOTE — PROGRESS NOTES
Faxed clinical to pt's CCM Jenna for consideration into [facility].  Fax # [phone]. They are possibly considering him for their residence.  They did ask about coming to interview.  I explained to Jenna that at this time we do not send out committed pt's to interview's but could do one over the phone if need be.

## 2017-07-13 NOTE — PROGRESS NOTES
PT was isolative to room and withdrawn from staff and peers this evening. Pt did make a few request and was pleasant and cooperative but tense. Pts mood remained calm this evening. No behavioral concerns to report.      07/12/17 7640   Behavioral Health   Hallucinations denies / not responding to hallucinations   Thinking distractable   Orientation person: oriented;place: oriented   Memory baseline memory   Insight poor   Judgement impaired   Eye Contact at examiner   Affect tense   Mood mood is calm   Physical Appearance/Attire attire appropriate to age and situation   Hygiene well groomed   Suicidality other (see comments)  (none noted)   Self Injury other (see comment)  (none noted)   Elopement Statements about wanting to leave   Activity withdrawn;isolative   Speech coherent   Medication Sensitivity no observed side effects;no stated side effects   Psychomotor / Gait balanced;steady   Activities of Daily Living   Hygiene/Grooming independent   Oral Hygiene independent   Dress scrubs (behavioral health)   Laundry unable to complete   Room Organization independent   Behavioral Health Interventions   Psychotic Symptoms maintain safety precautions;monitor need to revise level of observation;maintain safe secure environment;reality orientation;simple, clear language;redirection of intrusive behaviors;decrease environmental stimulation;provide emotional support;establish therapeutic relationship;assist with developing & utilizing healthy coping strategies;build upon strengths;monitor need for prn medication   Social and Therapeutic Interventions (Psychotic Symptoms) encourage socialization with peers;encourage effective boundaries with peers;encourage participation in therapeutic groups and milieu activities

## 2017-07-13 NOTE — PROGRESS NOTES
Pt was calm this shift, isolative and withdrawn to his room or observed pacing the halls. Pt stated he had a minor headache, and stated he would ask for medication for it later. Pt mentioned a rash below his stomach and behind his calves, states the ointment helps. Pt attended group after prompting, polite on approach and with requests.      07/13/17 1412   Behavioral Health   Hallucinations denies / not responding to hallucinations   Thinking distractable   Orientation person: oriented;place: oriented;date: oriented   Memory baseline memory   Insight poor   Judgement impaired   Eye Contact at examiner   Affect blunted, flat   Mood mood is calm   Physical Appearance/Attire attire appropriate to age and situation   Hygiene well groomed   Suicidality other (see comments)  (denies)   Self Injury other (see comment)  (denies )   Elopement (none stated/ none observed)   Activity isolative;withdrawn   Speech clear;coherent   Medication Sensitivity no stated side effects;no observed side effects   Psychomotor / Gait balanced;steady   Psycho Education   Type of Intervention 1:1 intervention   Response participates, initiates socially appropriate   Hours 0.5   Treatment Detail check in    Activities of Daily Living   Hygiene/Grooming independent   Oral Hygiene independent   Dress scrubs (behavioral health)   Laundry unable to complete   Room Organization independent

## 2017-07-14 PROCEDURE — 12400003 ZZH R&B MH CRITICAL UMMC

## 2017-07-14 PROCEDURE — 25000132 ZZH RX MED GY IP 250 OP 250 PS 637: Performed by: NURSE PRACTITIONER

## 2017-07-14 PROCEDURE — 99232 SBSQ HOSP IP/OBS MODERATE 35: CPT | Performed by: PSYCHIATRY & NEUROLOGY

## 2017-07-14 PROCEDURE — 25000132 ZZH RX MED GY IP 250 OP 250 PS 637: Performed by: PSYCHIATRY & NEUROLOGY

## 2017-07-14 PROCEDURE — 97150 GROUP THERAPEUTIC PROCEDURES: CPT | Mod: GO

## 2017-07-14 RX ORDER — RISPERIDONE 1 MG/1
1 TABLET ORAL AT BEDTIME
Status: DISCONTINUED | OUTPATIENT
Start: 2017-07-14 | End: 2017-07-17

## 2017-07-14 RX ORDER — LURASIDONE HYDROCHLORIDE 40 MG/1
40 TABLET, FILM COATED ORAL DAILY
Status: DISCONTINUED | OUTPATIENT
Start: 2017-07-15 | End: 2017-07-21 | Stop reason: HOSPADM

## 2017-07-14 RX ORDER — RISPERIDONE 0.5 MG/1
0.5 TABLET ORAL DAILY
Status: DISCONTINUED | OUTPATIENT
Start: 2017-07-15 | End: 2017-07-17

## 2017-07-14 RX ADMIN — LURASIDONE HYDROCHLORIDE 80 MG: 80 TABLET, FILM COATED ORAL at 08:16

## 2017-07-14 RX ADMIN — CARBOXYMETHYLCELLULOSE SODIUM 1 DROP: 10 GEL OPHTHALMIC at 08:18

## 2017-07-14 RX ADMIN — QUETIAPINE FUMARATE 200 MG: 200 TABLET, FILM COATED ORAL at 20:07

## 2017-07-14 RX ADMIN — CARBAMAZEPINE 200 MG: 100 TABLET, EXTENDED RELEASE ORAL at 08:15

## 2017-07-14 RX ADMIN — RISPERIDONE 1 MG: 1 TABLET ORAL at 20:06

## 2017-07-14 RX ADMIN — LISINOPRIL 20 MG: 20 TABLET ORAL at 08:15

## 2017-07-14 RX ADMIN — CARBAMAZEPINE 200 MG: 100 TABLET, EXTENDED RELEASE ORAL at 20:06

## 2017-07-14 RX ADMIN — RISPERIDONE 0.5 MG: 0.5 TABLET ORAL at 08:16

## 2017-07-14 RX ADMIN — MULTIPLE VITAMINS W/ MINERALS TAB 1 TABLET: TAB at 08:16

## 2017-07-14 ASSESSMENT — ACTIVITIES OF DAILY LIVING (ADL)
ORAL_HYGIENE: INDEPENDENT
LAUNDRY: UNABLE TO COMPLETE
ORAL_HYGIENE: INDEPENDENT
GROOMING: INDEPENDENT
DRESS: SCRUBS (BEHAVIORAL HEALTH)
GROOMING: INDEPENDENT
DRESS: SCRUBS (BEHAVIORAL HEALTH)

## 2017-07-14 NOTE — PROGRESS NOTES
07/14/17 1503   Significant Event   Significant Event Other (see comments)  (shift summary)   Pt had a positive shift. He was isolative to his room for much of the shift, though when in the lounge he was pleasant, calm, and polite with requests. No issues.

## 2017-07-14 NOTE — PLAN OF CARE
Problem: Psychotic Symptoms  Goal: Psychotic Symptoms  Signs and symptoms of listed problems will be absent or manageable.   Outcome: No Change  Jv had a good evening. No agitated behaviors this evening, He was calm and pleasant. Flat and blunted affect this evening. He was visible in milieu but not socializing. He took his scheduled medication without any issue. No aggressive behaviors this evening.  No SI/SIB.

## 2017-07-14 NOTE — PLAN OF CARE
Problem: Psychotic Symptoms  Intervention: Social and Therapeutic Interv (Psychotic Symptoms)     Pt attended OT clinic group, focused on task work, though quiet and kept to self.
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
## 2017-07-15 PROCEDURE — 12400003 ZZH R&B MH CRITICAL UMMC

## 2017-07-15 PROCEDURE — 25000132 ZZH RX MED GY IP 250 OP 250 PS 637: Performed by: PSYCHIATRY & NEUROLOGY

## 2017-07-15 PROCEDURE — 25000132 ZZH RX MED GY IP 250 OP 250 PS 637: Performed by: NURSE PRACTITIONER

## 2017-07-15 RX ADMIN — CARBAMAZEPINE 200 MG: 100 TABLET, EXTENDED RELEASE ORAL at 08:47

## 2017-07-15 RX ADMIN — LURASIDONE HYDROCHLORIDE 40 MG: 40 TABLET, FILM COATED ORAL at 08:48

## 2017-07-15 RX ADMIN — CARBAMAZEPINE 200 MG: 100 TABLET, EXTENDED RELEASE ORAL at 20:00

## 2017-07-15 RX ADMIN — RISPERIDONE 0.5 MG: 0.5 TABLET ORAL at 08:48

## 2017-07-15 RX ADMIN — LISINOPRIL 20 MG: 20 TABLET ORAL at 08:48

## 2017-07-15 RX ADMIN — MULTIPLE VITAMINS W/ MINERALS TAB 1 TABLET: TAB at 08:48

## 2017-07-15 RX ADMIN — QUETIAPINE FUMARATE 200 MG: 200 TABLET, FILM COATED ORAL at 20:55

## 2017-07-15 RX ADMIN — RISPERIDONE 1 MG: 1 TABLET ORAL at 20:55

## 2017-07-15 ASSESSMENT — ACTIVITIES OF DAILY LIVING (ADL)
LAUNDRY: UNABLE TO COMPLETE
ORAL_HYGIENE: PROMPTS;INDEPENDENT
DRESS: SCRUBS (BEHAVIORAL HEALTH)
HYGIENE/GROOMING: PROMPTS;INDEPENDENT
DRESS: SCRUBS (BEHAVIORAL HEALTH);INDEPENDENT
GROOMING: INDEPENDENT
ORAL_HYGIENE: INDEPENDENT

## 2017-07-15 NOTE — PROGRESS NOTES
Maribell from [facility] called for information about patient behaviors and medication compliance.  She requested paperwork regarding the pt's inpatient history be faxed over when it became available.    Staff were given a phone number of [phone] and a fax number of [phone]. Staff were attempting to get this information together and will let the following shift know what needs to completed.

## 2017-07-15 NOTE — PLAN OF CARE
"Problem: Psychotic Symptoms  Goal: Psychotic Symptoms  Signs and symptoms of listed problems will be absent or manageable.   Outcome: Improving  Patient presents as less guarded and more at ease.  He is more cooperative and pleasant on approach.  He has not struggled with any episodes of agitation or aggressive behaviors this shift.  His mood is calm, and his affect is blunted.  He has been more visible in the milieu; however, he remains socially withdrawn, and is rarely seen interacting with staff or peers.  He denies that he is experiencing any psychotic symptoms.  He elected to bring up the seclusion event of 7/11/17 and was apologetic for his aggressive behaviors.  He reports that his length of stay has been frustrating for him, but he notes that \"I always feel better when I leave the hospital\".  Patient seems to be demonstrating more insight into his illness.  He reports that he is working on coping with stress more effectively, and he agrees to alert unit staff should he experience any aggressive ideation.  He denies any homicidal ideation at this time.   He reports that he has been tolerating the addition of Risperdal without any adverse side effects.  He has been compliant with all of his scheduled medications, and no adverse side effects have been reported or observed.  He denies any acute physical concerns.      "

## 2017-07-16 PROCEDURE — 25000132 ZZH RX MED GY IP 250 OP 250 PS 637: Performed by: NURSE PRACTITIONER

## 2017-07-16 PROCEDURE — 25000132 ZZH RX MED GY IP 250 OP 250 PS 637: Performed by: PSYCHIATRY & NEUROLOGY

## 2017-07-16 PROCEDURE — 90853 GROUP PSYCHOTHERAPY: CPT

## 2017-07-16 PROCEDURE — 12400003 ZZH R&B MH CRITICAL UMMC

## 2017-07-16 RX ADMIN — LISINOPRIL 20 MG: 20 TABLET ORAL at 09:48

## 2017-07-16 RX ADMIN — CARBAMAZEPINE 200 MG: 100 TABLET, EXTENDED RELEASE ORAL at 20:16

## 2017-07-16 RX ADMIN — MULTIPLE VITAMINS W/ MINERALS TAB 1 TABLET: TAB at 09:48

## 2017-07-16 RX ADMIN — RISPERIDONE 0.5 MG: 0.5 TABLET ORAL at 09:48

## 2017-07-16 RX ADMIN — RISPERIDONE 1 MG: 1 TABLET ORAL at 20:16

## 2017-07-16 RX ADMIN — LURASIDONE HYDROCHLORIDE 40 MG: 40 TABLET, FILM COATED ORAL at 09:48

## 2017-07-16 RX ADMIN — QUETIAPINE FUMARATE 200 MG: 200 TABLET, FILM COATED ORAL at 20:16

## 2017-07-16 RX ADMIN — CARBAMAZEPINE 200 MG: 100 TABLET, EXTENDED RELEASE ORAL at 09:48

## 2017-07-16 RX ADMIN — CARBOXYMETHYLCELLULOSE SODIUM 1 DROP: 10 GEL OPHTHALMIC at 20:37

## 2017-07-16 RX ADMIN — NICOTINE POLACRILEX 2 MG: 2 GUM, CHEWING ORAL at 16:43

## 2017-07-16 ASSESSMENT — ACTIVITIES OF DAILY LIVING (ADL)
GROOMING: INDEPENDENT
ORAL_HYGIENE: INDEPENDENT
DRESS: SCRUBS (BEHAVIORAL HEALTH)

## 2017-07-16 NOTE — PROGRESS NOTES
Patient was visible in the milieu this shift, but noted to be isolative to self and engaging minimally with others.  Patient presented with flat affect, smiling innapropriately at times. Patient was noted to have significant body odor while in the lounge and was not receptive to staff encouragement to perform ADLs/hygiene. Patient made no complaints of physical discomfort or emotional distress. Will continue to monitor and assess.       07/15/17 2200   Behavioral Health   Hallucinations denies / not responding to hallucinations   Thinking delusional   Orientation person: oriented;place: oriented;date: oriented   Memory baseline memory   Insight poor   Judgement impaired   Eye Contact at examiner   Affect blunted, flat   Mood mood is calm   Physical Appearance/Attire attire appropriate to age and situation   Hygiene body odor;neglected grooming - unclean body, hair, teeth   Suicidality other (see comments)  (none endorsed or observed)   Self Injury other (see comment)  (none endorsed or observed)   Elopement (none this shift, but pt is on elopement precautions)   Activity isolative  (in milieu, but not engaging socially with others)   Speech clear;coherent   Medication Sensitivity no stated side effects;no observed side effects   Psychomotor / Gait balanced;steady   Activities of Daily Living   Hygiene/Grooming prompts;independent   Oral Hygiene prompts;independent   Dress scrubs (behavioral health)   Laundry unable to complete   Behavioral Health Interventions   Psychotic Symptoms maintain safety precautions;monitor need to revise level of observation;maintain safe secure environment;reality orientation;simple, clear language;decrease environmental stimulation;redirection of intrusive behaviors;redirection of aggressive behaviors;assist patient in developing safety plan;assist patient in following safety plan;encourage nutrition and hydration;encourage participation / independence with adls;provide emotional  support;establish therapeutic relationship;assist with developing & utilizing healthy coping strategies;build upon strengths;assess patient response to medication;assess medication adherance;monitor need for prn medication;monitor confusion, memory loss, decision making ability and reorient / intervent as needed;assess & implement appropriate substance withdrawal protocol;monitor substance withdrawal process   Social and Therapeutic Interventions (Psychotic Symptoms) encourage socialization with peers;encourage effective boundaries with peers;encourage participation in therapeutic groups and milieu activities

## 2017-07-17 PROCEDURE — 25000132 ZZH RX MED GY IP 250 OP 250 PS 637: Performed by: NURSE PRACTITIONER

## 2017-07-17 PROCEDURE — 25000132 ZZH RX MED GY IP 250 OP 250 PS 637: Performed by: PSYCHIATRY & NEUROLOGY

## 2017-07-17 PROCEDURE — 12400003 ZZH R&B MH CRITICAL UMMC

## 2017-07-17 PROCEDURE — 90853 GROUP PSYCHOTHERAPY: CPT

## 2017-07-17 PROCEDURE — 99231 SBSQ HOSP IP/OBS SF/LOW 25: CPT | Performed by: PSYCHIATRY & NEUROLOGY

## 2017-07-17 PROCEDURE — 97150 GROUP THERAPEUTIC PROCEDURES: CPT | Mod: GO

## 2017-07-17 PROCEDURE — 99232 SBSQ HOSP IP/OBS MODERATE 35: CPT | Performed by: PHYSICIAN ASSISTANT

## 2017-07-17 RX ORDER — DIPHENHYDRAMINE HCL 50 MG
50 CAPSULE ORAL 3 TIMES DAILY PRN
Status: DISCONTINUED | OUTPATIENT
Start: 2017-07-17 | End: 2017-07-21 | Stop reason: HOSPADM

## 2017-07-17 RX ORDER — DIPHENHYDRAMINE HYDROCHLORIDE 50 MG/ML
50 INJECTION INTRAMUSCULAR; INTRAVENOUS 3 TIMES DAILY PRN
Status: DISCONTINUED | OUTPATIENT
Start: 2017-07-17 | End: 2017-07-21 | Stop reason: HOSPADM

## 2017-07-17 RX ADMIN — CARBAMAZEPINE 200 MG: 100 TABLET, EXTENDED RELEASE ORAL at 08:24

## 2017-07-17 RX ADMIN — RISPERIDONE 0.5 MG: 0.5 TABLET ORAL at 08:24

## 2017-07-17 RX ADMIN — CARBAMAZEPINE 200 MG: 100 TABLET, EXTENDED RELEASE ORAL at 20:55

## 2017-07-17 RX ADMIN — LISINOPRIL 20 MG: 20 TABLET ORAL at 08:23

## 2017-07-17 RX ADMIN — DIPHENHYDRAMINE HYDROCHLORIDE 50 MG: 50 CAPSULE ORAL at 14:32

## 2017-07-17 RX ADMIN — CARBOXYMETHYLCELLULOSE SODIUM 1 DROP: 10 GEL OPHTHALMIC at 14:25

## 2017-07-17 RX ADMIN — NICOTINE POLACRILEX 2 MG: 2 GUM, CHEWING ORAL at 21:52

## 2017-07-17 RX ADMIN — QUETIAPINE FUMARATE 200 MG: 200 TABLET, FILM COATED ORAL at 20:55

## 2017-07-17 RX ADMIN — MULTIPLE VITAMINS W/ MINERALS TAB 1 TABLET: TAB at 08:24

## 2017-07-17 RX ADMIN — LURASIDONE HYDROCHLORIDE 40 MG: 40 TABLET, FILM COATED ORAL at 08:24

## 2017-07-17 ASSESSMENT — ACTIVITIES OF DAILY LIVING (ADL)
LAUNDRY: UNABLE TO COMPLETE
ORAL_HYGIENE: INDEPENDENT
DRESS: SCRUBS (BEHAVIORAL HEALTH)
GROOMING: INDEPENDENT

## 2017-07-17 NOTE — PROGRESS NOTES
Evening Shift 7/16    Pt continues to be isolative to his room the majority of shift. Pt was appropriate when he was in the milieu. Pt did not need any redirection this shift. Pt's thinking remains intact, insight continues to show improvement. Pt remains somewhat withdrawn from interaction with an overall blunted affect.

## 2017-07-17 NOTE — PROGRESS NOTES
This writer left vm for Jenna to find out if she has heard anything from [facility] about consideration for them.

## 2017-07-17 NOTE — PLAN OF CARE
Problem: Psychotic Symptoms  Goal: Psychotic Symptoms  Signs and symptoms of listed problems will be absent or manageable.   Outcome: No Change  48 hour nursing assessment completed. Patient mostly withdrawn to self and room throughout the shift. He latter c/o a red, raised rash over his entire body. See note. Medicine here to evaluate. Risperdal d/s'd at this time. Patient appears withdrawn, flat affect, depressed mood. Denies SI/SIB. Medication compliant. Continue to monitor and assess.

## 2017-07-17 NOTE — PROGRESS NOTES
Patient reporting a red, itchy, rash on his entire body. Red, raised. MD notified. Risperdal discontinued due to correspondence of onset. Medicine paged and notified.

## 2017-07-17 NOTE — PROGRESS NOTES
[facility], [place]   Psychiatric Progress Note        Interim History:   The patient's care was discussed with the treatment team during the daily team meeting and/or staff's chart notes were reviewed.  Staff reports patient has been calm and pleasant cooperative with care and compliant with medications. Less distracted and on task. No overt psychosis, rodrigo or confusion. No SI, LOYDA or HI. Sleeping well. Independent with ADL.     The patient noted that he is doing well. He denied dep and anxiety. Noted that irritability resolved and denied racing thoughts, hallucinations and paranoia. Noted that he is sleeping well and appetite is intact. Noted that he developed itchy rash last week and it is been spreading. The patient had a diffused erythematous rash, none on the face and no discharge. No SOB or fever. IM assessing.      Dicussed medications and care plan.          lurasidone  40 mg Oral Daily     risperiDONE  0.5 mg Oral Daily     risperiDONE  1 mg Oral At Bedtime     QUEtiapine  200 mg Oral At Bedtime     multivitamin, therapeutic  1 tablet Oral Daily     carBAMazepine  200 mg Oral BID     lisinopril  20 mg Oral Daily          Allergies:     Allergies   Allergen Reactions     No Known Drug Allergies           Labs:     No results found for this or any previous visit (from the past 24 hour(s)).       Psychiatric Examination:     Vitals:    07/15/17 1900 07/16/17 0800 07/16/17 0948 07/17/17 0835   BP: 116/65  127/78    Pulse:       Resp:  18  16   Temp: 99.4  F (37.4  C) 98.9  F (37.2  C)  98.3  F (36.8  C)   TempSrc: Tympanic Oral  Tympanic   SpO2:       Weight:  109.8 kg (242 lb)     Height:           Weight is 241 lbs 15.98 oz  Body mass index is 30.25 kg/(m^2).    Appearance: awake, alert, adequately groomed, dressed in hospital scrubs, appeared as age stated and no apparent distress  Attitude:  cooperative  Eye Contact:  good  Mood:  good  Affect:  more reactive and more engaged.  pleasant and calm  Speech:  clear, coherent and normal prosody  Psychomotor Behavior:  no evidence of tardive dyskinesia, dystonia, or tics and intact station, gait and muscle tone  Throught Process:  goal oriented  Associations:  no loose associations  Thought Content:  no auditory hallucinations present, no visual hallucinations present and adamantly denied SI, LOYDA and HI. Internal stimuli and paranoia resolving.   Insight:  fair  Judgement:  fair  Oriented to:  time, person, and place  Attention Span and Concentration:  intact  Recent and Remote Memory:  intact    Fund of Knowledge: Adequate         Precautions:     Behavioral Orders   Procedures     Assault precautions     Code 1 - Restrict to Unit     Elopement precautions     Routine Programming     As clinically indicated     Sexual precautions     Status 15     Every 15 minutes.          Diagnoses:     1.  Schizophrenia, paranoid type. (r/o schizoaffective Bipolar type)   2.  Noncompliance, nonadherence.   3.  Cluster B with antisocial traits.           Plan:     Medications:  -- Saphris: restated on admission and titrated to 10 mg BID but later cross tapered to Latuda due to lack of efficacy.  -- PRNs: Vistaril for anxiety and Seroquel for restlessness and haldol with Benadryl for agitation and psychosis. PRN ativan was discontinued. Seroquel 200 mg qhs PRN for racing thoughts and insomnia.   -- Seroquel 200 mg qHS   -- Latuda: started and titrated to 120 mg daily. The medication was helpful initially but later mood lability and paranoia re-emerged. Medications was lowered to 40 mg daily with goal to cross taper to Risperdal.   -- Risperdal started and titrated to 0.5 mg q am and 1 mg qhs, (started at a lower dose and being titrated slowly to improve compliance)    -- Tegretol: start at 100 mg BID, level was 5.1 on 6/11 and dose increased to 200 mg BID.  Tegretol level was 6.9 on 6/16 and 5.5 on 7/12.  -- Cogentin was added at 0.5-1 mg BID PRN for EPS.      -- Hold Risperdal while IM team evaluating for possible allergy.     -- IM consult consult completed,  re: Lyme management. They have recommended no intervention at this time due to lack of neurological findings. This was based on their conversation with infectious disease specialist.    Legal Status and Disposition:  1.  Commitment with [facility] for Saphris, Zyprexa, haloperidol, Latuda, and risperidone.  2.  Will work with [facility]  on [facility] placement. Given his statements about homicidal thoughts toward neighbor. May consider discharge home if safety established. [facility] informed local police department of the threats he made earlier in the hospital stay.

## 2017-07-17 NOTE — PLAN OF CARE
Problem: Psychotic Symptoms  Intervention: Social and Therapeutic Interv (Psychotic Symptoms)     Pt attended 2 of 2 OT groups, participates in discussion group though doesn't give clear answers, seems to talk in circles and is difficult to follow the more he talks.  Consistently is calm in OT groups, doesn't react to a peer that seems paranoid of him (the peer will leave the area when Jv is present).  States he's tired of being here, and understandably frustrated about having not been outside since admission 5/27/17.

## 2017-07-17 NOTE — CONSULTS
"  Internal Medicine Initial Visit    Jv Pate MRN# [id number]   Age: 32 year old YOB: 1985   Date of Admission: 5/27/2017     Reason for consult: Suspected Hives Over Entire Body - Possibly Related to Initiation of Risperdal       Requesting physician Dr. Shah      SUBJECTIVE   HPI:   Jv Pate is a 32 year old male with a history of paranoid schizophrenia, cluster B with antisocial traits, Lyme disease, and poorly controlled hypertension who was admitted to inpatient behavioral health on 5/27/17 with acute psychiatric decompensation and homicidal ideation. He was initially seen by Internal Medicine on 6/1/17 due to concerns for neurologic Lyme disease and facial rash; please refer to the consult note by Key for further details. Internal Medicine was re-consulted today for evaluation of \"suspected hives over entire body possibly related to initiation of Risperdal.\"    Mr. Pate reports he developed a rash about a week ago which was initially localized to just below his belt line. Over the course of the next 24 hours, he reports the rash spread to involve his entire body aside from his face. RN staff and primary Psychiatrist (Dr. Shah) feel the rash developed more recently than patient reports and estimate it occurred over the past 1-2 days. Rash is reported to be pruritic, though is becoming less so. Denies any open lesions, draining lesions, blister-like lesions, tongue or oral involvement, new chemical exposures, or URI symptoms. He does not have a history of similar rashes or known drug allergies. No additional complaints today.     Past Medical History:     Past Medical History:   Diagnosis Date     Cluster B personality disorder     antisocial traits.      H/o Lyme disease     Working with Dr. Karan Caputo from [place], [place]. [facility] with diagnosis of neurologic lyme disease.      Hypertension      Schizophrenia (H)     paranoid type      Reviewed & updated in [facility].     Past Surgical " "History:   No past surgical history.    Current Medications:   Scheduled:  Tegretol  mg PO BID  Lisinopril 20 mg PO QD  Latuda 40 mg PO QD  Multivitamin 1 tablet PO QD  Seroquel 200 mg PO q HS  Risperdal 0.5 mg PO q AM and 1 mg PO q PM    PRNs:  Tylenol 650 mg PO q 4h PRN for mild pain or fever  Aspirin 325 mg PO BID PRN for moderate pain  Cogentin 0.5-1 mg PO BID PRN for agitation or EPS  Refresh eye drops - 1 drop into both eyes TID PRN for dry eyes  Eucerin cream BID PRN for dry skin  Benadryl 50 mg PO or IM TID PRN for agitation  Haldol 5-10 mg PO or IM TID PRN for agitation  Atarax 25-50 mg PO q 4h PRN for anxiety  Nicorette gum 2 mg q 1h PRN for smoking cessation  Seroquel 200 mg PO q HS PRN for sleep  Ocean Nasal Spray 1 spray into both nostrils q 1h PRN for congestion     Allergies:   No Know Drug Allergies     Social History:   Tobacco Use: Smoking 1.5 ppd prior to admission  Illicit Drug Use: Admission Utox positive for Cannabinoids.  No history of alcohol abuse.     Family History:     Family History   Problem Relation Age of Onset     Neurologic Disorder Father      HEART DISEASE Paternal Grandfather      attack      Reviewed & updated in Epic.     Review of Systems:   Ten point review of systems negative except as stated above in History of Present Illness.    OBJECTIVE   Physical Exam:   Blood pressure 127/78, pulse 82, temperature 98.3  F (36.8  C), temperature source Tympanic, resp. rate 16, height 1.905 m (6' 3\"), weight 109.8 kg (242 lb), SpO2 100 %.  General: Awake. Non-toxic appearing. NAD.  HEENT: NC/AT. Anicteric sclera. Eyes symmetric and free of discharge bilaterally. Tongue and buccal mucosa without lesions. Mucous membranes moist.  Neck: Supple  Extremities: Warm & well perfused. No peripheral edema.  Skin: Diffuse maculopapular rash involving bilateral upper & lower extremities, trunk, and back, but sparing the face. No vesicles, pustules, or open lesions.  Neurologic: A&O X 3. " Answers questions appropriately. Moves all extremities symmetrically.     Laboratory Data:   Most recent CMP and CBC from 6/16/17 were WNL and unremarkable.     Assessment and Plan/Recommendations:   Jv Pate is a 32 year old male with a history of paranoid schizophrenia, Cluster B with antisocial traits, Lyme disease, and HTN who was admitted to inpatient behavioral health on 5/27/17 with acute psychiatric decompensation.     Paranoid Schizophrenia, Cluster B with Antisocial Traits - Management per psychiatry.    Acute Generalized Maculopapular Rash likely 2/2 Drug Eruption - Developed ~7 days ago per patient and ~1-2 days ago per Psychiatry staff. Involves all extremities, trunk, and back while sparing the face. Rash is pruritic, but this is improving. No open lesions, vesicles, pustules, oral involvement, or respiratory/airway compromise. No known drug allergies or history of similar rashes. Suspect due to drug eruption, though viral exanthem remains on the DDx but is felt less likely given lack of constitutional symptoms.  - Difficult to pinpoint exactly which medication is the most likely culprit, however, timing appears most c/w Risperdal as etiology. Recommend discontinuing Risperdal and monitoring for improvement.  - Benadryl available PRN for itching  - Consider topical steroid cream if worsening, but will defer for now given rash is not particularly bothersome.    HTN - BP well controlled on Lisinopril 20 mg QD. Continue with hold parameters. Contact Medicine if BP persistently >140/90 or <100/50.    Patient was seen and evaluated by Dr. Rollins who is in agreement with the above plan. Medicine will re-evaluate patient in 2-3 days to assess for improvement in rash.     Please page the Internal Medicine job code pager for any intercurrent medical issues which arise in the interim.     Tammy Fregoso PA-C  Hospitalist Service  [phone]

## 2017-07-18 PROCEDURE — 25000132 ZZH RX MED GY IP 250 OP 250 PS 637: Performed by: PHYSICIAN ASSISTANT

## 2017-07-18 PROCEDURE — 99231 SBSQ HOSP IP/OBS SF/LOW 25: CPT | Performed by: PSYCHIATRY & NEUROLOGY

## 2017-07-18 PROCEDURE — 25000132 ZZH RX MED GY IP 250 OP 250 PS 637: Performed by: NURSE PRACTITIONER

## 2017-07-18 PROCEDURE — 12400003 ZZH R&B MH CRITICAL UMMC

## 2017-07-18 PROCEDURE — 25000132 ZZH RX MED GY IP 250 OP 250 PS 637: Performed by: PSYCHIATRY & NEUROLOGY

## 2017-07-18 RX ORDER — HYDROCORTISONE 10 MG/ML
LOTION TOPICAL 3 TIMES DAILY PRN
Status: DISCONTINUED | OUTPATIENT
Start: 2017-07-18 | End: 2017-07-18

## 2017-07-18 RX ORDER — BENZOCAINE/MENTHOL 6 MG-10 MG
LOZENGE MUCOUS MEMBRANE 3 TIMES DAILY PRN
Status: DISCONTINUED | OUTPATIENT
Start: 2017-07-18 | End: 2017-07-21 | Stop reason: HOSPADM

## 2017-07-18 RX ADMIN — LURASIDONE HYDROCHLORIDE 40 MG: 40 TABLET, FILM COATED ORAL at 09:03

## 2017-07-18 RX ADMIN — HALOPERIDOL 10 MG: 5 TABLET ORAL at 00:27

## 2017-07-18 RX ADMIN — QUETIAPINE FUMARATE 200 MG: 200 TABLET, FILM COATED ORAL at 21:02

## 2017-07-18 RX ADMIN — HYDROCORTISONE: 10 CREAM TOPICAL at 21:03

## 2017-07-18 RX ADMIN — DIPHENHYDRAMINE HYDROCHLORIDE 50 MG: 50 CAPSULE ORAL at 00:27

## 2017-07-18 RX ADMIN — CARBOXYMETHYLCELLULOSE SODIUM 1 DROP: 10 GEL OPHTHALMIC at 00:27

## 2017-07-18 RX ADMIN — CARBAMAZEPINE 200 MG: 100 TABLET, EXTENDED RELEASE ORAL at 09:03

## 2017-07-18 RX ADMIN — QUETIAPINE FUMARATE 200 MG: 200 TABLET, FILM COATED ORAL at 00:27

## 2017-07-18 RX ADMIN — NICOTINE POLACRILEX 2 MG: 2 GUM, CHEWING ORAL at 20:54

## 2017-07-18 RX ADMIN — LISINOPRIL 20 MG: 20 TABLET ORAL at 09:03

## 2017-07-18 RX ADMIN — CARBAMAZEPINE 200 MG: 100 TABLET, EXTENDED RELEASE ORAL at 21:02

## 2017-07-18 RX ADMIN — MULTIPLE VITAMINS W/ MINERALS TAB 1 TABLET: TAB at 09:03

## 2017-07-18 ASSESSMENT — ACTIVITIES OF DAILY LIVING (ADL)
LAUNDRY: WITH SUPERVISION
DRESS: SCRUBS (BEHAVIORAL HEALTH)
ORAL_HYGIENE: INDEPENDENT
HYGIENE/GROOMING: HANDWASHING;SHOWER;INDEPENDENT
ORAL_HYGIENE: INDEPENDENT
LAUNDRY: WITH SUPERVISION
DRESS: SCRUBS (BEHAVIORAL HEALTH);INDEPENDENT
GROOMING: INDEPENDENT

## 2017-07-18 NOTE — PROGRESS NOTES
[facility], [place]   Psychiatric Progress Note        Interim History:   The patient's care was discussed with the treatment team during the daily team meeting and/or staff's chart notes were reviewed.  Staff reports patient is calm, but tense. keep to self but visible and engaged on approach. He reported that rash improved. Cooperative with care and compliant with medications.  No overt psychosis, rodrigo or confusion. No SI, LOYDA or HI. Sleeping well. Independent with ADL.     The patient noted that he is doing better. Rash and itching persist, but no fever, discharge or SOB. Denied hallucinations, paranoia and racing thoughts. Feels restless but improved. Concerned  about disposition and lengthy hospital stay. Denied dep, SI, LOYDA and HI. Sleep and appetite intact.    Dicussed medications and care plan. The patient noted that PRN haldol been helpful. Agreed to withhold any further medication changes until rash subsided.          lurasidone  40 mg Oral Daily     QUEtiapine  200 mg Oral At Bedtime     multivitamin, therapeutic  1 tablet Oral Daily     carBAMazepine  200 mg Oral BID     lisinopril  20 mg Oral Daily          Allergies:     Allergies   Allergen Reactions     Risperdal [Risperidone] Rash          Labs:     No results found for this or any previous visit (from the past 24 hour(s)).       Psychiatric Examination:     Vitals:    07/15/17 1900 07/16/17 0800 07/16/17 0948 07/17/17 0835   BP: 116/65  127/78    Pulse:       Resp:  18  16   Temp: 99.4  F (37.4  C) 98.9  F (37.2  C)  98.3  F (36.8  C)   TempSrc: Tympanic Oral  Tympanic   SpO2:       Weight:  109.8 kg (242 lb)     Height:           Weight is 241 lbs 15.98 oz  Body mass index is 30.25 kg/(m^2).    Appearance: awake, alert, adequately groomed, dressed in hospital scrubs, appeared as age stated and no apparent distress  Attitude:  cooperative  Eye Contact:  good  Mood:  anxious, better and good  Affect:  more reactive and  more engaged. pleasant and calm  Speech:  clear, coherent and normal prosody  Psychomotor Behavior:  no evidence of tardive dyskinesia, dystonia, or tics and intact station, gait and muscle tone  Throught Process:  goal oriented  Associations:  no loose associations  Thought Content:  no auditory hallucinations present, no visual hallucinations present and adamantly denied SI, LOYDA and HI. Internal stimuli and paranoia resolved   Insight:  fair  Judgement:  fair  Oriented to:  time, person, and place  Attention Span and Concentration:  intact  Recent and Remote Memory:  intact    Fund of Knowledge: Adequate         Precautions:     Behavioral Orders   Procedures     Assault precautions     Code 1 - Restrict to Unit     Elopement precautions     Routine Programming     As clinically indicated     Sexual precautions     Status 15     Every 15 minutes.          Diagnoses:     1.  Schizophrenia, paranoid type. (r/o schizoaffective Bipolar type)   2.  Noncompliance, nonadherence.   3.  Cluster B with antisocial traits.           Plan:     Medications:  -- Saphris: restated on admission and titrated to 10 mg BID but later cross tapered to Latuda due to lack of efficacy.  -- PRNs: Vistaril for anxiety and Seroquel for restlessness and haldol with Benadryl for agitation and psychosis. PRN ativan was discontinued. Seroquel 200 mg qhs PRN for racing thoughts and insomnia.   -- Seroquel 200 mg qHS   -- Latuda: started and titrated to 120 mg daily. The medication was helpful initially but later mood lability and paranoia re-emerged. Medications was lowered to 40 mg daily.   -- Risperdal was started and showed great efficacy but patient later developed skin rash, believed to be an allergic reaction vs hypersensitivity to Risperdal. Risperdal was eventually discontinued.     -- Tegretol: start at 100 mg BID, level was 5.1 on 6/11 and dose increased to 200 mg BID.  Tegretol level was 6.9 on 6/16 and 5.5 on 7/12.  -- Cogentin was  added at 0.5-1 mg BID PRN for EPS.     -- IM consult consult completed,  re: Lyme management. They have recommended no intervention at this time due to lack of neurological findings. This was based on their conversation with infectious disease specialist.    Legal Status and Disposition:  1.  Commitment with [facility] for Saphris, Zyprexa, haloperidol, Latuda, and risperidone.  2.  Will work with [facility]  on [facility] placement. Given his statements about homicidal thoughts toward neighbor. May consider discharge home if safety established. [facility] informed local police department of the threats he made earlier in the hospital stay.

## 2017-07-18 NOTE — PROGRESS NOTES
"Pt was withdrawn, distractible and internally preoccupied throughout the shift. He was be staring into space, then laughing to himself. When asked what he was laughing at, Pt responded, \"Just having some memories.\" Pt at his dinner in the milieu and was appropriate with his interactions with staff and peers. There are no behavioral issues to report.      "

## 2017-07-18 NOTE — PROGRESS NOTES
Pt came to RN at 0030 appearing tense and irritable. Pt requested and received PRN Benadryl, Seroquel, Haldol, and eye drops. Pt was able to return to sleep following PRNs.

## 2017-07-18 NOTE — PROGRESS NOTES
This [facility]  [facility] WAS on the [facility], however due to his recent behavior (slapping nurse etc) they moved him to [facility] as they felt he is more appropriate for that at this time.     [facility] say [facility] would [facility] him - they want to see 2 weeks of stable behavior.  We will check in next week.  The CCM is planning to call and speak to Jv and explain that we know he is frustrated with being stuck here but he needs to keep his behaviors in check if he wants to d/c.

## 2017-07-18 NOTE — PROGRESS NOTES
"Noticeably irritable and more withdrawn. Though he had recently been more agreeable to morning vitals, today he abruptly and firmly stated \"No!\" and walked away. Had agreed to attend OT group but then chose to stay in his room and then paced the loya. In conversation, he appears to be tracking the conversation initially then quickly drifts into unrelated associations. He then becomes defensive and walks away when asked to clarify. He denies current SI/HI. He says he is frustrated with his DC status.     07/18/17 1400   Behavioral Health   Hallucinations denies / not responding to hallucinations   Thinking delusional   Orientation (all spheres)   Memory baseline memory   Insight other (see comment)  (fair)   Judgement impaired   Eye Contact at examiner   Affect irritable   Physical Appearance/Attire attire appropriate to age and situation   Hygiene well groomed   Suicidality other (see comments)  (denies current )   Self Injury other (see comment)  (none observed or reported)   Elopement (none observed)   Activity withdrawn   Speech coherent;clear   Psycho Education   Type of Intervention 1:1 intervention   Response refuses   Hours 0.5   Treatment Detail check in   Activities of Daily Living   Hygiene/Grooming independent   Oral Hygiene independent   Dress scrubs (behavioral health)   Laundry with supervision   Room Organization independent   Behavioral Health Interventions   Psychotic Symptoms reality orientation;simple, clear language;decrease environmental stimulation   Social and Therapeutic Interventions (Psychotic Symptoms) encourage socialization with peers;encourage participation in therapeutic groups and milieu activities     "

## 2017-07-19 PROCEDURE — 25000132 ZZH RX MED GY IP 250 OP 250 PS 637: Performed by: PSYCHIATRY & NEUROLOGY

## 2017-07-19 PROCEDURE — 25000132 ZZH RX MED GY IP 250 OP 250 PS 637: Performed by: NURSE PRACTITIONER

## 2017-07-19 PROCEDURE — 12400003 ZZH R&B MH CRITICAL UMMC

## 2017-07-19 PROCEDURE — 99232 SBSQ HOSP IP/OBS MODERATE 35: CPT | Performed by: PSYCHIATRY & NEUROLOGY

## 2017-07-19 PROCEDURE — 25000132 ZZH RX MED GY IP 250 OP 250 PS 637: Performed by: PHYSICIAN ASSISTANT

## 2017-07-19 PROCEDURE — 99231 SBSQ HOSP IP/OBS SF/LOW 25: CPT | Performed by: PHYSICIAN ASSISTANT

## 2017-07-19 RX ADMIN — CARBAMAZEPINE 200 MG: 100 TABLET, EXTENDED RELEASE ORAL at 08:56

## 2017-07-19 RX ADMIN — LISINOPRIL 20 MG: 20 TABLET ORAL at 08:56

## 2017-07-19 RX ADMIN — LURASIDONE HYDROCHLORIDE 40 MG: 40 TABLET, FILM COATED ORAL at 08:57

## 2017-07-19 RX ADMIN — DIPHENHYDRAMINE HYDROCHLORIDE 50 MG: 50 CAPSULE ORAL at 02:15

## 2017-07-19 RX ADMIN — QUETIAPINE FUMARATE 200 MG: 200 TABLET, FILM COATED ORAL at 19:59

## 2017-07-19 RX ADMIN — MULTIPLE VITAMINS W/ MINERALS TAB 1 TABLET: TAB at 08:56

## 2017-07-19 RX ADMIN — CARBAMAZEPINE 200 MG: 100 TABLET, EXTENDED RELEASE ORAL at 19:59

## 2017-07-19 RX ADMIN — NICOTINE POLACRILEX 2 MG: 2 GUM, CHEWING ORAL at 19:59

## 2017-07-19 RX ADMIN — HYDROCORTISONE 10 G: 10 CREAM TOPICAL at 03:10

## 2017-07-19 RX ADMIN — NICOTINE POLACRILEX 2 MG: 2 GUM, CHEWING ORAL at 06:02

## 2017-07-19 RX ADMIN — CARBOXYMETHYLCELLULOSE SODIUM 1 DROP: 10 GEL OPHTHALMIC at 03:10

## 2017-07-19 RX ADMIN — QUETIAPINE FUMARATE 200 MG: 200 TABLET, FILM COATED ORAL at 01:05

## 2017-07-19 ASSESSMENT — ACTIVITIES OF DAILY LIVING (ADL)
LAUNDRY: UNABLE TO COMPLETE
DRESS: SCRUBS (BEHAVIORAL HEALTH)
GROOMING: HANDWASHING;SHOWER;INDEPENDENT
ORAL_HYGIENE: INDEPENDENT

## 2017-07-19 NOTE — PLAN OF CARE
Problem: Psychotic Symptoms  Goal: Psychotic Symptoms  Signs and symptoms of listed problems will be absent or manageable.   Outcome: Declining  RN Assessment     Jv has been isolative and withdrawn. He has been declining to complete his ADL's and has been declining to go to groups. He continues to have a rash and appropriately requests PRN medications for relief of symptoms. He has been more irritable.  He is tense, guarded and slightly agitated. He is medication compliant. He is eating and drinking in good amounts. He would like to be discharged soon.

## 2017-07-19 NOTE — PROGRESS NOTES
"Patient had a restless shift.    Patient did not require seclusion/restraints to manage behavior.    Jv Pate did not participate in groups and was visible in the milieu.    Notable mental health symptoms during this shift:  Anxious   Restless  Withdrawn   Isolative     Patient is working on these coping/social skills:   Breathing exercises     Visitors during this shift included None.    Other information about this shift:   Pt appeared restless, pacing the hallways. Pt stated that \" I'm frustrated regarding my discharge and I'm ready to go home.\" When not pacing the hallways Pt spent the majority of the evening Isolated in his room.     "

## 2017-07-19 NOTE — PLAN OF CARE
Problem: General Plan of Care (Inpatient Behavioral)  Goal: Team Discussion  Team Plan:   BEHAVIORAL TEAM DISCUSSION     Participants: Dr. Mario Shah, Mohsen Mendoza RN, Rae Rowan OT/R, Francoise WARE, [facility]  Progress: Pt's mental health declined recently.  He had an allergic reaction to medication that was helping his mental health status, we had medicine see him and they are looking at trying a different medication. Pt is frustrated at the length of his hospital stay and that has attributed to some of his negative behavior.  Continued Stay Criteria/Rationale: MH symptoms increased for short period of time.  Irritability, paranoia and delusions  Medical/Physical: see medical chart  Precautions:   Behavioral Orders   Procedures     Assault precautions     Code 1 - Restrict to Unit     Elopement precautions     Routine Programming       As clinically indicated     Sexual precautions     Status 15       Every 15 minutes.     Plan: Plan is if he can show 2 weeks of no negative behavior towards staff/other patients, [facility].  He is also on the list for [facility].  They took him off the [facility] list because they felt his symptoms more appropriate for [facility].   Rationale for change in precautions or plan: no change warranted at this time.

## 2017-07-19 NOTE — PROGRESS NOTES
[facility], [place]   Psychiatric Progress Note        Interim History:   The patient's care was discussed with the treatment team during the daily team meeting and/or staff's chart notes were reviewed.  Staff reports patient is calm, and cooperative but possible more guarded and a bit more withdrawn. Had difficulty with sleep last night.  Cooperative with care and medications. Skin rash persist. Compliant with care and medications. No overt rodrigo or confusion. No SI, LOYDA or HI.  Independent with ADL.     The patient noted that he is not feeling well. Endorsed anxiety and restlessness. Concerned about worsening rash, but itching improved. No fever, discharge or pain. Denied hallucinations and paranoia. Hopes to restart on Saphris but agreed to withhold further medication changes until rash resolved. He denied SI, LOYDA and HI. believes thathe slept OK last night. Eating well, but c/o mild nausea.     Dicussed medications and care plan.          lurasidone  40 mg Oral Daily     QUEtiapine  200 mg Oral At Bedtime     multivitamin, therapeutic  1 tablet Oral Daily     carBAMazepine  200 mg Oral BID     lisinopril  20 mg Oral Daily          Allergies:     Allergies   Allergen Reactions     Risperdal [Risperidone] Rash          Labs:     No results found for this or any previous visit (from the past 24 hour(s)).       Psychiatric Examination:     Vitals:    07/16/17 0800 07/16/17 0948 07/17/17 0835 07/19/17 0800   BP:  127/78     Pulse:       Resp: 18  16 14   Temp: 98.9  F (37.2  C)  98.3  F (36.8  C) 99  F (37.2  C)   TempSrc: Oral  Tympanic Tympanic   SpO2:       Weight: 109.8 kg (242 lb)      Height:           Weight is 241 lbs 15.98 oz  Body mass index is 30.25 kg/(m^2).    Appearance: awake, alert, adequately groomed, dressed in hospital scrubs, appeared as age stated and no apparent distress  Attitude:  cooperative  Eye Contact:  good  Mood:  anxious, better and good  Affect:  more  reactive and more engaged. pleasant and calm  Speech:  clear, coherent and normal prosody  Psychomotor Behavior:  no evidence of tardive dyskinesia, dystonia, or tics and intact station, gait and muscle tone  Throught Process:  goal oriented  Associations:  no loose associations  Thought Content:  no auditory hallucinations present, no visual hallucinations present and adamantly denied SI, LOYDA and HI. Internal stimuli and paranoia resolved   Insight:  fair  Judgement:  fair  Oriented to:  time, person, and place  Attention Span and Concentration:  intact  Recent and Remote Memory:  intact    Fund of Knowledge: Adequate         Precautions:     Behavioral Orders   Procedures     Assault precautions     Code 1 - Restrict to Unit     Elopement precautions     Routine Programming     As clinically indicated     Sexual precautions     Status 15     Every 15 minutes.          Diagnoses:     1.  Schizophrenia, paranoid type. (r/o schizoaffective Bipolar type)   2.  Noncompliance, nonadherence.   3.  Cluster B with antisocial traits.           Plan:     Medications:  -- Saphris: restated on admission and titrated to 10 mg BID but later cross tapered to Latuda due to lack of efficacy.  -- PRNs: Vistaril for anxiety and Seroquel for restlessness and haldol with Benadryl for agitation and psychosis. PRN ativan was discontinued. Seroquel 200 mg qhs PRN for racing thoughts and insomnia.   -- Seroquel 200 mg qHS   -- Latuda: started and titrated to 120 mg daily. The medication was helpful initially but later mood lability and paranoia re-emerged. Medications was lowered to 40 mg daily.   -- Risperdal was started and showed great efficacy but patient later developed skin rash, believed to be an allergic reaction vs hypersensitivity to Risperdal. Risperdal was eventually discontinued.     -- Tegretol: start at 100 mg BID, level was 5.1 on 6/11 and dose increased to 200 mg BID.  Tegretol level was 6.9 on 6/16 and 5.5 on 7/12.  --  Cogentin was added at 0.5-1 mg BID PRN for EPS.     -- IM consult consult completed,  re: Lyme management. They have recommended no intervention at this time due to lack of neurological findings. This was based on their conversation with infectious disease specialist.    -- case discussed with IM and Pharm D. May consider Dermatology consult if rash fails to resolve.   -- obtain CBC with diff, CMP, CRP, ESR, Carbamazepine, and 10 11 epoxide (free and total) in am     Legal Status and Disposition:  1.  Commitment with [facility] for Saphris, Zyprexa, haloperidol, Latuda, and risperidone.  2.  [facility] placement. Given his statements about homicidal thoughts toward neighbor. May consider discharge home if safety established. [facility] informed local police department of the threats he made earlier in the hospital stay.

## 2017-07-19 NOTE — PROGRESS NOTES
Patient unable to go back to sleep. Seroquel 200 mgs given as PRN for sleep.    2:15 a.m. Patient woke up and requested for anti-anxiety medications and inability to sleep. Writer offered Hydroxyzine but patient declined. He preferred Benadryl. Benadryl 50 mgs given orally as PRN for agitation; itchiness of his rashes in his forearms. Hydrocortisone cream also applied to the rashes. Patient went back to sleep.

## 2017-07-19 NOTE — PROGRESS NOTES
BRIEF INTERNAL MEDICINE PROGRESS NOTE     Jv Pate  : 1985  MRN # [id number]  2017    ASSESSMENT & PLAN: Jv Pate is a 32 year old male with a history of paranoid schizophrenia, Cluster B with antisocial traits, Lyme disease, and HTN admitted to [facility] on 17 with aucte psychiatric decompensation. Internal Medicine following since  for generalized maculopapular rash presumed secondary to drug eruption.    Acute Generalized Maculopapular Rash likely 2/2 Drug Eruption - Unclear timing of symptom onset. Patient has given various reports as to timing of rash development. Psychiatry feels rash developed ~7/15-. Involves all extremities, trunk, and back while sparing the face. Rash is pruritic, but this is improving. No open lesions, vesicles, pustules, oral involvement, or respiratory/airway compromise. No known drug allergies or history of similar rashes. Suspect due to drug eruption, though difficult to pinpoint which medication may be causing it. Risperdal discontinued  as this was the most recently initiated medication, though drug eruptions do not always coincide with drug initiation and can occur after weeks or more of medication use. Rash unchanged since Risperdal discontinued.  - Discussed with Dr. Rollins who feels rash will likely take another 1-2 days to start improving if indeed the rash is from Risperdal  - Psychiatry concerned Tegretol may be contributing to rash, will defer ongoing use to their team.   - Benadryl available PRN for itching  - No indication for topical or oral steroids at this time given rash is not particularly bothersome to patient  - If rash does not improve in coming days, consider Dermatology consult    Medicine will re-evaluate rash on .    Tammy Fregoso PA-C  Hospitalist Service  [phone]    INTERVAL HISTORY:   Met with Mr. Pate to re-evaluate rash which Medicine was consulted for on . Psychiatry feels the rash is  "worsening despite discontinuing Risperdal. Mr. Pate is unsure whether the rash is stable, worse, or improving as he has not been paying much attention. Overall, he feels the rash is becoming less pruritic. Denies any open lesions or drainage. States he is experiencing \"flu-like symptoms,\" but is unable to further elaborate or describe these. No rhinorrhea or cough noted during exam. Remains afebrile.    PHYSICAL EXAM:  Blood pressure 127/78, pulse 82, temperature 99  F (37.2  C), temperature source Tympanic, resp. rate 14, height 1.905 m (6' 3\"), weight 109.8 kg (242 lb), SpO2 100 %.  GENERAL: Awake. Lying in bed, appears to be resting comfortably.  HEENT: NC/AT. Anicteric sclera. No oral lesions. Mucous membranes moist.  EXTREMITIES: No peripheral edema. Warm & well perfused.  MUSCULOSKELETAL: No joint swelling or tenderness.   SKIN: Diffuse maculopapular rash involving bilateral upper and lower extremities, trunk, and back. Rash spares the face. Rash is essentially unchanged since 7/17. No open wounds or draining lesions noted. No jaundice.    LABS:   No recent laboratory data.    "

## 2017-07-20 PROCEDURE — 99231 SBSQ HOSP IP/OBS SF/LOW 25: CPT | Performed by: PHYSICIAN ASSISTANT

## 2017-07-20 PROCEDURE — 25000132 ZZH RX MED GY IP 250 OP 250 PS 637: Performed by: NURSE PRACTITIONER

## 2017-07-20 PROCEDURE — 12400003 ZZH R&B MH CRITICAL UMMC

## 2017-07-20 PROCEDURE — 25000132 ZZH RX MED GY IP 250 OP 250 PS 637: Performed by: PSYCHIATRY & NEUROLOGY

## 2017-07-20 PROCEDURE — 25000132 ZZH RX MED GY IP 250 OP 250 PS 637: Performed by: PHYSICIAN ASSISTANT

## 2017-07-20 PROCEDURE — 99232 SBSQ HOSP IP/OBS MODERATE 35: CPT | Performed by: PSYCHIATRY & NEUROLOGY

## 2017-07-20 RX ADMIN — CARBOXYMETHYLCELLULOSE SODIUM 1 DROP: 10 GEL OPHTHALMIC at 23:28

## 2017-07-20 RX ADMIN — LISINOPRIL 20 MG: 20 TABLET ORAL at 08:47

## 2017-07-20 RX ADMIN — LURASIDONE HYDROCHLORIDE 40 MG: 40 TABLET, FILM COATED ORAL at 08:47

## 2017-07-20 RX ADMIN — DIPHENHYDRAMINE HYDROCHLORIDE 50 MG: 50 CAPSULE ORAL at 19:56

## 2017-07-20 RX ADMIN — MULTIPLE VITAMINS W/ MINERALS TAB 1 TABLET: TAB at 08:47

## 2017-07-20 RX ADMIN — CARBAMAZEPINE 200 MG: 100 TABLET, EXTENDED RELEASE ORAL at 08:47

## 2017-07-20 RX ADMIN — QUETIAPINE FUMARATE 200 MG: 200 TABLET, FILM COATED ORAL at 19:54

## 2017-07-20 ASSESSMENT — ACTIVITIES OF DAILY LIVING (ADL)
ORAL_HYGIENE: INDEPENDENT
GROOMING: INDEPENDENT
DRESS: SCRUBS (BEHAVIORAL HEALTH)
DRESS: SCRUBS (BEHAVIORAL HEALTH)
GROOMING: INDEPENDENT
LAUNDRY: UNABLE TO COMPLETE
ORAL_HYGIENE: INDEPENDENT

## 2017-07-20 NOTE — PROGRESS NOTES
This writer returned a phone call Letha @ [facility] Phone: [phone] regarding the pt's upcoming psychaitry appt.  They wanted to verify if he was going to be able to make the appt or not.  I explained that at this time he is having some additional symptoms currently and will not likely d/c by his appt next week.     Will follow-up as we get closer to d/c for him for psychiatry appointment.

## 2017-07-20 NOTE — PROGRESS NOTES
BRIEF INTERNAL MEDICINE PROGRESS NOTE     Jv Pate  : 1985  MRN # [id number]  2017    ASSESSMENT & PLAN: Jv Pate is a 32 year old male with a history of paranoid schizophrenia, Cluster B with antisocial traits, Lyme disease, and HTN admitted to [facility] 12 on 17 with aucte psychiatric decompensation. Internal Medicine following since  for generalized maculopapular rash presumed secondary to drug eruption.    Acute Generalized Maculopapular Rash likely 2/2 Drug Eruption - Unclear timing of symptom onset; patient has given various reports as to timing and Psychiatry feels rash developed ~7/15-. Involves all extremities, trunk, and back while sparing the face. Rash is pruritic, but this is improving. No open lesions, vesicles, pustules, oral involvement, respiratory/airway compromise, or evidence of superimposed bacterial infection. No known drug allergies or history of similar rashes. Suspect due to drug eruption, though difficult to pinpoint which medication may be causing it. Risperdal discontinued  as this was the most recently initiated medication, though drug eruptions do not always coincide with drug initiation and can occur after weeks or more of medication use. Rash unchanged since Risperdal discontinued. Tegretol discontinued by Psychiatry today.  - Due to psychiatric decompensation and lack of rash improvement with medications being discontinued, will consult Dermatology for their assistance in determining the etiology of this rash so as to hopefully avoid discontinuing any unnecessary psychiatric medications. Spoke with on-call Dermatology resident.  - Benadryl available PRN for itching    Medicine will follow up on Dermatology recommendations.    Tammy Fregoso PA-C  Hospitalist Service  [phone]    INTERVAL HISTORY:   Medicine following for rash since . Rash essentially unchanged today. Remains pruritic. No open lesions, vesicles, pustules, or  "evidence of spread.     PHYSICAL EXAM:  Blood pressure 127/78, pulse 82, temperature 99  F (37.2  C), temperature source Tympanic, resp. rate 14, height 1.905 m (6' 3\"), weight 108.4 kg (239 lb), SpO2 100 %.  GENERAL: Awake. Lying in bed, appears to be resting comfortably.  HEENT: NC/AT. Anicteric sclera. No oral lesions. Mucous membranes moist.  EXTREMITIES: No peripheral edema. Warm & well perfused.  MUSCULOSKELETAL: No joint swelling or tenderness.   SKIN: Diffuse maculopapular rash involving bilateral upper and lower extremities, trunk, and back. Rash spares the face. Rash is essentially unchanged since 7/17. No open wounds, pustules, vesicles, drainage, excoriations, or evidence of superimposed bacterial infection. No jaundice.    LABS:   No recent laboratory data.    "

## 2017-07-20 NOTE — PROGRESS NOTES
"[facility], [place]   Psychiatric Progress Note        Interim History:   The patient's care was discussed with the treatment team during the daily team meeting and/or staff's chart notes were reviewed.  Staff reports patient is more irritable and tense. Paranoid this AM. Refused labs, stating that the  is covered with blood. Compliant with medications. Slept poorly. Tense and agitated this morning with posturing.  Skin rash not improving. No SI, LOYDA or HI.  Independent with ADL.     Met patient with staff. He expressed paranoia toward selected nursing staff. He noted that the  was covered in blood and was using dirty needle. He agreed to allow a different tech to obtain specimen. He denied SI, LOYDA and HI. Denied hallucinations and reported no racing thoughts. He noted that he is eating poorly but believes that he slept well last night. Rash is worsening.     Dicussed medications and care plan. I reviewed proposed medications profile.          lurasidone  40 mg Oral Daily     QUEtiapine  200 mg Oral At Bedtime     multivitamin, therapeutic  1 tablet Oral Daily     carBAMazepine  200 mg Oral BID     lisinopril  20 mg Oral Daily          Allergies:     Allergies   Allergen Reactions     Risperdal [Risperidone] Rash          Labs:     No results found for this or any previous visit (from the past 24 hour(s)).       Psychiatric Examination:     Vitals:    07/16/17 0948 07/17/17 0835 07/19/17 0800 07/20/17 0859   BP: 127/78      Pulse:       Resp:  16 14    Temp:  98.3  F (36.8  C) 99  F (37.2  C)    TempSrc:  Tympanic Tympanic    SpO2:       Weight:    108.4 kg (239 lb)   Height:           Weight is 239 lbs 0 oz  Body mass index is 29.87 kg/(m^2).    Appearance: awake, alert, adequately groomed, dressed in hospital scrubs, appeared as age stated and mild distress  Attitude:  guarded and somewhat cooperative  Eye Contact:  intense  Mood:  \"frustrated\"  Affect:  intensity is " heightened  Speech:  clear, coherent and normal prosody  Psychomotor Behavior:  no evidence of tardive dyskinesia, dystonia, or tics and intact station, gait and muscle tone  Throught Process:  goal oriented  Associations:  no loose associations  Thought Content:  no auditory hallucinations present, no visual hallucinations present and adamantly denied SI, LOYDA and HI. Internal stimuli and paranoia suspected  Insight:  fair  Judgement:  fair  Oriented to:  time, person, and place  Attention Span and Concentration:  intact  Recent and Remote Memory:  intact    Fund of Knowledge: Adequate         Precautions:     Behavioral Orders   Procedures     Assault precautions     Code 1 - Restrict to Unit     Elopement precautions     Routine Programming     As clinically indicated     Sexual precautions     Status 15     Every 15 minutes.          Diagnoses:     1.  Schizophrenia, paranoid type. (r/o schizoaffective Bipolar type)   2.  Noncompliance, nonadherence.   3.  Cluster B with antisocial traits.           Plan:     Medications:  -- Saphris: restated on admission and titrated to 10 mg BID but later cross tapered to Latuda due to lack of efficacy.  -- PRNs: Vistaril for anxiety and Seroquel for restlessness and haldol with Benadryl for agitation and psychosis. PRN ativan was discontinued. Seroquel 200 mg qhs PRN for racing thoughts and insomnia.   -- Seroquel 200 mg qHS   -- Latuda: started and titrated to 120 mg daily. The medication was helpful initially but later mood lability and paranoia re-emerged. Medications was lowered to 40 mg daily.   -- Risperdal was started and showed great efficacy but patient later developed a skin rash. Risperdal was eventually discontinued.  May consider Risperdal re-challenge if rash resolved after discontinuing tegretol.   -- Tegretol: start and titrated to 200 mg BID, level was 5.1 on 6/11 and 6.9 on 6/16 and 5.5 on 7/12. The patient developed a rash which failed to resolved after  d/c'ing Risperdal. Tegretol was suspected and discontinued on 7/20.   -- Cogentin was added at 0.5-1 mg BID PRN for EPS.     -- IM consult consult completed,  re: Lyme management. They have recommended no intervention at this time due to lack of neurological findings. This was based on their conversation with infectious disease specialist.    -- case discussed with IM and Pharm D. May consider Dermatology consult if rash fails to resolve.   -- obtain CBC with diff, CMP, CRP, ESR, Carbamazepine, and 10 11 epoxide (free and total).     Legal Status and Disposition:  1.  Commitment with [facility] for Saphris, Zyprexa, haloperidol, Latuda, and risperidone.  2.  Will work with [facility]  on [facility] placement. Given his statements about homicidal thoughts toward neighbor. May consider discharge home if safety established. [facility] informed local police department of the threats he made earlier in the hospital stay.

## 2017-07-20 NOTE — PROGRESS NOTES
"Patient refused his lab draw this AM.  Initially, he demanded to know what labs had been ordered and the rationale for the lab orders.  As this writer was gathering this information, Jv's agitation and paranoia continued to mount.  He raised his voice and made several delusional statements about the : \"I'm not getting my blood drawn by that kwame!  Can't you see!  He's covered in blood!\".  Patient took on an aggressive posture and appeared quite irritable and physically tense with clenched fists.  Reassured patient that he has the right to refuse.  Will continue to monitor closely.  "

## 2017-07-20 NOTE — PROGRESS NOTES
Patient was isolative and withdrawn with staff and peers isolating to his room majority of the evening. He appeared guarded and tense when pacing in the halls and was delayed in his responses to staff and appeared to be thinking out his responses to staff conversations. He was independent with ADL's and diet appeared normal.     07/19/17 2100   Behavioral Health   Hallucinations denies / not responding to hallucinations   Thinking distractable;paranoid   Orientation person: oriented;place: oriented;date: oriented;time: oriented   Memory baseline memory   Insight poor   Judgement impaired   Eye Contact at examiner   Affect blunted, flat;tense   Mood anxious;irritable   Physical Appearance/Attire attire appropriate to age and situation   Hygiene well groomed   Suicidality other (see comments)  (none stated or observed)   Self Injury other (see comment)  (none stated or observed)   Elopement (no behaviors noted)   Activity isolative;withdrawn   Speech clear;coherent  (delayed in responses)   Psychomotor / Gait agitated;paces;balanced;steady  (guared, tense)   Overt Aggression Scale   Verbal Aggression 0   Aggression against Property 0   Auto-Aggression 0   Physical Aggression 0   Overt Aggression Total Score 0   Sleep/Rest/Relaxation   Day/Evening Time Hours resting in bed   Number of hours resting in bed 1 hours   Coping/Psychosocial   Verbalized Emotional State frustration   Supportive Measures active listening utilized;decision-making supported;positive reinforcement provided;verbalization of feelings encouraged   Trust Relationship/Rapport care explained;thoughts/feelings acknowledged;questions answered;questions encouraged;reassurance provided   Daily Care   Activity up ad jomar   Patient Performed Hygiene teeth brushed;shower   Activities of Daily Living   Hygiene/Grooming handwashing;shower;independent   Oral Hygiene independent   Dress scrubs (behavioral health)   Laundry unable to complete   Room Organization  independent   Activity   Activity Level of Assistance independent   Behavioral Health Interventions   Psychotic Symptoms maintain safety precautions;maintain safe secure environment;decrease environmental stimulation;simple, clear language;encourage participation / independence with adls;monitor confusion, memory loss, decision making ability and reorient / intervent as needed   Social and Therapeutic Interventions (Psychotic Symptoms) encourage socialization with peers;encourage effective boundaries with peers;encourage participation in therapeutic groups and milieu activities

## 2017-07-20 NOTE — PROGRESS NOTES
Lab attempted a second blood draw this afternoon; however, patient remains paranoid and continues to refuse to cooperate with the lab draw.

## 2017-07-20 NOTE — PROGRESS NOTES
Jv appeared irritable today. He verbalized his desire to leave the hospital and go home and did not socialize. Jv was not cooperative with cares, refusing to have his blood drawn for lab measurements. He remained in his room most of the shift. No unsafe behaviors noted.       07/20/17 1516   Behavioral Health   Hallucinations denies / not responding to hallucinations   Thinking intact   Orientation person: oriented;place: oriented;date: oriented;time: oriented   Memory baseline memory   Insight admits / accepts   Judgement impaired   Eye Contact drooping   Affect irritable   Mood irritable   Physical Appearance/Attire disheveled   Hygiene other (see comment)  (adequate)   Suicidality other (see comments)  (none reported)   Self Injury other (see comment)  (none reported)   Elopement Statements about wanting to leave   Activity refusal   Speech clear;coherent   Coping/Psychosocial   Verbalized Emotional State frustration;anger   Activities of Daily Living   Hygiene/Grooming independent   Oral Hygiene independent   Dress scrubs (behavioral health)   Room Organization independent   Behavioral Health Interventions   Psychotic Symptoms maintain safe secure environment;maintain safety precautions;build upon strengths   Social and Therapeutic Interventions (Psychotic Symptoms) encourage socialization with peers;encourage effective boundaries with peers;encourage participation in therapeutic groups and milieu activities

## 2017-07-20 NOTE — CONSULTS
[facility] Inpatient Consult Dermatology Note    Impression/Plan:  33 yo schizophrenic man is seen during inpatient psychiatric hospitalization for a diffuse skin rash. Starting some time around 7/15-16, though timeline not completely clear. On exam, we find fine papules coalescing into edematous pink plaques over the trunk, buttock, upper and lower extremities with flexural predominance; several areas with collarettes of scale suggestive of possible resolving non-follicular pustules. Overall impression is one of a morbilliform eruption, though some areas have subtle features  reminiscent of acute generalized exanthematous pustulosis, which is severe cutaneous adverse drug reaction (SCAR). Our ddx includes simple drug eruption (non-life threatening) vs. AGEP vs. Allergic contact dermatitis with id reaction vs. Viral exanthem vs. Pustular psoriasis, vs DRESS. Overall, we favor simple drug reaction (see drug list below). Quetiapine, and risperidone were both initiated in expected window (5-14 days-simple drug/2-5 days AGEP). The patient continues on quetiapine at the time of consultation.     -At this point would recommend skin biopsy. The patient is, unfortunately, unwilling to participate with this in the hospital setting. Will recommend empiric treatment and close safety lab monitoring.   -Please order CBC with Diff and CMP. Trend daily until rash resolving.  -Start Triamcinolone 0.1% ointment (454 g tub please) twice daily to the affected skin.   -Consider discontinuation of Seroquel, though as long as the patient remains in stable condition without fevers or lab abnormalities, it is reasonable to treat through.   -It should be noted that it can take up to 1-2 weeks before things really improve significantly.   -If new fevers, mucosal involvement, notable lab abnormalities, please page.       Thank you for the dermatology consultation. Please do not hesitate to contact the dermatology  resident/faculty on call for any additional questions or concerns. We will continue to follow via chart review, but wuld be happy to see again on request with interval changes.     Russell Gallegos  Dermatology Resident    Attending Note  Update 7/21 - pt with clinical worsening including fever, as well as labs now suggesting DRESS/DIHS.  Did not have typical exam findings of DRESS on yesterday's exam (no facial involvement, not acutely ill appearing), but we suspect we were seeing him immediately before the onset of more obvious systemic involvement.  At this point carbamazepine is very likely the culprit medication, as DRESS tends to present 2-6 weeks after drug initiation.   Recommend immediate d/c of this med as well as strict contraindication to rechallenge.  Will likely need to start systemic CS with slow taper in light of new findings.      I have seen and examined this patient and agree with the assessment and plan as documented in the resident's note.    Harish Gaviria MD  Dermatology Attending      DRUG EXPOSURES:   (Rash starting ~7/15)  Lisinopril 6/1-Present  Lurasidone 6/10-present  *Carbamazepine 6/13-7/20 (could be consistent if dress labs+)  *Quetiapine 7/10-Present  Risperidone 7/12-7/17    *Reports of AGEP in the literature  Timeline consistent        Date of Admission: May 27, 2017   Encounter Date: 7/20/2017     Reason for Consultation:   Skin rash     History of Present Illness:  Mr. Jv Pate is a 32 year old male with schizophrenia who was admitted for acute psychiatric hospitalization. Dermatology was consulted for a diffuse cutaneous eruption. On report, this seems to have started sometime around 7/15-7/16. The patient states 1.5 week, though he is not a reliable historian. Notes that this started on the R lower abdomen and spread diffusely since that time. He has associated pruritus and is slightly uncomfortable with this, but otherwise well. Denies mucous membrane, palmoplantar  "involvement,fevers/chills. No recent labs on file.  Medication exposure hx as above. Was recently stopped on Risperdal (7/17) given concern about this as a culprit medication. Today, Tegretol was discontinued. Patient notes a hx of \"rash\" in the past but he cannot describe this. He has een using hydroxyzine and cortisone 10 topically for this with minimal to no improvement. Rash seems stable recently.     Past Medical History:   Patient Active Problem List   Diagnosis     Psychosis     Past Medical History:   Diagnosis Date     Cluster B personality disorder     antisocial traits.      H/o Lyme disease     Working with Dr. Karan Caputo from [place], [place]. [facility] with diagnosis of neurologic lyme disease.      Hypertension      Schizophrenia (H)     paranoid type     Past Surgical History:   Procedure Laterality Date     NO HISTORY OF SURGERY           Social History:  Social History     Social History     Marital status: Single     Spouse name: N/A     Number of children: N/A     Years of education: N/A     Occupational History     Not on file.     Social History Main Topics     Smoking status: Current Every Day Smoker     Packs/day: 1.50     Smokeless tobacco: Not on file     Alcohol use No     Drug use: No     Sexual activity: Not on file     Other Topics Concern     Not on file     Social History Narrative    Intake 6/1/17:         Information obtained from chart review.               His education, occupation, finances and legal issues:  Not working.  Indicates he did go to high school in [place] in 2004.  Then, [employer] but never finished and the school closed.        Records indicate tobacco use.     Tox on admission + THC.           Family History:  Family History   Problem Relation Age of Onset     Neurologic Disorder Father      HEART DISEASE Paternal Grandfather      attack        Medications:  Current Facility-Administered Medications   Medication     hydrocortisone (CORTAID) 1 % cream     " "diphenhydrAMINE (BENADRYL) injection 50 mg    Or     diphenhydrAMINE (BENADRYL) capsule 50 mg     lurasidone (LATUDA) tablet 40 mg     QUEtiapine (SEROquel) tablet 200 mg     QUEtiapine (SEROquel) tablet 200 mg     multivitamin, therapeutic (THERA-VIT) tablet 1 tablet     dimethicone-zinc oxide (EUCERIN) cream     carboxymethylcellulose (CELLUVISC/REFRESH LIQUIGEL) 1 % ophthalmic solution 1 drop     benztropine (COGENTIN) tablet 0.5-1 mg     sodium chloride (OCEAN) 0.65 % nasal spray 1 spray     aspirin tablet 325 mg     haloperidol (HALDOL) tablet 5-10 mg    Or     haloperidol lactate (HALDOL) injection 5-10 mg     acetaminophen (TYLENOL) tablet 650 mg     lisinopril (PRINIVIL/ZESTRIL) tablet 20 mg     nicotine polacrilex (NICORETTE) gum 2 mg     hydrOXYzine (ATARAX) tablet 25-50 mg          Allergies   Allergen Reactions     Risperdal [Risperidone] Rash         Review of Systems:  10 point ROS negative beyond above.       Physical exam:  Vitals: /78  Pulse 82  Temp 99  F (37.2  C) (Tympanic)  Resp 14  Ht 1.905 m (6' 3\")  Wt 108.4 kg (239 lb)  SpO2 100%  BMI 29.87 kg/m2  GEN: This is a well developed, well-nourished male in no acute distress. Abnormal affect; cooperative.   SKIN: Total skin including the undergarment areas was performed. The exam included the head/face, neck, both arms, chest, back, abdomen, both legs, digits and/or nails.   -Fine pink papules  coalescing into edematous pink plaques over the trunk, buttock, upper and lower extremities with flexural predominance. Most intensely involved areas over the right lower abdomen (L involved as well), and bilat. popliteal fossae.   -Small collarettes of scale noted over the areas of edematous, plaques. Small fine papules and possible resolving pustules overlying (smal, non-follicular).   -No PO or palmoplantar involvement today.   -No other lesions of concern on areas examined.     Laboratory:  No results found for this or any previous visit " (from the past 24 hour(s)).    Dr. Harish Gaviria staffed the patient.    Staff Involved:  Resident(Russell Gallegos)/Staff(as above)    Russell Gallegos MD  PGY3 Dermatology  [phone]

## 2017-07-21 ENCOUNTER — HOSPITAL ENCOUNTER (INPATIENT)
Facility: CLINIC | Age: 32
LOS: 2 days | Discharge: HOME | End: 2017-07-23
Attending: INTERNAL MEDICINE | Admitting: INTERNAL MEDICINE
Payer: MEDICAID

## 2017-07-21 VITALS
DIASTOLIC BLOOD PRESSURE: 64 MMHG | TEMPERATURE: 98.7 F | BODY MASS INDEX: 29.72 KG/M2 | RESPIRATION RATE: 14 BRPM | HEART RATE: 99 BPM | WEIGHT: 239 LBS | OXYGEN SATURATION: 100 % | SYSTOLIC BLOOD PRESSURE: 121 MMHG | HEIGHT: 75 IN

## 2017-07-21 DIAGNOSIS — D72.12 DRESS SYNDROME: Primary | ICD-10-CM

## 2017-07-21 DIAGNOSIS — T50.905A DRESS SYNDROME: Primary | ICD-10-CM

## 2017-07-21 LAB
ALBUMIN SERPL-MCNC: 3.6 G/DL (ref 3.4–5)
ALBUMIN UR-MCNC: 10 MG/DL
ALP SERPL-CCNC: 190 U/L (ref 40–150)
ALT SERPL W P-5'-P-CCNC: 180 U/L (ref 0–70)
ANION GAP SERPL CALCULATED.3IONS-SCNC: 11 MMOL/L (ref 3–14)
APPEARANCE UR: CLEAR
AST SERPL W P-5'-P-CCNC: 73 U/L (ref 0–45)
BACTERIA #/AREA URNS HPF: ABNORMAL /HPF
BASOPHILS # BLD AUTO: 0 10E9/L (ref 0–0.2)
BASOPHILS NFR BLD AUTO: 0.6 %
BILIRUB SERPL-MCNC: 0.4 MG/DL (ref 0.2–1.3)
BILIRUB UR QL STRIP: NEGATIVE
BUN SERPL-MCNC: 8 MG/DL (ref 7–30)
CALCIUM SERPL-MCNC: 8.6 MG/DL (ref 8.5–10.1)
CARBAMAZEPINE SERPL-MCNC: 3.6 MG/L (ref 4–12)
CHLORIDE SERPL-SCNC: 106 MMOL/L (ref 94–109)
CO2 SERPL-SCNC: 23 MMOL/L (ref 20–32)
COLOR UR AUTO: YELLOW
CREAT SERPL-MCNC: 0.57 MG/DL (ref 0.66–1.25)
CRP SERPL-MCNC: 6.9 MG/L (ref 0–8)
DIFFERENTIAL METHOD BLD: ABNORMAL
EOSINOPHIL # BLD AUTO: 1.1 10E9/L (ref 0–0.7)
EOSINOPHIL NFR BLD AUTO: 17.4 %
ERYTHROCYTE [DISTWIDTH] IN BLOOD BY AUTOMATED COUNT: 12.8 % (ref 10–15)
ERYTHROCYTE [SEDIMENTATION RATE] IN BLOOD BY WESTERGREN METHOD: 5 MM/H (ref 0–15)
GFR SERPL CREATININE-BSD FRML MDRD: ABNORMAL ML/MIN/1.7M2
GLUCOSE SERPL-MCNC: 104 MG/DL (ref 70–99)
GLUCOSE UR STRIP-MCNC: NEGATIVE MG/DL
HCT VFR BLD AUTO: 44.8 % (ref 40–53)
HGB BLD-MCNC: 15.2 G/DL (ref 13.3–17.7)
HGB UR QL STRIP: NEGATIVE
IMM GRANULOCYTES # BLD: 0 10E9/L (ref 0–0.4)
IMM GRANULOCYTES NFR BLD: 0.3 %
KETONES UR STRIP-MCNC: 10 MG/DL
LEUKOCYTE ESTERASE UR QL STRIP: NEGATIVE
LYMPHOCYTES # BLD AUTO: 1.8 10E9/L (ref 0.8–5.3)
LYMPHOCYTES NFR BLD AUTO: 28.1 %
MCH RBC QN AUTO: 28.9 PG (ref 26.5–33)
MCHC RBC AUTO-ENTMCNC: 33.9 G/DL (ref 31.5–36.5)
MCV RBC AUTO: 85 FL (ref 78–100)
MONOCYTES # BLD AUTO: 0.8 10E9/L (ref 0–1.3)
MONOCYTES NFR BLD AUTO: 12.9 %
MUCOUS THREADS #/AREA URNS LPF: PRESENT /LPF
NEUTROPHILS # BLD AUTO: 2.6 10E9/L (ref 1.6–8.3)
NEUTROPHILS NFR BLD AUTO: 40.7 %
NITRATE UR QL: NEGATIVE
NRBC # BLD AUTO: 0 10*3/UL
NRBC BLD AUTO-RTO: 0 /100
PH UR STRIP: 6 PH (ref 5–7)
PLATELET # BLD AUTO: 199 10E9/L (ref 150–450)
POTASSIUM SERPL-SCNC: 3.9 MMOL/L (ref 3.4–5.3)
PROT SERPL-MCNC: 6.5 G/DL (ref 6.8–8.8)
RBC # BLD AUTO: 5.26 10E12/L (ref 4.4–5.9)
RBC #/AREA URNS AUTO: 1 /HPF (ref 0–2)
SODIUM SERPL-SCNC: 140 MMOL/L (ref 133–144)
SP GR UR STRIP: 1.01 (ref 1–1.03)
URN SPEC COLLECT METH UR: ABNORMAL
UROBILINOGEN UR STRIP-MCNC: NORMAL MG/DL (ref 0–2)
WBC # BLD AUTO: 6.4 10E9/L (ref 4–11)
WBC #/AREA URNS AUTO: 2 /HPF (ref 0–2)

## 2017-07-21 PROCEDURE — 25000132 ZZH RX MED GY IP 250 OP 250 PS 637: Performed by: NURSE PRACTITIONER

## 2017-07-21 PROCEDURE — 25000132 ZZH RX MED GY IP 250 OP 250 PS 637: Performed by: PSYCHIATRY & NEUROLOGY

## 2017-07-21 PROCEDURE — 87799 DETECT AGENT NOS DNA QUANT: CPT | Performed by: PSYCHIATRY & NEUROLOGY

## 2017-07-21 PROCEDURE — 80339 ANTIEPILEPTICS NOS 1-3: CPT | Performed by: PSYCHIATRY & NEUROLOGY

## 2017-07-21 PROCEDURE — 93010 ELECTROCARDIOGRAM REPORT: CPT | Performed by: INTERNAL MEDICINE

## 2017-07-21 PROCEDURE — 36415 COLL VENOUS BLD VENIPUNCTURE: CPT | Performed by: PSYCHIATRY & NEUROLOGY

## 2017-07-21 PROCEDURE — 80156 ASSAY CARBAMAZEPINE TOTAL: CPT | Performed by: PSYCHIATRY & NEUROLOGY

## 2017-07-21 PROCEDURE — 85652 RBC SED RATE AUTOMATED: CPT | Performed by: PSYCHIATRY & NEUROLOGY

## 2017-07-21 PROCEDURE — 81001 URINALYSIS AUTO W/SCOPE: CPT | Performed by: PHYSICIAN ASSISTANT

## 2017-07-21 PROCEDURE — 25000132 ZZH RX MED GY IP 250 OP 250 PS 637: Performed by: PHYSICIAN ASSISTANT

## 2017-07-21 PROCEDURE — 87533 HHV-6 DNA QUANT: CPT | Performed by: PSYCHIATRY & NEUROLOGY

## 2017-07-21 PROCEDURE — 99222 1ST HOSP IP/OBS MODERATE 55: CPT | Mod: AI | Performed by: PHYSICIAN ASSISTANT

## 2017-07-21 PROCEDURE — 80157 ASSAY CARBAMAZEPINE FREE: CPT | Performed by: PSYCHIATRY & NEUROLOGY

## 2017-07-21 PROCEDURE — 93005 ELECTROCARDIOGRAM TRACING: CPT

## 2017-07-21 PROCEDURE — 86140 C-REACTIVE PROTEIN: CPT | Performed by: PSYCHIATRY & NEUROLOGY

## 2017-07-21 PROCEDURE — 80053 COMPREHEN METABOLIC PANEL: CPT | Performed by: PSYCHIATRY & NEUROLOGY

## 2017-07-21 PROCEDURE — 85025 COMPLETE CBC W/AUTO DIFF WBC: CPT | Performed by: PSYCHIATRY & NEUROLOGY

## 2017-07-21 RX ORDER — QUETIAPINE FUMARATE 100 MG/1
200 TABLET, FILM COATED ORAL AT BEDTIME
Status: DISCONTINUED | OUTPATIENT
Start: 2017-07-21 | End: 2017-07-23 | Stop reason: HOSPADM

## 2017-07-21 RX ORDER — HALOPERIDOL 5 MG/1
5-10 TABLET ORAL 3 TIMES DAILY PRN
Status: DISCONTINUED | OUTPATIENT
Start: 2017-07-21 | End: 2017-07-21 | Stop reason: CLARIF

## 2017-07-21 RX ORDER — QUETIAPINE FUMARATE 200 MG/1
200 TABLET, FILM COATED ORAL
Qty: 60 TABLET | Status: ON HOLD | DISCHARGE
Start: 2017-07-21 | End: 2017-09-01

## 2017-07-21 RX ORDER — PREDNISONE 20 MG/1
40 TABLET ORAL 2 TIMES DAILY WITH MEALS
Status: DISCONTINUED | OUTPATIENT
Start: 2017-07-21 | End: 2017-07-23 | Stop reason: HOSPADM

## 2017-07-21 RX ORDER — LURASIDONE HYDROCHLORIDE 20 MG/1
40 TABLET, FILM COATED ORAL DAILY
Status: DISCONTINUED | OUTPATIENT
Start: 2017-07-22 | End: 2017-07-23 | Stop reason: HOSPADM

## 2017-07-21 RX ORDER — DIPHENHYDRAMINE HCL 25 MG
50 CAPSULE ORAL 3 TIMES DAILY PRN
Status: DISCONTINUED | OUTPATIENT
Start: 2017-07-21 | End: 2017-07-21

## 2017-07-21 RX ORDER — HYDROXYZINE HYDROCHLORIDE 25 MG/1
25-50 TABLET, FILM COATED ORAL EVERY 4 HOURS PRN
Qty: 120 TABLET | DISCHARGE
Start: 2017-07-21

## 2017-07-21 RX ORDER — HALOPERIDOL 5 MG/ML
5-10 INJECTION INTRAMUSCULAR 3 TIMES DAILY PRN
Qty: 90 ML | Status: ON HOLD | DISCHARGE
Start: 2017-07-21 | End: 2017-09-01

## 2017-07-21 RX ORDER — DIPHENHYDRAMINE HYDROCHLORIDE 50 MG/ML
50 INJECTION INTRAMUSCULAR; INTRAVENOUS 3 TIMES DAILY PRN
Status: DISCONTINUED | OUTPATIENT
Start: 2017-07-21 | End: 2017-07-21 | Stop reason: CLARIF

## 2017-07-21 RX ORDER — BENZTROPINE MESYLATE 0.5 MG/1
.5-1 TABLET ORAL 2 TIMES DAILY PRN
Status: ON HOLD | DISCHARGE
Start: 2017-07-21 | End: 2017-09-01

## 2017-07-21 RX ORDER — LIDOCAINE 40 MG/G
CREAM TOPICAL
Status: DISCONTINUED | OUTPATIENT
Start: 2017-07-21 | End: 2017-07-23 | Stop reason: HOSPADM

## 2017-07-21 RX ORDER — PREDNISONE 20 MG/1
40 TABLET ORAL 2 TIMES DAILY WITH MEALS
Status: DISCONTINUED | OUTPATIENT
Start: 2017-07-21 | End: 2017-07-21 | Stop reason: HOSPADM

## 2017-07-21 RX ORDER — LURASIDONE HYDROCHLORIDE 40 MG/1
40 TABLET, FILM COATED ORAL DAILY
Status: ON HOLD | DISCHARGE
Start: 2017-07-21 | End: 2017-09-01

## 2017-07-21 RX ORDER — HALOPERIDOL 5 MG/ML
5-10 INJECTION INTRAMUSCULAR 3 TIMES DAILY PRN
Status: DISCONTINUED | OUTPATIENT
Start: 2017-07-21 | End: 2017-07-23 | Stop reason: HOSPADM

## 2017-07-21 RX ORDER — DIPHENHYDRAMINE HYDROCHLORIDE 50 MG/ML
50 INJECTION INTRAMUSCULAR; INTRAVENOUS 3 TIMES DAILY PRN
Qty: 0.5 ML | Status: ON HOLD | DISCHARGE
Start: 2017-07-21 | End: 2017-09-01

## 2017-07-21 RX ORDER — LISINOPRIL 20 MG/1
20 TABLET ORAL DAILY
Qty: 30 TABLET | Status: ON HOLD | DISCHARGE
Start: 2017-07-21 | End: 2017-09-01

## 2017-07-21 RX ORDER — HYDROXYZINE HYDROCHLORIDE 25 MG/1
25-50 TABLET, FILM COATED ORAL EVERY 4 HOURS PRN
Status: DISCONTINUED | OUTPATIENT
Start: 2017-07-21 | End: 2017-07-23 | Stop reason: HOSPADM

## 2017-07-21 RX ORDER — PREDNISONE 20 MG/1
40 TABLET ORAL 2 TIMES DAILY WITH MEALS
Status: ON HOLD | DISCHARGE
Start: 2017-07-21 | End: 2017-07-23

## 2017-07-21 RX ORDER — DIPHENHYDRAMINE HCL 25 MG
50 CAPSULE ORAL 3 TIMES DAILY PRN
Status: DISCONTINUED | OUTPATIENT
Start: 2017-07-21 | End: 2017-07-21 | Stop reason: CLARIF

## 2017-07-21 RX ORDER — ACETAMINOPHEN 325 MG/1
650 TABLET ORAL EVERY 4 HOURS PRN
Qty: 100 TABLET | DISCHARGE
Start: 2017-07-21

## 2017-07-21 RX ORDER — HALOPERIDOL 5 MG/ML
5-10 INJECTION INTRAMUSCULAR 3 TIMES DAILY PRN
Status: DISCONTINUED | OUTPATIENT
Start: 2017-07-21 | End: 2017-07-21 | Stop reason: CLARIF

## 2017-07-21 RX ORDER — HALOPERIDOL 5 MG/1
5-10 TABLET ORAL 3 TIMES DAILY PRN
Status: DISCONTINUED | OUTPATIENT
Start: 2017-07-21 | End: 2017-07-23 | Stop reason: HOSPADM

## 2017-07-21 RX ORDER — DIPHENHYDRAMINE HCL 25 MG
50 CAPSULE ORAL 3 TIMES DAILY PRN
Status: DISCONTINUED | OUTPATIENT
Start: 2017-07-21 | End: 2017-07-23 | Stop reason: HOSPADM

## 2017-07-21 RX ORDER — LISINOPRIL 20 MG/1
20 TABLET ORAL DAILY
Status: DISCONTINUED | OUTPATIENT
Start: 2017-07-22 | End: 2017-07-23 | Stop reason: HOSPADM

## 2017-07-21 RX ORDER — TRIAMCINOLONE ACETONIDE 1 MG/G
OINTMENT TOPICAL 2 TIMES DAILY
Status: DISCONTINUED | OUTPATIENT
Start: 2017-07-21 | End: 2017-07-23 | Stop reason: HOSPADM

## 2017-07-21 RX ORDER — MULTIVITAMIN,THERAPEUTIC
1 TABLET ORAL DAILY
Refills: 0 | DISCHARGE
Start: 2017-07-21

## 2017-07-21 RX ORDER — DIPHENHYDRAMINE HYDROCHLORIDE 50 MG/ML
50 INJECTION INTRAMUSCULAR; INTRAVENOUS 3 TIMES DAILY PRN
Status: DISCONTINUED | OUTPATIENT
Start: 2017-07-21 | End: 2017-07-23 | Stop reason: HOSPADM

## 2017-07-21 RX ORDER — DIPHENHYDRAMINE HYDROCHLORIDE 50 MG/ML
50 INJECTION INTRAMUSCULAR; INTRAVENOUS 3 TIMES DAILY PRN
Status: DISCONTINUED | OUTPATIENT
Start: 2017-07-21 | End: 2017-07-21

## 2017-07-21 RX ORDER — ACETAMINOPHEN 325 MG/1
650 TABLET ORAL EVERY 4 HOURS PRN
Status: DISCONTINUED | OUTPATIENT
Start: 2017-07-21 | End: 2017-07-23 | Stop reason: HOSPADM

## 2017-07-21 RX ORDER — BENZTROPINE MESYLATE 0.5 MG/1
.5-1 TABLET ORAL 2 TIMES DAILY PRN
Status: DISCONTINUED | OUTPATIENT
Start: 2017-07-21 | End: 2017-07-23 | Stop reason: HOSPADM

## 2017-07-21 RX ORDER — DIPHENHYDRAMINE HCL 50 MG
50 CAPSULE ORAL 3 TIMES DAILY PRN
Qty: 56 CAPSULE | Status: ON HOLD | DISCHARGE
Start: 2017-07-21 | End: 2017-09-01

## 2017-07-21 RX ORDER — QUETIAPINE FUMARATE 200 MG/1
200 TABLET, FILM COATED ORAL AT BEDTIME
Qty: 60 TABLET | Status: ON HOLD | DISCHARGE
Start: 2017-07-21 | End: 2017-09-01

## 2017-07-21 RX ORDER — HALOPERIDOL 5 MG/1
5-10 TABLET ORAL 3 TIMES DAILY PRN
Status: ON HOLD | DISCHARGE
Start: 2017-07-21 | End: 2017-09-01

## 2017-07-21 RX ORDER — MULTIVITAMIN,THERAPEUTIC
1 TABLET ORAL DAILY
Status: DISCONTINUED | OUTPATIENT
Start: 2017-07-22 | End: 2017-07-23 | Stop reason: HOSPADM

## 2017-07-21 RX ORDER — QUETIAPINE FUMARATE 100 MG/1
200 TABLET, FILM COATED ORAL
Status: DISCONTINUED | OUTPATIENT
Start: 2017-07-21 | End: 2017-07-23 | Stop reason: HOSPADM

## 2017-07-21 RX ADMIN — QUETIAPINE FUMARATE 200 MG: 100 TABLET, FILM COATED ORAL at 21:30

## 2017-07-21 RX ADMIN — CARBOXYMETHYLCELLULOSE SODIUM 1 DROP: 10 GEL OPHTHALMIC at 22:08

## 2017-07-21 RX ADMIN — PREDNISONE 40 MG: 20 TABLET ORAL at 19:17

## 2017-07-21 RX ADMIN — DIPHENHYDRAMINE HYDROCHLORIDE 50 MG: 50 CAPSULE ORAL at 09:04

## 2017-07-21 RX ADMIN — LISINOPRIL 20 MG: 20 TABLET ORAL at 09:01

## 2017-07-21 RX ADMIN — LURASIDONE HYDROCHLORIDE 40 MG: 40 TABLET, FILM COATED ORAL at 09:02

## 2017-07-21 RX ADMIN — HYDROCORTISONE: 10 CREAM TOPICAL at 04:18

## 2017-07-21 RX ADMIN — MULTIPLE VITAMINS W/ MINERALS TAB 1 TABLET: TAB at 09:01

## 2017-07-21 NOTE — PROVIDER NOTIFICATION
Internal medicine paged and returned call. Patient update given. IM indicated that they would f/u with dermatology at this time.

## 2017-07-21 NOTE — PROGRESS NOTES
Pt is isolative to room and withdrawn from staff and peers this evening. Pt was seen by dermatology this evening. Pt was cooperative with examination, no behavioral concerns to report for the examination or this evening. Pt was unable to remember who this writer was this evening. I only note this as the pt has seemed to decompensate and this would be a new symptom, short term memory problems. Pt and I normally have a good rapport and are on a first name basis. Today pt treated this writer like a stranger, which was new. Affect tense, mood irritable today.      07/20/17 2240   Behavioral Health   Hallucinations denies / not responding to hallucinations   Thinking poor concentration   Orientation person: oriented;place: oriented;date: oriented;time: oriented   Memory baseline memory;short term;other (see comment)  (was unable to remember who this writer was today.)   Insight admits / accepts   Judgement impaired   Eye Contact at examiner   Affect tense;irritable   Mood irritable   Physical Appearance/Attire untidy;disheveled   Hygiene neglected grooming - unclean body, hair, teeth   Suicidality other (see comments)  (none noted)   Self Injury other (see comment)  ( none noted)   Elopement Statements about wanting to leave   Activity withdrawn;isolative   Speech clear;coherent   Medication Sensitivity no observed side effects;no stated side effects   Psychomotor / Gait balanced;steady   Activities of Daily Living   Hygiene/Grooming independent   Oral Hygiene independent   Dress scrubs (behavioral health)   Laundry unable to complete   Room Organization independent   Behavioral Health Interventions   Psychotic Symptoms maintain safety precautions;monitor need to revise level of observation;maintain safe secure environment;provide emotional support;establish therapeutic relationship;assist with developing & utilizing healthy coping strategies;build upon strengths;monitor need for prn medication   Social and Therapeutic  Interventions (Psychotic Symptoms) encourage socialization with peers;encourage effective boundaries with peers;encourage participation in therapeutic groups and milieu activities

## 2017-07-21 NOTE — PROGRESS NOTES
Dermatology Progress Note:   31 yo schizophrenic man seen in dermatologic consultation for diffuse skin rash during prolonged inpatient psychiatric hospitalization.   A/P:  1. DRESS Syndrome  DRESS syndrome, now known as Drug Induced Hypersensitivity syndrome (DIHS), is a severe cutaneous adverse reaction (SCAR) associated with a 10% mortality rate. The diffuse, morbilliform eruption, with probable facial edema/invovlement, subjective fatigue/malaise, LFT elevation (, AST 73, ALKP 190) and new peripheral eosinophilia 1.1 is suggestive. He was started on classic culprit medication, Carbemazepine, ~ 5 weeks prior to rash start; which is in the classic 4-6 week window for this condition. (though possible between 2-8 weeks)   -At this point, would transfer him to the medicine floor/service for close monitoring, *telemetry, frequent vitals, and skilled nursing care; [place] preferable 2/2 dermatology presence.   -Start Prednisone (or IV equivalent) 80 mg daily. DIHS requires a long taper. Recs to following pending course.   -Start BID Triamcinolone 0.1% ointment (454 g tub please) twice daily to the affected skin.   -Continue with daily CBC w/ Diff and CMP  -Order UA  -Obtain baseline EKG  -Order CMV, EBV and HHV6 PCR  -Avoid carbamazepine!   -Our suspicion, now that DIHS is the diagnosis, is much lower for medications like seroquel and risperdol which cause simple drug eruptions (non-life threatening) in the timeline they were given. These may be reasonable options as psychiatric decompensation will likely be expected in the context of decreased antipsychotics and systemic corticosteroids.   -If condition continue to worsen despite avoidance of carbamazepine and systemic steroids, we will need to consider alternative agents to lisinopril and lurarasidone as well, though feel these are unlikely culprit meds presently.     Dermatology will continue to follow this weekend. Please call the weekend on-call resident  "(Fifi Serna MD) with questions or concerns.        DRUG EXPOSURES:   (Rash starting ~7/15)  Lisinopril 6/1-Present  Lurasidone 6/10-present  *Carbamazepine 6/13-7/20   Quetiapine 7/10-Present  Risperidone 7/12-7/17     Timeline consistent with DIHS  *Bold=Classical DIHS drug.     S: patient continues to \"feel sick\". Now with facial involvement. Some apparent psychiatric decompensation; no longer recognizing those staff he is on a first name basis with. Now with transaminitis, eosinophilia. Topicals not started. Still off of carbamazepine.       O:   Physical exam:  /78  Pulse 82  Temp 99  F (37.2  C) (Tympanic)  Resp 14  Ht 1.905 m (6' 3\")  Wt 108.4 kg (239 lb)  SpO2 100%  BMI 29.87 kg/m2   GEN: Fatigued appearing man with abnormal affect.   SKIN: Total skin including the undergarment areas was performed. The exam included the head/face, neck, both arms, chest, back, abdomen, both legs, digits and/or nails.   -Fine papules coalescing into edematous pink plaques over the trunk, buttock, upper and lower extremities with flexural predominance. Most intensely involved areas over the right lower abdomen (L involved as well), and bilat. popliteal fossae.   -Small collarettes of scale noted over the areas of edematous, plaques.   -Mild facial edema with erythematous macules/patches on the lateral face.   -No oral or palmoplantar involvement today.   -No other lesions of concern on areas examined.     Patient was staffed with Dr. Alta Gallegos MD  PGY3 Dermatology  [phone]   "

## 2017-07-21 NOTE — PROGRESS NOTES
Paged internal medicine to review notes. IM returned paged, indicated that will get to the orders but as of now they are extremely busy.

## 2017-07-21 NOTE — PROVIDER NOTIFICATION
Paged internal medicine to review patient labs and consult note from dermatology for advise and treat recommendations.

## 2017-07-21 NOTE — PROGRESS NOTES
I did not see the patient today, however his provider was not available when decision to discharge had to be made. Per staff, patient has been under good enough behavioral control to go to medicine.    Patient was seen by dermatology, who recommended that he go to medicine due to worsening rash and abnormal labs. There is a not insignificant risk of further organ damage and he would be better served by level of monitoring on a medical unit. They recommended treatment to be followed by internal medicine team.    I also spoke with the internal medicine consult, who indicated that he will be transferred to internal medicine when a bed is available.

## 2017-07-21 NOTE — PROGRESS NOTES
Patient was discharged to [address] [place] [address]. A code green was called to escort him due to his elopement risk. Security was present. Report was given to Yaneth CINTRON. It was explained that patient is committed and will need to return when medically cleared. Belongings were sent with patient.

## 2017-07-21 NOTE — PHARMACY
Prescriber Notification Note    The pharmacist has communicated with this patient's provider regarding a concern or therapy recommendation.    Notified Person: Dr. Gallegos  Date/Time of Notification: 7/21/17  12:57  Interaction: text page  Concern/Recommendation:  Follow-up with lab results and rash     Comments/Additional Details: Paged MD to notify of lab abnormalities and staff reporting worsening rash.  MD called back at 1:18 to inform of plans to see the patient.

## 2017-07-22 LAB
ALBUMIN SERPL-MCNC: 3.5 G/DL (ref 3.4–5)
ALP SERPL-CCNC: 185 U/L (ref 40–150)
ALT SERPL W P-5'-P-CCNC: 177 U/L (ref 0–70)
ANION GAP SERPL CALCULATED.3IONS-SCNC: 11 MMOL/L (ref 3–14)
AST SERPL W P-5'-P-CCNC: 64 U/L (ref 0–45)
BASOPHILS # BLD AUTO: 0 10E9/L (ref 0–0.2)
BASOPHILS NFR BLD AUTO: 0.6 %
BILIRUB SERPL-MCNC: 0.6 MG/DL (ref 0.2–1.3)
BUN SERPL-MCNC: 14 MG/DL (ref 7–30)
CALCIUM SERPL-MCNC: 8.6 MG/DL (ref 8.5–10.1)
CHLORIDE SERPL-SCNC: 101 MMOL/L (ref 94–109)
CMV DNA SPEC NAA+PROBE-ACNC: NORMAL [IU]/ML
CMV DNA SPEC NAA+PROBE-LOG#: NORMAL {LOG_IU}/ML
CO2 SERPL-SCNC: 26 MMOL/L (ref 20–32)
CREAT SERPL-MCNC: 0.63 MG/DL (ref 0.66–1.25)
DIFFERENTIAL METHOD BLD: ABNORMAL
EOSINOPHIL # BLD AUTO: 1.2 10E9/L (ref 0–0.7)
EOSINOPHIL NFR BLD AUTO: 17.7 %
ERYTHROCYTE [DISTWIDTH] IN BLOOD BY AUTOMATED COUNT: 12.8 % (ref 10–15)
GFR SERPL CREATININE-BSD FRML MDRD: ABNORMAL ML/MIN/1.7M2
GLUCOSE SERPL-MCNC: 133 MG/DL (ref 70–99)
HCT VFR BLD AUTO: 44.5 % (ref 40–53)
HGB BLD-MCNC: 14.8 G/DL (ref 13.3–17.7)
IMM GRANULOCYTES # BLD: 0 10E9/L (ref 0–0.4)
IMM GRANULOCYTES NFR BLD: 0.3 %
LYMPHOCYTES # BLD AUTO: 1.7 10E9/L (ref 0.8–5.3)
LYMPHOCYTES NFR BLD AUTO: 25.4 %
MCH RBC QN AUTO: 28.7 PG (ref 26.5–33)
MCHC RBC AUTO-ENTMCNC: 33.3 G/DL (ref 31.5–36.5)
MCV RBC AUTO: 86 FL (ref 78–100)
MONOCYTES # BLD AUTO: 0.7 10E9/L (ref 0–1.3)
MONOCYTES NFR BLD AUTO: 10.9 %
NEUTROPHILS # BLD AUTO: 3.1 10E9/L (ref 1.6–8.3)
NEUTROPHILS NFR BLD AUTO: 45.1 %
NRBC # BLD AUTO: 0 10*3/UL
NRBC BLD AUTO-RTO: 0 /100
PLATELET # BLD AUTO: 224 10E9/L (ref 150–450)
POTASSIUM SERPL-SCNC: 3.9 MMOL/L (ref 3.4–5.3)
PROT SERPL-MCNC: 6.6 G/DL (ref 6.8–8.8)
RBC # BLD AUTO: 5.15 10E12/L (ref 4.4–5.9)
SODIUM SERPL-SCNC: 138 MMOL/L (ref 133–144)
SPECIMEN SOURCE: NORMAL
WBC # BLD AUTO: 6.8 10E9/L (ref 4–11)

## 2017-07-22 PROCEDURE — 99207 ZZC CDG-MDM COMPONENT: MEETS LOW - DOWN CODED: CPT | Performed by: INTERNAL MEDICINE

## 2017-07-22 PROCEDURE — 99221 1ST HOSP IP/OBS SF/LOW 40: CPT

## 2017-07-22 PROCEDURE — 99207 ZZC APP CREDIT; MD BILLING SHARED VISIT: CPT | Performed by: PHYSICIAN ASSISTANT

## 2017-07-22 PROCEDURE — 99232 SBSQ HOSP IP/OBS MODERATE 35: CPT | Performed by: INTERNAL MEDICINE

## 2017-07-22 RX ADMIN — TRIAMCINOLONE ACETONIDE: 1 OINTMENT TOPICAL at 21:40

## 2017-07-22 RX ADMIN — LURASIDONE HYDROCHLORIDE 40 MG: 20 TABLET, FILM COATED ORAL at 08:51

## 2017-07-22 RX ADMIN — THERA TABS 1 TABLET: TAB at 08:51

## 2017-07-22 RX ADMIN — PREDNISONE 40 MG: 20 TABLET ORAL at 18:15

## 2017-07-22 RX ADMIN — Medication 1 LOZENGE: at 14:32

## 2017-07-22 RX ADMIN — TRIAMCINOLONE ACETONIDE: 1 OINTMENT TOPICAL at 00:21

## 2017-07-22 RX ADMIN — TRIAMCINOLONE ACETONIDE: 1 OINTMENT TOPICAL at 08:52

## 2017-07-22 RX ADMIN — PREDNISONE 40 MG: 20 TABLET ORAL at 08:51

## 2017-07-22 RX ADMIN — QUETIAPINE FUMARATE 200 MG: 100 TABLET, FILM COATED ORAL at 21:39

## 2017-07-22 RX ADMIN — CARBOXYMETHYLCELLULOSE SODIUM 1 DROP: 10 GEL OPHTHALMIC at 06:39

## 2017-07-22 RX ADMIN — LISINOPRIL 20 MG: 20 TABLET ORAL at 08:51

## 2017-07-22 ASSESSMENT — ACTIVITIES OF DAILY LIVING (ADL)
GROOMING: SHOWER;INDEPENDENT
DRESS: STREET CLOTHES;INDEPENDENT

## 2017-07-22 NOTE — PROGRESS NOTES
Dermatology Progress Note:   33 yo schizophrenic man seen in dermatologic consultation for diffuse skin rash during prolonged inpatient psychiatric hospitalization.   A/P:  1. 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   DRESS syndrome, now known as Drug Induced Hypersensitivity syndrome (DIHS), is a severe cutaneous adverse reaction (SCAR) associated with a 10% mortality rate. The diffuse, morbilliform eruption, with probable facial edema/invovlement, subjective fatigue/malaise, LFT elevation (, AST 73, ALKP 190) and new peripheral eosinophilia 1.1 is suggestive. He was started on classic culprit medication, Carbemazepine, ~ 5 weeks prior to rash start; which is in the classic 4-6 week window for this condition. (though possible between 2-8 weeks)   -Continue Prednisone (or IV equivalent) 80 mg daily. DIHS requires a long taper. Recs to following pending course.   -Continue BID Triamcinolone 0.1% ointment (454 g tub please) twice daily to the affected skin.   -Continue with daily CBC w/ Diff and CMP  -F/U CMV, EBV and HHV6 PCR  -Avoid carbamazepine!   -Our suspicion, now that DIHS is the diagnosis, is much lower for medications like seroquel and risperdol which cause simple drug eruptions (non-life threatening) in the timeline they were given. These may be reasonable options as psychiatric decompensation will likely be expected in the context of decreased antipsychotics and systemic corticosteroids.   -If condition continue to worsen despite avoidance of carbamazepine and systemic steroids, we will need to consider alternative agents to lisinopril and lurarasidone as well, though feel these are unlikely culprit meds presently.      Dermatology will continue to follow this weekend. Please call the weekend on-call resident (Fifi Serna MD) with questions or concerns.     I have personally examined this patient and agree with Dr. Serna's documentation and plan of care. I have reviewed  and amended the resident's note above. The documentation accurately reflects my clinical observations, diagnoses, treatment and follow-up plans.     Mary Holm MD  Pediatric Dermatology Staff          DRUG EXPOSURES:   (Rash starting ~7/15)  Lisinopril 6/1-Present  Lurasidone 6/10-present  *Carbamazepine 6/13-7/20   Quetiapine 7/10-Present  Risperidone 7/12-7/17      Timeline consistent with DIHS  *Bold=Classical DIHS drug.      S: The patient feels substantially better today, more himself. He is friendly and conversational today, whereas he was previously aggressive and hostile. He does not think his face is swollen, and feels better than he has in days. Continues to have stable transaminitis and eosinophilia. Is willing to use topicals now. Still off carbamazepine.        O:   Physical exam:  /68  Pulse 113  Temp 96.6  F (35.9  C) (Oral)  Resp 18  SpO2 98%    GEN: Well appearing man with relatively normal affect. Friendly and conversational.  SKIN: Total skin excluding the undergarment areas was performed.   -Fine papules coalescing into edematous pink plaques over the trunk, buttock, upper and lower extremities with flexural predominance. Most intensely involved areas over lower abdomen (L involved as well), and bilat. popliteal fossae.   -Small collarettes of scale noted over the areas of edematous, plaques.   -Mild facial edema with erythematous macules/patches on the lateral face.   -No oral or palmoplantar involvement today.   -No other lesions of concern on areas examined.      Patient was staffed with Dr. Holm    __________________________  Fifi Serna MD  Medicine/Dermatology PGY-3  p [phone]

## 2017-07-22 NOTE — PLAN OF CARE
Problem: Goal Outcome Summary  Goal: Goal Outcome Summary  Outcome: No Change  Pt arrived from station 12N around 1830.  A&Ox4, VSS, lung sounds clear, telemetry NSR, denies; SOB, chest pain, nausea, and numbness/tingling. Pt has a very flat affect, has a blank stare and is unable to answer questions at times. Very slow thought process, per psych tech this has gotten worse over the last week. Had RN flyer try to start a PIV, pt refused and stated he was scared and wanted his mom at the bedside, tried to contact pt's mother,and was unable to get ahold of her. Pt has a rash that involves all extremities, trunk and back, reddened, raised and warm to the touch. Pt denies pain. Pt is currently committed and has a 1:1 sitter at the bedside.

## 2017-07-22 NOTE — PLAN OF CARE
Problem: Goal Outcome Summary  Goal: Goal Outcome Summary  Outcome: No Change  Pt refused to have an IV put in, states he wants his mom here because he is scared. Tried to contact pt's mother but unable to get a hold of her, will continue to try.

## 2017-07-22 NOTE — CONSULTS
Psychiatry    Impression: Schizophrenia, on high dose steroid, not overtly psychotic or agitated at this time.    Recommendations:    1. Continue current psychotropics.    2. Continue sitter, monitor mental status closely as is on high dose steroids and off tegretol.    3. Haldol prn.    4. Call with questions. Dictated.    Jason Dorsey MD  [phone]

## 2017-07-22 NOTE — H&P
Internal Medicine History and Physical    Patient Name: Jv Pate MRN# [id number]   Age: 32 year old YOB: 1985     Date of Admission:7/21/2017    Primary care provider: No primary care provider on file.  [facility]         Assessment and Plan:   Jv Pate is a 32 year old male with a history of paranoid schizophrenia, Cluster B with antisocial traits, Lyme disease, and HTN who was admitted to inpatient behavioral health on 5/27/17 with acute psychiatric decompensation. Transferred to [facility] on 7/21/17 after developing morbilliform rash concerning for DRESS.     Morbilliform Rash with Concern for DRESS - Developed ~7/15-7/16. Involves all extremities, trunk, and back. No open lesions, vesicles,oral involvement, or respiratory/airway compromise. No known drug allergies or history of similar rashes. Associated with transaminitis and eosinophilia concerning for DRESS. Most likely culprit Carbemazepine which was discontinued 7/21. Risperdal discontinued 7/17 as initially felt to be etiology, though appears less likely now given lack of improvement in rash after discontinuation and is not as commonly associated with DRESS.  - Start Prednisone 40 mg PO BID. Anticipate need for prolonged taper.  - Start Triamcinolone 0.1% ointment BID  - Obtain baseline EKG per Dermatology  - Telemetry monitoring due to potential for cardiac involvement  - Obtain UA to evaluate for proteinuria per Dermatology  - Check EBV, CMV, and HHV-6 PCRs per Dermatology  - Daily CBC with diff and CMP  - If worsens despite discontinuation of Tegretol and initiation of systemic steroids, may need to consider alternative agents to Lisinopril and Lutuda per Dermatology (though Derm feels these are unlikely culprit medications presently)  - Appreciate Dermatology following    Paranoid Schizophrenia, Cluster B with Antisocial Traits - Admitted to inpatient psychiatry 5/27 until transfer  to medical floor on 7/21. Underwent commitment with [employer] for Saphirs, Zyprexa, Haldol, Latuda, and Risperdal. Risperdal dc'd 7/17 and Tegretol dc'd 7/21 due to DRESS concerns with worsening psychiatric state since. Denies HI, SI, or hallucinations.  - 1:1 monitoring  - Psychiatry Consult 7/22  - Continue Seroquel 200 mg q HS with an additional 200 mg available q HS PRN for racing thoughts and insomnia  - Continue Latuda 40 mg QD  - Continue Atarax PRN for anxiety, Haldol with Benadryl PRN for agitation and psychosis, and Cogentin PRN for EPS  - Per d/w Dr. Diana, can likely resume Risperdal as Dermatology felt Tegretol was more likely the etiology of possible DRESS    Elevated LFTs - New onset 7/21/17 with Alk Phos 190, , AST 73. Total Bilirubin WNL. No underlying history of liver disease. LFTs previously WNL in 6/2017. DDx includes DRESS vs medication related (Seroquel, Risperdal, Tegretol). Asymptomatic.  - Trend CMP on 7/22     HTN - BP well controlled on PTA Lisinopril 20 mg QD. Continue with hold parameters.     CODE Status: Full  Diet/IVF: Regular  DVT ppx: Ambulation  Disposition/Admission Status: Inpatient, >2 midnight stay. Transfer back to inpatient psychiatry once medically stabel.    Tammy Fregoso PA-C  Hospitalist Service  Pager: [phone]           Chief Complaint:   Rash         HPI:   Jv Pate is a 32 year old male with a history of paranoid schizophrenia, cluster B with antisocial traits, Lyme disease with possible neurologic involvement, and HTN who was admitted to inpatient behavioral health on 7/1/17 with acute psychiatric decompensation and homicidal ideation. While on inpatient psychiatry, he has undergone commitment with [employer] for Saphris, Zyprexa, Haloperidol, Latuda, and Risperdal. His inpatient psychiatry course was complicated by the development of morbiliform rash for which Internal Medicine has been following Mr. Pate since 7/17.     Unclear timing of rash onset;  patient has given variable and unreliable reports as to timing and Psychiatry staff reports rash developed sometime over the weekend of 7/15-7/16. Rash has involved his trunk, back, upper, and lower extremities but has spared the face. Rash has been pruritic without draining lesions or evidence of superimposed bacterial infection. Rash felt to be drug eruption and Risperdal was discontinued on 7/16 as this was the most recently initiated medication (started on 7/12). Rash persisted without improvement despite Risperdal discontinuation, thus Tegretol was discontinued and Dermatology was consulted 7/20. Dermatology initially felt rash was most likely secondary to simple drug eruption and recommended topical steroids, daily CMP and CBC with diff until rash resolved, and skin biopsy which patient refused. CMP and CBC with diff obtained 7/22 per Dermatology recommendations which showed elevated LFTs (Alk Phos 190, , AST 73) and absolute eosinophilia which was concerning for DRESS. Given this, Dermatology recommended transfer to medical floor for close medical monitoring and steroid initiation.          Past Medical History:   Schizophrenia  Cluster B Personality Disorder with Antisocial traits  HTN  ? History of Lyme's Disease - Multiple prior antibiotic courses including chronic Azithromycin and Flagyl therapy based upon living in an endemic area with multiple tick exposures and possible bull's eye rash. No apparent serological diagnosis of Lyme disease. Chart review indicates possible hx of neurologic Lyme disease, though was evaluated by Neurology in 2013 who noted brain MRI was negative and no prior CSF evaluation was performed. Neurology did not recommend obtaining LP for CSF testing upon their evaluation in 2013.          Past Surgical History:   No past surgical history.         Social History:   Tobacco use consisted of 1.5 ppd prior to admission.  Utox upon inpatient psychiatry admission positive for  cannabinoids.  No history of alcohol abuse.  He is committed with [employer] for Saphris, Zyprexa, Haldol, Latuda, and Risperdal.   [facility]  working with psychiatry staff on [facility] placement given homicidal thoughts towards neighbor, though Psychiatry was considering discharge home if safety establish. [facility] informed local police department of homicidal threats during his psychiatry stay.         Family History:     Family History   Problem Relation Age of Onset     Neurologic Disorder Father      HEART DISEASE Paternal Grandfather      attack     Reviewed & updated in Epic.         Allergies:   No Known Drug Allergies - Unclear which drug has been causing rash and possible DRESS. Highest suspicion is for Tegretol, but will hold off on updated allergy list until further information obtained during this hospital stay.         Medications:     Prior to Admission Medications   Prescriptions Last Dose Informant Patient Reported? Taking?   QUEtiapine (SEROQUEL) 200 MG tablet   No No   Sig: Take 1 tablet (200 mg) by mouth nightly as needed (sleep)   QUEtiapine (SEROQUEL) 200 MG tablet   No No   Sig: Take 1 tablet (200 mg) by mouth At Bedtime   acetaminophen (TYLENOL) 325 MG tablet   No No   Sig: Take 2 tablets (650 mg) by mouth every 4 hours as needed for mild pain or fever   benztropine (COGENTIN) 0.5 MG tablet   No No   Sig: Take 1-2 tablets (0.5-1 mg) by mouth 2 times daily as needed for agitation (EPS)   carboxymethylcellulose (CELLUVISC/REFRESH LIQUIGEL) 1 % ophthalmic solution   No No   Sig: Place 1 drop into both eyes 3 times daily as needed for dry eyes   diphenhydrAMINE (BENADRYL) 50 MG capsule   No No   Sig: Take 1 capsule (50 mg) by mouth 3 times daily as needed for itching (for agitation, along with haldol and ativan)   diphenhydrAMINE (BENADRYL) 50 MG/ML injection   No No   Sig: Inject 1 mL (50 mg) into the muscle 3 times daily as needed for itching or other (for agitation, give along with haldol and  ativan)   haloperidol (HALDOL) 5 MG tablet   No No   Sig: Take 1-2 tablets (5-10 mg) by mouth 3 times daily as needed for agitation   haloperidol lactate (HALDOL) 5 MG/ML injection   No No   Sig: Inject 1-2 mLs (5-10 mg) into the muscle 3 times daily as needed for agitation   hydrOXYzine (ATARAX) 25 MG tablet   No No   Sig: Take 1-2 tablets (25-50 mg) by mouth every 4 hours as needed for anxiety   lisinopril (PRINIVIL/ZESTRIL) 20 MG tablet   No No   Sig: Take 1 tablet (20 mg) by mouth daily   lurasidone (LATUDA) 40 MG TABS tablet   No No   Sig: Take 1 tablet (40 mg) by mouth daily   multivitamin, therapeutic (THERA-VIT) TABS tablet   No No   Sig: Take 1 tablet by mouth daily   nicotine polacrilex (NICORETTE) 2 MG gum   No No   Sig: Place 1 each (2 mg) inside cheek every hour as needed for smoking cessation   predniSONE (DELTASONE) 20 MG tablet   No No   Sig: Take 2 tablets (40 mg) by mouth 2 times daily (with meals)   sodium chloride (OCEAN) 0.65 % nasal spray   No No   Sig: Spray 1 spray into both nostrils every hour as needed for congestion      Facility-Administered Medications: None             Review of Systems:   A complete ROS was performed and is negative other than what is stated in the HPI.          Physical Exam:   Blood pressure 136/85, pulse 115, temperature 98.8  F (37.1  C), temperature source Oral, resp. rate 18, SpO2 96 %.  General: Awake. Lying in bed, resting comfortably. Non-toxic appearing. NAD.  HEENT: NC/AT. Anicteric sclera. EOMI. Eyes symmetric and free of discharge bilaterally. No oral lesions. Mucous membranes moist and without lesions.  Neck: Supple  CV: Tachycardic with regular rhythm and S1,S2. No appreciable murmurs.  PULMONARY: Normal effort on room air. Lungs CTAB without wheezes, rales, or rhonchi.  GI: Soft, non-tender, and non-distended with bowel sounds present.  Extremities/MSK: No peripheral edema. Warm & well perfused. No joint tenderness or swelling.  Skin: Diffuse  morbilliform eruption involving bilateral upper and lower extremities, trunk, and back.   Neuro: A&O X 3. Moves all extremities symmetrically. No focal deficits.  Psych: Cooperative. Linear thought process.    Tubes/Lines/Devices: None            Data:   CMP from 7/21/17 significant for Creatinine 0.57 (L), Alk Phos 190 (H),  (H), and AST 73 (H).  CRP from 7/21/17 was 6.9.  CBC with diff from 7/21/17 significant for elevated absolute eosinophil count of 1.1.

## 2017-07-22 NOTE — PLAN OF CARE
Problem: Goal Outcome Summary  Goal: Goal Outcome Summary  Pt alert and oriented x4, denied itching, lotion applied. Reports mood as good. Cooperative with meds. Eating meals without difficulty. Seen by psych. Mom called multiple times today. Reports pt has history of Lyme disease and feels he doesn't have schizophrenia. States his symptoms clear with antibiotics and pt has poor functioning on neuroleptics. Mom wondering why pt is on prednisone, explained rash is profuse and has pustules/deep reddened color and recommended by dermatology. Pt VSS. Denied pain. No behavioral issues. Has 1:1 will continue to monitor.

## 2017-07-22 NOTE — PROGRESS NOTES
"Pt had a good shift.  He ate limited breakfast, stating \"when I'm sick, I don't really eat much.\"  Pt ate about 50% of lunch.  Pt enjoyed short walks to lounge on unit to get coffee and water.  Pt was respectful and kind to staff, as well as mindful about boundaries.  Around 1300 hours, pt was looking out window and singing to himself.  He laughed occasionally, appearing to be responding to internal stimuli.  Pt lay in bed much of the day, sometimes watching tv, sometimes just relaxing.  After second session of RN applying cream for rash, pt requested to spend time laying in bed nude to \"be in the open air.\"  Pt was awarded space by attendant and RN, with door open a crack in case of incident.  Pt redressed after ~20minutes. Pt said that he \"spent much of the day praying.\"     07/22/17 1500   Behavioral Health   Hallucinations appears responding   Thinking other (see comment)  (adequate)   Orientation person: oriented;place: oriented;time: oriented;date: oriented   Insight admits / accepts  (maybe doesn't understand diagnosis at length)   Judgement intact   Eye Contact at examiner   Affect full range affect   Mood mood is calm   Bedside Attendant   Attendant Observation Awake   Attendant Comment lying in bed, RN applied more cream for rash, pt is trying to relax     "

## 2017-07-22 NOTE — PLAN OF CARE
Problem: Goal Outcome Summary  Goal: Goal Outcome Summary  Alert and able to make needs known. Slept well throughout night with bedside attendant at all times. Vital signs stable, afebrile. Refusing and detaching cardiac monitor electrodes. Denies headache, dizziness, nausea, chest pain or lightheadedness. Also denied any pain throughout shift. LS CTA bilat. HR regular. Abdomen soft, non tender, BS +4. Kenalog 0.1 % cream applied to all 4 extremities, chest and abdomen. Patient declined to have ointment applied to other parts of the body stating that he wants to see how it works first. Continue the plan of care.

## 2017-07-22 NOTE — PROGRESS NOTES
[facility], [place]    Internal Medicine Progress Note - Gold Service      Assessment & Plan   Jv Pate is a 32 year old male who was transferred form inpatient psychiatry to medicine on 7/21/2017. He has a pmh of paranoid schizophrenia, cluster B w/ antisocial traits, Lyme disease, and HTN who was admitted to medicine after developing a morbilliform rash concerning for DRESS.     # Morbiliform Rash with concern for DRESS. Developed ~7/15. Involving extremities, trunk and back. Associated w/ transaminitis and eosinophilia concerning for DRESS. Most likely culprit Carbamazepine, which was discontinued 7/21. Risperdal discontinued 7/17 as initially felt to be etiology, less likely now as lack of improvement following discontinuation and not as commonly associated with DRESS. Started on prednisone 40 mg BID 7/21 with plan for long taper.  - Dermatology following  - Continue prednisone, triamcinolone ointment  - Telemetry monitoring d/t potential for cardiac involvement  - EBV, CMV, HHV-6 pending, follow  - CBC with Diff in am    Paranoid Schizophrenia, cluster B w/ antisocial traits. Admitted to inpatient psychiatry 5/27. Risperdal and Tegretol d/c'd (as above) due to conerns for dress.  - 1:1 monitoring  - Psych consult  - Continue Seroquel, Latuda scheduled, seroquel also available prn  - Atarax prn for anxiety, haldol w/ benadryl prn for agitation and psychosis, and cogentin prn for EPS    Elevated LFTs. New onset 7/21 w/ Alk Phos 190, , AST 73. No underlying hx of liver disease. DRESS vs drug reaction (seroquel, risperdal, tegretol).   - Trend CMP    HTN. Stable. Lisinopril 20 mg QD.     Diet: Combination Diet Regular Diet Adult  Fluids: PO intake  DVT Prophylaxis: Ambulate every shift  Code Status: Full Code    Disposition Plan   Expected discharge: 2 - 3 days; recommended to inpatient psychiatry once medically stable.     Entered: Fatmata Grijalva 07/22/2017, 9:26 AM    "Information in the above section will display in the discharge planner report.      The patient's care was discussed with the Attending Physician, Dr. Rollins.    Fatmata Grijalva  Internal Medicine Staff Hospitalist Service  [facility]  Pager: [phone]  Please see sticky note for cross cover information    Interval History   States that yesterday he felt \"close to death\", but this has since improved. Denies pruritis or pain associated with diffuse rash. Does endorse mild sore throat, but denies fevers, chills, abdominal pain, nausea or vomiting.       Data reviewed today: I reviewed all medications, new labs and imaging results over the last 24 hours. I personally reviewed the EKG tracing showing NSR.    Physical Exam   Vital Signs: Temp: 96.6  F (35.9  C) Temp src: Oral BP: 122/68 Pulse: 113   Resp: 18 SpO2: 98 % O2 Device: None (Room air)    Weight: 0 lbs 0 oz  General Appearance: Awake, non-toxic appearing, NAD  Respiratory: Lungs CTAB, no wheezing, rales, rhonchi  Cardiovascular: RRR, no murmurs or gallops  GI: soft , non-tender, non-distended, BS+  Skin: Diffuse morbilliform eruption involving bilateral upper and lower extremities, trunk, and back  Other: No peripheral edema, well perfused  Psych: Pleasant, cooperative          Data   Data     Recent Labs  Lab 07/22/17  0554 07/21/17  0834   WBC 6.8 6.4   HGB 14.8 15.2   MCV 86 85    199    140   POTASSIUM 3.9 3.9   CHLORIDE 101 106   CO2 26 23   BUN 14 8   CR 0.63* 0.57*   ANIONGAP 11 11   EB 8.6 8.6   * 104*   ALBUMIN 3.5 3.6   PROTTOTAL 6.6* 6.5*   BILITOTAL 0.6 0.4   ALKPHOS 185* 190*   * 180*   AST 64* 73*         "

## 2017-07-23 ENCOUNTER — HOSPITAL ENCOUNTER (INPATIENT)
Facility: CLINIC | Age: 32
LOS: 44 days | Discharge: HOME | End: 2017-09-05
Attending: PSYCHIATRY & NEUROLOGY | Admitting: PSYCHIATRY & NEUROLOGY
Payer: MEDICAID

## 2017-07-23 VITALS
SYSTOLIC BLOOD PRESSURE: 123 MMHG | TEMPERATURE: 98.7 F | DIASTOLIC BLOOD PRESSURE: 65 MMHG | HEART RATE: 98 BPM | RESPIRATION RATE: 24 BRPM | OXYGEN SATURATION: 97 %

## 2017-07-23 DIAGNOSIS — T50.905A DRESS SYNDROME: Primary | ICD-10-CM

## 2017-07-23 DIAGNOSIS — D72.12 DRESS SYNDROME: Primary | ICD-10-CM

## 2017-07-23 DIAGNOSIS — F29 PSYCHOSIS, UNSPECIFIED PSYCHOSIS TYPE (H): ICD-10-CM

## 2017-07-23 PROBLEM — F20.9 SCHIZOPHRENIA (H): Status: ACTIVE | Noted: 2017-07-23

## 2017-07-23 LAB
ALBUMIN SERPL-MCNC: 3.6 G/DL (ref 3.4–5)
ALP SERPL-CCNC: 156 U/L (ref 40–150)
ALT SERPL W P-5'-P-CCNC: 169 U/L (ref 0–70)
ANION GAP SERPL CALCULATED.3IONS-SCNC: 11 MMOL/L (ref 3–14)
AST SERPL W P-5'-P-CCNC: 52 U/L (ref 0–45)
BASOPHILS # BLD AUTO: 0.1 10E9/L (ref 0–0.2)
BASOPHILS NFR BLD AUTO: 0.8 %
BILIRUB SERPL-MCNC: 0.5 MG/DL (ref 0.2–1.3)
BUN SERPL-MCNC: 12 MG/DL (ref 7–30)
CALCIUM SERPL-MCNC: 8.6 MG/DL (ref 8.5–10.1)
CARBAMAZEPINE EP FREE SERPL-MCNC: 0.2 UG/ML
CARBAMAZEPINE EP SERPL-MCNC: 0.7 UG/ML
CARBAMAZEPINE EP SERPL-MCNC: 0.7 UG/ML
CARBAMAZEPINE FREE SERPL-MCNC: ABNORMAL UG/ML
CARBAMAZEPINE SERPL-MCNC: 2.8 UG/ML
CARBAMAZEPINE SERPL-MCNC: 2.9 UG/ML
CHLORIDE SERPL-SCNC: 104 MMOL/L (ref 94–109)
CO2 SERPL-SCNC: 26 MMOL/L (ref 20–32)
CREAT SERPL-MCNC: 0.65 MG/DL (ref 0.66–1.25)
DIFFERENTIAL METHOD BLD: ABNORMAL
EOSINOPHIL # BLD AUTO: 2.6 10E9/L (ref 0–0.7)
EOSINOPHIL NFR BLD AUTO: 23 %
ERYTHROCYTE [DISTWIDTH] IN BLOOD BY AUTOMATED COUNT: 12.8 % (ref 10–15)
GFR SERPL CREATININE-BSD FRML MDRD: ABNORMAL ML/MIN/1.7M2
GLUCOSE SERPL-MCNC: 103 MG/DL (ref 70–99)
HCT VFR BLD AUTO: 41.1 % (ref 40–53)
HGB BLD-MCNC: 14.1 G/DL (ref 13.3–17.7)
IMM GRANULOCYTES # BLD: 0.1 10E9/L (ref 0–0.4)
IMM GRANULOCYTES NFR BLD: 0.4 %
LYMPHOCYTES # BLD AUTO: 3.7 10E9/L (ref 0.8–5.3)
LYMPHOCYTES NFR BLD AUTO: 33 %
MCH RBC QN AUTO: 29.4 PG (ref 26.5–33)
MCHC RBC AUTO-ENTMCNC: 34.3 G/DL (ref 31.5–36.5)
MCV RBC AUTO: 86 FL (ref 78–100)
MONOCYTES # BLD AUTO: 1.3 10E9/L (ref 0–1.3)
MONOCYTES NFR BLD AUTO: 11.8 %
NEUTROPHILS # BLD AUTO: 3.5 10E9/L (ref 1.6–8.3)
NEUTROPHILS NFR BLD AUTO: 31 %
NRBC # BLD AUTO: 0 10*3/UL
NRBC BLD AUTO-RTO: 0 /100
PLATELET # BLD AUTO: 231 10E9/L (ref 150–450)
POTASSIUM SERPL-SCNC: 3.5 MMOL/L (ref 3.4–5.3)
PROT SERPL-MCNC: 6.5 G/DL (ref 6.8–8.8)
RBC # BLD AUTO: 4.79 10E12/L (ref 4.4–5.9)
SODIUM SERPL-SCNC: 141 MMOL/L (ref 133–144)
WBC # BLD AUTO: 11.2 10E9/L (ref 4–11)

## 2017-07-23 PROCEDURE — 99239 HOSP IP/OBS DSCHRG MGMT >30: CPT | Performed by: INTERNAL MEDICINE

## 2017-07-23 PROCEDURE — 99207 ZZC APP CREDIT; MD BILLING SHARED VISIT: CPT | Performed by: PHYSICIAN ASSISTANT

## 2017-07-23 RX ORDER — HYDROXYZINE HYDROCHLORIDE 25 MG/1
25-50 TABLET, FILM COATED ORAL EVERY 4 HOURS PRN
Status: DISCONTINUED | OUTPATIENT
Start: 2017-07-23 | End: 2017-09-05 | Stop reason: HOSPADM

## 2017-07-23 RX ORDER — TRIAMCINOLONE ACETONIDE 1 MG/G
OINTMENT TOPICAL 2 TIMES DAILY
Status: DISCONTINUED | OUTPATIENT
Start: 2017-07-23 | End: 2017-08-17

## 2017-07-23 RX ORDER — BENZTROPINE MESYLATE 0.5 MG/1
.5-1 TABLET ORAL 2 TIMES DAILY PRN
Status: DISCONTINUED | OUTPATIENT
Start: 2017-07-23 | End: 2017-09-05 | Stop reason: HOSPADM

## 2017-07-23 RX ORDER — HYDROXYZINE HYDROCHLORIDE 25 MG/1
25-50 TABLET, FILM COATED ORAL EVERY 4 HOURS PRN
Status: DISCONTINUED | OUTPATIENT
Start: 2017-07-23 | End: 2017-07-25

## 2017-07-23 RX ORDER — DIPHENHYDRAMINE HYDROCHLORIDE 50 MG/ML
50 INJECTION INTRAMUSCULAR; INTRAVENOUS 3 TIMES DAILY PRN
Status: DISCONTINUED | OUTPATIENT
Start: 2017-07-23 | End: 2017-08-04 | Stop reason: CLARIF

## 2017-07-23 RX ORDER — HALOPERIDOL 5 MG/ML
5-10 INJECTION INTRAMUSCULAR 3 TIMES DAILY PRN
Status: DISCONTINUED | OUTPATIENT
Start: 2017-07-23 | End: 2017-08-04 | Stop reason: CLARIF

## 2017-07-23 RX ORDER — PREDNISONE 20 MG/1
40 TABLET ORAL 2 TIMES DAILY WITH MEALS
Status: DISCONTINUED | OUTPATIENT
Start: 2017-07-23 | End: 2017-07-28

## 2017-07-23 RX ORDER — LISINOPRIL 20 MG/1
20 TABLET ORAL DAILY
Status: DISCONTINUED | OUTPATIENT
Start: 2017-07-23 | End: 2017-09-05 | Stop reason: HOSPADM

## 2017-07-23 RX ORDER — TRIAMCINOLONE ACETONIDE 1 MG/G
OINTMENT TOPICAL 2 TIMES DAILY
Status: ON HOLD | DISCHARGE
Start: 2017-07-23 | End: 2019-03-04

## 2017-07-23 RX ORDER — QUETIAPINE FUMARATE 200 MG/1
200 TABLET, FILM COATED ORAL
Status: DISCONTINUED | OUTPATIENT
Start: 2017-07-23 | End: 2017-07-24

## 2017-07-23 RX ORDER — ALUMINA, MAGNESIA, AND SIMETHICONE 2400; 2400; 240 MG/30ML; MG/30ML; MG/30ML
30 SUSPENSION ORAL EVERY 6 HOURS PRN
Status: DISCONTINUED | OUTPATIENT
Start: 2017-07-23 | End: 2017-07-23 | Stop reason: HOSPADM

## 2017-07-23 RX ORDER — HALOPERIDOL 5 MG/1
5-10 TABLET ORAL 3 TIMES DAILY PRN
Status: DISCONTINUED | OUTPATIENT
Start: 2017-07-23 | End: 2017-08-04 | Stop reason: CLARIF

## 2017-07-23 RX ORDER — DIPHENHYDRAMINE HCL 50 MG
50 CAPSULE ORAL 3 TIMES DAILY PRN
Status: DISCONTINUED | OUTPATIENT
Start: 2017-07-23 | End: 2017-08-04 | Stop reason: CLARIF

## 2017-07-23 RX ORDER — ACETAMINOPHEN 325 MG/1
650 TABLET ORAL EVERY 4 HOURS PRN
Status: DISCONTINUED | OUTPATIENT
Start: 2017-07-23 | End: 2017-09-05 | Stop reason: HOSPADM

## 2017-07-23 RX ORDER — QUETIAPINE FUMARATE 200 MG/1
200 TABLET, FILM COATED ORAL AT BEDTIME
Status: DISCONTINUED | OUTPATIENT
Start: 2017-07-23 | End: 2017-07-31

## 2017-07-23 RX ORDER — MULTIVITAMIN,THERAPEUTIC
1 TABLET ORAL DAILY
Status: DISCONTINUED | OUTPATIENT
Start: 2017-07-24 | End: 2017-09-05 | Stop reason: HOSPADM

## 2017-07-23 RX ORDER — PREDNISONE 20 MG/1
40 TABLET ORAL 2 TIMES DAILY WITH MEALS
Status: ON HOLD | DISCHARGE
Start: 2017-07-23 | End: 2017-09-01

## 2017-07-23 RX ORDER — LURASIDONE HYDROCHLORIDE 40 MG/1
40 TABLET, FILM COATED ORAL DAILY
Status: DISCONTINUED | OUTPATIENT
Start: 2017-07-24 | End: 2017-07-28

## 2017-07-23 RX ADMIN — HALOPERIDOL 10 MG: 5 TABLET ORAL at 12:42

## 2017-07-23 RX ADMIN — PREDNISONE 40 MG: 20 TABLET ORAL at 12:24

## 2017-07-23 RX ADMIN — QUETIAPINE FUMARATE 200 MG: 200 TABLET, FILM COATED ORAL at 19:45

## 2017-07-23 RX ADMIN — THERA TABS 1 TABLET: TAB at 12:24

## 2017-07-23 RX ADMIN — PREDNISONE 40 MG: 20 TABLET ORAL at 19:45

## 2017-07-23 RX ADMIN — TRIAMCINOLONE ACETONIDE: 1 OINTMENT TOPICAL at 20:11

## 2017-07-23 RX ADMIN — LISINOPRIL 20 MG: 20 TABLET ORAL at 12:24

## 2017-07-23 RX ADMIN — LURASIDONE HYDROCHLORIDE 40 MG: 20 TABLET, FILM COATED ORAL at 12:23

## 2017-07-23 RX ADMIN — HALOPERIDOL LACTATE 10 MG: 5 INJECTION, SOLUTION INTRAMUSCULAR at 03:08

## 2017-07-23 ASSESSMENT — ACTIVITIES OF DAILY LIVING (ADL)
COGNITION: 0 - NO COGNITION ISSUES REPORTED
TRANSFERRING: 0-->INDEPENDENT
RETIRED_EATING: 0-->INDEPENDENT
AMBULATION: 0-->INDEPENDENT
FALL_HISTORY_WITHIN_LAST_SIX_MONTHS: NO
BATHING: 0-->INDEPENDENT
TOILETING: 0-->INDEPENDENT
DRESS: 0-->INDEPENDENT
SWALLOWING: 0-->SWALLOWS FOODS/LIQUIDS WITHOUT DIFFICULTY
RETIRED_COMMUNICATION: 0-->UNDERSTANDS/COMMUNICATES WITHOUT DIFFICULTY

## 2017-07-23 NOTE — PLAN OF CARE
Problem: Goal Outcome Summary  Goal: Goal Outcome Summary  Outcome: No Change  Pt A&O x's 4.  Very flat affect.slow thought process and respond to questions very slow. VSS. Afebrile. Lungs clear. Denies SOB, CP and nausea. On a regular diet but pt has poor appetite. Bowel sounds active in all quadrants. Unable to tell when LBM was . Void spontaneously without difficulty per pt report. Pt has rash all over his body. Applied Kenalog cream to arms and legs but refused to other parts of his body. Denies pain. CMS intact, denies N/T. Pt has been walking on hallway with bedside attendant. Pt is committed so has 1:1 bedside attendant. Able to make needs know, call light in reach. Will continue to monitor.

## 2017-07-23 NOTE — PLAN OF CARE
Problem: Goal Outcome Summary  Goal: Goal Outcome Summary  Outcome: No Change  Pt. Refused assessment this shift. Told writer to shut up and stop talking. Ambulated with bedside attendant occasionally in the hallway.

## 2017-07-23 NOTE — SIGNIFICANT EVENT
"After getting out of the shower, pt started walking down the hallway stating that he needed to walk. Writer then stepped in front of patient and reminded him that he could not walk the hallway given his commitment status. Pt replied in a dismissive tone, \"well, I need to walk with my health, so...\" and proceeded to attempt to walk past writer. Writer again stepped in front of patient and reminded him that nobody wanted a repeat of this early AM's episode (see note) and again asked him to return to his room. Pt then walked back to his room without incident. Frankie MADRID. 7/23/17   "

## 2017-07-23 NOTE — PROGRESS NOTES
Jv Pate is a 32-year-old male who admitted during the day shift from medical unit. He was transferred from inpatient psychiatry ([facility]) to medicine on 7/21/2017 after developing a morbilliform rash concerning for DRESS most likely from culprit Carbamazepine (Tegretol), which was discontinued 7/21 according to medical discharge note. Patient was started on prednisone 40 mg BID on 7/21 with plan for long taper. Patient was medically cleared and determined stable before admitting to this unit and with plans for inpatient psychiatry to continue the following order:  - Trend CMP, CBC w/ diff daily   - Dermatology will continue to follow and guide treatment  - Continue prednisone 40 mg BID  - Continue triamcinolone ointment BID  - EBV, CMV, HHV-6 pending, follow  - Avoid Carbamazepine  Jv has hx of paranoid, schizophrenia, cluster B w/ antisocial traits, Lyme disease, and HTN. He is commitment and Ross. He appears tense, flat and blunted this evening. He was observed pacing back and forth the hallway. No agitating behaviors observed this evening.

## 2017-07-23 NOTE — PROGRESS NOTES
Pt was agitated at the beginning of the shift, refused attempts to assess and consistently requested writer to be out of his room. No VS were done as well. He continuously hit the code blue button and it had to be cancelled several times. When writer tried to talk to pt about it, he closes his eyes and started responding to internal stimuli and appears to have no insight.    11:24pm: Offered him some psych scrubs but declined to get into scrubs. Assessed him with lindsey CINTRON. Pt states he'd be ready for another assessment later and wants to get some shut eye. Refused VS about 13mins later. Bedside attendant encouraged to sit outside the room for safety and to minimize stimulation.    01:30: Approached pt again but was told to get out of the room and shut up.    02:00: Requested for something for heartburn. Moonlighter notified and orders written. Writer approached him to ask more questions before administering but he told writer to get out of his face. Continues to pace the hallways with attendant following him. Proceeded to rub some foam alcohol on face. Seemed unperturbed and continued pacing hallways with eyes closed.    02:30: Isabella CINTRON called to help with pt. RN attempted to administer some PRN medications but became really agitated and flung meds all over the floor. Proceeded to smoke his e-cigarette.    02:50: Pt continued to vandalize room equipment. Flyer RN called code 21. Pt barricaded himself in the room and several lid bands were heard in the hallway.

## 2017-07-23 NOTE — PLAN OF CARE
Problem: Individualization  Goal: Patient Preferences  Outcome: Improving  D: Pt  A/O x4. VSS . Lungs- CL as best can tell  . Bowel- WALLACE . PP- +. No edema. CMS- intact as pt is walking around room/ halls.. Pt taking PO REG  food/ fluids well. Voiding Good amounts in BR. Pain/CAPA - denies..  Dressing -   Body rash  Resolving  Slightly . Pt stated less itchy around ears. Pt was non-complaiant before but now IS COMPLIANT. Report called to [facility].  Security called  and code green called  to assist in transport. 1;1 sitter present. All personal belongings sent with code green team.  A: con't to monitor. Call light in reach. Pt able to make needs known. Pt Tx to [facility]

## 2017-07-23 NOTE — PROGRESS NOTES
ADMISSION:  Belongings upon admission to [facility] 7/23/2017  Valuables sent to security  Env#[id number]- [place] EBT [id number], mastercard 4355 (missing receipt slip upon patients admission from [address] [place] [address]  Env#[id number]- mobile phone  Env#[id number]- hydrocortizone cream, hydroxyzine, prednisone  Env#[id number]-  checkbook check numbers [phone], $100.00   Belongings in [facility] locker  2 packs of cigarettes  1 pair jeans  2 plaid shirts  1 white t-shirt  -1 pair shorts  -1 pair boxers  -1 blue t- shirt  -wallet (cash sent to security)  -1 pair black shoes  -beard ken  -1 pair of glasses            I am responsible for any personal items that are not sent to the safe or pharmacy. [facility] is not responsible for loss, theft or damage of any property in my possession.    Patient Signature _____________________ Date/Time _____________________    Staff Signature _______________________ Date/Time _____________________    [facility] Staff person, if patient is unable/unwilling to sign  ___________________________________ Date/Time _____________________  DISCHARGE:  All personal items have been returned to me.    Patient Signature _____________________ Date/Time _____________________    Staff Signature _______________________ Date/Time _____________________

## 2017-07-23 NOTE — IP AVS SNAPSHOT
[address] [address]    Phone:  [phone]                                       After Visit Summary   7/23/2017    Jv Pate    MRN: [id number]           After Visit Summary Signature Page     I have received my discharge instructions, and my questions have been answered. I have discussed any challenges I see with this plan with the nurse or doctor.    ..........................................................................................................................................  Patient/Patient Representative Signature      ..........................................................................................................................................  Patient Representative Print Name and Relationship to Patient    ..................................................               ................................................  Date                                            Time    ..........................................................................................................................................  Reviewed by Signature/Title    ...................................................              ..............................................  Date                                                            Time

## 2017-07-23 NOTE — DISCHARGE SUMMARY
[facility], [place]    Internal Medicine Discharge Summary  Date of Admission:  7/21/2017  Date of Discharge:  7/23/2017  Discharging Provider: Dr. Ria Smart  Discharge Team: [facility]    Discharge Diagnoses   DRESS syndrome  Paranoid Schizophrenia, cluster B w/ antisocial traits  Elevated LFTs  HTN    Follow-ups Needed After Discharge   Dermatology to follow  Trend LFT's, CBC w/ diff    Hospital Course   Jv Pate is a 32 year old male who was transferred form inpatient psychiatry to medicine on 7/21/2017. He has a pmh of paranoid schizophrenia, cluster B w/ antisocial traits, Lyme disease, and HTN who was admitted to medicine after developing a morbilliform rash concerning for DRESS.      # DRESS syndrome. Developed morbilliform rash ~7/15. Involving extremities, trunk and back, spares mucos membranes. Associated w/ transaminitis and eosinophilia. Most likely culprit Carbamazepine, which was discontinued 7/21. Risperdal discontinued 7/17 as initially felt to be etiology, less likely now as lack of improvement following discontinuation and not as commonly associated with DRESS. Started on prednisone 40 mg BID 7/21 with plan for long taper.  - Discharge to inpatient psychiatry  - Trend CMP, CBC w/ diff daily   - Dermatology will continue to follow and guide treatment  - Continue prednisone 40 mg BID  - Continue triamcinolone ointment BID  - EBV, CMV, HHV-6 pending, follow  - Avoid Carbamazepine     Paranoid Schizophrenia, cluster B w/ antisocial traits. Admitted to inpatient psychiatry 5/27. Risperdal and Tegretol d/c'd (as above) due to conerns for dress. Per dermatology, can risperdol reasonable to use if psychiatric decompensation as timeline of DRESS most likely associated with carbamazepine.   - Transfer to inpatient psychiatry  - Continue Seroquel, Latuda scheduled, seroquel also available prn  - Atarax prn for anxiety, haldol w/ benadryl prn for  agitation and psychosis, and cogentin prn for EPS     Elevated LFTs. New onset 7/21 w/ Alk Phos 190, , AST 73. No underlying hx of liver disease. DRESS vs drug reaction (seroquel, risperdal, tegretol). Mild improvement after starting steroid.   - Trend CMP QD     HTN. Stable. Continued Lisinopril 20 mg QD.     Consultations This Hospital Stay   PSYCHIATRY IP CONSULT   Dermatology    Code Status   Full Code    Time Spent on this Encounter   I, Fatmata Grijalva, personally saw the patient today and spent greater than 30 minutes discharging this patient.       Fatmata Grijalva  Internal Medicine Staff Hospitalist Service  [facility]  Pager: [phone]  ______________________________________________________________________    Physical Exam   Vital Signs: Temp: 98.7  F (37.1  C) Temp src: Oral BP: 123/65 Pulse: 98   Resp: 24 SpO2: 97 % O2 Device: None (Room air)    Weight: 0 lbs 0 oz    General Appearance:            Awake, non-toxic appearing, NAD  Respiratory: Lungs CTAB, no wheezing, rales, rhonchi  Cardiovascular: RRR, no murmurs or gallops  GI: soft , non-tender, non-distended, BS+  Skin: Diffuse morbilliform eruption involving bilateral upper and lower extremities, trunk, and back  Other: No peripheral edema, well perfused  Psych: Pleasant, cooperative                Significant Results and Procedures   Most Recent 2 LFT's:  Recent Labs   Lab Test  07/22/17   0554  07/21/17   0834   AST  64*  73*   ALT  177*  180*   ALKPHOS  185*  190*   BILITOTAL  0.6  0.4       Pending Results   These results will be followed up by dermatology  Unresulted Labs Ordered in the Past 30 Days of this Admission     Date and Time Order Name Status Description    7/21/2017 0834 HHV6 DNA Quant PCR In process     7/21/2017 0834 EBV DNA PCR Quantitative Whole Blood In process     7/21/2017 0030 Carbamazepine and epoxide free and total In process     7/21/2017 0030 Carbamazepine and 10 11 epoxide In process               Primary Care Physician   SHAHEED DUNN    Discharge Disposition   Discharged to Inpatient psychiatry  Condition at discharge: Stable    Discharge Orders     General info for SNF   Length of Stay Estimate: Short Term Care: Estimated # of Days <30  Condition at Discharge: Stable  Level of care:skilled   Rehabilitation Potential: Fair  Admission H&P remains valid and up-to-date: Yes  Recent Chemotherapy: N/A  Use Nursing Home Standing Orders: N/A     Mantoux instructions   Give two-step Mantoux (PPD) Per Facility Policy Yes     Reason for your hospital stay   You were hospitalized due to a diffuse rash to be monitored and started on steroid therapy.     Follow Up and recommended labs and tests   Follow up with Psychiatrist daily. Continue to trend LFT's and CBC with diff.     Activity - Up ad jomar     Full Code     Advance Diet as Tolerated   Follow this diet upon discharge: Orders Placed This Encounter     Combination Diet Regular Diet Adult       Discharge Medications   Current Discharge Medication List      START taking these medications    Details   triamcinolone (KENALOG) 0.1 % ointment Apply topically 2 times daily    Associated Diagnoses: DRESS syndrome         CONTINUE these medications which have CHANGED    Details   predniSONE (DELTASONE) 20 MG tablet Take 2 tablets (40 mg) by mouth 2 times daily (with meals)    Associated Diagnoses: DRESS syndrome         CONTINUE these medications which have NOT CHANGED    Details   acetaminophen (TYLENOL) 325 MG tablet Take 2 tablets (650 mg) by mouth every 4 hours as needed for mild pain or fever  Qty: 100 tablet    Associated Diagnoses: Medication reaction, initial encounter      hydrOXYzine (ATARAX) 25 MG tablet Take 1-2 tablets (25-50 mg) by mouth every 4 hours as needed for anxiety  Qty: 120 tablet    Associated Diagnoses: Psychosis, unspecified psychosis type      diphenhydrAMINE (BENADRYL) 50 MG/ML injection Inject 1 mL (50 mg) into the muscle 3 times daily  as needed for itching or other (for agitation, give along with haldol and ativan)  Qty: 0.5 mL    Associated Diagnoses: Medication reaction, initial encounter      diphenhydrAMINE (BENADRYL) 50 MG capsule Take 1 capsule (50 mg) by mouth 3 times daily as needed for itching (for agitation, along with haldol and ativan)  Qty: 56 capsule    Associated Diagnoses: Medication reaction, initial encounter      lisinopril (PRINIVIL/ZESTRIL) 20 MG tablet Take 1 tablet (20 mg) by mouth daily  Qty: 30 tablet    Associated Diagnoses: Benign essential hypertension      benztropine (COGENTIN) 0.5 MG tablet Take 1-2 tablets (0.5-1 mg) by mouth 2 times daily as needed for agitation (EPS)    Associated Diagnoses: Psychosis, unspecified psychosis type      haloperidol (HALDOL) 5 MG tablet Take 1-2 tablets (5-10 mg) by mouth 3 times daily as needed for agitation    Associated Diagnoses: Psychosis, unspecified psychosis type      haloperidol lactate (HALDOL) 5 MG/ML injection Inject 1-2 mLs (5-10 mg) into the muscle 3 times daily as needed for agitation  Qty: 90 mL    Associated Diagnoses: Psychosis, unspecified psychosis type      lurasidone (LATUDA) 40 MG TABS tablet Take 1 tablet (40 mg) by mouth daily    Associated Diagnoses: Psychosis, unspecified psychosis type      !! QUEtiapine (SEROQUEL) 200 MG tablet Take 1 tablet (200 mg) by mouth nightly as needed (sleep)  Qty: 60 tablet    Associated Diagnoses: Psychosis, unspecified psychosis type      !! QUEtiapine (SEROQUEL) 200 MG tablet Take 1 tablet (200 mg) by mouth At Bedtime  Qty: 60 tablet    Associated Diagnoses: Psychosis, unspecified psychosis type      sodium chloride (OCEAN) 0.65 % nasal spray Spray 1 spray into both nostrils every hour as needed for congestion    Associated Diagnoses: Nasal congestion      nicotine polacrilex (NICORETTE) 2 MG gum Place 1 each (2 mg) inside cheek every hour as needed for smoking cessation  Qty: 30 tablet    Associated Diagnoses: Tobacco abuse       multivitamin, therapeutic (THERA-VIT) TABS tablet Take 1 tablet by mouth daily  Refills: 0    Associated Diagnoses: Medication reaction, initial encounter      carboxymethylcellulose (CELLUVISC/REFRESH LIQUIGEL) 1 % ophthalmic solution Place 1 drop into both eyes 3 times daily as needed for dry eyes  Qty: 1 Bottle    Associated Diagnoses: Dry eyes       !! - Potential duplicate medications found. Please discuss with provider.        Allergies   No Active Allergies

## 2017-07-23 NOTE — IP AVS SNAPSHOT
MRN:[id number]                      After Visit Summary   7/23/2017    Jv Pate    MRN: [id number]           Thank you!     Thank you for choosing [facility] for your care. Our goal is always to provide you with excellent care.        Patient Information     Date Of Birth          1985        About your hospital stay     You were admitted on:  July 23, 2017 You last received care in the:  [facility] 12[facility]    You were discharged on:  September 5, 2017       Who to Call     For medical emergencies, please call 911.  For non-urgent questions about your medical care, please call your primary care provider or clinic, [phone]          Attending Provider     Provider Specialty    Mario Shah MD Psychiatry    Gaviria, Denny Be MD Psychiatry       Primary Care Provider Office Phone # Fax #    Gautam Pitts [phone]      Further instructions from your care team       Behavioral Discharge Planning and Instructions      Summary:  You were admitted on 7-23-17 due to psychosis.     You were treated on [facility] by Dr Shah and Dr. Gaviria and discharged on 9-5-17 to your mother's home in [place], [place].     You plan to follow-up with your outpatient providers.  You were cooperative with treatment and your symptoms have improved.  On 6-16-17, you were committed as MI to the [facility] of [facility] and/or [facility].   There is also a [facility] order for Seroquel,  Saphris, Zyprexa, Haldol, Lutuda, and Risperdal.    Primary Diagnoses:  1.  Schizophrenia, paranoid type, is likely.  2.  Psychosis related to chronic Lyme's probably cannot be definitively ruled out.       Mental Health Follow-Up:     Psychiatrist   Appointment:  Wednesday September 13th at 9:30am with Dr. An Barahona   [address], [place]   Phone: [phone]  Fax: [phone]  HUC TO FAX AVS *and Discharge Summary, when available!     [facility] - Day Treatment  Your [facility]   (Jenna Khalil) has placed your name on the waiting list for the day treatment program.     When your name gets to the top of the list, [facility] will call you to schedule an intake appointment.  Phone: [phone]   Fax: [phone]    [facility]    Jenna Khalil  Phone: [phone]  Fax: [phone]  HUC to FAX AVS      Attend all scheduled appointments with your outpatient providers. Call at least 24 hours in advance if you need to reschedule an appointment to ensure continued access to your outpatient providers.   Major Treatments, Procedures and Findings:  You were provided with: a psychiatric assessment, medication evaluation and/or management, group therapy and milieu management    Symptoms to Report: feeling more aggressive, increased confusion, losing more sleep, mood getting worse or thoughts of suicide    Early warning signs can include: increased depression or anxiety sleep disturbances increased thoughts or behaviors of suicide or self-harm  increased unusual thinking, such as paranoia or hearing voices    Safety and Wellness:  Take all medicines as directed.  Make no changes unless your doctor suggests them.      Follow treatment recommendations.  Refrain from alcohol and non-prescribed drugs.  Ask your support system to help you reduce your access to items that could harm yourself or others. Items could include:  Firearms  Medicines (both prescribed and over-the-counter)  Knives and other sharp objects  Ropes and like materials  Car keys  If there is a concern for safety, call 911. If there is a concern for safety, call 911.    Resources:   Crisis Intervention: 557.568.5708 or 103-696-4582 (TTY: 908.545.1018).  Call anytime for help.  National Alsen on Mental Illness (www.[place].eileen.org): 474.542.8016 or 870-869-6655.  [place] Association for Children's Mental Health (www.macmh.org): 332.241.6836.  Alcoholics Anonymous (www.alcoholics-anonymous.org): Check your phone book for  "your local chapter.  Suicide Awareness Voices of Education (SAVE) (www.save.org): 694-456-VITD (7283)  National Suicide Prevention Line (www.mentalhealthmn.org): 839-196-BNZP (4329)  Mental Health Consumer/Survivor Network of [place] (www.mhcsn.net): 464.414.4941 or 691-604-5449  Mental Health Association of [place] (www.mentalhealth.org): 852.367.1923 or 154-158-7899  [facility] Crisis Response 651 752-0681  [facility] Crisis (COPE) Response - Adult [phone]  [facility] Crisis Response - Adult 092 477-3808  Text 4 Life: txt \"LIFE\" to 54180 for immediate support and crisis intervention  Crisis text line: Text \"START\" to 641-501. Free, confidential, 24/7.  Crisis Intervention: 161.772.8408 or 182-021-5919. Call anytime for help.   [facility] Health Crisis Team - Child: [phone]  [facility] Mental Health Crisis Response Team - Child: [phone]  [facility] Mental Health Crisis Line: [phone]    The treatment team has appreciated the opportunity to work with you. If you have any questions or concerns our unit number is [phone].          Pending Results     No orders found from 7/21/2017 to 7/24/2017.            Admission Information     Date & Time Provider Department Dept. Phone    7/23/2017 Denny Gaviria MD [address] [phone]      Your Vitals Were     Blood Pressure Pulse Temperature Respirations Weight BMI (Body Mass Index)    137/81 (BP Location: Right arm) 77 97.2  F (36.2  C) (Tympanic) 18 110.7 kg (244 lb) 30.5 kg/m2      [employer] Information     [employer] lets you send messages to your doctor, view your test results, renew your prescriptions, schedule appointments and more. To sign up, go to [email or web address] . Click on \"Log in\" on the left side of the screen, which will take you to the Welcome page. Then click on \"Sign up Now\" on the right side of the page.     You will be asked to enter the access code listed below, as well as " some personal information. Please follow the directions to create your username and password.     Your access code is: [email or web address]  Expires: 12/3/2017  1:46 PM     Your access code will  in 90 days. If you need help or a new code, please call your [facility] clinic or [phone].        Care EveryWhere ID     This is your Care EveryWhere ID. This could be used by other organizations to access your [facility] medical records  [id number]        Equal Access to Services     [facility]: Hadii dov ramirez hadasho Soomaali, waaxda luqadaha, qaybta kaalmada adeegyada, susy lees . So [facility] [phone].    ATENCIÓN: Si habla español, tiene a burt disposición servicios gratuitos de asistencia lingüística. Llame al [phone].    We comply with applicable federal civil rights laws and [place] laws. We do not discriminate on the basis of race, color, national origin, age, disability sex, sexual orientation or gender identity.               Review of your medicines      START taking        Dose / Directions    haloperidol decanoate 100 MG/ML injection   Commonly known as:  HALDOL DECANOATE   Used for:  Psychosis, unspecified psychosis type        Dose:  200 mg   Start taking on:  2017   Inject 200 mg into the muscle every 28 days   Quantity:  2 mL   Refills:  0       minocycline 100 MG capsule   Commonly known as:  MINOCIN/DYNACIN   Indication:  chronic Lyme's dz   Used for:  Psychosis, unspecified psychosis type        Dose:  100 mg   Take 1 capsule (100 mg) by mouth every 12 hours   Quantity:  60 capsule   Refills:  1       ranitidine 150 MG tablet   Commonly known as:  ZANTAC   Used for:  Psychosis, unspecified psychosis type        Dose:  150 mg   Take 1 tablet (150 mg) by mouth 2 times daily   Quantity:  60 tablet   Refills:  1       saccharomyces boulardii 250 MG capsule   Commonly known as:  FLORASTOR   Used for:  Psychosis, unspecified psychosis type        Dose:  250 mg   Take 1  capsule (250 mg) by mouth 2 times daily   Quantity:  60 capsule   Refills:  1         CONTINUE these medicines which may have CHANGED, or have new prescriptions. If we are uncertain of the size of tablets/capsules you have at home, strength may be listed as something that might have changed.        Dose / Directions    benztropine 0.5 MG tablet   Commonly known as:  COGENTIN   This may have changed:    - how much to take  - when to take this  - reasons to take this   Used for:  Psychosis, unspecified psychosis type        Dose:  0.5 mg   Take 1 tablet (0.5 mg) by mouth 2 times daily   Quantity:  60 tablet   Refills:  1       haloperidol 10 MG tablet   Commonly known as:  HALDOL   This may have changed:    - medication strength  - how much to take  - when to take this  - reasons to take this   Used for:  Psychosis, unspecified psychosis type        Dose:  10 mg   Take 1 tablet (10 mg) by mouth 2 times daily   Quantity:  60 tablet   Refills:  1       lurasidone 120 MG Tabs tablet   Commonly known as:  LATUDA   This may have changed:    - medication strength  - how much to take   Used for:  Psychosis, unspecified psychosis type        Dose:  120 mg   Take 1 tablet (120 mg) by mouth daily   Quantity:  30 tablet   Refills:  1       predniSONE 10 MG tablet   Commonly known as:  DELTASONE   This may have changed:    - medication strength  - how much to take  - when to take this  - additional instructions   Used for:  DRESS syndrome        Dose:  20 mg   Start taking on:  9/6/2017   Take 2 tablets (20 mg) by mouth daily For 10 days, then take 1 tablet daily for 10 days, then take a half-tab daily for 10 days, then stop   Quantity:  30 tablet   Refills:  0       QUEtiapine 200 MG tablet   Commonly known as:  SEROquel   This may have changed:    - how much to take  - when to take this  - reasons to take this  - Another medication with the same name was removed. Continue taking this medication, and follow the directions you  see here.   Used for:  Psychosis, unspecified psychosis type        Dose:  100-200 mg   Take 0.5-1 tablets (100-200 mg) by mouth 2 times daily as needed (anxiety & sleep)   Quantity:  30 tablet   Refills:  1         CONTINUE these medicines which have NOT CHANGED        Dose / Directions    acetaminophen 325 MG tablet   Commonly known as:  TYLENOL   Used for:  Medication reaction, initial encounter        Dose:  650 mg   Take 2 tablets (650 mg) by mouth every 4 hours as needed for mild pain or fever   Quantity:  100 tablet   Refills:  0       carboxymethylcellulose 1 % ophthalmic solution   Commonly known as:  CELLUVISC/REFRESH LIQUIGEL   Used for:  Dry eyes        Dose:  1 drop   Place 1 drop into both eyes 3 times daily as needed for dry eyes   Quantity:  1 Bottle   Refills:  0       hydrOXYzine 25 MG tablet   Commonly known as:  ATARAX   Used for:  Psychosis, unspecified psychosis type        Dose:  25-50 mg   Take 1-2 tablets (25-50 mg) by mouth every 4 hours as needed for anxiety   Quantity:  120 tablet   Refills:  0       lisinopril 20 MG tablet   Commonly known as:  PRINIVIL/ZESTRIL   Used for:  Psychosis, unspecified psychosis type        Dose:  20 mg   Take 1 tablet (20 mg) by mouth daily   Quantity:  30 tablet   Refills:  1       multivitamin, therapeutic Tabs tablet   Used for:  Medication reaction, initial encounter        Dose:  1 tablet   Take 1 tablet by mouth daily   Refills:  0       nicotine polacrilex 2 MG gum   Commonly known as:  NICORETTE   Used for:  Tobacco abuse        Dose:  2 mg   Place 1 each (2 mg) inside cheek every hour as needed for smoking cessation   Quantity:  30 tablet   Refills:  0       sodium chloride 0.65 % nasal spray   Commonly known as:  OCEAN   Used for:  Nasal congestion        Dose:  1 spray   Spray 1 spray into both nostrils every hour as needed for congestion   Refills:  0       triamcinolone 0.1 % ointment   Commonly known as:  KENALOG   Used for:  DRESS syndrome         Apply topically 2 times daily   Refills:  0         STOP taking     diphenhydrAMINE 50 MG capsule   Commonly known as:  BENADRYL           diphenhydrAMINE 50 MG/ML injection   Commonly known as:  BENADRYL           haloperidol lactate 5 MG/ML injection   Commonly known as:  HALDOL                Where to get your medicines      These medications were sent to [facility] [place], [place] - [address]  [address], [facility] [address]     Phone:  [phone]     benztropine 0.5 MG tablet    haloperidol 10 MG tablet    haloperidol decanoate 100 MG/ML injection    lisinopril 20 MG tablet    lurasidone 120 MG Tabs tablet    minocycline 100 MG capsule    predniSONE 10 MG tablet    QUEtiapine 200 MG tablet    ranitidine 150 MG tablet    saccharomyces boulardii 250 MG capsule                Protect others around you: Learn how to safely use, store and throw away your medicines at www.disposemymeds.org.             Medication List: This is a list of all your medications and when to take them. Check marks below indicate your daily home schedule. Keep this list as a reference.      Medications           Morning Afternoon Evening Bedtime As Needed    acetaminophen 325 MG tablet   Commonly known as:  TYLENOL   Take 2 tablets (650 mg) by mouth every 4 hours as needed for mild pain or fever   Last time this was given:  650 mg on 9/1/2017 11:12 PM                                benztropine 0.5 MG tablet   Commonly known as:  COGENTIN   Take 1 tablet (0.5 mg) by mouth 2 times daily   Last time this was given:  0.5 mg on 9/5/2017  8:34 AM                                carboxymethylcellulose 1 % ophthalmic solution   Commonly known as:  CELLUVISC/REFRESH LIQUIGEL   Place 1 drop into both eyes 3 times daily as needed for dry eyes   Last time this was given:  1 drop on 8/21/2017  2:30 AM                                haloperidol 10 MG tablet   Commonly known as:  HALDOL   Take 1 tablet (10 mg) by  mouth 2 times daily   Last time this was given:  10 mg on 9/5/2017  8:34 AM                                haloperidol decanoate 100 MG/ML injection   Commonly known as:  HALDOL DECANOATE   Inject 200 mg into the muscle every 28 days   Start taking on:  9/25/2017   Last time this was given:  100 mg on 9/4/2017 11:34 AM                                hydrOXYzine 25 MG tablet   Commonly known as:  ATARAX   Take 1-2 tablets (25-50 mg) by mouth every 4 hours as needed for anxiety   Last time this was given:  50 mg on 8/31/2017 10:52 AM                                lisinopril 20 MG tablet   Commonly known as:  PRINIVIL/ZESTRIL   Take 1 tablet (20 mg) by mouth daily   Last time this was given:  20 mg on 9/5/2017  8:34 AM                                lurasidone 120 MG Tabs tablet   Commonly known as:  LATUDA   Take 1 tablet (120 mg) by mouth daily   Last time this was given:  140 mg on 9/5/2017  8:34 AM                                minocycline 100 MG capsule   Commonly known as:  MINOCIN/DYNACIN   Take 1 capsule (100 mg) by mouth every 12 hours   Last time this was given:  100 mg on 9/5/2017  8:34 AM                                multivitamin, therapeutic Tabs tablet   Take 1 tablet by mouth daily   Last time this was given:  1 tablet on 9/5/2017  8:34 AM                                nicotine polacrilex 2 MG gum   Commonly known as:  NICORETTE   Place 1 each (2 mg) inside cheek every hour as needed for smoking cessation   Last time this was given:  2 mg on 9/5/2017  9:01 AM                                predniSONE 10 MG tablet   Commonly known as:  DELTASONE   Take 2 tablets (20 mg) by mouth daily For 10 days, then take 1 tablet daily for 10 days, then take a half-tab daily for 10 days, then stop   Start taking on:  9/6/2017   Last time this was given:  30 mg on 9/5/2017  8:34 AM                                QUEtiapine 200 MG tablet   Commonly known as:  SEROquel   Take 0.5-1 tablets (100-200 mg) by mouth 2  times daily as needed (anxiety & sleep)   Last time this was given:  200 mg on 9/5/2017  9:14 AM                                ranitidine 150 MG tablet   Commonly known as:  ZANTAC   Take 1 tablet (150 mg) by mouth 2 times daily   Last time this was given:  150 mg on 9/5/2017  8:34 AM                                saccharomyces boulardii 250 MG capsule   Commonly known as:  FLORASTOR   Take 1 capsule (250 mg) by mouth 2 times daily   Last time this was given:  250 mg on 9/5/2017  8:34 AM                                sodium chloride 0.65 % nasal spray   Commonly known as:  OCEAN   Spray 1 spray into both nostrils every hour as needed for congestion                                triamcinolone 0.1 % ointment   Commonly known as:  KENALOG   Apply topically 2 times daily   Last time this was given:  8/8/2017  8:26 AM
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
## 2017-07-23 NOTE — PROGRESS NOTES
D: Pt refused meds!. To prevent  Further agitation staff not re-attempting. What could be seen from a distance RASH  Appears to be resolving.  A: Con't to monitor. And 1;1  For behavioral issues.

## 2017-07-23 NOTE — PROGRESS NOTES
D: Pt requested to shower. Staff made deal that he could shower ONCE HE TOOK ALL MORNING MEDS.   Pt willingly complied and TOOK ALL  MEDS, was CALM / COOPERATIVE  and COMPLIANT.  ( he ever thanked this writer for help.)  A: UP date nursing supervisor and con't to plan to Tx BACK to psych.

## 2017-07-23 NOTE — PROGRESS NOTES
D: Pt has been CLEARED medically ( dr subramanian)and from dermatology to be DC'd back to Psych!. Pt placement informed.  A: arrange TX bach to psych.

## 2017-07-24 LAB
EBV DNA # SPEC NAA+PROBE: NORMAL {COPIES}/ML
EBV DNA SPEC NAA+PROBE-LOG#: NORMAL {LOG_COPIES}/ML
HHV6 DNA # SPEC NAA+PROBE: NORMAL COPIES/ML
HHV6 DNA SPEC NAA+PROBE-LOG#: NORMAL LOG COPIES/ML
INTERPRETATION ECG - MUSE: NORMAL
SPECIMEN SOURCE: NORMAL

## 2017-07-24 PROCEDURE — 99222 1ST HOSP IP/OBS MODERATE 55: CPT | Mod: AI | Performed by: PSYCHIATRY & NEUROLOGY

## 2017-07-24 RX ADMIN — TRIAMCINOLONE ACETONIDE: 1 OINTMENT TOPICAL at 20:29

## 2017-07-24 RX ADMIN — PREDNISONE 40 MG: 20 TABLET ORAL at 20:28

## 2017-07-24 RX ADMIN — LISINOPRIL 20 MG: 20 TABLET ORAL at 08:55

## 2017-07-24 RX ADMIN — MULTIPLE VITAMINS W/ MINERALS TAB 1 TABLET: TAB at 08:55

## 2017-07-24 RX ADMIN — PREDNISONE 40 MG: 20 TABLET ORAL at 08:55

## 2017-07-24 RX ADMIN — LURASIDONE HYDROCHLORIDE 40 MG: 40 TABLET, FILM COATED ORAL at 08:55

## 2017-07-24 RX ADMIN — QUETIAPINE FUMARATE 200 MG: 200 TABLET, FILM COATED ORAL at 20:28

## 2017-07-24 ASSESSMENT — ACTIVITIES OF DAILY LIVING (ADL)
LAUNDRY: UNABLE TO COMPLETE
ORAL_HYGIENE: INDEPENDENT
DRESS: SCRUBS (BEHAVIORAL HEALTH)
HYGIENE/GROOMING: INDEPENDENT
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
## 2017-07-24 NOTE — PLAN OF CARE
Problem: General Plan of Care (Inpatient Behavioral)  Goal: Team Discussion  Team Plan:   BEHAVIORAL TEAM DISCUSSION     Participants: Dr. Mario Shah, Mohsen Mendoza RN, [facility]- Francoise WARE, [facility] and Rae Rowan OT/R  Progress: Pt had to be sent to medical due to a rash they believe was cause my medication. (see notes).  He had some behavioral outbursts while on the medical unit.    Continued Stay Criteria/Rationale: Pt still exhibiting mental health symptoms that require inpatient hospitalization  Medical/Physical: see medical chart - pt had to be transferred briefly to medical  Precautions:   Behavioral Orders   Procedures     Assault precautions     Code 1 - Restrict to Unit     Elopement precautions     Routine Programming       As clinically indicated     Status 15       Every 15 minutes.     Plan: pt has been referred to [facility] and [facility] residence (He will need to show 2 weeks of behavioral control to be considered for there program)  Rationale for change in precautions or plan: pt was on 1:1 while on medical, but is not on a 1:1 here currently.

## 2017-07-24 NOTE — PROGRESS NOTES
Jv s mother visit this evening, she insists that the rash on jv s body is from risperidone and instructed that Dr. Shah should not reorder this medication for him anymore. She stated that she consulted with her primary pharmacist and they suggest the rash is from the risperidone. Writer assured her that her message will be pass across to Dr. Shah.

## 2017-07-24 NOTE — DISCHARGE SUMMARY
"Psychiatric Discharge Summary    Jv Pate MRN# [id number]   Age: 32 year old YOB: 1985     Date of Admission:  5/27/2017  Date of Discharge:  7/24/2017  Admitting Physician:  Mario Shah MD  Discharge Physician:  Karan Diana MD          Event Leading to Hospitalization:   Jv Pate is a 32 year old male who presents to the Emergency Department via EMS for evaluation of psychosis and homicidal ideation. Patient states that he is here because \"the police ordered me here\". He is concerned that he is surrounded by terrorists and everything is monitored. He denies homicidal ideation except towards \"the enemy\". He denies pain or thoughts of self harm. He is prescribed Zyprexa which he takes as needed. He denies a history of psychosis and states that his previous behaviors were secondary to lyme's disease. He denies nausea, vomiting, or chest pain stating that he \"feels fine\".           See Admission note by Saleem Lord MD found on 5/28/2017 for additional details.          Diagnoses:     1.  Schizophrenia, paranoid type. (r/o schizoaffective Bipolar type)   2.  Noncompliance, nonadherence.   3.  Cluster B with antisocial traits.           Labs:     Recent Results (from the past 336 hour(s))   Carbamazepine total    Collection Time: 07/12/17  9:44 AM   Result Value Ref Range    Carbamazepine Level 5.5 4.0 - 12.0 mg/L   Carbamazepine and 10 11 epoxide    Collection Time: 07/21/17  8:34 AM   Result Value Ref Range    Carbamazepine Total Level 2.8 (L)     10, 11 Epoxide Level 0.7    Carbamazepine and epoxide free and total    Collection Time: 07/21/17  8:34 AM   Result Value Ref Range    Carbamazepine Total Level 2.9 (L)     10, 11 Epoxide Level 0.7     Free Carbamazepine Level Ug/Ml <0.5  Reference range: 0.6  to  4.2  Unit: ug/mL   (L)     Free Epoxide Level 0.2    Carbamazepine total    Collection Time: 07/21/17  8:34 AM   Result Value Ref Range    Carbamazepine Level 3.6 (L) 4.0 - " 12.0 mg/L   CBC with platelets differential    Collection Time: 07/21/17  8:34 AM   Result Value Ref Range    WBC 6.4 4.0 - 11.0 10e9/L    RBC Count 5.26 4.4 - 5.9 10e12/L    Hemoglobin 15.2 13.3 - 17.7 g/dL    Hematocrit 44.8 40.0 - 53.0 %    MCV 85 78 - 100 fl    MCH 28.9 26.5 - 33.0 pg    MCHC 33.9 31.5 - 36.5 g/dL    RDW 12.8 10.0 - 15.0 %    Platelet Count 199 150 - 450 10e9/L    Diff Method Automated Method     % Neutrophils 40.7 %    % Lymphocytes 28.1 %    % Monocytes 12.9 %    % Eosinophils 17.4 %    % Basophils 0.6 %    % Immature Granulocytes 0.3 %    Nucleated RBCs 0 0 /100    Absolute Neutrophil 2.6 1.6 - 8.3 10e9/L    Absolute Lymphocytes 1.8 0.8 - 5.3 10e9/L    Absolute Monocytes 0.8 0.0 - 1.3 10e9/L    Absolute Eosinophils 1.1 (H) 0.0 - 0.7 10e9/L    Absolute Basophils 0.0 0.0 - 0.2 10e9/L    Abs Immature Granulocytes 0.0 0 - 0.4 10e9/L    Absolute Nucleated RBC 0.0    Comprehensive metabolic panel    Collection Time: 07/21/17  8:34 AM   Result Value Ref Range    Sodium 140 133 - 144 mmol/L    Potassium 3.9 3.4 - 5.3 mmol/L    Chloride 106 94 - 109 mmol/L    Carbon Dioxide 23 20 - 32 mmol/L    Anion Gap 11 3 - 14 mmol/L    Glucose 104 (H) 70 - 99 mg/dL    Urea Nitrogen 8 7 - 30 mg/dL    Creatinine 0.57 (L) 0.66 - 1.25 mg/dL    GFR Estimate >90  Non  GFR Calc   >60 mL/min/1.7m2    GFR Estimate If Black >90   GFR Calc   >60 mL/min/1.7m2    Calcium 8.6 8.5 - 10.1 mg/dL    Bilirubin Total 0.4 0.2 - 1.3 mg/dL    Albumin 3.6 3.4 - 5.0 g/dL    Protein Total 6.5 (L) 6.8 - 8.8 g/dL    Alkaline Phosphatase 190 (H) 40 - 150 U/L     (H) 0 - 70 U/L    AST 73 (H) 0 - 45 U/L   CRP inflammation    Collection Time: 07/21/17  8:34 AM   Result Value Ref Range    CRP Inflammation 6.9 0.0 - 8.0 mg/L   Erythrocyte sedimentation rate auto    Collection Time: 07/21/17  8:34 AM   Result Value Ref Range    Sed Rate 5 0 - 15 mm/h   CMV DNA quantification    Collection Time: 07/21/17  8:34  AM   Result Value Ref Range    CMV DNA Quantitation Specimen Plasma     CMV Quant IU/mL  CMVND [IU]/mL     CMV DNA Not Detected   Mutations within the highly conserved regions of the viral genome covered by   the LENA AmpliPrep/LENA TaqMan CMV Test primers and/or probes have been   identified and may result in under-quantitation of or failure to detect the   virus.  Supplemental testing methods should be used for testing when this is   suspected.   The LENA AmpliPrep/LENA TaqMan CMV Test is an FDA-approved in vitro nucleic   acid amplification test for the quantitation of cytomegalovirus DNA in human   plasma (EDTA plasma) using the LENA AmpliPrep Instrument for automated viral   nucleic acid extraction and the [employer] TaqMan Analyzer or [employer] TaqMan for   automated Real Time amplification and detection of the viral nucleic acid   target.   Titer results are reported in International Units/mL (IU/mL using 1st WHO   International standard for Human Cytomegalovirus for Nucleic Acid Amplification   based assays. The conversion factor between CMV DNA copis/mL (as defined by the   Roche LENA  TaqMan CMV test) and International Units is the CMV DNA   concentration in IU/mL x 1.1 copies/IU = CMV DNA in copies/mL.   This assay has received FDA approval for the testing of human plasma only. The   [facility] at the [facility], [place], has validated the performance characteristics of the Roche   CMV assay for plasma, bronchial alveolar lavage/wash and urine.      Log IU/mL of CMVQNT Not Calculated <2.1 [Log_IU]/mL   EKG 12-lead, complete    Collection Time: 07/21/17  2:48 PM   Result Value Ref Range    Interpretation ECG Click View Image link to view waveform and result    UA with Microscopic reflex to Culture    Collection Time: 07/21/17  3:54 PM   Result Value Ref Range    Color Urine Yellow     Appearance Urine Clear     Glucose Urine Negative NEG mg/dL     Bilirubin Urine Negative NEG    Ketones Urine 10 (A) NEG mg/dL    Specific Gravity Urine 1.014 1.003 - 1.035    Blood Urine Negative NEG    pH Urine 6.0 5.0 - 7.0 pH    Protein Albumin Urine 10 (A) NEG mg/dL    Urobilinogen mg/dL Normal 0.0 - 2.0 mg/dL    Nitrite Urine Negative NEG    Leukocyte Esterase Urine Negative NEG    Source Midstream Urine     WBC Urine 2 0 - 2 /HPF    RBC Urine 1 0 - 2 /HPF    Bacteria Urine Moderate (A) NEG /HPF    Mucous Urine Present (A) NEG /LPF   Comprehensive metabolic panel    Collection Time: 07/22/17  5:54 AM   Result Value Ref Range    Sodium 138 133 - 144 mmol/L    Potassium 3.9 3.4 - 5.3 mmol/L    Chloride 101 94 - 109 mmol/L    Carbon Dioxide 26 20 - 32 mmol/L    Anion Gap 11 3 - 14 mmol/L    Glucose 133 (H) 70 - 99 mg/dL    Urea Nitrogen 14 7 - 30 mg/dL    Creatinine 0.63 (L) 0.66 - 1.25 mg/dL    GFR Estimate >90  Non  GFR Calc   >60 mL/min/1.7m2    GFR Estimate If Black >90   GFR Calc   >60 mL/min/1.7m2    Calcium 8.6 8.5 - 10.1 mg/dL    Bilirubin Total 0.6 0.2 - 1.3 mg/dL    Albumin 3.5 3.4 - 5.0 g/dL    Protein Total 6.6 (L) 6.8 - 8.8 g/dL    Alkaline Phosphatase 185 (H) 40 - 150 U/L     (H) 0 - 70 U/L    AST 64 (H) 0 - 45 U/L   CBC with platelets differential    Collection Time: 07/22/17  5:54 AM   Result Value Ref Range    WBC 6.8 4.0 - 11.0 10e9/L    RBC Count 5.15 4.4 - 5.9 10e12/L    Hemoglobin 14.8 13.3 - 17.7 g/dL    Hematocrit 44.5 40.0 - 53.0 %    MCV 86 78 - 100 fl    MCH 28.7 26.5 - 33.0 pg    MCHC 33.3 31.5 - 36.5 g/dL    RDW 12.8 10.0 - 15.0 %    Platelet Count 224 150 - 450 10e9/L    Diff Method Automated Method     % Neutrophils 45.1 %    % Lymphocytes 25.4 %    % Monocytes 10.9 %    % Eosinophils 17.7 %    % Basophils 0.6 %    % Immature Granulocytes 0.3 %    Nucleated RBCs 0 0 /100    Absolute Neutrophil 3.1 1.6 - 8.3 10e9/L    Absolute Lymphocytes 1.7 0.8 - 5.3 10e9/L    Absolute Monocytes 0.7 0.0 - 1.3 10e9/L     Absolute Eosinophils 1.2 (H) 0.0 - 0.7 10e9/L    Absolute Basophils 0.0 0.0 - 0.2 10e9/L    Abs Immature Granulocytes 0.0 0 - 0.4 10e9/L    Absolute Nucleated RBC 0.0    CBC with platelets differential    Collection Time: 07/23/17 11:57 AM   Result Value Ref Range    WBC 11.2 (H) 4.0 - 11.0 10e9/L    RBC Count 4.79 4.4 - 5.9 10e12/L    Hemoglobin 14.1 13.3 - 17.7 g/dL    Hematocrit 41.1 40.0 - 53.0 %    MCV 86 78 - 100 fl    MCH 29.4 26.5 - 33.0 pg    MCHC 34.3 31.5 - 36.5 g/dL    RDW 12.8 10.0 - 15.0 %    Platelet Count 231 150 - 450 10e9/L    Diff Method Automated Method     % Neutrophils 31.0 %    % Lymphocytes 33.0 %    % Monocytes 11.8 %    % Eosinophils 23.0 %    % Basophils 0.8 %    % Immature Granulocytes 0.4 %    Nucleated RBCs 0 0 /100    Absolute Neutrophil 3.5 1.6 - 8.3 10e9/L    Absolute Lymphocytes 3.7 0.8 - 5.3 10e9/L    Absolute Monocytes 1.3 0.0 - 1.3 10e9/L    Absolute Eosinophils 2.6 (H) 0.0 - 0.7 10e9/L    Absolute Basophils 0.1 0.0 - 0.2 10e9/L    Abs Immature Granulocytes 0.1 0 - 0.4 10e9/L    Absolute Nucleated RBC 0.0    Comprehensive metabolic panel    Collection Time: 07/23/17 11:57 AM   Result Value Ref Range    Sodium 141 133 - 144 mmol/L    Potassium 3.5 3.4 - 5.3 mmol/L    Chloride 104 94 - 109 mmol/L    Carbon Dioxide 26 20 - 32 mmol/L    Anion Gap 11 3 - 14 mmol/L    Glucose 103 (H) 70 - 99 mg/dL    Urea Nitrogen 12 7 - 30 mg/dL    Creatinine 0.65 (L) 0.66 - 1.25 mg/dL    GFR Estimate >90  Non  GFR Calc   >60 mL/min/1.7m2    GFR Estimate If Black >90   GFR Calc   >60 mL/min/1.7m2    Calcium 8.6 8.5 - 10.1 mg/dL    Bilirubin Total 0.5 0.2 - 1.3 mg/dL    Albumin 3.6 3.4 - 5.0 g/dL    Protein Total 6.5 (L) 6.8 - 8.8 g/dL    Alkaline Phosphatase 156 (H) 40 - 150 U/L     (H) 0 - 70 U/L    AST 52 (H) 0 - 45 U/L              Consults:   The patient was followed by internal medicine, who initially contacted infectious disease to discuss mother's concern  of Lyme. When the patient developed a rash, medicine was involved and eventually dermatology saw him as the rash worsened. They recommended transfer to internal medicine.         Hospital Course:   Jv Pate was admitted to [facility] with attending Mario Shah MD petition for commitment was filed and granted due to homicidal statements. The patient was placed under status 15 (15 minute checks) to ensure patient safety.     The patient did not reach full stability as he was discharged to internal medicine for treatment of his rash. He was improving with risperidone, but this was stopped due to concerns it was cause the rash. Later dermatology determined that the patient should be monitored on medicine due to potential worsening of medical stability with rash.    The patient was still under commitment and will return to the psych service after monitoring and stabilization.         Discharge Medications:     Discharge Medication List as of 7/21/2017  5:21 PM      CONTINUE these medications which have NOT CHANGED    Details   !! ZYPREXA 15 MG OR TABS 1 TABLET DAILY, Historical      !! ZYPREXA 15 MG OR TABS 1 TABLET DAILY, Historical       !! - Potential duplicate medications found. Please discuss with provider.               Psychiatric Examination:   Patient was not seen by me on day of transfer as he was not my patient and the provider, Dr. Shah, was out that day.         Discharge Plan:   Patient will return to psychiatry when stable.

## 2017-07-24 NOTE — CONSULTS
"  REASON FOR CONSULTATION:  The medicine service requested a consultation for assistance in the management of this patient with paranoid schizophrenia, who is admitted to medicine with a concerning rash, likely secondary to Tegretol.    HISTORY OF PRESENT ILLNESS:  The patient is a 32-year-old gentleman with a history of paranoid schizophrenia, cluster B personality disorder, with antisocial traits, Lyme disease is possible, neurologic involvement, hypertension, who was initially admitted to psychiatry for what was described as acute psychiatric decompensation with homicidal ideation.  While on psychiatry, had undergone treatment with Saphris, Zyprexa, Haldol, Latuda, Risperdal, and Tegretol.  The patient developed a rash approximately July 17, 2017.  The patient has been seen by Dermatology.  There is concern that the rash is secondary to Tegretol, and the patient has elevated LFTs, and eosinophil and there is a concern for DRESS.  Dermatology recommended transfer to the medical floor for close medical monitoring.  Review of Dr. Lord's admitting History and Physical from May 28, 2017, reveals that the patient has a past history of schizophrenia paranoid type.  He had been on Zyprexa, but apparently not compliant at the time of admission.  Per Dr. Lord psychiatric issues include acting strange for couple days, homicidal ideation intention, verbalizing that he could \"kill someone.\"  Recent notes from psychiatry were reviewed.   Per Dr. Shah on July 20, the patient was expressing paranoid toward certain nursing staff.  Denied suicidal, homicidal ideation.  Denied hallucinations.  Denied racing thoughts.  At that time, was on Lurasidone,  quetiapine, carbamazepine and had been on Risperdal per Dr. Shah.  The patient is diagnosed with Schizophrenia paranoid type and cluster B, with antisocial traits.    On my interview, the patient is sitting up in bed.  He is engageable, cooperative.  He appears " "somewhat guarded in his presentation.  He denies any voices.  Denies any overt paranoia.  He is able to generally describe why he is on the medical floor due to issues related to his medication.  He seems to minimize the severity of the psychiatric disorder at this time and states that he \"feels better\" and states that he thinks he can go home soon to be managed as an outpatient.  He denies any racing thoughts.  Denies any manic type symptoms.    PAST MEDICAL HISTORY:  Significant for questionable history of Lyme disease.    Past psychiatric history:  The patient states he has been hospitalized approximately 9 times since he was 18 years old.  Denies any suicide attempts.  Does state that he has a diagnosis of schizophrenia.  Per records he also has a diagnosis of cluster B personality disorder with antisocial traits.    Chemical dependence history:  The patient states he has used alcohol and cannabis, but has never been in treatment for them and states his use has \"not been a problem.\"      Family psychiatric history:  Patient denies.    SOCIAL HISTORY:  The patient has been on disability due to psychiatric illness.  He lives at home with his mother.  He went to high school for one year and did some college.    REVIEW OF SYSTEMS:  Ten point review of systems was performed.  The patient denies chest pain, shortness of breath, abdominal symptoms.  Denies localizing neurological symptoms.  He does state that his skin feels somewhat itchy and tingly, which he attributes to the rash.  Denies fevers, chills, nausea, vomiting, weight loss.  Rest of 10 point review of systems is negative.    MEDICATIONS:  Medications were reviewed per [facility].  The patient currently is on Latuda 40 milligrams per day, Seroquel 200 milligrams at bedtime.  He is being started on a regimen of prednisone 40 milligrams two times a day for his rash and DRESS syndrome.  He is also on as needed Haldol, Benadryl and Cogentin.    MENTAL STATUS EXAM:  " GENERAL APPEARANCE AND BEHAVIOR: The patient is an appropriately groomed gentleman sitting up in bed.  He is somewhat guarded, but generally cooperative.      MOOD AND AFFECT: The patient denies depression.  His affect is flat.      THOUGHT PROCESSES AND ASSOCIATIONS:  These are intact.      THOUGHT CONTENT:  Denies auditory or visual hallucinations.  Denies suicidal ideation.      SPEECH AND LANGUAGE: These are appropriate.    ATTENTION AND CONCENTRATION:  Appear intact.    ORIENTATION: The patient is alert and oriented x3.      RECENT AND REMOTE MEMORY: Appear intact.     FUND OF KNOWLEDGE:  Appears average.      JUDGMENT INSIGHT:  Appears adequate but somewhat to circumstances, but overall somewhat limited in terms of his acknowledgement of his psychiatric illness.      MUSCLE BULK AND TONE:  Appear normal.      ABNORMAL MOVEMENTS:  None noted.    Impression:  The patient is a 32-year-old gentleman with a long history of paranoid schizophrenia, who was being treated on the psychiatric unit and developed a rash.  He has transferred to medicine.  He is now on fairly high-dose prednisone.  He currently is somewhat guarded and appears mildly paranoid, but is not overtly psychotic or agitated.  We will need to watch him carefully with the addition of steroids to his regimen.  Dsm diagnoses:    1.  Schizophrenia.  2.  Personality disorder, unspecified cluster B with antisocial traits.      Treatment recommendations:    1.  We will continue current regimen at this time with his Latuda, Seroquel and as needed Haldol.  2.  If the patient becomes agitated, paranoid or psychotic we have a low threshold for giving him the Haldol and his mental status should be monitored closely by staff, as steroids can certainly cause changes in psychiatric status.  3.  Will consider the reinstitution of Risperdal in the future as this likely is not the medicine that caused the rash, but for now we will hold pending further monitoring of  his the dermatologic and medical condition.  4.  When the patient is stable, he should be transferred back to Psychiatry.  5.  Please call or reconsult for any questions, concerns, or change in the patient's status.  My number [phone].        Jason Dorsey MD    D:  07/22/2017 13:52 T:  07/24/2017 09:56  Document:  [id number]

## 2017-07-24 NOTE — PLAN OF CARE
Problem: Psychotic Symptoms  Goal: Psychotic Symptoms  Signs and symptoms of listed problems will be absent or manageable.  Outcome: Therapy, progress toward functional goals as expected  RN Assessment     Patient has been medication compliant since his return. He has not made any attempts to elope. He has been cooperative and pleasant. He is able to make his needs known. Denies any MH symptoms. He is pleasant with staff and peers. Rash does appear to be improving. Patient reports no s/s of pain or discomfort. He is attending groups. Eating and drinking in good amounts.

## 2017-07-25 LAB
ALBUMIN SERPL-MCNC: 3.9 G/DL (ref 3.4–5)
ALP SERPL-CCNC: 142 U/L (ref 40–150)
ALT SERPL W P-5'-P-CCNC: 141 U/L (ref 0–70)
ANION GAP SERPL CALCULATED.3IONS-SCNC: 12 MMOL/L (ref 3–14)
AST SERPL W P-5'-P-CCNC: 31 U/L (ref 0–45)
BASOPHILS # BLD AUTO: 0.1 10E9/L (ref 0–0.2)
BASOPHILS NFR BLD AUTO: 0.9 %
BILIRUB SERPL-MCNC: 0.3 MG/DL (ref 0.2–1.3)
BUN SERPL-MCNC: 10 MG/DL (ref 7–30)
CALCIUM SERPL-MCNC: 9.2 MG/DL (ref 8.5–10.1)
CHLORIDE SERPL-SCNC: 105 MMOL/L (ref 94–109)
CO2 SERPL-SCNC: 24 MMOL/L (ref 20–32)
CREAT SERPL-MCNC: 0.56 MG/DL (ref 0.66–1.25)
DIFFERENTIAL METHOD BLD: ABNORMAL
EOSINOPHIL # BLD AUTO: 1.4 10E9/L (ref 0–0.7)
EOSINOPHIL NFR BLD AUTO: 12.6 %
ERYTHROCYTE [DISTWIDTH] IN BLOOD BY AUTOMATED COUNT: 13.1 % (ref 10–15)
GFR SERPL CREATININE-BSD FRML MDRD: ABNORMAL ML/MIN/1.7M2
GLUCOSE SERPL-MCNC: 110 MG/DL (ref 70–99)
HCT VFR BLD AUTO: 43.4 % (ref 40–53)
HGB BLD-MCNC: 14.7 G/DL (ref 13.3–17.7)
IMM GRANULOCYTES # BLD: 0.1 10E9/L (ref 0–0.4)
IMM GRANULOCYTES NFR BLD: 0.6 %
LYMPHOCYTES # BLD AUTO: 3.9 10E9/L (ref 0.8–5.3)
LYMPHOCYTES NFR BLD AUTO: 36.3 %
MCH RBC QN AUTO: 29.6 PG (ref 26.5–33)
MCHC RBC AUTO-ENTMCNC: 33.9 G/DL (ref 31.5–36.5)
MCV RBC AUTO: 87 FL (ref 78–100)
MONOCYTES # BLD AUTO: 1 10E9/L (ref 0–1.3)
MONOCYTES NFR BLD AUTO: 8.9 %
NEUTROPHILS # BLD AUTO: 4.4 10E9/L (ref 1.6–8.3)
NEUTROPHILS NFR BLD AUTO: 40.7 %
NRBC # BLD AUTO: 0 10*3/UL
NRBC BLD AUTO-RTO: 0 /100
PLATELET # BLD AUTO: 272 10E9/L (ref 150–450)
POTASSIUM SERPL-SCNC: 3.6 MMOL/L (ref 3.4–5.3)
PROT SERPL-MCNC: 6.9 G/DL (ref 6.8–8.8)
RBC # BLD AUTO: 4.97 10E12/L (ref 4.4–5.9)
SODIUM SERPL-SCNC: 141 MMOL/L (ref 133–144)
WBC # BLD AUTO: 10.7 10E9/L (ref 4–11)

## 2017-07-25 PROCEDURE — 99232 SBSQ HOSP IP/OBS MODERATE 35: CPT | Performed by: PSYCHIATRY & NEUROLOGY

## 2017-07-25 RX ADMIN — TRIAMCINOLONE ACETONIDE: 1 OINTMENT TOPICAL at 21:23

## 2017-07-25 RX ADMIN — NICOTINE POLACRILEX 2 MG: 2 GUM, CHEWING ORAL at 16:10

## 2017-07-25 RX ADMIN — PREDNISONE 40 MG: 20 TABLET ORAL at 08:20

## 2017-07-25 RX ADMIN — LURASIDONE HYDROCHLORIDE 40 MG: 40 TABLET, FILM COATED ORAL at 08:01

## 2017-07-25 RX ADMIN — PREDNISONE 40 MG: 20 TABLET ORAL at 18:04

## 2017-07-25 RX ADMIN — DIPHENHYDRAMINE HYDROCHLORIDE 50 MG: 50 CAPSULE ORAL at 21:22

## 2017-07-25 RX ADMIN — TRIAMCINOLONE ACETONIDE: 1 OINTMENT TOPICAL at 08:26

## 2017-07-25 RX ADMIN — QUETIAPINE FUMARATE 200 MG: 200 TABLET, FILM COATED ORAL at 21:22

## 2017-07-25 ASSESSMENT — ACTIVITIES OF DAILY LIVING (ADL)
LAUNDRY: WITH SUPERVISION
HYGIENE/GROOMING: HANDWASHING;SHOWER;INDEPENDENT
ORAL_HYGIENE: INDEPENDENT
LAUNDRY: UNABLE TO COMPLETE
DRESS: SCRUBS (BEHAVIORAL HEALTH)
DRESS: STREET CLOTHES;INDEPENDENT
ORAL_HYGIENE: INDEPENDENT
GROOMING: INDEPENDENT

## 2017-07-25 NOTE — PROGRESS NOTES
Patient tried to refuse all medications beside Prednisone and cream this morning. He did end up taking his Latuda after prompting.

## 2017-07-25 NOTE — PROGRESS NOTES
"[facility], [place]   Psychiatric Progress Note        Interim History:   The patient's care was discussed with the treatment team during the daily team meeting and/or staff's chart notes were reviewed.  Staff reports patient slept 7hrs last night. Tense and withdrawn, but no outbursts and engaged on approach. Appears suspicious and paranoia. Compliant with medications but needed prompting with Latuda today. irritable and tense.  Skin rash and LFT improving.  No SI, LOYDA or HI.  Independent with ADL. Reported emesis x2 today.     The patient was pleasant and engaged. Less tense and more engaged today. Denied all symptoms, including dep, racing thoughts, paranoia and hallucinations. Noted that anxiety improved. Noted that \"stomachache\" improved and eating \"better\". No specific physical complaints. Denied SI, LOYDA and HI. Sleeping well.     Dicussed medications and care plan.          lisinopril  20 mg Oral Daily     lurasidone  40 mg Oral Daily     multivitamin, therapeutic  1 tablet Oral Daily     predniSONE  40 mg Oral BID w/meals     QUEtiapine  200 mg Oral At Bedtime     triamcinolone   Topical BID          Allergies:     Allergies   Allergen Reactions     Carbamazepine Rash     Carbamazepine (Tegretol),.....Rash.          Labs:     Recent Results (from the past 24 hour(s))   Comprehensive metabolic panel    Collection Time: 07/25/17  8:47 AM   Result Value Ref Range    Sodium 141 133 - 144 mmol/L    Potassium 3.6 3.4 - 5.3 mmol/L    Chloride 105 94 - 109 mmol/L    Carbon Dioxide 24 20 - 32 mmol/L    Anion Gap 12 3 - 14 mmol/L    Glucose 110 (H) 70 - 99 mg/dL    Urea Nitrogen 10 7 - 30 mg/dL    Creatinine 0.56 (L) 0.66 - 1.25 mg/dL    GFR Estimate >90  Non  GFR Calc   >60 mL/min/1.7m2    GFR Estimate If Black >90   GFR Calc   >60 mL/min/1.7m2    Calcium 9.2 8.5 - 10.1 mg/dL    Bilirubin Total 0.3 0.2 - 1.3 mg/dL    Albumin 3.9 3.4 - 5.0 g/dL    Protein " Total 6.9 6.8 - 8.8 g/dL    Alkaline Phosphatase 142 40 - 150 U/L     (H) 0 - 70 U/L    AST 31 0 - 45 U/L   CBC with platelets differential    Collection Time: 07/25/17  8:47 AM   Result Value Ref Range    WBC 10.7 4.0 - 11.0 10e9/L    RBC Count 4.97 4.4 - 5.9 10e12/L    Hemoglobin 14.7 13.3 - 17.7 g/dL    Hematocrit 43.4 40.0 - 53.0 %    MCV 87 78 - 100 fl    MCH 29.6 26.5 - 33.0 pg    MCHC 33.9 31.5 - 36.5 g/dL    RDW 13.1 10.0 - 15.0 %    Platelet Count 272 150 - 450 10e9/L    Diff Method Automated Method     % Neutrophils 40.7 %    % Lymphocytes 36.3 %    % Monocytes 8.9 %    % Eosinophils 12.6 %    % Basophils 0.9 %    % Immature Granulocytes 0.6 %    Nucleated RBCs 0 0 /100    Absolute Neutrophil 4.4 1.6 - 8.3 10e9/L    Absolute Lymphocytes 3.9 0.8 - 5.3 10e9/L    Absolute Monocytes 1.0 0.0 - 1.3 10e9/L    Absolute Eosinophils 1.4 (H) 0.0 - 0.7 10e9/L    Absolute Basophils 0.1 0.0 - 0.2 10e9/L    Abs Immature Granulocytes 0.1 0 - 0.4 10e9/L    Absolute Nucleated RBC 0.0           Psychiatric Examination:     Vitals:    07/23/17 1826 07/23/17 1953 07/24/17 0700 07/25/17 0824   BP: 115/52 114/70     Pulse: 98 97     Resp: 18  16 16   Temp: 99.3  F (37.4  C) 98.4  F (36.9  C) 98  F (36.7  C) 97.8  F (36.6  C)   TempSrc:  Tympanic Tympanic Tympanic   Weight:    108 kg (238 lb)       Weight is 238 lbs 0 oz  Body mass index is 29.75 kg/(m^2).    Appearance: awake, alert, adequately groomed, dressed in hospital scrubs, appeared as age stated and no apparent distress  Attitude:  cooperative and less guarded  Eye Contact:  better  Mood:  anxious and better  Affect:  and more engaged.  and reactive  Speech:  clear, coherent and normal prosody  Psychomotor Behavior:  no evidence of tardive dyskinesia, dystonia, or tics and intact station, gait and muscle tone  Throught Process:  linear and goal oriented  Associations:  no loose associations  Thought Content:  no auditory hallucinations present, no visual  hallucinations present and adamantly denied SI, LOYDA and HI. Internal stimuli and paranoia improved.   Insight:  fair  Judgement:  fair  Oriented to:  time, person, and place  Attention Span and Concentration:  intact  Recent and Remote Memory:  intact    Fund of Knowledge: Adequate         Precautions:     Behavioral Orders   Procedures     Assault precautions     Code 1 - Restrict to Unit     Elopement precautions     Routine Programming     As clinically indicated     Status 15     Every 15 minutes.          Diagnoses:     1.  Schizophrenia, paranoid type. (r/o schizoaffective Bipolar type)   2.  Noncompliance, nonadherence.   3.  Cluster B with antisocial traits.  4.  DRESS, rash improved and LFT normalizing.            Plan:     Medications:  -- Saphris: was tapered off due to lack of efficacy.  -- PRNs: Vistaril for anxiety and Seroquel for restlessness and haldol with Benadryl for agitation and psychosis. PRN ativan was discontinued. Seroquel 200 mg qhs PRN for racing thoughts and insomnia.   -- Seroquel 200 mg qHS. Plan to add Seroquel and remove Risperdal from Ross orders.   -- Latuda: started and titrated to 120 mg daily. The medication was helpful initially but later mood lability and paranoia re-emerged. Medications was gradually lowered to 40 mg daily with plan to cross taper it with Risperdal. May consider re-titrating to a target dose of 120-160 mg if mood lability persist now that Risperdal was discontinued.   -- Risperdal was started and showed great efficacy but patient later developed a skin rash. Risperdal was eventually discontinued.  The patient declined to reconsider Risperdal in future.    -- Tegretol: started and titrated to 200 mg BID, level was 5.1 on 6/11 and 6.9 on 6/16 and 5.5 on 7/12. The patient developed a rash which failed to resolved after d/c'ing Risperdal. Tegretol was suspected and discontinued on 7/20.   -- Cogentin was added at 0.5-1 mg BID PRN for EPS.     -- IM consult  consult completed,  re: Lyme management. They have recommended no intervention at this time due to lack of neurological findings. This was based on their conversation with infectious disease specialist.    Legal Status and Disposition:  1.  Commitment with [facility] for Saphris, Zyprexa, haloperidol, Latuda, and risperidone.  2.  Will work with [facility]  on [facility] placement. Given his statements about homicidal thoughts toward neighbor. May consider discharge home if safety established. [facility] [place] informed [place] police department of the threats he made earlier in the hospital stay.

## 2017-07-25 NOTE — PROGRESS NOTES
07/25/17 [facility]   Behavioral Health   Hallucinations denies / not responding to hallucinations   Thinking poor concentration   Orientation person: oriented;place: oriented;date: oriented;time: oriented   Memory new learning, recall loss   Insight poor   Judgement impaired   Eye Contact at examiner   Affect blunted, flat   Mood depressed;mood is calm   Physical Appearance/Attire attire appropriate to age and situation   Hygiene neglected grooming - unclean body, hair, teeth   Suicidality other (see comments)  (none stated)   Self Injury other (see comment)  (none stated)   Elopement (none stated or observed)   Activity isolative;withdrawn   Speech (WDL) WDL   Medication Sensitivity no stated side effects;no observed side effects   Psychomotor / Gait balanced;steady   Activities of Daily Living   Hygiene/Grooming independent   Oral Hygiene independent   Dress scrubs (behavioral health)   Laundry unable to complete   Room Organization independent   Activity   Activity Level of Assistance independent   Behavioral Health Interventions   Psychotic Symptoms maintain safety precautions;maintain safe secure environment;simple, clear language;reality orientation;assist patient in developing safety plan;assist patient in following safety plan;encourage nutrition and hydration;encourage participation / independence with adls;provide emotional support;assist with developing & utilizing healthy coping strategies;build upon strengths   Social and Therapeutic Interventions (Psychotic Symptoms) encourage socialization with peers;encourage effective boundaries with peers;encourage participation in therapeutic groups and milieu activities   Pt spent morning napping on and off in his room.  Pt came out very little and socialized with peers very little.  Pt did not attend group sessions this shift.  Pt stated no SI/SIB.  Pt continues to ask same thing multiple times.  Other than this, nothing remarkable to report about pt and no  behavioral concerns.

## 2017-07-25 NOTE — PROGRESS NOTES
Pt was tense and irritable throughout the shift, especially when his requests were not met immediately. Pt tends to ask the same questions to different staff.  Pt vomited x2 this evening.  No major behavioral concerns to be noted.        07/24/17 2149   Behavioral Health   Hallucinations denies / not responding to hallucinations   Thinking poor concentration   Orientation place: oriented   Memory new learning, recall loss  (asks same questions repeatedly )   Insight poor   Judgement impaired   Eye Contact out of corner of eyes;at examiner   Affect tense;blunted, flat   Mood depressed;irritable   Physical Appearance/Attire untidy;disheveled   Hygiene neglected grooming - unclean body, hair, teeth   Suicidality (WDL) WDL   Self Injury (WDL) WDL   Elopement (WDL) (pt on elopement precautions)   Activity restless;withdrawn   Speech (WDL) WDL   Psychomotor Gait (WDL) WDL   Activities of Daily Living   Hygiene/Grooming independent   Oral Hygiene independent   Dress scrubs (behavioral health)   Laundry unable to complete   Room Organization independent

## 2017-07-26 LAB
ALBUMIN SERPL-MCNC: 3.6 G/DL (ref 3.4–5)
ALP SERPL-CCNC: 128 U/L (ref 40–150)
ALT SERPL W P-5'-P-CCNC: 108 U/L (ref 0–70)
ANION GAP SERPL CALCULATED.3IONS-SCNC: 10 MMOL/L (ref 3–14)
AST SERPL W P-5'-P-CCNC: 17 U/L (ref 0–45)
BASOPHILS # BLD AUTO: 0.1 10E9/L (ref 0–0.2)
BASOPHILS NFR BLD AUTO: 0.6 %
BILIRUB SERPL-MCNC: 0.2 MG/DL (ref 0.2–1.3)
BUN SERPL-MCNC: 14 MG/DL (ref 7–30)
CALCIUM SERPL-MCNC: 9.3 MG/DL (ref 8.5–10.1)
CHLORIDE SERPL-SCNC: 108 MMOL/L (ref 94–109)
CO2 SERPL-SCNC: 26 MMOL/L (ref 20–32)
CREAT SERPL-MCNC: 0.66 MG/DL (ref 0.66–1.25)
DIFFERENTIAL METHOD BLD: NORMAL
EOSINOPHIL # BLD AUTO: 0.7 10E9/L (ref 0–0.7)
EOSINOPHIL NFR BLD AUTO: 6.8 %
ERYTHROCYTE [DISTWIDTH] IN BLOOD BY AUTOMATED COUNT: 13.1 % (ref 10–15)
GFR SERPL CREATININE-BSD FRML MDRD: ABNORMAL ML/MIN/1.7M2
GLUCOSE SERPL-MCNC: 92 MG/DL (ref 70–99)
HCT VFR BLD AUTO: 42.4 % (ref 40–53)
HGB BLD-MCNC: 14 G/DL (ref 13.3–17.7)
IMM GRANULOCYTES # BLD: 0.1 10E9/L (ref 0–0.4)
IMM GRANULOCYTES NFR BLD: 0.9 %
LYMPHOCYTES # BLD AUTO: 4.2 10E9/L (ref 0.8–5.3)
LYMPHOCYTES NFR BLD AUTO: 39.1 %
MCH RBC QN AUTO: 28.9 PG (ref 26.5–33)
MCHC RBC AUTO-ENTMCNC: 33 G/DL (ref 31.5–36.5)
MCV RBC AUTO: 87 FL (ref 78–100)
MONOCYTES # BLD AUTO: 1.3 10E9/L (ref 0–1.3)
MONOCYTES NFR BLD AUTO: 11.9 %
NEUTROPHILS # BLD AUTO: 4.4 10E9/L (ref 1.6–8.3)
NEUTROPHILS NFR BLD AUTO: 40.7 %
NRBC # BLD AUTO: 0 10*3/UL
NRBC BLD AUTO-RTO: 0 /100
PLATELET # BLD AUTO: 277 10E9/L (ref 150–450)
POTASSIUM SERPL-SCNC: 4.1 MMOL/L (ref 3.4–5.3)
PROT SERPL-MCNC: 6.5 G/DL (ref 6.8–8.8)
RBC # BLD AUTO: 4.85 10E12/L (ref 4.4–5.9)
SODIUM SERPL-SCNC: 144 MMOL/L (ref 133–144)
WBC # BLD AUTO: 10.7 10E9/L (ref 4–11)

## 2017-07-26 PROCEDURE — 99207 ZZC CDG-MDM COMPONENT: MEETS MODERATE - UP CODED: CPT | Performed by: PHYSICIAN ASSISTANT

## 2017-07-26 PROCEDURE — 99232 SBSQ HOSP IP/OBS MODERATE 35: CPT | Performed by: PHYSICIAN ASSISTANT

## 2017-07-26 PROCEDURE — 99232 SBSQ HOSP IP/OBS MODERATE 35: CPT | Performed by: PSYCHIATRY & NEUROLOGY

## 2017-07-26 RX ADMIN — Medication 1 LOZENGE: at 20:18

## 2017-07-26 RX ADMIN — TRIAMCINOLONE ACETONIDE: 1 OINTMENT TOPICAL at 19:36

## 2017-07-26 RX ADMIN — NICOTINE POLACRILEX 2 MG: 2 GUM, CHEWING ORAL at 07:50

## 2017-07-26 RX ADMIN — TRIAMCINOLONE ACETONIDE: 1 OINTMENT TOPICAL at 07:50

## 2017-07-26 RX ADMIN — LURASIDONE HYDROCHLORIDE 40 MG: 40 TABLET, FILM COATED ORAL at 07:50

## 2017-07-26 RX ADMIN — PREDNISONE 40 MG: 20 TABLET ORAL at 07:50

## 2017-07-26 RX ADMIN — Medication 1 LOZENGE: at 21:48

## 2017-07-26 RX ADMIN — QUETIAPINE FUMARATE 200 MG: 200 TABLET, FILM COATED ORAL at 19:36

## 2017-07-26 RX ADMIN — LISINOPRIL 20 MG: 20 TABLET ORAL at 07:50

## 2017-07-26 RX ADMIN — PREDNISONE 40 MG: 20 TABLET ORAL at 17:46

## 2017-07-26 RX ADMIN — MULTIPLE VITAMINS W/ MINERALS TAB 1 TABLET: TAB at 07:50

## 2017-07-26 RX ADMIN — NICOTINE POLACRILEX 2 MG: 2 GUM, CHEWING ORAL at 22:02

## 2017-07-26 RX ADMIN — Medication 1 LOZENGE: at 11:11

## 2017-07-26 ASSESSMENT — ACTIVITIES OF DAILY LIVING (ADL)
ORAL_HYGIENE: INDEPENDENT
ORAL_HYGIENE: INDEPENDENT
DRESS: SCRUBS (BEHAVIORAL HEALTH)
LAUNDRY: UNABLE TO COMPLETE
DRESS: SCRUBS (BEHAVIORAL HEALTH)
GROOMING: INDEPENDENT
GROOMING: SHOWER;INDEPENDENT;HANDWASHING
LAUNDRY: UNABLE TO COMPLETE
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
## 2017-07-26 NOTE — PROGRESS NOTES
"[facility], [place]   Psychiatric Progress Note        Interim History:   The patient's care was discussed with the treatment team during the daily team meeting and/or staff's chart notes were reviewed.  Staff reports patient slept 7hrs last night. He is tense at times, keep to self and preoccupied at times, but cooperative and engaged on approach. Appears restless at time. Denied SI, LOYDA and HI. No significant irritability and no outbursts.  Compliant with medications. Skin rash improving.  Independent with ADL.     The patient was fully engaged and affect was greatly more reactive. He noted that he is feeling better. C/o mild sore throat but no SOB, or dysphagia. Rash improving. Denied dep and anx. Denied racing thoughts and irritability. Noted that he is sleeping well. Appetite improved and eating well. Denied SI, LOYDA and HI. Pointed out that he is been doing well and hopes that he will be able to discharge home eventually. [place]it[place]". Denied hallucinations and paranoia.     Dicussed medications and care plan.          lisinopril  20 mg Oral Daily     lurasidone  40 mg Oral Daily     multivitamin, therapeutic  1 tablet Oral Daily     predniSONE  40 mg Oral BID w/meals     QUEtiapine  200 mg Oral At Bedtime     triamcinolone   Topical BID          Allergies:     Allergies   Allergen Reactions     Carbamazepine Rash     Carbamazepine (Tegretol),.....Rash.          Labs:     Recent Results (from the past 24 hour(s))   Comprehensive metabolic panel    Collection Time: 07/26/17  7:20 AM   Result Value Ref Range    Sodium 144 133 - 144 mmol/L    Potassium 4.1 3.4 - 5.3 mmol/L    Chloride 108 94 - 109 mmol/L    Carbon Dioxide 26 20 - 32 mmol/L    Anion Gap 10 3 - 14 mmol/L    Glucose 92 70 - 99 mg/dL    Urea Nitrogen 14 7 - 30 mg/dL    Creatinine 0.66 0.66 - 1.25 mg/dL    GFR Estimate >90  Non  GFR Calc   >60 mL/min/1.7m2    GFR Estimate " If Black >90   GFR Calc   >60 mL/min/1.7m2    Calcium 9.3 8.5 - 10.1 mg/dL    Bilirubin Total 0.2 0.2 - 1.3 mg/dL    Albumin 3.6 3.4 - 5.0 g/dL    Protein Total 6.5 (L) 6.8 - 8.8 g/dL    Alkaline Phosphatase 128 40 - 150 U/L     (H) 0 - 70 U/L    AST 17 0 - 45 U/L   CBC with platelets differential    Collection Time: 07/26/17  7:20 AM   Result Value Ref Range    WBC 10.7 4.0 - 11.0 10e9/L    RBC Count 4.85 4.4 - 5.9 10e12/L    Hemoglobin 14.0 13.3 - 17.7 g/dL    Hematocrit 42.4 40.0 - 53.0 %    MCV 87 78 - 100 fl    MCH 28.9 26.5 - 33.0 pg    MCHC 33.0 31.5 - 36.5 g/dL    RDW 13.1 10.0 - 15.0 %    Platelet Count 277 150 - 450 10e9/L    Diff Method Automated Method     % Neutrophils 40.7 %    % Lymphocytes 39.1 %    % Monocytes 11.9 %    % Eosinophils 6.8 %    % Basophils 0.6 %    % Immature Granulocytes 0.9 %    Nucleated RBCs 0 0 /100    Absolute Neutrophil 4.4 1.6 - 8.3 10e9/L    Absolute Lymphocytes 4.2 0.8 - 5.3 10e9/L    Absolute Monocytes 1.3 0.0 - 1.3 10e9/L    Absolute Eosinophils 0.7 0.0 - 0.7 10e9/L    Absolute Basophils 0.1 0.0 - 0.2 10e9/L    Abs Immature Granulocytes 0.1 0 - 0.4 10e9/L    Absolute Nucleated RBC 0.0           Psychiatric Examination:     Vitals:    07/23/17 1953 07/24/17 0700 07/25/17 0824 07/26/17 0750   BP: 114/70      Pulse: 97      Resp:  16 16 16   Temp: 98.4  F (36.9  C) 98  F (36.7  C) 97.8  F (36.6  C) 97.3  F (36.3  C)   TempSrc: Tympanic Tympanic Tympanic Tympanic   Weight:   108 kg (238 lb)        Weight is 238 lbs 0 oz  Body mass index is 29.75 kg/(m^2).    Appearance: awake, alert, dressed in hospital scrubs, appeared as age stated and no apparent distress  Attitude:  cooperative and less guarded  Eye Contact:  good  Mood:  better  Affect:  and more engaged.  and reactive  Speech:  clear, coherent and normal prosody  Psychomotor Behavior:  no evidence of tardive dyskinesia, dystonia, or tics and intact station, gait and muscle tone  Throught Process:   linear and goal oriented  Associations:  no loose associations  Thought Content:  no evidence of suicidal ideation or homicidal ideation, no auditory hallucinations present and no visual hallucinations present  Insight:  fair  Judgement:  fair  Oriented to:  time, person, and place  Attention Span and Concentration:  intact  Recent and Remote Memory:  intact    Fund of Knowledge: Adequate         Precautions:     Behavioral Orders   Procedures     Assault precautions     Code 1 - Restrict to Unit     Elopement precautions     Routine Programming     As clinically indicated     Status 15     Every 15 minutes.          Diagnoses:     1.  Schizophrenia, paranoid type. (r/o schizoaffective Bipolar type)   2.  Noncompliance, nonadherence.   3.  Cluster B with antisocial traits.  4.  DRESS, rash improved and LFT normalizing.            Plan:     Medications:  -- Saphris: was tapered off due to lack of efficacy.  -- PRNs: Vistaril for anxiety and Seroquel for restlessness and haldol with Benadryl for agitation and psychosis. PRN ativan was discontinued. Seroquel 200 mg qhs PRN for racing thoughts and insomnia.   -- Seroquel 200 mg qHS. Plan to add Seroquel and remove Risperdal from [facility] orders.   -- Latuda: started and titrated to 120 mg daily. The medication was helpful initially but later mood lability and paranoia re-emerged. Medications was gradually lowered to 40 mg daily with plan to cross taper it with Risperdal. May consider re-titrating to a target dose of 120-160 mg if mood lability persist now that Risperdal was discontinued.   -- Risperdal was started and showed great efficacy but patient later developed a skin rash. Risperdal was eventually discontinued.  The patient declined to reconsider Risperdal in future.    -- Tegretol: started and titrated to 200 mg BID, level was 5.1 on 6/11 and 6.9 on 6/16 and 5.5 on 7/12. The patient developed a rash which failed to resolved after d/c'ing Risperdal. Tegretol was  suspected and discontinued on 7/20.   -- Cogentin was added at 0.5-1 mg BID PRN for EPS.     -- IM consult consult completed,  re: Lyme management. They have recommended no intervention at this time due to lack of neurological findings. This was based on their conversation with infectious disease specialist.    -- IM team continue to follow and monitor re: DRESS.     Legal Status and Disposition:  1.  Commitment with [facility] for Saphris, Zyprexa, haloperidol, Latuda, and risperidone. Letter was sent to add Seroquel and remove Risperdal from [facility]  2.  Will work with [facility]  on [facility] placement. Given his statements about homicidal thoughts toward neighbor. May consider discharge home if safety established. [facility] informed local police department of the threats he made earlier in the hospital stay.

## 2017-07-26 NOTE — PROGRESS NOTES
At the request of the attending Psychiatrist Dr. Mario Shah, a letter was sent to [facility] to request amending the almanzar order.     We asked to remove Risperdal due to possible adverse reaction and to remove Saphris due to being ineffective. We want to add Seroquel.  Meds we want on the Almanzar are the following: Zyprexa, Haldol, Latuda and Seroquel.     Letter faxed today @ 2pm

## 2017-07-26 NOTE — PLAN OF CARE
Problem: Psychotic Symptoms  Goal: Psychotic Symptoms  Signs and symptoms of listed problems will be absent or manageable.   Outcome: No Change  48 hour nursing assessment completed. Patient awake and visible for the majority of the shift. Patient presents with an odd affect, and is very matter of fact and direct with his conversations. Patient states his rash is improving, and on assessment, it does appear to have minimized since last time writer assessed. Labs trending down well. Patient states the worst area is in his pubis area. Patient is compliant with all his medications. Denies SI/SIB. Continue to monitor and assess.

## 2017-07-26 NOTE — PROGRESS NOTES
Patient had a appropriate shift.    Patient did not require seclusion/restraints to manage behavior.    Jv Pate was not participate in groups and was visible in the milieu.    Notable mental health symptoms during this shift:  Restless   Withdrawn   Distractible     Patient is working on these coping/social skills:   Breathing exercises.    Visitors during this shift included None.    Other information about this shift: Pt was mostly in his room the majority of the evening. When out his room Pt was appropriate with staff and peers. Pt denies SI and SIB.

## 2017-07-26 NOTE — PROGRESS NOTES
[facility] - Internal Medicine Daily Note   Date of Service: 7/26/2017  Patient: Jv Pate  MRN: [id number]  Admission Date: 7/23/2017  Hospital Day # 3    Assessment & Plan    Jv Pate is a 32 year old male who was transferred form inpatient psychiatry to medicine on 7/21/2017. He has a pmh of paranoid schizophrenia, cluster B w/ antisocial traits, Lyme disease, and HTN who was admitted to medicine after developing a morbilliform rash concerning for DRESS.       # DRESS syndrome. Developed morbilliform rash ~7/15. Involving extremities, trunk and back, spares mucos membranes. Associated w/ transaminitis and eosinophilia (2.6). Most likely culprit Carbamazepine, which was discontinued 7/21. Started on prednisone 40 mg BID 7/21 with plan for long taper. Eosinophilia resolved.   - Continue to trend CMP, CBC w/ diff daily   - Dermatology will continue to follow and guide treatment  - Continue prednisone 40 mg BID  - Continue triamcinolone ointment BID  - Avoid Carbamazepine    Elevated LFTs. New onset 7/21 w/ Alk Phos 190, , AST 73. No underlying hx of liver disease. Though 2/2 DRESS as significant improvement correlating with initiation of prednisone as well as rash improvement. Today Alk Phos 128, , AST 17.   - Trend CMP QD      HTN. Stable. Continued Lisinopril 20 mg QD.       Internal medicine will continue to follow for lab results as well as treatment plan.       Fatmata Grijalva PA-C  Internal Medicine [facility] Hospitalist   [facility]   Pager: [phone]  ___________________________________________________________________    Subjective & Interval Hx:  Feeling well, believes rash is improving. States that rash is worst on his suprapubic area, this is where it began. Significantly improved on trunk and upper extremities. Denies pruritis or pain. No fevers, chills, nausea, vomiting or abdominal pain.     Last 24 hr care team notes reviewed.   ROS:  4  point ROS including Respiratory, CV, GI and , other than that noted in the HPI, is negative.    Medications: Reviewed in EPIC. List below for reference    Physical Exam:    /70  Pulse 97  Temp 97.3  F (36.3  C) (Tympanic)  Resp 16  Wt 108 kg (238 lb)  BMI 29.75 kg/m2     GENERAL: Alert and oriented x 3. NAD.   HEENT: Anicteric sclera. Mucous membranes moist.   CV: RRR. S1, S2. No murmurs appreciated.   RESPIRATORY: Effort normal on room air. Lungs CTAB with no wheezing, rales, rhonchi.   GI: Abdomen soft and non distended with normoactive bowel sounds present in all quadrants. No tenderness, rebound, guarding.   NEUROLOGICAL: No focal deficits. Moves all extremities.    EXTREMITIES: No peripheral edema. Intact bilateral pedal pulses.   SKIN: Diffuse morbilliform eruption involving bilateral upper and lower extremities, trunk, and back, improved from prior on upper extremities and trunk.

## 2017-07-27 LAB
ALBUMIN SERPL-MCNC: 3.4 G/DL (ref 3.4–5)
ALP SERPL-CCNC: 114 U/L (ref 40–150)
ALT SERPL W P-5'-P-CCNC: 92 U/L (ref 0–70)
ANION GAP SERPL CALCULATED.3IONS-SCNC: 13 MMOL/L (ref 3–14)
AST SERPL W P-5'-P-CCNC: 17 U/L (ref 0–45)
BASOPHILS # BLD AUTO: 0.1 10E9/L (ref 0–0.2)
BASOPHILS NFR BLD AUTO: 0.8 %
BILIRUB SERPL-MCNC: 0.2 MG/DL (ref 0.2–1.3)
BUN SERPL-MCNC: 11 MG/DL (ref 7–30)
CALCIUM SERPL-MCNC: 8.7 MG/DL (ref 8.5–10.1)
CHLORIDE SERPL-SCNC: 109 MMOL/L (ref 94–109)
CO2 SERPL-SCNC: 20 MMOL/L (ref 20–32)
CREAT SERPL-MCNC: 0.53 MG/DL (ref 0.66–1.25)
DIFFERENTIAL METHOD BLD: NORMAL
EOSINOPHIL # BLD AUTO: 0.7 10E9/L (ref 0–0.7)
EOSINOPHIL NFR BLD AUTO: 6.3 %
ERYTHROCYTE [DISTWIDTH] IN BLOOD BY AUTOMATED COUNT: 13.1 % (ref 10–15)
GFR SERPL CREATININE-BSD FRML MDRD: ABNORMAL ML/MIN/1.7M2
GLUCOSE SERPL-MCNC: 88 MG/DL (ref 70–99)
HCT VFR BLD AUTO: 42.3 % (ref 40–53)
HGB BLD-MCNC: 13.9 G/DL (ref 13.3–17.7)
IMM GRANULOCYTES # BLD: 0.1 10E9/L (ref 0–0.4)
IMM GRANULOCYTES NFR BLD: 1.1 %
LYMPHOCYTES # BLD AUTO: 4.5 10E9/L (ref 0.8–5.3)
LYMPHOCYTES NFR BLD AUTO: 43.2 %
MCH RBC QN AUTO: 29.1 PG (ref 26.5–33)
MCHC RBC AUTO-ENTMCNC: 32.9 G/DL (ref 31.5–36.5)
MCV RBC AUTO: 89 FL (ref 78–100)
MONOCYTES # BLD AUTO: 1.1 10E9/L (ref 0–1.3)
MONOCYTES NFR BLD AUTO: 10.1 %
NEUTROPHILS # BLD AUTO: 4.1 10E9/L (ref 1.6–8.3)
NEUTROPHILS NFR BLD AUTO: 38.5 %
NRBC # BLD AUTO: 0 10*3/UL
NRBC BLD AUTO-RTO: 0 /100
PLATELET # BLD AUTO: 249 10E9/L (ref 150–450)
POTASSIUM SERPL-SCNC: 3.7 MMOL/L (ref 3.4–5.3)
PROT SERPL-MCNC: 6.3 G/DL (ref 6.8–8.8)
RBC # BLD AUTO: 4.78 10E12/L (ref 4.4–5.9)
SODIUM SERPL-SCNC: 142 MMOL/L (ref 133–144)
WBC # BLD AUTO: 10.5 10E9/L (ref 4–11)

## 2017-07-27 RX ORDER — TRAZODONE HYDROCHLORIDE 50 MG/1
50 TABLET, FILM COATED ORAL
Status: DISCONTINUED | OUTPATIENT
Start: 2017-07-27 | End: 2017-07-27

## 2017-07-27 RX ORDER — QUETIAPINE FUMARATE 200 MG/1
200 TABLET, FILM COATED ORAL
Status: DISCONTINUED | OUTPATIENT
Start: 2017-07-27 | End: 2017-07-29

## 2017-07-27 RX ORDER — TRAZODONE HYDROCHLORIDE 50 MG/1
50 TABLET, FILM COATED ORAL
Status: DISCONTINUED | OUTPATIENT
Start: 2017-07-27 | End: 2017-09-05 | Stop reason: HOSPADM

## 2017-07-27 RX ADMIN — Medication 1 LOZENGE: at 07:16

## 2017-07-27 RX ADMIN — TRIAMCINOLONE ACETONIDE: 1 OINTMENT TOPICAL at 11:05

## 2017-07-27 RX ADMIN — DIPHENHYDRAMINE HYDROCHLORIDE 50 MG: 50 CAPSULE ORAL at 20:48

## 2017-07-27 RX ADMIN — CARBOXYMETHYLCELLULOSE SODIUM 1 DROP: 10 GEL OPHTHALMIC at 20:24

## 2017-07-27 RX ADMIN — PREDNISONE 40 MG: 20 TABLET ORAL at 20:22

## 2017-07-27 RX ADMIN — NICOTINE POLACRILEX 2 MG: 2 GUM, CHEWING ORAL at 01:37

## 2017-07-27 RX ADMIN — MULTIPLE VITAMINS W/ MINERALS TAB 1 TABLET: TAB at 08:28

## 2017-07-27 RX ADMIN — QUETIAPINE FUMARATE 200 MG: 200 TABLET, FILM COATED ORAL at 20:22

## 2017-07-27 RX ADMIN — LISINOPRIL 20 MG: 20 TABLET ORAL at 08:28

## 2017-07-27 RX ADMIN — HYDROXYZINE HYDROCHLORIDE 50 MG: 25 TABLET ORAL at 07:16

## 2017-07-27 RX ADMIN — LURASIDONE HYDROCHLORIDE 40 MG: 40 TABLET, FILM COATED ORAL at 08:29

## 2017-07-27 RX ADMIN — TRAZODONE HYDROCHLORIDE 50 MG: 50 TABLET ORAL at 03:01

## 2017-07-27 RX ADMIN — TRIAMCINOLONE ACETONIDE: 1 OINTMENT TOPICAL at 20:22

## 2017-07-27 RX ADMIN — DIPHENHYDRAMINE HYDROCHLORIDE 50 MG: 50 CAPSULE ORAL at 00:22

## 2017-07-27 RX ADMIN — PREDNISONE 40 MG: 20 TABLET ORAL at 08:28

## 2017-07-27 RX ADMIN — QUETIAPINE FUMARATE 200 MG: 200 TABLET, FILM COATED ORAL at 01:34

## 2017-07-27 RX ADMIN — Medication 1 LOZENGE: at 03:02

## 2017-07-27 ASSESSMENT — ACTIVITIES OF DAILY LIVING (ADL)
GROOMING: INDEPENDENT
DRESS: SCRUBS (BEHAVIORAL HEALTH);INDEPENDENT
GROOMING: INDEPENDENT
DRESS: SCRUBS (BEHAVIORAL HEALTH)
ORAL_HYGIENE: INDEPENDENT
ORAL_HYGIENE: INDEPENDENT

## 2017-07-27 NOTE — PROGRESS NOTES
"Jv asked to speak with this writer.  I went out and he said, \"You need to get my discharge papers now Francoise.\"  I told him Dr. Shah isn't here today and we will not be able to d/c him.  He said, \"that doctor doesn''t know anything about me all he does is come in and adjust my meds and leave.  Get my discharge papers.\"      I told him we are hoping for [facility] if he has 2 weeks of good behavior.  He said, \"I have had that.  Add it up.\"  I told him starting with the time he got back from medical we need good behavior for [facility] to consider him.  Last time we spoke he didn't want [facility] because [place]. Now he said he'll consider anything that is out of the hospital.      This writer spoke to Jenna Khalil (pt's OP CCM); she is going to follow-up with [facility] about the ppwrk I sent over.   "

## 2017-07-27 NOTE — PROGRESS NOTES
Attempted to contact pt mother, Silvana, per her request. Unable to reach by phone x 2.     Fatmata Grijalva PA-C

## 2017-07-27 NOTE — PROGRESS NOTES
"Patient requested to talk 1:1 with writer.  He had a flat affect for the majority of the time we talked.  When asked why he was spending so much time in his room he said that he felt like no one cared about him.  He said he was feeling depressed. He denied any suicidal ideation.  He began to make reference to being restrained.  Jv then talked about the \"friends\" who he thinks poisoned him just prior to his admission. When asked to talk about that a little more he began sounding highly paranoid and delusional and was difficult to understand.  Out of the blue he brought up his desire to be on Saphris and wanted to know why his outpt Dr could not come to the hospital to see him. He was asked if he had talked with his inpt Dr about Saphris and he said he had.    Patient was later observed walking in the hallway.  When writer talked with him he said he was feeling better.  Pt then held up his hand with two fingers up. \"That's how good I'm doing.\"  He then directed writer to hold up writers hand.   Writer held up hand with all five fingers extended.  Pt then said \"See how well I'm doing.\"   It was difficult to follow his train of thought.  "

## 2017-07-27 NOTE — PROGRESS NOTES
Patient anxious and very tense, asking for PRNs to help him sleep after Benadryl prn did not work. Patient refused his prn Hydroxyzine, but asked to have back his prn Seroquel & prn Trazodone.  M.D. Contacted and requested (after reviewing chart to see what patient was on in Medical prior to coming back to Psych) for his PRN at bedtime Seroquel 200 mg and his Trazodone 50 mg po.  Patient given the Seroquel 200 mg at 01:37 and did rest for approx. 1 & 1/2 hours.  But he was awake at 03:00 and requesting the Trazodone 50 and was given at 03:01.  Now awake and pacing at the end of the loya - remains tense, but less so than before.  Patient is polite and calm appearing around staff, and  asking staff a few times if he could help them with something, or that he'd be willing to listen to Staff's problems.  Appears to want to deflect staff from talking about his own feelings & also appears hypervigilant at times but denies any paranoia.  Continue to monitor closely for Elopement possibilities.

## 2017-07-27 NOTE — PROGRESS NOTES
"Jv had a good evening during most of the shift. He was quiet, visible and \"tense\". Staff reported that Jv was challenging another staff and seemed verbally aggressive to the staff. He reported to the author that he feels \"depressed and anxious\" this evening and staff seen him responding internal stimuli. He showered and ate his dinner. Nothing else to report.    "

## 2017-07-27 NOTE — PROGRESS NOTES
"Jv had a good evening during most of the shift. He was quiet, visible and \"tense\". Staff reported that Jv was challenging another staff and seemed verbally aggressive to the staff. He reported to the author that he feels \"depressed and anxious\" this evening and staff seen him responding internal stimuli. He showered and ate his dinner. Nothing else to report.         07/26/17 2100   Behavioral Health   Hallucinations appears responding   Thinking poor concentration;distractable   Orientation place: oriented;person: oriented   Memory baseline memory   Insight poor   Judgement impaired   Eye Contact at examiner   Affect tense;irritable   Mood depressed;anxious;labile;irritable   Physical Appearance/Attire attire appropriate to age and situation   Hygiene well groomed;other (see comment)  (Showered)   Suicidality other (see comments)  (None)   Self Injury other (see comment)  (None)   Activity hyperactive (agitated, impulsive)   Speech incoherent;tangential   Psychomotor / Gait steady;balanced   Activities of Daily Living   Hygiene/Grooming shower;independent;handwashing   Oral Hygiene independent   Dress scrubs (behavioral health)   Laundry unable to complete   Room Organization independent   Behavioral Health Interventions   Psychotic Symptoms maintain safety precautions;monitor need to revise level of observation;maintain safe secure environment;reality orientation;simple, clear language;decrease environmental stimulation;redirection of intrusive behaviors;redirection of aggressive behaviors;establish therapeutic relationship;provide emotional support;build upon strengths;assess medication adherance;monitor need for prn medication;assess patient response to medication   Social and Therapeutic Interventions (Psychotic Symptoms) encourage socialization with peers;encourage effective boundaries with peers;encourage participation in therapeutic groups and milieu activities     "

## 2017-07-28 LAB
ALBUMIN SERPL-MCNC: 3.7 G/DL (ref 3.4–5)
ALP SERPL-CCNC: 117 U/L (ref 40–150)
ALT SERPL W P-5'-P-CCNC: 91 U/L (ref 0–70)
ANION GAP SERPL CALCULATED.3IONS-SCNC: 8 MMOL/L (ref 3–14)
AST SERPL W P-5'-P-CCNC: 19 U/L (ref 0–45)
BASOPHILS # BLD AUTO: 0 10E9/L (ref 0–0.2)
BASOPHILS NFR BLD AUTO: 0.3 %
BILIRUB SERPL-MCNC: 0.3 MG/DL (ref 0.2–1.3)
BUN SERPL-MCNC: 13 MG/DL (ref 7–30)
CALCIUM SERPL-MCNC: 9.2 MG/DL (ref 8.5–10.1)
CHLORIDE SERPL-SCNC: 107 MMOL/L (ref 94–109)
CO2 SERPL-SCNC: 27 MMOL/L (ref 20–32)
CREAT SERPL-MCNC: 0.54 MG/DL (ref 0.66–1.25)
DIFFERENTIAL METHOD BLD: ABNORMAL
EOSINOPHIL # BLD AUTO: 0.3 10E9/L (ref 0–0.7)
EOSINOPHIL NFR BLD AUTO: 2.2 %
ERYTHROCYTE [DISTWIDTH] IN BLOOD BY AUTOMATED COUNT: 13.3 % (ref 10–15)
GFR SERPL CREATININE-BSD FRML MDRD: ABNORMAL ML/MIN/1.7M2
GLUCOSE SERPL-MCNC: 94 MG/DL (ref 70–99)
HCT VFR BLD AUTO: 43.6 % (ref 40–53)
HGB BLD-MCNC: 14.5 G/DL (ref 13.3–17.7)
IMM GRANULOCYTES # BLD: 0.1 10E9/L (ref 0–0.4)
IMM GRANULOCYTES NFR BLD: 0.9 %
LYMPHOCYTES # BLD AUTO: 4.3 10E9/L (ref 0.8–5.3)
LYMPHOCYTES NFR BLD AUTO: 34.6 %
MCH RBC QN AUTO: 29.1 PG (ref 26.5–33)
MCHC RBC AUTO-ENTMCNC: 33.3 G/DL (ref 31.5–36.5)
MCV RBC AUTO: 88 FL (ref 78–100)
MONOCYTES # BLD AUTO: 1.1 10E9/L (ref 0–1.3)
MONOCYTES NFR BLD AUTO: 9.1 %
NEUTROPHILS # BLD AUTO: 6.5 10E9/L (ref 1.6–8.3)
NEUTROPHILS NFR BLD AUTO: 52.9 %
NRBC # BLD AUTO: 0 10*3/UL
NRBC BLD AUTO-RTO: 0 /100
PLATELET # BLD AUTO: 304 10E9/L (ref 150–450)
POTASSIUM SERPL-SCNC: 3.8 MMOL/L (ref 3.4–5.3)
PROT SERPL-MCNC: 6.6 G/DL (ref 6.8–8.8)
RBC # BLD AUTO: 4.98 10E12/L (ref 4.4–5.9)
SODIUM SERPL-SCNC: 142 MMOL/L (ref 133–144)
WBC # BLD AUTO: 12.3 10E9/L (ref 4–11)

## 2017-07-28 PROCEDURE — 99232 SBSQ HOSP IP/OBS MODERATE 35: CPT | Performed by: PSYCHIATRY & NEUROLOGY

## 2017-07-28 PROCEDURE — 99232 SBSQ HOSP IP/OBS MODERATE 35: CPT | Performed by: PHYSICIAN ASSISTANT

## 2017-07-28 PROCEDURE — 99207 ZZC CDG-MDM COMPONENT: MEETS LOW - DOWN CODED: CPT | Performed by: PHYSICIAN ASSISTANT

## 2017-07-28 RX ORDER — PREDNISONE 20 MG/1
40 TABLET ORAL DAILY
Status: COMPLETED | OUTPATIENT
Start: 2017-08-17 | End: 2017-08-26

## 2017-07-28 RX ORDER — PREDNISONE 20 MG/1
20 TABLET ORAL ONCE
Status: COMPLETED | OUTPATIENT
Start: 2017-07-28 | End: 2017-07-28

## 2017-07-28 RX ORDER — PREDNISONE 20 MG/1
60 TABLET ORAL DAILY
Status: COMPLETED | OUTPATIENT
Start: 2017-07-29 | End: 2017-08-06

## 2017-07-28 RX ORDER — LURASIDONE HYDROCHLORIDE 80 MG/1
80 TABLET, FILM COATED ORAL DAILY
Status: DISCONTINUED | OUTPATIENT
Start: 2017-07-29 | End: 2017-07-31

## 2017-07-28 RX ORDER — PREDNISONE 10 MG/1
10 TABLET ORAL DAILY
Status: DISCONTINUED | OUTPATIENT
Start: 2017-09-16 | End: 2017-09-05 | Stop reason: HOSPADM

## 2017-07-28 RX ORDER — PREDNISONE 5 MG/1
5 TABLET ORAL DAILY
Status: DISCONTINUED | OUTPATIENT
Start: 2017-09-26 | End: 2017-09-05 | Stop reason: HOSPADM

## 2017-07-28 RX ORDER — PREDNISONE 50 MG/1
50 TABLET ORAL DAILY
Status: COMPLETED | OUTPATIENT
Start: 2017-08-07 | End: 2017-08-16

## 2017-07-28 RX ORDER — PREDNISONE 20 MG/1
20 TABLET ORAL DAILY
Status: DISCONTINUED | OUTPATIENT
Start: 2017-09-06 | End: 2017-09-05 | Stop reason: HOSPADM

## 2017-07-28 RX ADMIN — DIPHENHYDRAMINE HYDROCHLORIDE 50 MG: 50 CAPSULE ORAL at 20:24

## 2017-07-28 RX ADMIN — QUETIAPINE FUMARATE 200 MG: 200 TABLET, FILM COATED ORAL at 20:24

## 2017-07-28 RX ADMIN — PREDNISONE 20 MG: 20 TABLET ORAL at 12:11

## 2017-07-28 RX ADMIN — CARBOXYMETHYLCELLULOSE SODIUM 1 DROP: 10 GEL OPHTHALMIC at 20:24

## 2017-07-28 RX ADMIN — ACETAMINOPHEN 650 MG: 325 TABLET, FILM COATED ORAL at 12:14

## 2017-07-28 RX ADMIN — LURASIDONE HYDROCHLORIDE 40 MG: 40 TABLET, FILM COATED ORAL at 08:27

## 2017-07-28 RX ADMIN — TRIAMCINOLONE ACETONIDE: 1 OINTMENT TOPICAL at 08:39

## 2017-07-28 RX ADMIN — MULTIPLE VITAMINS W/ MINERALS TAB 1 TABLET: TAB at 08:27

## 2017-07-28 RX ADMIN — PREDNISONE 40 MG: 20 TABLET ORAL at 08:27

## 2017-07-28 RX ADMIN — LISINOPRIL 20 MG: 20 TABLET ORAL at 08:27

## 2017-07-28 ASSESSMENT — ACTIVITIES OF DAILY LIVING (ADL)
GROOMING: HANDWASHING;INDEPENDENT
DRESS: SCRUBS (BEHAVIORAL HEALTH);INDEPENDENT
ORAL_HYGIENE: INDEPENDENT
ORAL_HYGIENE: INDEPENDENT
DRESS: SCRUBS (BEHAVIORAL HEALTH)
HYGIENE/GROOMING: INDEPENDENT
LAUNDRY: WITH SUPERVISION
LAUNDRY: WITH SUPERVISION

## 2017-07-28 NOTE — PLAN OF CARE
"Problem: Psychotic Symptoms  Goal: Psychotic Symptoms  Signs and symptoms of listed problems will be absent or manageable.   Outcome: No Change  RN ASSESSMENT   Patient isolative to room and his bed most of the shift.  Guarded and paranoid on approach especially when approached by male staff. Mood irritable. Per staff report patient was overheard laughing in his room. Patient hesitated to take medications this morning stating,  \" they just make me sicker\"  He initially requested an injection, but then agreed to take the medications. Mother contacted the unit and was updated per her request. Denied suicidal and homicidal thoughts. Insight into situation remains poor. Sleep remains adequate. Per chart review patient has been sleeping average of 6.5 hours per night. Mildly elated temperature of 99.3, Tylenol given per patient request, discussed symptoms with internal medicine.  Current legal status committed and [facility]. Refer to case manger notes for discharge planing and recommendations. Continue with current treatment plan and recommendations. Continue to monitor and reassess symptoms. Monitor response to medications. Monitor progress towards treatment goals. Encourage groups and participation.      "
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
## 2017-07-28 NOTE — PLAN OF CARE
Brief Medicine Note  July 28, 2017      Attempted to contact patients mother Silvana Pate at [phone] today x 2 without success.    Taya Levy PA-C

## 2017-07-28 NOTE — PROGRESS NOTES
"Pt continues to be paranoid and suspicious of staff and peers. Affect tense and angry. Appears confused at times; when HS snack was brought out, pt asked for his dinner tray. When reminded that he had his dinner tray hours ago, he responded that \"so because you guys are poisoning me into a coma, I can't remember that.\" Accusatory at times. Initially refused 1800 scheduled Prednisone from writer, and would only take scheduled medications from specific male RN this evening. He did eventually take all scheduled medications, though he appeared very suspicious of staff during these interactions.   "

## 2017-07-28 NOTE — PROGRESS NOTES
This writer faxed a new almanzar order to [facility].  They requested we do a complete new almanzar because we had already amended it once.

## 2017-07-28 NOTE — PROGRESS NOTES
[facility]  Daily Progress Note  Jv Pate  [id number]  July 28, 2017     Interval History:   Jv is not doing well today. He is asking to be discharged. He is resting in bed and has a flat affect, but is cooperative with questions, although disorganized. Reports rash is improving. Denies pruritis. Denies fevers. Sleeping okay.     ROS:   Please see HPI, otherwise, complete 10 point review of systems is negative.      Physical Exam:   Vitals were reviewed  Blood pressure 148/82, pulse 85, temperature 99  F (37.2  C), resp. rate 18, weight 106.6 kg (235 lb).  General:alert, NAD, resting in bed  Neuropsychiatric: flat affect, disorganized  Skin: very faint morbiliform rash on distal lower and upper extremities and lower trunk - significant improved from previous.      Assessment and Plan:   Jv Pate is a 32 year old male who was transferred form inpatient psychiatry to medicine on 7/21/2017. He has a pmh of paranoid schizophrenia, cluster B w/ antisocial traits, Lyme disease, and HTN who was admitted to medicine after developing a morbilliform rash concerning for DRESS.     DRESS syndrome. Developed morbilliform rash around 7/15. Involving extremities, trunk and back, spares mucous membranes. Associated with transaminitis and eosinophilia (2.6). Most likely culprit carbamazepine, which was stopped 7/21. Started on prednisone 40mg BID 7/21 with plan for long taper. Eosinophilia resolved. LFTs improving. Rash improving significantly, very faint morbiliform rash on distal lower and upper extremities and lower trunk - significant improved from previous.   Discussed with dermatology, will start tapering steroids today.   - Prednisone 60mg qd x 10d, followed by 50mg qd x 10d, then 40mg qd x 10d, then 30mg qd x10d, then 20mg qd x 10d, then 10mg qd x10d, then 5mg qd x10d (taper order placed in EPIC)  - Cont to check labs daily throughout weekend, then will re-eval frequency next week  - if  patient refused prednisone dose, please contact medicine at this places patient at risk for adrenal insufficiency    Elevated LFTs. New onset 7/21. No hx of liver disease. Likely 2/2 DRESS as significant improvement correlating with initiation of prednisone and rash improving. AST 19, ALT 91, .     HTN. BPs stable. Cont lisinopril.       Attestation:  I have reviewed today's vital signs, notes, medications, labs and imaging.      aTya Levy  Internal Medicine [facility] Hospitalist  [phone]  July 28, 2017

## 2017-07-29 LAB
ALBUMIN SERPL-MCNC: 3.4 G/DL (ref 3.4–5)
ALP SERPL-CCNC: 108 U/L (ref 40–150)
ALT SERPL W P-5'-P-CCNC: 73 U/L (ref 0–70)
ANION GAP SERPL CALCULATED.3IONS-SCNC: 12 MMOL/L (ref 3–14)
AST SERPL W P-5'-P-CCNC: 18 U/L (ref 0–45)
BASOPHILS # BLD AUTO: 0.1 10E9/L (ref 0–0.2)
BASOPHILS NFR BLD AUTO: 0.6 %
BILIRUB SERPL-MCNC: 0.3 MG/DL (ref 0.2–1.3)
BUN SERPL-MCNC: 19 MG/DL (ref 7–30)
CALCIUM SERPL-MCNC: 8.7 MG/DL (ref 8.5–10.1)
CHLORIDE SERPL-SCNC: 108 MMOL/L (ref 94–109)
CO2 SERPL-SCNC: 24 MMOL/L (ref 20–32)
CREAT SERPL-MCNC: 0.63 MG/DL (ref 0.66–1.25)
DIFFERENTIAL METHOD BLD: NORMAL
EOSINOPHIL # BLD AUTO: 0.6 10E9/L (ref 0–0.7)
EOSINOPHIL NFR BLD AUTO: 5.6 %
ERYTHROCYTE [DISTWIDTH] IN BLOOD BY AUTOMATED COUNT: 13.6 % (ref 10–15)
GFR SERPL CREATININE-BSD FRML MDRD: ABNORMAL ML/MIN/1.7M2
GLUCOSE SERPL-MCNC: 82 MG/DL (ref 70–99)
HCT VFR BLD AUTO: 45.1 % (ref 40–53)
HGB BLD-MCNC: 15 G/DL (ref 13.3–17.7)
IMM GRANULOCYTES # BLD: 0.1 10E9/L (ref 0–0.4)
IMM GRANULOCYTES NFR BLD: 1.2 %
LYMPHOCYTES # BLD AUTO: 5.2 10E9/L (ref 0.8–5.3)
LYMPHOCYTES NFR BLD AUTO: 47.9 %
MCH RBC QN AUTO: 29.5 PG (ref 26.5–33)
MCHC RBC AUTO-ENTMCNC: 33.3 G/DL (ref 31.5–36.5)
MCV RBC AUTO: 89 FL (ref 78–100)
MONOCYTES # BLD AUTO: 0.9 10E9/L (ref 0–1.3)
MONOCYTES NFR BLD AUTO: 8.5 %
NEUTROPHILS # BLD AUTO: 4 10E9/L (ref 1.6–8.3)
NEUTROPHILS NFR BLD AUTO: 36.2 %
NRBC # BLD AUTO: 0 10*3/UL
NRBC BLD AUTO-RTO: 0 /100
PLATELET # BLD AUTO: 272 10E9/L (ref 150–450)
PLATELET # BLD EST: NORMAL 10*3/UL
POTASSIUM SERPL-SCNC: 3.8 MMOL/L (ref 3.4–5.3)
PROT SERPL-MCNC: 6.2 G/DL (ref 6.8–8.8)
RBC # BLD AUTO: 5.09 10E12/L (ref 4.4–5.9)
RBC MORPH BLD: NORMAL
SODIUM SERPL-SCNC: 144 MMOL/L (ref 133–144)
WBC # BLD AUTO: 10.9 10E9/L (ref 4–11)

## 2017-07-29 RX ORDER — QUETIAPINE FUMARATE 100 MG/1
100-200 TABLET, FILM COATED ORAL 2 TIMES DAILY PRN
Status: DISCONTINUED | OUTPATIENT
Start: 2017-07-29 | End: 2017-09-05 | Stop reason: HOSPADM

## 2017-07-29 RX ADMIN — LURASIDONE HYDROCHLORIDE 80 MG: 80 TABLET, FILM COATED ORAL at 08:32

## 2017-07-29 RX ADMIN — PREDNISONE 60 MG: 20 TABLET ORAL at 08:32

## 2017-07-29 RX ADMIN — MULTIPLE VITAMINS W/ MINERALS TAB 1 TABLET: TAB at 08:32

## 2017-07-29 RX ADMIN — TRIAMCINOLONE ACETONIDE: 1 OINTMENT TOPICAL at 08:43

## 2017-07-29 RX ADMIN — DIPHENHYDRAMINE HYDROCHLORIDE 50 MG: 50 CAPSULE ORAL at 18:48

## 2017-07-29 RX ADMIN — LISINOPRIL 20 MG: 20 TABLET ORAL at 08:32

## 2017-07-29 RX ADMIN — Medication 1 LOZENGE: at 15:58

## 2017-07-29 RX ADMIN — Medication 1 LOZENGE: at 11:18

## 2017-07-29 ASSESSMENT — ACTIVITIES OF DAILY LIVING (ADL)
GROOMING: INDEPENDENT
DRESS: SCRUBS (BEHAVIORAL HEALTH)
ORAL_HYGIENE: INDEPENDENT
LAUNDRY: UNABLE TO COMPLETE
DRESS: SCRUBS (BEHAVIORAL HEALTH)
ORAL_HYGIENE: INDEPENDENT
GROOMING: INDEPENDENT
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
## 2017-07-29 NOTE — PROGRESS NOTES
"Patient's affect appeared tense, depressed looking all shift.  He was very polite, making requests with \"Please\" and \"Thank you.\"  His behavior shifted momentarily in the late morning when he began to walk alongside a visiting staff person.  He walked very close to her and she appeared concerned.  When staff told him to back off he made some excuse about needing to do this.  Eventually, near the exit door, he let off and walked away peacefully.      After lunch he became preoccupied with filling glasses with water from the water cooler, while running the sink faucet wide open. This made a good deal of noise so I asked him what he was up to.  He pointed out, accurately, that when he ran the cooler water a few seconds it looked more clear in the cup.  He filled about 6 cups with ice, then dumped it out, stacked the cups and washed the cups in the faucet stream.  He finally relented and put the cups down after a couple gentle requests. He then sat looking sullen at the table.  He later stood up to do it again, but again stopped after gentle requests to do so.  "

## 2017-07-29 NOTE — PROGRESS NOTES
Pt appeared tense and withdrawn all evening. Showered, and had a visit from his mother. No behavioral concerns to be noted this shift.       07/28/17 2049   Behavioral Health   Hallucinations denies / not responding to hallucinations   Thinking poor concentration;paranoid;distractable   Orientation person: oriented;place: oriented;date: oriented;time: oriented   Memory baseline memory   Insight poor   Judgement impaired   Eye Contact at examiner;staring   Affect tense;irritable;blunted, flat   Mood depressed;anxious   Physical Appearance/Attire (fair)   Hygiene neglected grooming - unclean body, hair, teeth   Suicidality (WDL) WDL   Self Injury (WDL) WDL   Elopement (pt on elopement precautions)   Activity withdrawn;restless;isolative   Speech (WDL) WDL   Psychomotor Gait (WDL) WDL   Activities of Daily Living   Hygiene/Grooming independent   Oral Hygiene independent   Dress scrubs (behavioral health)   Laundry with supervision   Room Organization independent

## 2017-07-29 NOTE — PROGRESS NOTES
Patients mother contacted the unit yesterday and today. She was looking for internal medicine provider who saw Jv early this week. She stated that she was told that Lyme testing will be ordered. She asked that the  internal medicine provider who saw patient last week (RB) calls her to discuss lyme disease testing and treatment. I informed patients mother yesterday that the provider that she is looking for is not on call this weekend. She attempted to obtain the pager number to internal medicine yesterday, however today only called to remind nursing that she would like a call from that provider once she is on.

## 2017-07-30 LAB
ALBUMIN SERPL-MCNC: 3.7 G/DL (ref 3.4–5)
ALP SERPL-CCNC: 104 U/L (ref 40–150)
ALT SERPL W P-5'-P-CCNC: 89 U/L (ref 0–70)
ANION GAP SERPL CALCULATED.3IONS-SCNC: 14 MMOL/L (ref 3–14)
AST SERPL W P-5'-P-CCNC: 23 U/L (ref 0–45)
BASOPHILS # BLD AUTO: 0 10E9/L (ref 0–0.2)
BASOPHILS NFR BLD AUTO: 0.3 %
BILIRUB SERPL-MCNC: 0.7 MG/DL (ref 0.2–1.3)
BUN SERPL-MCNC: 15 MG/DL (ref 7–30)
CALCIUM SERPL-MCNC: 8.9 MG/DL (ref 8.5–10.1)
CHLORIDE SERPL-SCNC: 106 MMOL/L (ref 94–109)
CO2 SERPL-SCNC: 24 MMOL/L (ref 20–32)
CREAT SERPL-MCNC: 0.61 MG/DL (ref 0.66–1.25)
DIFFERENTIAL METHOD BLD: ABNORMAL
EOSINOPHIL # BLD AUTO: 0.3 10E9/L (ref 0–0.7)
EOSINOPHIL NFR BLD AUTO: 1.9 %
ERYTHROCYTE [DISTWIDTH] IN BLOOD BY AUTOMATED COUNT: 13.4 % (ref 10–15)
GFR SERPL CREATININE-BSD FRML MDRD: ABNORMAL ML/MIN/1.7M2
GLUCOSE SERPL-MCNC: 88 MG/DL (ref 70–99)
HCT VFR BLD AUTO: 42.4 % (ref 40–53)
HGB BLD-MCNC: 14.5 G/DL (ref 13.3–17.7)
IMM GRANULOCYTES # BLD: 0.1 10E9/L (ref 0–0.4)
IMM GRANULOCYTES NFR BLD: 0.8 %
LYMPHOCYTES # BLD AUTO: 5.4 10E9/L (ref 0.8–5.3)
LYMPHOCYTES NFR BLD AUTO: 40.2 %
MCH RBC QN AUTO: 30 PG (ref 26.5–33)
MCHC RBC AUTO-ENTMCNC: 34.2 G/DL (ref 31.5–36.5)
MCV RBC AUTO: 88 FL (ref 78–100)
MONOCYTES # BLD AUTO: 1.2 10E9/L (ref 0–1.3)
MONOCYTES NFR BLD AUTO: 9.1 %
NEUTROPHILS # BLD AUTO: 6.5 10E9/L (ref 1.6–8.3)
NEUTROPHILS NFR BLD AUTO: 47.7 %
NRBC # BLD AUTO: 0 10*3/UL
NRBC BLD AUTO-RTO: 0 /100
PLATELET # BLD AUTO: 288 10E9/L (ref 150–450)
PLATELET # BLD EST: ABNORMAL 10*3/UL
POTASSIUM SERPL-SCNC: 3.5 MMOL/L (ref 3.4–5.3)
PROT SERPL-MCNC: 6.4 G/DL (ref 6.8–8.8)
RBC # BLD AUTO: 4.84 10E12/L (ref 4.4–5.9)
RBC MORPH BLD: NORMAL
SODIUM SERPL-SCNC: 144 MMOL/L (ref 133–144)
WBC # BLD AUTO: 13.5 10E9/L (ref 4–11)

## 2017-07-30 RX ADMIN — TRIAMCINOLONE ACETONIDE: 1 OINTMENT TOPICAL at 08:34

## 2017-07-30 RX ADMIN — PREDNISONE 60 MG: 20 TABLET ORAL at 08:33

## 2017-07-30 RX ADMIN — MULTIPLE VITAMINS W/ MINERALS TAB 1 TABLET: TAB at 08:33

## 2017-07-30 RX ADMIN — LURASIDONE HYDROCHLORIDE 80 MG: 80 TABLET, FILM COATED ORAL at 08:33

## 2017-07-30 RX ADMIN — LISINOPRIL 20 MG: 20 TABLET ORAL at 08:33

## 2017-07-30 ASSESSMENT — ACTIVITIES OF DAILY LIVING (ADL)
ORAL_HYGIENE: INDEPENDENT
LAUNDRY: UNABLE TO COMPLETE
DRESS: SCRUBS (BEHAVIORAL HEALTH)
HYGIENE/GROOMING: INDEPENDENT
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
## 2017-07-31 LAB
ALBUMIN SERPL-MCNC: 3.7 G/DL (ref 3.4–5)
ALP SERPL-CCNC: 109 U/L (ref 40–150)
ALT SERPL W P-5'-P-CCNC: 80 U/L (ref 0–70)
ANION GAP SERPL CALCULATED.3IONS-SCNC: 12 MMOL/L (ref 3–14)
AST SERPL W P-5'-P-CCNC: 21 U/L (ref 0–45)
BASOPHILS # BLD AUTO: 0 10E9/L (ref 0–0.2)
BASOPHILS NFR BLD AUTO: 0.4 %
BILIRUB SERPL-MCNC: 0.7 MG/DL (ref 0.2–1.3)
BUN SERPL-MCNC: 18 MG/DL (ref 7–30)
CALCIUM SERPL-MCNC: 8.8 MG/DL (ref 8.5–10.1)
CHLORIDE SERPL-SCNC: 108 MMOL/L (ref 94–109)
CO2 SERPL-SCNC: 22 MMOL/L (ref 20–32)
CREAT SERPL-MCNC: 0.59 MG/DL (ref 0.66–1.25)
DIFFERENTIAL METHOD BLD: NORMAL
EOSINOPHIL # BLD AUTO: 0.5 10E9/L (ref 0–0.7)
EOSINOPHIL NFR BLD AUTO: 4.5 %
ERYTHROCYTE [DISTWIDTH] IN BLOOD BY AUTOMATED COUNT: 13.4 % (ref 10–15)
GFR SERPL CREATININE-BSD FRML MDRD: ABNORMAL ML/MIN/1.7M2
GLUCOSE SERPL-MCNC: 85 MG/DL (ref 70–99)
HCT VFR BLD AUTO: 45.8 % (ref 40–53)
HGB BLD-MCNC: 15.1 G/DL (ref 13.3–17.7)
IMM GRANULOCYTES # BLD: 0.1 10E9/L (ref 0–0.4)
IMM GRANULOCYTES NFR BLD: 0.6 %
LYMPHOCYTES # BLD AUTO: 5.3 10E9/L (ref 0.8–5.3)
LYMPHOCYTES NFR BLD AUTO: 48.8 %
MCH RBC QN AUTO: 29 PG (ref 26.5–33)
MCHC RBC AUTO-ENTMCNC: 33 G/DL (ref 31.5–36.5)
MCV RBC AUTO: 88 FL (ref 78–100)
MONOCYTES # BLD AUTO: 1.3 10E9/L (ref 0–1.3)
MONOCYTES NFR BLD AUTO: 12.4 %
NEUTROPHILS # BLD AUTO: 3.6 10E9/L (ref 1.6–8.3)
NEUTROPHILS NFR BLD AUTO: 33.3 %
NRBC # BLD AUTO: 0 10*3/UL
NRBC BLD AUTO-RTO: 0 /100
PLATELET # BLD AUTO: 292 10E9/L (ref 150–450)
POTASSIUM SERPL-SCNC: 4.1 MMOL/L (ref 3.4–5.3)
PROT SERPL-MCNC: 6.5 G/DL (ref 6.8–8.8)
RBC # BLD AUTO: 5.2 10E12/L (ref 4.4–5.9)
SODIUM SERPL-SCNC: 142 MMOL/L (ref 133–144)
WBC # BLD AUTO: 10.9 10E9/L (ref 4–11)

## 2017-07-31 PROCEDURE — 99207 ZZC CDG-MDM COMPONENT: MEETS LOW - DOWN CODED: CPT | Performed by: PHYSICIAN ASSISTANT

## 2017-07-31 PROCEDURE — 99232 SBSQ HOSP IP/OBS MODERATE 35: CPT | Performed by: PSYCHIATRY & NEUROLOGY

## 2017-07-31 PROCEDURE — 99232 SBSQ HOSP IP/OBS MODERATE 35: CPT | Performed by: PHYSICIAN ASSISTANT

## 2017-07-31 RX ADMIN — PREDNISONE 60 MG: 20 TABLET ORAL at 10:53

## 2017-07-31 RX ADMIN — LISINOPRIL 20 MG: 20 TABLET ORAL at 10:52

## 2017-07-31 RX ADMIN — QUETIAPINE FUMARATE 100 MG: 100 TABLET, FILM COATED ORAL at 23:45

## 2017-07-31 RX ADMIN — LURASIDONE HYDROCHLORIDE 80 MG: 80 TABLET, FILM COATED ORAL at 10:52

## 2017-07-31 RX ADMIN — MULTIPLE VITAMINS W/ MINERALS TAB 1 TABLET: TAB at 10:54

## 2017-07-31 NOTE — PLAN OF CARE
Problem: Psychotic Symptoms  Goal: Psychotic Symptoms  Signs and symptoms of listed problems will be absent or manageable.   Outcome: Declining  48 hour nursing assessment completed. Patient isolative to his room throughout the shift. When approached by this writer, patient is calm, but guarded. He answers questions with brief, one word answers, and has poor eye contact. He is observed staring at the powered off TV in his room for extended periods of time. Patient cooperative with all medications, except his kenolog cream. Denies SI/SIB. Continue to monitor and assess.

## 2017-07-31 NOTE — PROGRESS NOTES
[facility] had a almanzar hearing on 8/4/17 @ 10:30am.  Dr. Shah amended the almanzar for a 2nd time and therefore they needed a new petition and hearing.

## 2017-07-31 NOTE — PROGRESS NOTES
Patient appeared tense while in the milieu this shift, but was generally isolative to room. Patient was frequently observed laying on his bed, staring blankly at his ceiling and not moving for long periods of time. Patient is quite malodorous and hygiene appears very poor. Patient made no complaints of physical discomfort or emotional distress this shift, but offered little when speaking with staff. Will continue to monitor and assess.     07/30/17 5462   Behavioral Health   Hallucinations other (see comment)  (unclear/unable to assess)   Thinking paranoid;delusional   Orientation person: oriented;place: oriented   Memory (unclear)   Insight poor   Judgement impaired   Eye Contact staring;into space;at examiner   Affect tense;blunted, flat   Mood depressed  (appears/didn't endorse)   Physical Appearance/Attire untidy;disheveled   Hygiene body odor;neglected grooming - unclean body, hair, teeth   Suicidality other (see comments)  (none endorsed or observed)   Self Injury other (see comment)  (none endorsed or observed)   Elopement (none observed this shift)   Activity isolative;withdrawn   Speech clear   Medication Sensitivity no stated side effects   Psychomotor / Gait balanced;steady;slow   Activities of Daily Living   Hygiene/Grooming independent   Oral Hygiene independent   Dress scrubs (behavioral health)   Laundry unable to complete   Room Organization independent   Behavioral Health Interventions   Psychotic Symptoms maintain safety precautions;monitor need to revise level of observation;maintain safe secure environment;reality orientation;simple, clear language;redirection of intrusive behaviors;decrease environmental stimulation;redirection of aggressive behaviors;assist patient in developing safety plan;assist patient in following safety plan;encourage nutrition and hydration;encourage participation / independence with adls;provide emotional support;establish therapeutic relationship;assist with developing  & utilizing healthy coping strategies;build upon strengths;assess patient response to medication;assess medication adherance;monitor need for prn medication;monitor confusion, memory loss, decision making ability and reorient / intervent as needed;assess & implement appropriate substance withdrawal protocol;monitor substance withdrawal process   Social and Therapeutic Interventions (Psychotic Symptoms) encourage socialization with peers;encourage effective boundaries with peers;encourage participation in therapeutic groups and milieu activities

## 2017-07-31 NOTE — PROGRESS NOTES
[facility]  Daily Progress Note  Jv Pate  [id number]  July 31, 2017     Interval History:   Jv is doing okay. Rash is improving. No itchiness or burning pain. Denies fevers or chills. Has flat affect, but is cooperative with questions. Denies any other concerns at this time.    ROS:   Please see HPI, otherwise, complete 10 point review of systems is negative.      Physical Exam:   Vitals were reviewed  Blood pressure 148/82, pulse 85, temperature 97.5  F (36.4  C), temperature source Tympanic, resp. rate 16, weight 106.6 kg (235 lb).  General:alert, NAD, resting in bed, flat affect, disorganized, cooperative  HEENT: NCAT, anicteric sclera, EOMI, mucous membranes pink and moist, no lesions on mucous membranes  Skin: very faint morbiliform rash on distal lower and upper extremities and lower trunk     Assessment and Plan:   Jv Pate is a 32 year old male who was transferred form inpatient psychiatry to medicine on 7/21/2017. He has a pmh of paranoid schizophrenia, cluster B w/ antisocial traits, Lyme disease, and HTN who was admitted to medicine after developing a morbilliform rash concerning for DRESS.      DRESS syndrome. Developed morbilliform rash around 7/15. Involving extremities, trunk and back, spares mucous membranes. Associated with transaminitis and eosinophilia (2.6). Most likely culprit carbamazepine, which was stopped 7/21. Started on prednisone 40mg BID 7/21, started tapering slowly on 7/28. Eosinophilia resolved 7/26. LFTs improving, ALT still mildly elevated . Rash improving significantly, very faint morbiliform rash on distal lower and upper extremities and lower trunk - significant improved from previous.   - Continue steroid taper started 7/28: prednisone 60mg qd x 10d, followed by 50mg qd x 10d, then 40mg qd x 10d, then 30mg qd x10d, then 20mg qd x 10d, then 10mg qd x10d, then 5mg qd x10d (taper order placed in EPIC)  - D/w dermatology, okay to decrease lab  checks CMP, CBC to twice weekly (ordered for every Monday/Friday)  - if patient refused prednisone dose, please contact medicine at this places patient at risk for adrenal insufficiency  - Dermatology should be notified prior to patients discharge to arrange outpatient follow up     Elevated LFTs. New onset 7/21. No hx of liver disease. Likely 2/2 DRESS as significant improvement correlating with initiation of prednisone and rash improving.      HTN. BPs stable. Cont lisinopril.         Attestation:  I have reviewed today's vital signs, notes, medications, labs and imaging.        Taya Levy  Internal Medicine [facility] Hospitalist  [phone]  July 31, 2017

## 2017-08-01 PROCEDURE — 99232 SBSQ HOSP IP/OBS MODERATE 35: CPT | Performed by: PSYCHIATRY & NEUROLOGY

## 2017-08-01 RX ADMIN — LISINOPRIL 20 MG: 20 TABLET ORAL at 08:27

## 2017-08-01 RX ADMIN — PREDNISONE 60 MG: 20 TABLET ORAL at 08:27

## 2017-08-01 RX ADMIN — MULTIPLE VITAMINS W/ MINERALS TAB 1 TABLET: TAB at 08:27

## 2017-08-01 RX ADMIN — LURASIDONE HYDROCHLORIDE 100 MG: 80 TABLET, FILM COATED ORAL at 08:27

## 2017-08-01 ASSESSMENT — ACTIVITIES OF DAILY LIVING (ADL)
DRESS: SCRUBS (BEHAVIORAL HEALTH)
LAUNDRY: UNABLE TO COMPLETE
ORAL_HYGIENE: INDEPENDENT
HYGIENE/GROOMING: INDEPENDENT

## 2017-08-01 NOTE — PROGRESS NOTES
[facility], [place]   Psychiatric Progress Note        Interim History:   The patient's care was discussed with the treatment team during the daily team meeting and/or staff's chart notes were reviewed.  Staff reports patient has been slept well last night and resting in bed during the day. Tense, suspicious and paranoid. preoccupied and internal stimuli suspected. Compliant with medications. Bouts of irritability but no outbursts. Keep to self but out for meals. Not attending groups. Denied SI, LOYDA and HI.  Independent with ADL.     Met patient with staff. He was tense but cooperative. Asked to wear his own clothes but receptive when declined. Also asked to have different meals option but agreed to work with staff when filling the menu. Dismissive of all symptoms. Noted tolerating medications well. Would like to stop Prednisone, but agreed to continue it when infomed on dermatology team's recommendations.      Dicussed medications and care plan.          lurasidone  100 mg Oral Daily     predniSONE  60 mg Oral Daily    Followed by     [START ON 8/7/2017] predniSONE  50 mg Oral Daily    Followed by     [START ON 8/17/2017] predniSONE  40 mg Oral Daily    Followed by     [START ON 8/27/2017] predniSONE  30 mg Oral Daily    Followed by     [START ON 9/6/2017] predniSONE  20 mg Oral Daily    Followed by     [START ON 9/16/2017] predniSONE  10 mg Oral Daily    Followed by     [START ON 9/26/2017] predniSONE  5 mg Oral Daily     lisinopril  20 mg Oral Daily     multivitamin, therapeutic  1 tablet Oral Daily     triamcinolone   Topical BID          Allergies:     Allergies   Allergen Reactions     Carbamazepine Rash     Carbamazepine (Tegretol),.....Rash.          Labs:     No results found for this or any previous visit (from the past 24 hour(s)).       Psychiatric Examination:     Vitals:    07/28/17 1300 07/29/17 0800 07/30/17 0800 07/31/17 0945   BP:       Pulse:       Resp:  14 16   "  Temp: 99.3  F (37.4  C) 99.3  F (37.4  C) 97.6  F (36.4  C) 97.5  F (36.4  C)   TempSrc:    Tympanic   Weight:  106.6 kg (235 lb)         Weight is 234 lbs 15.99 oz  Body mass index is 29.37 kg/(m^2).    Appearance: awake, alert, dressed in hospital scrubs, appeared as age stated and mild distress  Attitude:  cooperative and guarded  Eye Contact:  good  Mood:  \"Fine\"  Affect:  intensity is blunted, guarded and restricted range  Speech:  clear, coherent and normal prosody  Psychomotor Behavior:  no evidence of tardive dyskinesia, dystonia, or tics and intact station, gait and muscle tone  Throught Process:  linear and goal oriented  Associations:  no loose associations  Thought Content:  no evidence of suicidal ideation or homicidal ideation, no auditory hallucinations present, no visual hallucinations present, patient appears to be responding to internal stimuli and paranoia   Insight:  fair  Judgement:  fair  Oriented to:  time, person, and place  Attention Span and Concentration:  intact  Recent and Remote Memory:  intact    Fund of Knowledge: Adequate         Precautions:     Behavioral Orders   Procedures     Assault precautions     Code 1 - Restrict to Unit     Elopement precautions     Routine Programming     As clinically indicated     Status 15     Every 15 minutes.          Diagnoses:     1.  Schizophrenia, paranoid type. (r/o schizoaffective Bipolar type)   2.  Noncompliance, nonadherence.   3.  Cluster B with antisocial traits.  4.  DRESS, rash improved and LFT normalizing.            Plan:     Medications:  -- Saphris: was tapered off due to lack of efficacy.  -- PRNs: Vistaril for anxiety and Seroquel for restlessness and haldol with Benadryl for agitation and psychosis. PRN ativan was discontinued. Seroquel 200 mg qhs PRN for racing thoughts and insomnia.   -- Seroquel 200 mg qHS but later but continued -200 mg BID PRN for psychosis, restlessness and insomnia.    -- Latuda: started and " titrated to 120 mg daily. The medication was helpful initially but later mood lability and paranoia re-emerged. Medications was gradually lowered to 40 mg daily with plan to cross taper it with Risperdal. Plan to re-titrating to a target dose of 120-160 mg. Dosed was increased to 100 mg daily on 8/1.   -- Risperdal was started and showed great efficacy but patient later developed a skin rash. Risperdal was eventually discontinued.  The patient declined to reconsider Risperdal in future.    -- Tegretol: started and titrated to 200 mg BID, level was 5.1 on 6/11 and 6.9 on 6/16 and 5.5 on 7/12. The patient developed a rash which failed to resolved after d/c'ing Risperdal. Tegretol was suspected and discontinued on 7/20.   -- Cogentin was added at 0.5-1 mg BID PRN for EPS.     -- IM and dermatology teams continues to follow and monitor re: DRESS.     Legal Status and Disposition:  1.  Commitment with [facility] for Saphris, Zyprexa, haloperidol, Latuda, and Risperidone. Letter was sent to add Seroquel and remove Risperdal and Saphris from [facility]  2.  Patient is currently is on [facility] list. Given his statements about homicidal thoughts toward neighbor. May consider discharge home if safety established. [facility] informed local police department of the threats he made earlier in the hospital stay.

## 2017-08-01 NOTE — PROGRESS NOTES
This writer called [facility] to check on Jv's status on their list.      Left vm with Mary Jo Cochran; asked for a return call to discuss pt's options and their wait time etc.

## 2017-08-01 NOTE — PROGRESS NOTES
"Received a call from patient's mother, Silvana Pate, who requested an update on patient's progress toward stabilization, current medications, and discharge plan.  Update given.  Silvana requested that the treatment team consider allowing Jv to: wear street clothing on the unit (informed her that this is not possible while patient remains on [address], the high acuity unit, and elopement precautions); access \"fresh air and sunshine\" (informed her that while Behavioral does have the \"[place]\" it is not clear that Jv would be a good candidate to access this area given his legal status of Commitment and being on elopement precautions).  Silvana affirmed an understanding of these clinical rationales, but she asked that the treatment team continue to consider these changes to Jv's plan of care.    "

## 2017-08-01 NOTE — PROGRESS NOTES
07/31/17 2301   Behavioral Health   Hallucinations appears responding   Thinking delusional   Orientation person: oriented;place: oriented;date: oriented   Memory baseline memory   Insight poor   Judgement impaired   Eye Contact at examiner   Affect blunted, flat   Mood mood is calm   Physical Appearance/Attire untidy   Hygiene neglected grooming - unclean body, hair, teeth   Suicidality other (see comments)  (denies currently)   Self Injury other (see comment)  (denies currently)   Elopement (no value)   Activity withdrawn;isolative   Speech clear;coherent   Medication Sensitivity no observed side effects;no stated side effects   Psychomotor / Gait steady;balanced     Pt was isolative to his room this evening. Minimal interaction with staff or peers. Mood was calm and affect flat. Hygiene appears somewhat neglected. No indication of SI/SIB. No behavioral issues this shift.

## 2017-08-01 NOTE — PROGRESS NOTES
This writer spoke to Mary Jo Cochran (phone: [phone])  She explained that Jv is currently 8th on the [facility] list.      She did say that if/when he starts to improve in symptoms they could do a re-screen for [facility] because he is a [facility] resident.  It may be appropriate for him if he stabilizes a little further.     We will see how he is once we have adjusted medications (almanzar hearing etc) and then call [facility] and ask for a re-screen for [facility]!

## 2017-08-01 NOTE — PLAN OF CARE
Problem: General Plan of Care (Inpatient Behavioral)  Goal: Team Discussion  Team Plan:   BEHAVIORAL TEAM DISCUSSION     Participants: Dr. Mario Shah, Mohsen Mendoza RN, Sandy Everett RN, HANS- Francoise Newberry LMFT, [facility]  Progress: Pt had a period of time where he was improving, however he had an allergic reaction to his medication and had to be changed. He has backslid since this happened and is extremely paranoid and irritable.   Continued Stay Criteria/Rationale: mental health stabilization is needed, inpatient hospitalization is needed at this time  Medical/Physical: see medical chart  Precautions:   Behavioral Orders   Procedures     Assault precautions     Code 1 - Restrict to Unit     Elopement precautions     Routine Programming       As clinically indicated     Status 15       Every 15 minutes.     Plan: Pt is on the list for [facility].  Due to his symptoms they have taken him off of [facility] and placed him on [facility]'s list. At this time, it is the most appropriate placement for him.    Rationale for change in precautions or plan: no change warranted at this time.

## 2017-08-01 NOTE — PROGRESS NOTES
"Pt has odd/intense affect when speaking. Continues to make bizarre social interactions. Laughs to himself when alone in his room. Blinks frequently when speaking to staff and peers. Pt held hands with other pt until redirected by staff. Pt perseverated on a few other female patients in his pod--although the perseveration did not appear to be overtly sexual. Pt followed direction and told staff to \"Calm down. Calm down.\"  "

## 2017-08-02 PROCEDURE — 99232 SBSQ HOSP IP/OBS MODERATE 35: CPT | Performed by: PSYCHIATRY & NEUROLOGY

## 2017-08-02 RX ADMIN — LISINOPRIL 20 MG: 20 TABLET ORAL at 09:13

## 2017-08-02 RX ADMIN — QUETIAPINE FUMARATE 200 MG: 100 TABLET, FILM COATED ORAL at 12:09

## 2017-08-02 RX ADMIN — HALOPERIDOL 10 MG: 5 TABLET ORAL at 21:21

## 2017-08-02 RX ADMIN — HALOPERIDOL 10 MG: 5 TABLET ORAL at 18:02

## 2017-08-02 RX ADMIN — TRIAMCINOLONE ACETONIDE: 1 OINTMENT TOPICAL at 09:14

## 2017-08-02 RX ADMIN — LURASIDONE HYDROCHLORIDE 100 MG: 80 TABLET, FILM COATED ORAL at 09:13

## 2017-08-02 RX ADMIN — MULTIPLE VITAMINS W/ MINERALS TAB 1 TABLET: TAB at 09:13

## 2017-08-02 RX ADMIN — CARBOXYMETHYLCELLULOSE SODIUM 1 DROP: 10 GEL OPHTHALMIC at 20:31

## 2017-08-02 RX ADMIN — Medication 1 LOZENGE: at 20:14

## 2017-08-02 RX ADMIN — CARBOXYMETHYLCELLULOSE SODIUM 1 DROP: 10 GEL OPHTHALMIC at 09:32

## 2017-08-02 RX ADMIN — DIPHENHYDRAMINE HYDROCHLORIDE 50 MG: 50 CAPSULE ORAL at 18:02

## 2017-08-02 RX ADMIN — PREDNISONE 60 MG: 20 TABLET ORAL at 09:13

## 2017-08-02 RX ADMIN — DIPHENHYDRAMINE HYDROCHLORIDE 50 MG: 50 CAPSULE ORAL at 21:22

## 2017-08-02 RX ADMIN — NICOTINE POLACRILEX 2 MG: 2 GUM, CHEWING ORAL at 21:22

## 2017-08-02 RX ADMIN — Medication 1 LOZENGE: at 21:22

## 2017-08-02 RX ADMIN — TRAZODONE HYDROCHLORIDE 50 MG: 50 TABLET ORAL at 20:31

## 2017-08-02 RX ADMIN — QUETIAPINE FUMARATE 200 MG: 100 TABLET, FILM COATED ORAL at 19:40

## 2017-08-02 ASSESSMENT — ACTIVITIES OF DAILY LIVING (ADL)
GROOMING: HANDWASHING;INDEPENDENT
LAUNDRY: UNABLE TO COMPLETE
LAUNDRY: WITH SUPERVISION
GROOMING: HANDWASHING;INDEPENDENT
DRESS: SCRUBS (BEHAVIORAL HEALTH)
ORAL_HYGIENE: INDEPENDENT
DRESS: SCRUBS (BEHAVIORAL HEALTH);INDEPENDENT
ORAL_HYGIENE: INDEPENDENT

## 2017-08-02 NOTE — PROGRESS NOTES
"[facility], [place]   Psychiatric Progress Note        Interim History:   The patient's care was discussed with the treatment team during the daily team meeting and/or staff's chart notes were reviewed.  Staff reports patient slept 7hrs last night. No behavioral outbursts but irritable with intense affect.  Laughs to himself when alone in his room. Held hands with other pt until redirected by staff. Perseverated on a few other female patients in his pod--although the perseveration did not appear to be overtly sexual. Redirectable and followed direction and told staff. Resting in bed during the day but more visible in the evenings. Suspicious and paranoid. Compliant with medications. Not attending groups. Denied SI, LOYDA and HI.  Independent with ADL.     The patient was tense but cooperative and polite. Denied all symptoms and noted feeling \"better\". Denied SI, LOYDA and HI. Denied hallucinations and paranoia. Noted tolerating medications well. Noted that he slept better last night and appetite is intact. Skin rash resolved.     Dicussed medications and care plan.          lurasidone  100 mg Oral Daily     predniSONE  60 mg Oral Daily    Followed by     [START ON 8/7/2017] predniSONE  50 mg Oral Daily    Followed by     [START ON 8/17/2017] predniSONE  40 mg Oral Daily    Followed by     [START ON 8/27/2017] predniSONE  30 mg Oral Daily    Followed by     [START ON 9/6/2017] predniSONE  20 mg Oral Daily    Followed by     [START ON 9/16/2017] predniSONE  10 mg Oral Daily    Followed by     [START ON 9/26/2017] predniSONE  5 mg Oral Daily     lisinopril  20 mg Oral Daily     multivitamin, therapeutic  1 tablet Oral Daily     triamcinolone   Topical BID          Allergies:     Allergies   Allergen Reactions     Carbamazepine Rash     Carbamazepine (Tegretol),.....Rash.          Labs:     No results found for this or any previous visit (from the past 24 hour(s)).       Psychiatric Examination: "     Vitals:    07/29/17 0800 07/30/17 0800 07/31/17 0945 08/02/17 0916   BP:       Pulse:    92   Resp: 14 16  16   Temp: 99.3  F (37.4  C) 97.6  F (36.4  C) 97.5  F (36.4  C) 98.3  F (36.8  C)   TempSrc:   Tympanic Tympanic   Weight: 106.6 kg (235 lb)          Weight is 234 lbs 15.99 oz  Body mass index is 29.37 kg/(m^2).    Appearance: awake, alert, dressed in hospital scrubs, appeared as age stated and no apparent distress  Attitude:  cooperative and guarded  Eye Contact:  good  Mood:  better  Affect:  guarded and restricted range  Speech:  clear, coherent and normal prosody  Psychomotor Behavior:  no evidence of tardive dyskinesia, dystonia, or tics and intact station, gait and muscle tone  Throught Process:  linear and goal oriented  Associations:  no loose associations  Thought Content:  no evidence of suicidal ideation or homicidal ideation, no auditory hallucinations present, no visual hallucinations present, patient appears to be responding to internal stimuli and paranoia suspected.   Insight:  fair  Judgement:  fair  Oriented to:  time, person, and place  Attention Span and Concentration:  intact  Recent and Remote Memory:  intact    Fund of Knowledge: Adequate         Precautions:     Behavioral Orders   Procedures     Assault precautions     Code 1 - Restrict to Unit     Elopement precautions     Routine Programming     As clinically indicated     Status 15     Every 15 minutes.          Diagnoses:     1.  Schizophrenia, paranoid type. (r/o schizoaffective Bipolar type)   2.  Noncompliance, nonadherence.   3.  Cluster B with antisocial traits.  4.  DRESS, rash improved and LFT normalizing.            Plan:     Medications:  -- Saphris: was tapered off due to lack of efficacy.  -- PRNs: Vistaril for anxiety and Seroquel for restlessness and haldol with Benadryl for agitation and psychosis. PRN ativan was discontinued. Seroquel 200 mg qhs PRN for racing thoughts and insomnia.   -- Seroquel 200 mg qHS but  later but continued -200 mg BID PRN for psychosis, restlessness and insomnia.    -- Latuda: started and titrated to 120 mg daily. The medication was helpful initially but later mood lability and paranoia re-emerged. Medications was gradually lowered to 40 mg daily with plan to cross taper it with Risperdal. Plan to re-titrating to a target dose of 120-160 mg. Dosed was increased to 100 mg daily on 8/1.   -- Risperdal was started and showed great efficacy but patient later developed a skin rash. Risperdal was eventually discontinued.  The patient declined to reconsider Risperdal in future.    -- Tegretol: started and titrated to 200 mg BID, level was 5.1 on 6/11 and 6.9 on 6/16 and 5.5 on 7/12. The patient developed a rash which failed to resolved after d/c'ing Risperdal. Tegretol was suspected and discontinued on 7/20.   -- Cogentin was added at 0.5-1 mg BID PRN for EPS.     -- IM and Dermatology teams continues to follow and monitor re: DRESS.     Legal Status and Disposition:  1.  Commitment with [facility] for Saphris, Zyprexa, haloperidol, Latuda, and Risperidone. Letter was sent to add Seroquel and remove Risperdal and Saphris from [facility]  2.  Patient is currently is on [facility] list. Given his statements about homicidal thoughts toward neighbor. May consider discharge home if safety established. [facility] informed local police department of the threats he made earlier in the hospital stay.

## 2017-08-02 NOTE — PLAN OF CARE
Problem: Psychotic Symptoms  Goal: Psychotic Symptoms  Signs and symptoms of listed problems will be absent or manageable.   Outcome: No Change  RN ASSESSMENT   Patient presents visible in the milieu. He continues to demonstrate odd behaviors and needed redirection. At one point he emptied most of the ice machine into a sink stating that he is cooling the hospital down. Laughing to self at times. Uncooperative with vital signs, however when taken by female staff agreed. He also denied that he ever refused the vital signs. Denied suicidal and homicidal thoughts.  Insight into situation remains limited. Compliant with scheduled medications. Sleep remains adequate. Per chart review patient has been sleeping average of 7 hours per night.    Refer to case manger notes for discharge planing and recommendations. Continue with current treatment plan and recommendations. Continue to monitor and reassess symptoms. Monitor response to medications. Monitor progress towards treatment goals. Encourage groups and participation.

## 2017-08-02 NOTE — PROGRESS NOTES
Pt was isolative to his room most of the shift, only coming out for dinner. Pt did not display any labile behaviors and overall was calm for the evening.       08/01/17 2042   Behavioral Health   Hallucinations appears responding   Thinking paranoid;poor concentration;distractable   Orientation person: oriented;place: oriented   Memory baseline memory   Insight poor   Judgement impaired   Eye Contact at examiner;into space   Affect blunted, flat;tense   Mood irritable   Physical Appearance/Attire untidy;disheveled   Hygiene neglected grooming - unclean body, hair, teeth;body odor   Suicidality (WDL) WDL   Self Injury (WDL) WDL   Elopement (WDL) WDL  (pt on elopement precautions)   Activity withdrawn;isolative   Speech (WDL) WDL   Psychomotor Gait (WDL) WDL   Activities of Daily Living   Hygiene/Grooming independent   Oral Hygiene independent   Dress scrubs (behavioral health)   Laundry unable to complete   Room Organization independent

## 2017-08-03 RX ORDER — HYDROXYZINE HYDROCHLORIDE 25 MG/1
25-50 TABLET, FILM COATED ORAL 4 TIMES DAILY PRN
Status: DISCONTINUED | OUTPATIENT
Start: 2017-08-03 | End: 2017-08-28

## 2017-08-03 RX ADMIN — QUETIAPINE FUMARATE 200 MG: 100 TABLET, FILM COATED ORAL at 12:58

## 2017-08-03 RX ADMIN — LURASIDONE HYDROCHLORIDE 100 MG: 80 TABLET, FILM COATED ORAL at 08:52

## 2017-08-03 RX ADMIN — LISINOPRIL 20 MG: 20 TABLET ORAL at 08:52

## 2017-08-03 RX ADMIN — MULTIPLE VITAMINS W/ MINERALS TAB 1 TABLET: TAB at 08:52

## 2017-08-03 RX ADMIN — TRIAMCINOLONE ACETONIDE: 1 OINTMENT TOPICAL at 19:53

## 2017-08-03 RX ADMIN — PREDNISONE 60 MG: 20 TABLET ORAL at 08:52

## 2017-08-03 ASSESSMENT — ACTIVITIES OF DAILY LIVING (ADL)
ORAL_HYGIENE: INDEPENDENT
LAUNDRY: UNABLE TO COMPLETE
DRESS: INDEPENDENT
HYGIENE/GROOMING: INDEPENDENT

## 2017-08-03 NOTE — PROGRESS NOTES
08/03/17 1500   Behavioral Health   Hallucinations denies / not responding to hallucinations;other (see comment)   Thinking distractable;delusional;paranoid;poor concentration   Orientation place: oriented   Memory baseline memory   Insight poor   Judgement impaired   Eye Contact at examiner   Affect blunted, flat;irritable   Mood mood is calm;labile;anxious;irritable   Hygiene other (see comment)   Activities of Daily Living   Hygiene/Grooming independent   Oral Hygiene independent   Dress independent   Laundry unable to complete   Room Organization wero Carias seemed to be a little irritated during this shift. He was agitated toward a staff member when staff member was trying to get his vitals, and he also went up close/face to face with another patient sometime this afternoon. He ate his meals, and he is currently in his room taking a nap. He did not attend any of the groups offered today, or  interact with staff or other patients.

## 2017-08-03 NOTE — PROGRESS NOTES
Jv was visible in the milieu and appeared restless and hyperactive. Pt was filling water in cups. Pt was disoriented, delusional, confused and seemed giggling to himself a lot while making delusional statements where at some point another patient responded to those delusional statements. Staff intervened and encouraged other pt to let go and ignore Jv's comments, since Jv wasn't talking to him directly. Pt seemed to have reddened eyes and was blinking a lot.        08/02/17 2200   Behavioral Health   Hallucinations appears responding   Thinking paranoid;delusional;distractable;confused;poor concentration   Orientation situation, disoriented   Memory baseline memory   Insight poor   Judgement impaired   Eye Contact at examiner   Affect blunted, flat;tense   Mood labile;irritable   Physical Appearance/Attire disheveled;attire appropriate to age and situation;untidy   Hygiene neglected grooming - unclean body, hair, teeth   Suicidality other (see comments)  (None stated none observed)   Self Injury other (see comment)  (None stated none observed)   Activity restless;hyperactive (agitated, impulsive)   Speech incoherent;tangential   Medication Sensitivity no stated side effects;no observed side effects   Psychomotor / Gait steady;balanced   Activities of Daily Living   Hygiene/Grooming handwashing;independent   Oral Hygiene independent   Dress scrubs (behavioral health)   Laundry unable to complete   Room Organization independent   Behavioral Health Interventions   Psychotic Symptoms maintain safety precautions;monitor need to revise level of observation;maintain safe secure environment;reality orientation;simple, clear language;redirection of intrusive behaviors;decrease environmental stimulation;redirection of aggressive behaviors;provide emotional support;establish therapeutic relationship;build upon strengths;assess medication adherance;monitor need for prn medication;assess patient response to medication    Social and Therapeutic Interventions (Psychotic Symptoms) encourage socialization with peers;encourage effective boundaries with peers;encourage participation in therapeutic groups and milieu activities

## 2017-08-04 LAB
ALBUMIN SERPL-MCNC: 3.8 G/DL (ref 3.4–5)
ALP SERPL-CCNC: 94 U/L (ref 40–150)
ALT SERPL W P-5'-P-CCNC: 51 U/L (ref 0–70)
ANION GAP SERPL CALCULATED.3IONS-SCNC: 12 MMOL/L (ref 3–14)
AST SERPL W P-5'-P-CCNC: 14 U/L (ref 0–45)
BASOPHILS # BLD AUTO: 0 10E9/L (ref 0–0.2)
BASOPHILS NFR BLD AUTO: 0.3 %
BILIRUB SERPL-MCNC: 0.5 MG/DL (ref 0.2–1.3)
BUN SERPL-MCNC: 13 MG/DL (ref 7–30)
CALCIUM SERPL-MCNC: 8.9 MG/DL (ref 8.5–10.1)
CHLORIDE SERPL-SCNC: 104 MMOL/L (ref 94–109)
CO2 SERPL-SCNC: 23 MMOL/L (ref 20–32)
CREAT SERPL-MCNC: 0.5 MG/DL (ref 0.66–1.25)
DIFFERENTIAL METHOD BLD: ABNORMAL
EOSINOPHIL # BLD AUTO: 0.2 10E9/L (ref 0–0.7)
EOSINOPHIL NFR BLD AUTO: 1.8 %
ERYTHROCYTE [DISTWIDTH] IN BLOOD BY AUTOMATED COUNT: 13.2 % (ref 10–15)
GFR SERPL CREATININE-BSD FRML MDRD: ABNORMAL ML/MIN/1.7M2
GLUCOSE SERPL-MCNC: 79 MG/DL (ref 70–99)
HCT VFR BLD AUTO: 44.9 % (ref 40–53)
HGB BLD-MCNC: 15 G/DL (ref 13.3–17.7)
IMM GRANULOCYTES # BLD: 0.1 10E9/L (ref 0–0.4)
IMM GRANULOCYTES NFR BLD: 0.5 %
LYMPHOCYTES # BLD AUTO: 6.5 10E9/L (ref 0.8–5.3)
LYMPHOCYTES NFR BLD AUTO: 53.3 %
MCH RBC QN AUTO: 29.2 PG (ref 26.5–33)
MCHC RBC AUTO-ENTMCNC: 33.4 G/DL (ref 31.5–36.5)
MCV RBC AUTO: 88 FL (ref 78–100)
MONOCYTES # BLD AUTO: 1.2 10E9/L (ref 0–1.3)
MONOCYTES NFR BLD AUTO: 10 %
NEUTROPHILS # BLD AUTO: 4.1 10E9/L (ref 1.6–8.3)
NEUTROPHILS NFR BLD AUTO: 34.1 %
NRBC # BLD AUTO: 0 10*3/UL
NRBC BLD AUTO-RTO: 0 /100
PLATELET # BLD AUTO: 248 10E9/L (ref 150–450)
PLATELET # BLD EST: ABNORMAL 10*3/UL
POTASSIUM SERPL-SCNC: 3.6 MMOL/L (ref 3.4–5.3)
PROT SERPL-MCNC: 6.9 G/DL (ref 6.8–8.8)
RBC # BLD AUTO: 5.13 10E12/L (ref 4.4–5.9)
RBC MORPH BLD: ABNORMAL
SODIUM SERPL-SCNC: 139 MMOL/L (ref 133–144)
WBC # BLD AUTO: 12.2 10E9/L (ref 4–11)

## 2017-08-04 PROCEDURE — 99232 SBSQ HOSP IP/OBS MODERATE 35: CPT | Performed by: PSYCHIATRY & NEUROLOGY

## 2017-08-04 RX ORDER — DIPHENHYDRAMINE HCL 50 MG
50 CAPSULE ORAL 3 TIMES DAILY PRN
Status: DISCONTINUED | OUTPATIENT
Start: 2017-08-04 | End: 2017-09-05 | Stop reason: HOSPADM

## 2017-08-04 RX ORDER — DIPHENHYDRAMINE HYDROCHLORIDE 50 MG/ML
50 INJECTION INTRAMUSCULAR; INTRAVENOUS 3 TIMES DAILY PRN
Status: DISCONTINUED | OUTPATIENT
Start: 2017-08-04 | End: 2017-09-05 | Stop reason: HOSPADM

## 2017-08-04 RX ORDER — HALOPERIDOL 5 MG/ML
5-10 INJECTION INTRAMUSCULAR 3 TIMES DAILY PRN
Status: DISCONTINUED | OUTPATIENT
Start: 2017-08-04 | End: 2017-09-05 | Stop reason: HOSPADM

## 2017-08-04 RX ORDER — HALOPERIDOL 5 MG/1
5-10 TABLET ORAL 3 TIMES DAILY PRN
Status: DISCONTINUED | OUTPATIENT
Start: 2017-08-04 | End: 2017-09-05 | Stop reason: HOSPADM

## 2017-08-04 RX ADMIN — QUETIAPINE FUMARATE 200 MG: 100 TABLET, FILM COATED ORAL at 01:19

## 2017-08-04 RX ADMIN — MULTIPLE VITAMINS W/ MINERALS TAB 1 TABLET: TAB at 12:06

## 2017-08-04 RX ADMIN — Medication 1 LOZENGE: at 04:21

## 2017-08-04 RX ADMIN — TRAZODONE HYDROCHLORIDE 50 MG: 50 TABLET ORAL at 03:10

## 2017-08-04 RX ADMIN — HYDROXYZINE HYDROCHLORIDE 50 MG: 25 TABLET ORAL at 04:21

## 2017-08-04 RX ADMIN — TRIAMCINOLONE ACETONIDE: 1 OINTMENT TOPICAL at 20:11

## 2017-08-04 RX ADMIN — LURASIDONE HYDROCHLORIDE 100 MG: 80 TABLET, FILM COATED ORAL at 12:06

## 2017-08-04 RX ADMIN — LISINOPRIL 20 MG: 20 TABLET ORAL at 12:06

## 2017-08-04 RX ADMIN — PREDNISONE 60 MG: 20 TABLET ORAL at 12:07

## 2017-08-04 ASSESSMENT — ACTIVITIES OF DAILY LIVING (ADL)
LAUNDRY: UNABLE TO COMPLETE
DRESS: SCRUBS (BEHAVIORAL HEALTH)
GROOMING: PROMPTS
ORAL_HYGIENE: INDEPENDENT

## 2017-08-04 NOTE — PROGRESS NOTES
Court order received from [facility] Son almanzar order is for the following medications  Seroquel  Zyprexa  Latuda  Risperdal  Haldol  Saphris    Up to the maximum psychiatrically accepted dosage amounts.

## 2017-08-04 NOTE — PROGRESS NOTES
[facility], [place]   Psychiatric Progress Note        Interim History:   The patient's care was discussed with the treatment team during the daily team meeting and/or staff's chart notes were reviewed.  Staff reports patient slept 3hrs last night, but resting in bed during the day.  Transported to court by PD to attend [place]. Demanded to take all his belongings with him and was suspected that he was contemplating to elope. Not attending groups. Denied SI, LOYDA and HI.  Independent with ADL.     The patient was very pleasant, calm and cooperative. Spoke about getting tired of the lengthy hospitalization. He expressed strong determination to adhere to medications and outpatient follow ups when he return home. Rash improved. He also understands that he need to complete prednisone taper as instructed. He denied hallucinations and paranoia. Acknowledged episodic irritability but noted that its mainly due to frustration. Tolerating medications well and receptive to proposed changes. He adamantly denied SI, LOYDA and HI. He noted that he is been sleeping during the day due to boredom. Appetite is intact.  He believes that the rash has resolved and none noted on exposed skin.     Dicussed medications and care plan.          [START ON 8/5/2017] lurasidone  120 mg Oral Daily     predniSONE  60 mg Oral Daily    Followed by     [START ON 8/7/2017] predniSONE  50 mg Oral Daily    Followed by     [START ON 8/17/2017] predniSONE  40 mg Oral Daily    Followed by     [START ON 8/27/2017] predniSONE  30 mg Oral Daily    Followed by     [START ON 9/6/2017] predniSONE  20 mg Oral Daily    Followed by     [START ON 9/16/2017] predniSONE  10 mg Oral Daily    Followed by     [START ON 9/26/2017] predniSONE  5 mg Oral Daily     lisinopril  20 mg Oral Daily     multivitamin, therapeutic  1 tablet Oral Daily     triamcinolone   Topical BID          Allergies:     Allergies   Allergen Reactions      Carbamazepine Rash     Carbamazepine (Tegretol),.....Rash.          Labs:     Recent Results (from the past 24 hour(s))   Comprehensive metabolic panel    Collection Time: 08/04/17  8:00 AM   Result Value Ref Range    Sodium 139 133 - 144 mmol/L    Potassium 3.6 3.4 - 5.3 mmol/L    Chloride 104 94 - 109 mmol/L    Carbon Dioxide 23 20 - 32 mmol/L    Anion Gap 12 3 - 14 mmol/L    Glucose 79 70 - 99 mg/dL    Urea Nitrogen 13 7 - 30 mg/dL    Creatinine 0.50 (L) 0.66 - 1.25 mg/dL    GFR Estimate >90  Non  GFR Calc   >60 mL/min/1.7m2    GFR Estimate If Black >90   GFR Calc   >60 mL/min/1.7m2    Calcium 8.9 8.5 - 10.1 mg/dL    Bilirubin Total 0.5 0.2 - 1.3 mg/dL    Albumin 3.8 3.4 - 5.0 g/dL    Protein Total 6.9 6.8 - 8.8 g/dL    Alkaline Phosphatase 94 40 - 150 U/L    ALT 51 0 - 70 U/L    AST 14 0 - 45 U/L   CBC with platelets differential    Collection Time: 08/04/17  8:00 AM   Result Value Ref Range    WBC 12.2 (H) 4.0 - 11.0 10e9/L    RBC Count 5.13 4.4 - 5.9 10e12/L    Hemoglobin 15.0 13.3 - 17.7 g/dL    Hematocrit 44.9 40.0 - 53.0 %    MCV 88 78 - 100 fl    MCH 29.2 26.5 - 33.0 pg    MCHC 33.4 31.5 - 36.5 g/dL    RDW 13.2 10.0 - 15.0 %    Platelet Count 248 150 - 450 10e9/L    Diff Method Automated Method     % Neutrophils 34.1 %    % Lymphocytes 53.3 %    % Monocytes 10.0 %    % Eosinophils 1.8 %    % Basophils 0.3 %    % Immature Granulocytes 0.5 %    Nucleated RBCs 0 0 /100    Absolute Neutrophil 4.1 1.6 - 8.3 10e9/L    Absolute Lymphocytes 6.5 (H) 0.8 - 5.3 10e9/L    Absolute Monocytes 1.2 0.0 - 1.3 10e9/L    Absolute Eosinophils 0.2 0.0 - 0.7 10e9/L    Absolute Basophils 0.0 0.0 - 0.2 10e9/L    Abs Immature Granulocytes 0.1 0 - 0.4 10e9/L    Absolute Nucleated RBC 0.0     RBC Morphology Consistent with reported results     Platelet Estimate Confirming automated cell count           Psychiatric Examination:     Vitals:    07/31/17 0945 08/02/17 0916 08/03/17 0913 08/04/17 0737  "  BP:   135/69    Pulse:  92 108    Resp:  16  16   Temp: 97.5  F (36.4  C) 98.3  F (36.8  C) 97.9  F (36.6  C) 97.3  F (36.3  C)   TempSrc: Tympanic Tympanic Tympanic Tympanic   Weight:   106.9 kg (235 lb 12 oz)        Weight is 235 lbs 12 oz  Body mass index is 29.47 kg/(m^2).    Appearance: awake, alert, dressed in hospital scrubs, appeared as age stated and no apparent distress  Attitude:  cooperative and more engaged and calm  Eye Contact:  good  Mood:  better and but \"frustrated\"  Affect:  less guarded and reactive  Speech:  clear, coherent and normal prosody  Psychomotor Behavior:  no evidence of tardive dyskinesia, dystonia, or tics and intact station, gait and muscle tone  Throught Process:  linear and goal oriented  Associations:  no loose associations  Thought Content:  no evidence of suicidal ideation or homicidal ideation, no auditory hallucinations present, no visual hallucinations present, patient appears to be responding to internal stimuli and paranoia subsided  Insight:  fair  Judgement:  fair  Oriented to:  time, person, and place  Attention Span and Concentration:  intact  Recent and Remote Memory:  intact    Fund of Knowledge: Adequate         Precautions:     Behavioral Orders   Procedures     Assault precautions     Code 1 - Restrict to Unit     Elopement precautions     Routine Programming     As clinically indicated     Status 15     Every 15 minutes.          Diagnoses:     1.  Schizophrenia, paranoid type. (r/o schizoaffective Bipolar type)   2.  Noncompliance, nonadherence.   3.  Cluster B with antisocial traits.  4.  DRESS, rash improved and LFT normalizing.            Plan:     Medications:  -- Saphris: was tapered off due to lack of efficacy.  -- PRNs: Vistaril for anxiety and Seroquel for restlessness and haldol with Benadryl for agitation and psychosis. PRN ativan was discontinued. Seroquel 200 mg qhs PRN for racing thoughts and insomnia.   -- Seroquel 200 mg qHS but later but " continued -200 mg BID PRN for psychosis, restlessness and insomnia.    -- Latuda: started and initially titrated to 120 mg daily. The medication was helpful initially but later mood lability and paranoia re-emerged. Medications was gradually lowered to 40 mg daily with plan to cross taper it with Risperdal. Trial with Risperdal was not successful due to rash and it was discontinued. Latuda was re-titrating to a target dose of 120-160 mg. Dosed was increased to 120 mg daily on 8/1.   -- Risperdal was started and showed great efficacy but patient later developed a skin rash. Risperdal was eventually discontinued.  The patient declined to reconsider Risperdal in future.    -- Tegretol: started and titrated to 200 mg BID, level was 5.1 on 6/11 and 6.9 on 6/16 and 5.5 on 7/12. The patient developed a rash which failed to resolved after d/c'ing Risperdal. Tegretol was suspected and discontinued on 7/20.   -- Cogentin was added at 0.5-1 mg BID PRN for EPS.     -- IM and Dermatology teams continues to follow and monitor re: DRESS.     Legal Status and Disposition:  1.  Commitment with [employer] for Saphris, Zyprexa, haloperidol, Latuda, and Risperidone. Letter was sent to add Seroquel and remove Risperdal and Saphris from [facility]  2.  Patient is currently is on [facility] list. Given his statements about homicidal thoughts toward neighbor. May consider discharge home if safety established. [facility] informed local police department of the threats he made earlier in the hospital stay.  May consider discharge home with increased services if safety established.

## 2017-08-05 RX ADMIN — TRIAMCINOLONE ACETONIDE: 1 OINTMENT TOPICAL at 20:09

## 2017-08-05 RX ADMIN — CARBOXYMETHYLCELLULOSE SODIUM 1 DROP: 10 GEL OPHTHALMIC at 03:20

## 2017-08-05 RX ADMIN — LURASIDONE HYDROCHLORIDE 120 MG: 80 TABLET, FILM COATED ORAL at 08:42

## 2017-08-05 RX ADMIN — QUETIAPINE FUMARATE 200 MG: 100 TABLET, FILM COATED ORAL at 02:49

## 2017-08-05 RX ADMIN — MULTIPLE VITAMINS W/ MINERALS TAB 1 TABLET: TAB at 08:42

## 2017-08-05 RX ADMIN — PREDNISONE 60 MG: 20 TABLET ORAL at 08:42

## 2017-08-05 RX ADMIN — LISINOPRIL 20 MG: 20 TABLET ORAL at 08:42

## 2017-08-05 RX ADMIN — TRIAMCINOLONE ACETONIDE: 1 OINTMENT TOPICAL at 09:21

## 2017-08-05 ASSESSMENT — ACTIVITIES OF DAILY LIVING (ADL)
GROOMING: INDEPENDENT
LAUNDRY: UNABLE TO COMPLETE
ORAL_HYGIENE: INDEPENDENT
DRESS: INDEPENDENT

## 2017-08-05 NOTE — PROGRESS NOTES
Pt is withdrawn from pts and staff unless making a request. Pt ate meals and snacks without incident. Pts affect is flat and constricted. Pt mood was calm most of the night. No behavioral concerns to report this evening.      08/04/17 2100   Behavioral Health   Hallucinations denies / not responding to hallucinations   Thinking delusional;paranoid   Orientation person: oriented;place: oriented;situation, disoriented   Memory short term   Insight poor   Judgement impaired   Eye Contact into space;staring   Affect blunted, flat   Mood depressed   Physical Appearance/Attire disheveled   Hygiene neglected grooming - unclean body, hair, teeth   Suicidality other (see comments)  (none noted)   Self Injury other (see comment)  (none noted)   Activity withdrawn   Speech pressured   Medication Sensitivity no observed side effects;no stated side effects   Psychomotor / Gait balanced;steady   Activities of Daily Living   Hygiene/Grooming prompts   Oral Hygiene independent   Dress scrubs (behavioral health)   Laundry unable to complete   Room Organization independent   Behavioral Health Interventions   Psychotic Symptoms maintain safety precautions;monitor need to revise level of observation;maintain safe secure environment;reality orientation;simple, clear language;decrease environmental stimulation;redirection of intrusive behaviors;provide emotional support;establish therapeutic relationship;assist with developing & utilizing healthy coping strategies;encourage participation / independence with adls;build upon strengths;monitor need for prn medication   Social and Therapeutic Interventions (Psychotic Symptoms) encourage socialization with peers;encourage effective boundaries with peers;encourage participation in therapeutic groups and milieu activities

## 2017-08-05 NOTE — PROGRESS NOTES
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 He also does strange things like piles a lot of ice in the sink and he states that it is his job for him to do so.

## 2017-08-06 PROCEDURE — 99207 ZZC CDG-MDM COMPONENT: MEETS LOW - DOWN CODED: CPT | Performed by: PHYSICIAN ASSISTANT

## 2017-08-06 PROCEDURE — 99232 SBSQ HOSP IP/OBS MODERATE 35: CPT | Performed by: PHYSICIAN ASSISTANT

## 2017-08-06 RX ADMIN — MULTIPLE VITAMINS W/ MINERALS TAB 1 TABLET: TAB at 08:48

## 2017-08-06 RX ADMIN — TRIAMCINOLONE ACETONIDE: 1 OINTMENT TOPICAL at 08:49

## 2017-08-06 RX ADMIN — TRAZODONE HYDROCHLORIDE 50 MG: 50 TABLET ORAL at 00:17

## 2017-08-06 RX ADMIN — LURASIDONE HYDROCHLORIDE 120 MG: 80 TABLET, FILM COATED ORAL at 08:48

## 2017-08-06 RX ADMIN — Medication 1 LOZENGE: at 01:05

## 2017-08-06 RX ADMIN — PREDNISONE 60 MG: 20 TABLET ORAL at 08:48

## 2017-08-06 RX ADMIN — LISINOPRIL 20 MG: 20 TABLET ORAL at 08:48

## 2017-08-06 RX ADMIN — CARBOXYMETHYLCELLULOSE SODIUM 1 DROP: 10 GEL OPHTHALMIC at 02:47

## 2017-08-06 RX ADMIN — ACETAMINOPHEN 650 MG: 325 TABLET, FILM COATED ORAL at 20:03

## 2017-08-06 RX ADMIN — TRIAMCINOLONE ACETONIDE: 1 OINTMENT TOPICAL at 19:52

## 2017-08-06 ASSESSMENT — ACTIVITIES OF DAILY LIVING (ADL)
ORAL_HYGIENE: INDEPENDENT
GROOMING: INDEPENDENT
LAUNDRY: UNABLE TO COMPLETE
DRESS: INDEPENDENT
ORAL_HYGIENE: INDEPENDENT
HYGIENE/GROOMING: INDEPENDENT
DRESS: SCRUBS (BEHAVIORAL HEALTH)

## 2017-08-06 NOTE — PLAN OF CARE
Problem: Psychotic Symptoms  Goal: Psychotic Symptoms  Signs and symptoms of listed problems will be absent or manageable.   Outcome: No Change  Jv had a good evening shift. He was visible in milieu pacing back and forth the hallway. He is withdrawn from patients and staff unless making a request.  He appears internally preoccupied and responding to internal stimuli. No agitating behaviors observed this evening. No SI/SIB

## 2017-08-06 NOTE — PROGRESS NOTES
[facility]  Daily Progress Note  Jv Pate  [id number]  August 6, 2017     Interval History:   Jv feels well today. He is resting well. He reports the rash is pretty much gone. Denies any itchiness or burning to his skin. Denies seeing any new lesions. Has flat affect, but continues to be cooperative with answering questions. No other concerns.    ROS:   Please see HPI, otherwise, complete 10 point review of systems is negative.      Physical Exam:   Vitals were reviewed  Blood pressure 135/69, pulse 108, temperature 97.8  F (36.6  C), temperature source Tympanic, resp. rate 16, weight 104.3 kg (230 lb).  General:alert, resting in bed, flat affect, no acute distress  HEENT: MMM, no oral lesions, no lympahdenopathy  Vascular: no peripheral edema  Neurologic: no focal neuro deficits noted  Skin: no morbiliform rash noted on distal lower and upper extremities at this time, very faint morbiliform rash over lower abdomen, no other lesions      Assessment and Plan:   Jv Pate is a 32 year old male who was transferred form inpatient psychiatry to medicine on 7/21/2017. He has a pmh of paranoid schizophrenia, cluster B w/ antisocial traits, Lyme disease, and HTN who was admitted to medicine after developing a morbilliform rash concerning for DRESS.       DRESS syndrome. Developed morbilliform rash around 7/15. Involving extremities, trunk and back, spares mucous membranes. Associated with transaminitis and eosinophilia (2.6). Most likely culprit carbamazepine, which was stopped 7/21. Started on prednisone 40mg BID 7/21, started tapering slowly on 7/28. Eosinophilia resolved 7/26. LFTs returned to normal 8/4. Rash improving significantly, no longer appreciate even faint morbiliform rash on lower or upper extremities, only very faint rash on lower abdomen. Derm initiated long steroid taper on 7/28.   - Continue steroid taper as ordered: prednisone 60mg qd x 10d (thru 8/6), followed by 50mg  qd x 10d (8/7-8/16), then 40mg qd x 10d (8/17-8/26), then 30mg qd x10d (8/27-9/5), then 20mg qd x 10d (9/6-9/15), then 10mg qd x10d (9/16-9/25), then 5mg qd x10d (9/26-10/5)  - D/w dermatology last week, okay to decrease lab checks CMP, CBC to twice weekly (ordered for every Monday/Friday)  - if patient refused prednisone dose, please contact medicine at this places patient at risk for adrenal insufficiency  - Dermatology should be notified prior to patients discharge to arrange outpatient follow up      Elevated LFTs. New onset 7/21. No hx of liver disease. Likely 2/2 DRESS as significant improvement correlating with initiation of prednisone and rash improving.       HTN. BPs stable. Cont lisinopril.     In regards to his mothers concerns about him having lymes disease, please refer to my colleague MANUEL Antonio's excellent note on 6/6 for which this was extensively reviewed and case was discussed with infectious disease. There continue to be no early or early disseminated findings such as viral prodrome, erythema migrans, lymphadenopathy, fevers, new rashes on exam at this time. Given there are no clinic signs of lymes, serology would not be helpful at this time and treatment with antibiotics is not indicated. If psychiatry feels, that at any time, patients psychiatric state is declining due to something outside of his underlying psychiatric illness, we could consider MRI brain, neuro consult, ID consul and LP for CSF analysis. None of these are indicated at this time.     Attestation:  I have reviewed today's vital signs, notes, medications, labs and imaging.        Taya Levy  Internal Medicine [facility] Hospitalist  [phone]  August 6, 2017

## 2017-08-06 NOTE — PROGRESS NOTES
Tense and blunted affect. Patient speaks in clipped sentences and his requests to staff are often for his peers. Patient appears to be decompensating     08/06/17 1345   Behavioral Health   Hallucinations appears responding   Thinking distractable;delusional;poor concentration;other (see comment)  (Guarded)   Orientation person: oriented;place: oriented;date: oriented   Memory short term   Insight poor   Judgement impaired   Eye Contact at examiner   Affect blunted, flat;tense;irritable   Mood irritable;anxious   Physical Appearance/Attire other (see comment)  (Adequate)   Hygiene neglected grooming - unclean body, hair, teeth;other (see comment)  (Adequate)   Suicidality other (see comments)  (Denies)   Self Injury other (see comment)  (Denies)   Elopement (No attempts)   Activity isolative;withdrawn;restless   Speech clear;coherent   Medication Sensitivity no stated side effects;no observed side effects   Psychomotor / Gait balanced;steady   Substance Withdrawal Interventions   Social and Therapeutic Interventions (Substance Withdrawal) encourage effective boundaries with peers   Overt Aggression Scale   Verbal Aggression 0   Aggression against Property 0   Auto-Aggression 0   Physical Aggression 0   Overt Aggression Total Score 0   Sleep/Rest/Relaxation   Day/Evening Time Hours up all shift   Psycho Education   Type of Intervention 1:1 intervention   Response participates, initiates socially appropriate   Hours 0.5   Treatment Detail Self Reflection   Daily Care   Activity up ad jomar   Activities of Daily Living   Hygiene/Grooming independent   Oral Hygiene independent   Dress independent   Laundry unable to complete   Room Organization independent   Activity   Activity Level of Assistance independent   Behavioral Health Interventions   Psychotic Symptoms maintain safety precautions;simple, clear language;decrease environmental stimulation;redirection of intrusive behaviors   Social and Therapeutic Interventions  (Psychotic Symptoms) encourage effective boundaries with peers   .

## 2017-08-07 LAB
ALBUMIN SERPL-MCNC: 3.5 G/DL (ref 3.4–5)
ALP SERPL-CCNC: 81 U/L (ref 40–150)
ALT SERPL W P-5'-P-CCNC: 43 U/L (ref 0–70)
ANION GAP SERPL CALCULATED.3IONS-SCNC: 11 MMOL/L (ref 3–14)
AST SERPL W P-5'-P-CCNC: 16 U/L (ref 0–45)
BASOPHILS # BLD AUTO: 0 10E9/L (ref 0–0.2)
BASOPHILS NFR BLD AUTO: 0.3 %
BILIRUB SERPL-MCNC: 0.4 MG/DL (ref 0.2–1.3)
BUN SERPL-MCNC: 14 MG/DL (ref 7–30)
CALCIUM SERPL-MCNC: 8.7 MG/DL (ref 8.5–10.1)
CHLORIDE SERPL-SCNC: 108 MMOL/L (ref 94–109)
CO2 SERPL-SCNC: 23 MMOL/L (ref 20–32)
CREAT SERPL-MCNC: 0.59 MG/DL (ref 0.66–1.25)
DIFFERENTIAL METHOD BLD: ABNORMAL
EOSINOPHIL # BLD AUTO: 0.2 10E9/L (ref 0–0.7)
EOSINOPHIL NFR BLD AUTO: 1.3 %
ERYTHROCYTE [DISTWIDTH] IN BLOOD BY AUTOMATED COUNT: 13.5 % (ref 10–15)
GFR SERPL CREATININE-BSD FRML MDRD: ABNORMAL ML/MIN/1.7M2
GLUCOSE SERPL-MCNC: 86 MG/DL (ref 70–99)
HCT VFR BLD AUTO: 46.4 % (ref 40–53)
HGB BLD-MCNC: 15.2 G/DL (ref 13.3–17.7)
IMM GRANULOCYTES # BLD: 0.1 10E9/L (ref 0–0.4)
IMM GRANULOCYTES NFR BLD: 0.6 %
LYMPHOCYTES # BLD AUTO: 6.4 10E9/L (ref 0.8–5.3)
LYMPHOCYTES NFR BLD AUTO: 49.8 %
MCH RBC QN AUTO: 28.8 PG (ref 26.5–33)
MCHC RBC AUTO-ENTMCNC: 32.8 G/DL (ref 31.5–36.5)
MCV RBC AUTO: 88 FL (ref 78–100)
MONOCYTES # BLD AUTO: 0.9 10E9/L (ref 0–1.3)
MONOCYTES NFR BLD AUTO: 6.8 %
NEUTROPHILS # BLD AUTO: 5.3 10E9/L (ref 1.6–8.3)
NEUTROPHILS NFR BLD AUTO: 41.2 %
NRBC # BLD AUTO: 0 10*3/UL
NRBC BLD AUTO-RTO: 0 /100
PLATELET # BLD AUTO: 243 10E9/L (ref 150–450)
PLATELET # BLD EST: ABNORMAL 10*3/UL
POTASSIUM SERPL-SCNC: 3.5 MMOL/L (ref 3.4–5.3)
PROT SERPL-MCNC: 6.6 G/DL (ref 6.8–8.8)
RBC # BLD AUTO: 5.28 10E12/L (ref 4.4–5.9)
RBC MORPH BLD: NORMAL
SODIUM SERPL-SCNC: 142 MMOL/L (ref 133–144)
WBC # BLD AUTO: 12.8 10E9/L (ref 4–11)

## 2017-08-07 PROCEDURE — 99232 SBSQ HOSP IP/OBS MODERATE 35: CPT | Performed by: PSYCHIATRY & NEUROLOGY

## 2017-08-07 RX ADMIN — LURASIDONE HYDROCHLORIDE 120 MG: 80 TABLET, FILM COATED ORAL at 09:33

## 2017-08-07 RX ADMIN — TRIAMCINOLONE ACETONIDE: 1 OINTMENT TOPICAL at 20:17

## 2017-08-07 RX ADMIN — LISINOPRIL 20 MG: 20 TABLET ORAL at 09:34

## 2017-08-07 RX ADMIN — CARBOXYMETHYLCELLULOSE SODIUM 1 DROP: 10 GEL OPHTHALMIC at 09:34

## 2017-08-07 RX ADMIN — PREDNISONE 50 MG: 50 TABLET ORAL at 09:34

## 2017-08-07 RX ADMIN — MULTIPLE VITAMINS W/ MINERALS TAB 1 TABLET: TAB at 09:33

## 2017-08-07 ASSESSMENT — ACTIVITIES OF DAILY LIVING (ADL)
DRESS: SCRUBS (BEHAVIORAL HEALTH)
GROOMING: INDEPENDENT
ORAL_HYGIENE: INDEPENDENT
LAUNDRY: UNABLE TO COMPLETE

## 2017-08-07 NOTE — PROGRESS NOTES
[facility], [place]   Psychiatric Progress Note        Interim History:   The patient's care was discussed with the treatment team during the daily team meeting and/or staff's chart notes were reviewed.  Staff reports patient sleeping 5-5.5hrs at night and naps during the day. He is not taking PRN Seroquel at bedtime. Keep to self and tense, but compliant with care and has had no outbursts. No SI, LOYDA or HI reported. Hesitant about medications but compliant with some prompting. Not attending groups and keep to self. Independent with ADL.     Met patient with staff. The patient was sleeping when approached, but cooperative, and calm when awaken. He was more engaged and even brighten today. Noted that he is doing well. Denied dep, anxiety, SI, LOYDA and HI. Denied hallucinations and paranoia. Noted tolerating medications well, and noted that he plans to continue medications as instructed after discharge. Noted sleeping well and not needing PRN Seroquel. Appetite is intact. Tolerating medications well and had not physical complaints. Believes that rash resolved. Hopes that he will be granted discharge home eventually.     Dicussed medications and care plan.          lurasidone  120 mg Oral Daily     predniSONE  50 mg Oral Daily    Followed by     [START ON 8/17/2017] predniSONE  40 mg Oral Daily    Followed by     [START ON 8/27/2017] predniSONE  30 mg Oral Daily    Followed by     [START ON 9/6/2017] predniSONE  20 mg Oral Daily    Followed by     [START ON 9/16/2017] predniSONE  10 mg Oral Daily    Followed by     [START ON 9/26/2017] predniSONE  5 mg Oral Daily     lisinopril  20 mg Oral Daily     multivitamin, therapeutic  1 tablet Oral Daily     triamcinolone   Topical BID          Allergies:     Allergies   Allergen Reactions     Carbamazepine Rash     Carbamazepine (Tegretol),.....Rash.          Labs:     Recent Results (from the past 24 hour(s))   Comprehensive metabolic panel     Collection Time: 08/07/17  8:25 AM   Result Value Ref Range    Sodium 142 133 - 144 mmol/L    Potassium 3.5 3.4 - 5.3 mmol/L    Chloride 108 94 - 109 mmol/L    Carbon Dioxide 23 20 - 32 mmol/L    Anion Gap 11 3 - 14 mmol/L    Glucose 86 70 - 99 mg/dL    Urea Nitrogen 14 7 - 30 mg/dL    Creatinine 0.59 (L) 0.66 - 1.25 mg/dL    GFR Estimate >90  Non  GFR Calc   >60 mL/min/1.7m2    GFR Estimate If Black >90   GFR Calc   >60 mL/min/1.7m2    Calcium 8.7 8.5 - 10.1 mg/dL    Bilirubin Total 0.4 0.2 - 1.3 mg/dL    Albumin 3.5 3.4 - 5.0 g/dL    Protein Total 6.6 (L) 6.8 - 8.8 g/dL    Alkaline Phosphatase 81 40 - 150 U/L    ALT 43 0 - 70 U/L    AST 16 0 - 45 U/L   CBC with platelets differential    Collection Time: 08/07/17  8:25 AM   Result Value Ref Range    WBC 12.8 (H) 4.0 - 11.0 10e9/L    RBC Count 5.28 4.4 - 5.9 10e12/L    Hemoglobin 15.2 13.3 - 17.7 g/dL    Hematocrit 46.4 40.0 - 53.0 %    MCV 88 78 - 100 fl    MCH 28.8 26.5 - 33.0 pg    MCHC 32.8 31.5 - 36.5 g/dL    RDW 13.5 10.0 - 15.0 %    Platelet Count 243 150 - 450 10e9/L    Diff Method Automated Method     % Neutrophils 41.2 %    % Lymphocytes 49.8 %    % Monocytes 6.8 %    % Eosinophils 1.3 %    % Basophils 0.3 %    % Immature Granulocytes 0.6 %    Nucleated RBCs 0 0 /100    Absolute Neutrophil 5.3 1.6 - 8.3 10e9/L    Absolute Lymphocytes 6.4 (H) 0.8 - 5.3 10e9/L    Absolute Monocytes 0.9 0.0 - 1.3 10e9/L    Absolute Eosinophils 0.2 0.0 - 0.7 10e9/L    Absolute Basophils 0.0 0.0 - 0.2 10e9/L    Abs Immature Granulocytes 0.1 0 - 0.4 10e9/L    Absolute Nucleated RBC 0.0     RBC Morphology Normal     Platelet Estimate Confirming automated cell count           Psychiatric Examination:     Vitals:    08/03/17 0913 08/04/17 0737 08/05/17 0746 08/06/17 0830   BP: 135/69      Pulse: 108      Resp:  16  16   Temp: 97.9  F (36.6  C) 97.3  F (36.3  C) 98.1  F (36.7  C) 97.8  F (36.6  C)   TempSrc: Tympanic Tympanic Tympanic Tympanic  "  Weight: 106.9 kg (235 lb 12 oz)  104.3 kg (230 lb)        Weight is 230 lbs 0 oz  Body mass index is 28.75 kg/(m^2).    Appearance: dressed in hospital scrubs, appeared as age stated and no apparent distress  Attitude:  cooperative and engaged and calm  Eye Contact:  good  Mood:  better and \"OK\"  Affect:  brightened, less guarded and reactive  Speech:  clear, coherent and normal prosody  Psychomotor Behavior:  no evidence of tardive dyskinesia, dystonia, or tics and intact station, gait and muscle tone  Throught Process:  linear and goal oriented  Associations:  no loose associations  Thought Content:  no evidence of suicidal ideation or homicidal ideation, no auditory hallucinations present, no visual hallucinations present, patient appears to be responding to internal stimuli and paranoia subsided  Insight:  fair  Judgement:  fair  Oriented to:  time, person, and place  Attention Span and Concentration:  intact  Recent and Remote Memory:  intact     Fund of Knowledge: Adequate         Precautions:     Behavioral Orders   Procedures     Assault precautions     Code 1 - Restrict to Unit     Elopement precautions     Routine Programming     As clinically indicated     Status 15     Every 15 minutes.          Diagnoses:     1.  Schizophrenia, paranoid type. (r/o schizoaffective Bipolar type)   2.  Noncompliance, nonadherence.   3.  Cluster B with antisocial traits.  4.  DRESS, rash improved and LFT normalizing.            Plan:     Medications:  -- Saphris: was tapered off due to lack of efficacy.  -- PRNs: Vistaril for anxiety and Seroquel for restlessness and haldol with Benadryl for agitation and psychosis. PRN ativan was discontinued. Seroquel 200 mg qhs PRN for racing thoughts and insomnia.   -- Seroquel 200 mg qHS but later but continued -200 mg BID PRN for psychosis, restlessness and insomnia.    -- Latuda: started and initially titrated to 120 mg daily. The medication was helpful initially but later " mood lability and paranoia re-emerged. Medications was gradually lowered to 40 mg daily with plan to cross taper it with Risperdal. Trial with Risperdal was not successful due to rash and it was discontinued. Latuda was re-titrating to a target dose of 120-160 mg. Dosed was increased to 120 mg daily on 8/1.   -- Risperdal was started and showed great efficacy but patient later developed a skin rash. Risperdal was eventually discontinued.  The patient declined to reconsider Risperdal in future.    -- Tegretol: started and titrated to 200 mg BID, level was 5.1 on 6/11 and 6.9 on 6/16 and 5.5 on 7/12. The patient developed a rash which failed to resolved after d/c'ing Risperdal. Tegretol was suspected and discontinued on 7/20.   -- Cogentin was added at 0.5-1 mg BID PRN for EPS.     -- IM and Dermatology teams continues to follow and monitor re: DRESS.     Legal Status and Disposition:  1.  Commitment with [employer] for Saphris, Zyprexa, haloperidol, Latuda, and Risperidone. Letter was sent to add Seroquel and remove Risperdal and Saphris from [facility]  2.  Patient is currently is on [facility] list. Given his statements about homicidal thoughts toward neighbor. May consider discharge home if safety established. [facility] informed local police department of the threats he made earlier in the hospital stay.  May consider discharge home with increased services if safety established.

## 2017-08-08 PROCEDURE — 99232 SBSQ HOSP IP/OBS MODERATE 35: CPT | Performed by: PSYCHIATRY & NEUROLOGY

## 2017-08-08 RX ORDER — POLYMYXIN B SULFATE AND TRIMETHOPRIM 1; 10000 MG/ML; [USP'U]/ML
2 SOLUTION OPHTHALMIC 4 TIMES DAILY
Status: COMPLETED | OUTPATIENT
Start: 2017-08-08 | End: 2017-08-13

## 2017-08-08 RX ADMIN — TRIAMCINOLONE ACETONIDE: 1 OINTMENT TOPICAL at 08:26

## 2017-08-08 RX ADMIN — MULTIPLE VITAMINS W/ MINERALS TAB 1 TABLET: TAB at 08:25

## 2017-08-08 RX ADMIN — POLYMYXIN B SULFATE, TRIMETHOPRIM SULFATE 2 DROP: 10000; 1 SOLUTION/ DROPS OPHTHALMIC at 19:19

## 2017-08-08 RX ADMIN — ACETAMINOPHEN 650 MG: 325 TABLET, FILM COATED ORAL at 19:18

## 2017-08-08 RX ADMIN — POLYMYXIN B SULFATE, TRIMETHOPRIM SULFATE 2 DROP: 10000; 1 SOLUTION/ DROPS OPHTHALMIC at 12:51

## 2017-08-08 RX ADMIN — CARBOXYMETHYLCELLULOSE SODIUM 1 DROP: 10 GEL OPHTHALMIC at 07:47

## 2017-08-08 RX ADMIN — POLYMYXIN B SULFATE, TRIMETHOPRIM SULFATE 2 DROP: 10000; 1 SOLUTION/ DROPS OPHTHALMIC at 16:06

## 2017-08-08 RX ADMIN — LURASIDONE HYDROCHLORIDE 120 MG: 80 TABLET, FILM COATED ORAL at 08:25

## 2017-08-08 RX ADMIN — LISINOPRIL 20 MG: 20 TABLET ORAL at 08:25

## 2017-08-08 RX ADMIN — CARBOXYMETHYLCELLULOSE SODIUM 1 DROP: 10 GEL OPHTHALMIC at 10:12

## 2017-08-08 RX ADMIN — PREDNISONE 50 MG: 50 TABLET ORAL at 08:25

## 2017-08-08 ASSESSMENT — ACTIVITIES OF DAILY LIVING (ADL)
ORAL_HYGIENE: INDEPENDENT
GROOMING: INDEPENDENT
DRESS: SCRUBS (BEHAVIORAL HEALTH)

## 2017-08-08 NOTE — PROGRESS NOTES
Pt was anxious and tense throughout the shift. He spent several hours standing in front of the lounge sink; filling cups with water, ice and juice, dumping out the contents, washing the cups with soap and re-stacking them next to the water machine. This staff noticed that pts hands tremored as he engaged in his aquatic activities. He took a shower after dinner, at his request, and asked to play chess with staff.

## 2017-08-08 NOTE — PROGRESS NOTES
We are still awaiting the 2 weeks of stabilization in order to ask them to re-consider this patient for [facility] rather than [facility].  Beginning of next week this writer will attempt to have them rescreen the pt if he is still doing well.  Hope [facility] will have a bed sooner than [facility] for this pt.  We can also look into [facility] if the pt will agree to it.

## 2017-08-08 NOTE — PROGRESS NOTES
"[facility], [place]   Psychiatric Progress Note        Interim History:   The patient's care was discussed with the treatment team during the daily team meeting and/or staff's chart notes were reviewed.  Staff reports patient slept 6 hrs last night but woke up early and went back to bed later this morning. Compliant with medications and care. Appears anxious and tense, but no behavioral outbursts and irritability subsided. More engaged with staff in the evening. Out for meals but not attending groups. No SI, LOYDA or HI reported. Paranoia and internal stimuli suspected. Rash resolved. Independent with ADL.     The patent noted that he is doing \"better\". Both eyes are red with some discharge. c/o of painful and some itchiness. Denied ep and anxi, but frustrated with lengthy hospital stay and hopes to return home. Tolerating medications well. Denied hallucinations, racing thoughts and irritability. Noted that he is sleeping better, attempting to avoid daytime naps. Eating well. Future oriented and denied SI, LOYDA and HI.     Dicussed medications and care plan.          lurasidone  120 mg Oral Daily     predniSONE  50 mg Oral Daily    Followed by     [START ON 8/17/2017] predniSONE  40 mg Oral Daily    Followed by     [START ON 8/27/2017] predniSONE  30 mg Oral Daily    Followed by     [START ON 9/6/2017] predniSONE  20 mg Oral Daily    Followed by     [START ON 9/16/2017] predniSONE  10 mg Oral Daily    Followed by     [START ON 9/26/2017] predniSONE  5 mg Oral Daily     lisinopril  20 mg Oral Daily     multivitamin, therapeutic  1 tablet Oral Daily     triamcinolone   Topical BID          Allergies:     Allergies   Allergen Reactions     Carbamazepine Rash     Carbamazepine (Tegretol),.....Rash.          Labs:     No results found for this or any previous visit (from the past 24 hour(s)).       Psychiatric Examination:     Vitals:    08/04/17 0737 08/05/17 0746 08/06/17 0830 08/08/17 0751 " "  BP:       Pulse:       Resp: 16  16 16   Temp: 97.3  F (36.3  C) 98.1  F (36.7  C) 97.8  F (36.6  C) 98.4  F (36.9  C)   TempSrc: Tympanic Tympanic Tympanic Tympanic   Weight:  104.3 kg (230 lb)         Weight is 230 lbs 0 oz  Body mass index is 28.75 kg/(m^2).    Appearance: dressed in hospital scrubs, appeared as age stated and no apparent distress  Attitude:  cooperative and engaged and calm  Eye Contact:  good  Mood:  \"OK\"  Affect:  more engaged  and and more reactive.   Speech:  clear, coherent and normal prosody  Psychomotor Behavior:  no evidence of tardive dyskinesia, dystonia, or tics and intact station, gait and muscle tone  Throught Process:  linear and goal oriented  Associations:  no loose associations  Thought Content:  no evidence of suicidal ideation or homicidal ideation, no auditory hallucinations present, no visual hallucinations present, patient appears to be responding to internal stimuli and paranoia subsided  Insight:  fair  Judgement:  fair  Oriented to:  time, person, and place  Attention Span and Concentration:  intact  Recent and Remote Memory:  intact     Fund of Knowledge: Adequate         Precautions:     Behavioral Orders   Procedures     Assault precautions     Code 1 - Restrict to Unit     Elopement precautions     Routine Programming     As clinically indicated     Status 15     Every 15 minutes.          Diagnoses:     1.  Schizophrenia, paranoid type. (r/o schizoaffective Bipolar type)   2.  Noncompliance, nonadherence.   3.  Cluster B with antisocial traits.  4.  DRESS, rash improved and LFT normalizing.            Plan:     Medications:  -- Saphris: was tapered off due to lack of efficacy.  -- PRNs: Vistaril for anxiety and Seroquel for restlessness and haldol with Benadryl for agitation and psychosis. PRN ativan was discontinued. Seroquel 200 mg qhs PRN for racing thoughts and insomnia.   -- Seroquel 200 mg qHS but later but continued -200 mg BID PRN for psychosis, " restlessness and insomnia.    -- Latuda: started and initially titrated to 120 mg daily. The medication was helpful initially but later mood lability and paranoia re-emerged. Medications was gradually lowered to 40 mg daily with plan to cross taper it with Risperdal. Trial with Risperdal was not successful due to rash and it was discontinued. Latuda was re-titrating to a target dose of 120-160 mg. Dosed was increased to 120 mg daily on 8/1.   -- Risperdal was started and showed great efficacy but patient later developed a skin rash. Risperdal was eventually discontinued.  The patient declined to reconsider Risperdal in future.    -- Tegretol: started and titrated to 200 mg BID, level was 5.1 on 6/11 and 6.9 on 6/16 and 5.5 on 7/12. The patient developed a rash which failed to resolved after d/c'ing Risperdal. Tegretol was suspected and discontinued on 7/20.   -- Cogentin was added at 0.5-1 mg BID PRN for EPS.     -- IM and Dermatology teams continues to follow and monitor re: DRESS.   -- IM to evaluate re: possible conjunctivitis.     Legal Status and Disposition:  1.  Commitment with [facility] for Saphris, Zyprexa, haloperidol, Latuda, and Risperidone. Letter was sent to add Seroquel and remove Risperdal and Saphris from [facility]  2.  Patient is currently is on [facility] list. Given his statements about homicidal thoughts toward neighbor. May consider discharge home if safety established. [facility] informed local police department of the threats he made earlier in the hospital stay.  May consider discharge home with increased services if safety established.

## 2017-08-08 NOTE — PROGRESS NOTES
Patient reported to writer that his right eye was bothering him. His eye appears red.  Attending Dr Shah has concerns that patient may have pink eye and would like Internal Medicine to see pt.

## 2017-08-09 PROCEDURE — 99232 SBSQ HOSP IP/OBS MODERATE 35: CPT | Performed by: PSYCHIATRY & NEUROLOGY

## 2017-08-09 RX ADMIN — POLYMYXIN B SULFATE, TRIMETHOPRIM SULFATE 2 DROP: 10000; 1 SOLUTION/ DROPS OPHTHALMIC at 16:14

## 2017-08-09 RX ADMIN — MULTIPLE VITAMINS W/ MINERALS TAB 1 TABLET: TAB at 10:14

## 2017-08-09 RX ADMIN — LURASIDONE HYDROCHLORIDE 120 MG: 80 TABLET, FILM COATED ORAL at 10:14

## 2017-08-09 RX ADMIN — POLYMYXIN B SULFATE, TRIMETHOPRIM SULFATE 2 DROP: 10000; 1 SOLUTION/ DROPS OPHTHALMIC at 10:15

## 2017-08-09 RX ADMIN — NICOTINE POLACRILEX 2 MG: 2 GUM, CHEWING ORAL at 11:59

## 2017-08-09 RX ADMIN — POLYMYXIN B SULFATE, TRIMETHOPRIM SULFATE 2 DROP: 10000; 1 SOLUTION/ DROPS OPHTHALMIC at 12:52

## 2017-08-09 RX ADMIN — LISINOPRIL 20 MG: 20 TABLET ORAL at 10:13

## 2017-08-09 RX ADMIN — CARBOXYMETHYLCELLULOSE SODIUM 1 DROP: 10 GEL OPHTHALMIC at 11:57

## 2017-08-09 RX ADMIN — Medication 1 LOZENGE: at 11:57

## 2017-08-09 RX ADMIN — POLYMYXIN B SULFATE, TRIMETHOPRIM SULFATE 2 DROP: 10000; 1 SOLUTION/ DROPS OPHTHALMIC at 20:20

## 2017-08-09 RX ADMIN — ACETAMINOPHEN 650 MG: 325 TABLET, FILM COATED ORAL at 11:57

## 2017-08-09 RX ADMIN — PREDNISONE 50 MG: 50 TABLET ORAL at 10:14

## 2017-08-09 ASSESSMENT — ACTIVITIES OF DAILY LIVING (ADL)
ORAL_HYGIENE: INDEPENDENT
DRESS: SCRUBS (BEHAVIORAL HEALTH)
DRESS: SCRUBS (BEHAVIORAL HEALTH)
ORAL_HYGIENE: INDEPENDENT
GROOMING: INDEPENDENT
GROOMING: INDEPENDENT

## 2017-08-09 NOTE — PLAN OF CARE
Problem: General Plan of Care (Inpatient Behavioral)  Goal: Team Discussion  Team Plan:   BEHAVIORAL TEAM DISCUSSION     Participants: Dr. Mario Shah, Ernestina Knox RN, Sandy Everett RN, HANS-Francoise Newberry LMFT, [facility]  Progress: pt has been doing odd behaviors on the unit (filling up glasses of ice to cool down the unit), however, he is better than he was when suffering from DRESS.   Continued Stay Criteria/Rationale: Pt is on the list for [facility], we are hoping to have him re-evaluated for [facility] as well.   Medical/Physical: see medical chart  Precautions:   Behavioral Orders   Procedures     Assault precautions     Code 1 - Restrict to Unit     Elopement precautions     Routine Programming       As clinically indicated     Status 15       Every 15 minutes.     Plan: possible d/c to [facility], [facility] or home if safety is establlished  Rationale for change in precautions or plan: no change

## 2017-08-09 NOTE — PROGRESS NOTES
[facility], [place]   Psychiatric Progress Note        Interim History:   The patient's care was discussed with the treatment team during the daily team meeting and/or staff's chart notes were reviewed.  Staff reports patient slept 7 hrs last night. Tense at times, but no irritability and more engaged on approach. Distracted with some odd behavior and requests. Making drinks for peers but receptive to redirection.  Compliant with medications and care. Cooperative with care, no behavioral outbursts and compliant with medications.  No SI, LOYDA or HI reported.  Independent with ADL.     The patient ws greatly more engaged and more conversational. Spoke about proper diet habits. He noted that he is feeling better today and was attending groups earlier.  He noted that he is slightly anxious, but mainly because he is not sure of disposition. He hopes that he will be granted discharge home eventually. Denied dep, SI, LOYDA and HI. Denied hallucinations and paranoia. Tolerating medications well. Appetite and sleep intact.     Dicussed medications and care plan.          trimethoprim-polymyxin b  2 drop Right Eye 4x Daily     lurasidone  120 mg Oral Daily     predniSONE  50 mg Oral Daily    Followed by     [START ON 8/17/2017] predniSONE  40 mg Oral Daily    Followed by     [START ON 8/27/2017] predniSONE  30 mg Oral Daily    Followed by     [START ON 9/6/2017] predniSONE  20 mg Oral Daily    Followed by     [START ON 9/16/2017] predniSONE  10 mg Oral Daily    Followed by     [START ON 9/26/2017] predniSONE  5 mg Oral Daily     lisinopril  20 mg Oral Daily     multivitamin, therapeutic  1 tablet Oral Daily     triamcinolone   Topical BID          Allergies:     Allergies   Allergen Reactions     Carbamazepine Rash     Carbamazepine (Tegretol),.....Rash.          Labs:     No results found for this or any previous visit (from the past 24 hour(s)).       Psychiatric Examination:     Vitals:     "08/05/17 0746 08/06/17 0830 08/08/17 0751 08/09/17 0836   BP:       Pulse:       Resp:  16 16 16   Temp: 98.1  F (36.7  C) 97.8  F (36.6  C) 98.4  F (36.9  C) 98  F (36.7  C)   TempSrc: Tympanic Tympanic Tympanic Tympanic   Weight: 104.3 kg (230 lb)          Weight is 230 lbs 0 oz  Body mass index is 28.75 kg/(m^2).    Appearance: dressed in hospital scrubs, appeared as age stated and no apparent distress  Attitude:  cooperative and engaged and calm  Eye Contact:  good  Mood:  \"little anxious\"  Affect:  more engaged  and and more reactive.   Speech:  clear, coherent and normal prosody  Psychomotor Behavior:  no evidence of tardive dyskinesia, dystonia, or tics and intact station, gait and muscle tone  Throught Process:  linear and goal oriented  Associations:  no loose associations  Thought Content:  no evidence of suicidal ideation or homicidal ideation, no auditory hallucinations present, no visual hallucinations present, patient appears to be responding to internal stimuli and paranoia subsided  Insight:  fair  Judgement:  fair  Oriented to:  time, person, and place  Attention Span and Concentration:  intact  Recent and Remote Memory:  intact     Fund of Knowledge: Adequate         Precautions:     Behavioral Orders   Procedures     Assault precautions     Code 1 - Restrict to Unit     Elopement precautions     Routine Programming     As clinically indicated     Status 15     Every 15 minutes.          Diagnoses:     1.  Schizophrenia, paranoid type. (r/o schizoaffective Bipolar type)   2.  Noncompliance, nonadherence.   3.  Cluster B with antisocial traits.  4.  DRESS, rash improved and LFT normalizing.            Plan:     Medications:  -- Saphris: was tapered off due to lack of efficacy.  -- PRNs: Vistaril for anxiety and Seroquel for restlessness and haldol with Benadryl for agitation and psychosis. PRN ativan was discontinued. Seroquel 200 mg qhs PRN for racing thoughts and insomnia.   -- Seroquel 200 mg qHS " but later but continued -200 mg BID PRN for psychosis, restlessness and insomnia.    -- Latuda: started and initially titrated to 120 mg daily. The medication was helpful initially but later mood lability and paranoia re-emerged. Medications was gradually lowered to 40 mg daily with plan to cross taper it with Risperdal. Trial with Risperdal was not successful due to rash and it was discontinued. Latuda was re-titrating to a target dose of 120-160 mg. Dosed was increased to 120 mg daily on 8/1.   -- Risperdal was started and showed great efficacy but patient later developed a skin rash. Risperdal was eventually discontinued.  The patient declined to reconsider Risperdal in future.    -- Tegretol: started and titrated to 200 mg BID, level was 5.1 on 6/11 and 6.9 on 6/16 and 5.5 on 7/12. The patient developed a rash which failed to resolved after d/c'ing Risperdal. Tegretol was suspected and discontinued on 7/20.   -- Cogentin was added at 0.5-1 mg BID PRN for EPS.     -- IM and Dermatology teams continues to follow and monitor re: DRESS.     Legal Status and Disposition:  1.  Commitment with [facility] for Saphris, Zyprexa, haloperidol, Latuda, and Risperidone. Letter was sent to add Seroquel and remove Risperdal and Saphris from [facility]  2.  Patient is currently is on [facility] list. Given his statements about homicidal thoughts toward neighbor. May consider discharge home if safety established. [facility] informed local police department of the threats he made earlier in the hospital stay.  May consider discharge home with increased services if safety established.

## 2017-08-09 NOTE — PLAN OF CARE
Problem: Psychotic Symptoms  Goal: Social and Therapeutic (Psychotic Symptoms)  Signs and symptoms of listed problems will be absent or manageable.         Pt. Attended 1 of 3 scheduled OT sessions today. Pt. Actively participated in goal directed task session. Assertively requested structured task and materials.  Demonstrated good focus and attention to details of task.  Affect was flat and minimally social with others.  Pt.was cooperative  throughout session.

## 2017-08-09 NOTE — PLAN OF CARE
Problem: Psychotic Symptoms  Goal: Social and Therapeutic (Psychotic Symptoms)  Signs and symptoms of listed problems will be absent or manageable.         Pt. Attended 1 of 3 scheduled OT sessions today. Pt actively participated in an OT directed  task group today.  The task involved experimentation with unfamiliar art media to create a writing pen. Pt was able to share materials with peers. Pt was disorganized in his efforts and needed some directions repeated. Pt appeared to enjoy experimenting with the unfamiliar materials   Pt.was cooperative throughout session.  Affect was flat. Minimal interaction with others observed.

## 2017-08-09 NOTE — PROGRESS NOTES
"Brief Medicine Note, 8/8/17:    Internal Medicine contacted today for increased eye irritation.  Patient seen on the unit, noted to have redness, trace edema, and tearing of R eye.  Reports that it began early this am.  He has noticed increased crusting with yellow-green \"goop\".  Has noticed increased tearing as well.  He reports some blurry vision occurring intermittently, but no vision loss.  He states that it \"feels irritated\" but denies foreign body sensation. He denies headaches, fevers, and dizziness.     Started trimethoprim-polymixin B eye drops for 5 days.  Advised patient to avoid wearing contact lenses until symptoms resolve.          Ligia Cabrera CNP  Hospitalist Service   Pager: [phone]  "

## 2017-08-09 NOTE — PROGRESS NOTES
"Requesting medical marijuana to be ordered \"for my glaucoma.\" States that he smelled it on the unit yesterday morning. Requested and received prn Refresh eye drops. A couple minutes later he returned to the medication room, stating that \"I need a vaso-constrictor like Visine.\" Also asked \"can I get another dose of lisinopril for eye pressure?\"   "

## 2017-08-09 NOTE — PLAN OF CARE
Problem: Psychotic Symptoms  Goal: Psychotic Symptoms  Signs and symptoms of listed problems will be absent or manageable.   Outcome: No Change  Patient has been taking numerous glasses and filling them up with ice water and then sets them about the unit to cool down the temperature.    He has also been mixing up juice and water and giving the cups to other patients to help them with there nutrition.  He has been asked to refrain from the above activities.  Patient is currently being treated for an eye infection with antibiotic eye gtts.  Last evening he requested Medical Marijuana for glaucoma and Lisinopril for high blood pressure in order to keep the pressure down in his eye. Patient has wandered in and out of his room during freetime. He is going to group sporadically.  Socialization with peers has decreased.  Patient is eating well and doing his ADLs when encouraged.  Patient denies suicidal ideation.  He denies that he is depressed but states he is frustrated with being in hospital.  Patient is taking his scheduled medications cooperatively.

## 2017-08-10 RX ADMIN — NICOTINE POLACRILEX 2 MG: 2 GUM, CHEWING ORAL at 18:28

## 2017-08-10 RX ADMIN — POLYMYXIN B SULFATE, TRIMETHOPRIM SULFATE 2 DROP: 10000; 1 SOLUTION/ DROPS OPHTHALMIC at 08:07

## 2017-08-10 RX ADMIN — MULTIPLE VITAMINS W/ MINERALS TAB 1 TABLET: TAB at 08:07

## 2017-08-10 RX ADMIN — LURASIDONE HYDROCHLORIDE 120 MG: 80 TABLET, FILM COATED ORAL at 08:07

## 2017-08-10 RX ADMIN — ACETAMINOPHEN 650 MG: 325 TABLET, FILM COATED ORAL at 18:28

## 2017-08-10 RX ADMIN — Medication 1 LOZENGE: at 18:28

## 2017-08-10 RX ADMIN — POLYMYXIN B SULFATE, TRIMETHOPRIM SULFATE 2 DROP: 10000; 1 SOLUTION/ DROPS OPHTHALMIC at 11:08

## 2017-08-10 RX ADMIN — LISINOPRIL 20 MG: 20 TABLET ORAL at 08:07

## 2017-08-10 RX ADMIN — PREDNISONE 50 MG: 50 TABLET ORAL at 08:07

## 2017-08-10 RX ADMIN — POLYMYXIN B SULFATE, TRIMETHOPRIM SULFATE 2 DROP: 10000; 1 SOLUTION/ DROPS OPHTHALMIC at 20:24

## 2017-08-10 RX ADMIN — POLYMYXIN B SULFATE, TRIMETHOPRIM SULFATE 2 DROP: 10000; 1 SOLUTION/ DROPS OPHTHALMIC at 16:08

## 2017-08-10 ASSESSMENT — ACTIVITIES OF DAILY LIVING (ADL)
ORAL_HYGIENE: INDEPENDENT
DRESS: SCRUBS (BEHAVIORAL HEALTH)
GROOMING: INDEPENDENT

## 2017-08-10 NOTE — PROGRESS NOTES
08/09/17 2200   Behavioral Health   Hallucinations denies / not responding to hallucinations   Thinking distractable   Orientation person: oriented;place: oriented   Memory baseline memory   Insight poor   Judgement impaired   Eye Contact at examiner   Affect full range affect;blunted, flat   Mood mood is calm   Physical Appearance/Attire attire appropriate to age and situation   Hygiene other (see comment)  (adequate )   Suicidality other (see comments)  (none stated nor observed )   Self Injury other (see comment)  (none stated nor observed )   Elopement Statements about wanting to leave   Activity other (see comment)  (active in milieu )   Speech clear   Medication Sensitivity no stated side effects;no observed side effects   Psychomotor / Gait balanced;steady   Safety   Assault status 15   Elopement status 15;no shoes;room away from unit doors;[facility]signs posted on unit entrance / exit doors   Activities of Daily Living   Hygiene/Grooming independent   Oral Hygiene independent   Dress scrubs (behavioral health)   Room Organization wero Carias was active in the milieu this evening and was social with staff. He was often doing an arts and craft project. His affect was full range, blunted/flat. Pt continues to have pink eye, encourage cleanliness and good hygiene. Pt was appropriate in the milieu this evening, no behavioral concerns this evening.

## 2017-08-10 NOTE — PROGRESS NOTES
Jv was visible in the milieu and social with other patients. No unsafe or aggressive behaviors. Jv's affect was somewhat bizarre; he appeared preoccupied with handing things to other patients to the extent of requiring redirection for appropriate boundaries.     08/10/17 0025   Behavioral Health   Hallucinations denies / not responding to hallucinations   Thinking confused   Orientation person: oriented;place: oriented;date: oriented;time: oriented   Memory baseline memory   Insight poor   Judgement impaired   Eye Contact staring   Affect full range affect   Mood mood is calm   Physical Appearance/Attire attire appropriate to age and situation   Hygiene other (see comment)  (adequate)   Suicidality other (see comments)  (none reported)   Self Injury other (see comment)  (none observed)   Activity other (see comment)  (visible in milieu)   Speech clear;coherent   Coping/Psychosocial   Verbalized Emotional State acceptance   Activities of Daily Living   Hygiene/Grooming independent   Oral Hygiene independent   Dress scrubs (behavioral health)   Room Organization independent

## 2017-08-10 NOTE — PLAN OF CARE
Problem: Psychotic Symptoms  Intervention: Social and Therapeutic Interv (Psychotic Symptoms)     Pt attended 2 of 3 OT groups.  Says little during discussion groups, socially awkward comments indirectly directed toward peers.  During OT clinic, asked writer to make photocopies of an abstract painting he did in a previous group, most of it came out black, dark grey, and blotchy, though pt wanted me to save the copies for coloring sheets for others.  Pt proceeded to color one of the sheets, then wanted a copy of that picture.  Later asked me to minimize the copy as he had a plan to copy the picture of playing cards (unclear exactly what he is doing and his level of organization).

## 2017-08-11 LAB
ALBUMIN SERPL-MCNC: 4 G/DL (ref 3.4–5)
ALP SERPL-CCNC: 73 U/L (ref 40–150)
ALT SERPL W P-5'-P-CCNC: 41 U/L (ref 0–70)
ANION GAP SERPL CALCULATED.3IONS-SCNC: 10 MMOL/L (ref 3–14)
AST SERPL W P-5'-P-CCNC: 12 U/L (ref 0–45)
BASOPHILS # BLD AUTO: 0 10E9/L (ref 0–0.2)
BASOPHILS NFR BLD AUTO: 0.3 %
BILIRUB SERPL-MCNC: 0.6 MG/DL (ref 0.2–1.3)
BUN SERPL-MCNC: 12 MG/DL (ref 7–30)
CALCIUM SERPL-MCNC: 9.3 MG/DL (ref 8.5–10.1)
CHLORIDE SERPL-SCNC: 104 MMOL/L (ref 94–109)
CO2 SERPL-SCNC: 26 MMOL/L (ref 20–32)
CREAT SERPL-MCNC: 0.76 MG/DL (ref 0.66–1.25)
DIFFERENTIAL METHOD BLD: ABNORMAL
EOSINOPHIL # BLD AUTO: 0.2 10E9/L (ref 0–0.7)
EOSINOPHIL NFR BLD AUTO: 1.4 %
ERYTHROCYTE [DISTWIDTH] IN BLOOD BY AUTOMATED COUNT: 13.2 % (ref 10–15)
GFR SERPL CREATININE-BSD FRML MDRD: NORMAL ML/MIN/1.7M2
GLUCOSE SERPL-MCNC: 95 MG/DL (ref 70–99)
HCT VFR BLD AUTO: 46.5 % (ref 40–53)
HGB BLD-MCNC: 15.2 G/DL (ref 13.3–17.7)
IMM GRANULOCYTES # BLD: 0 10E9/L (ref 0–0.4)
IMM GRANULOCYTES NFR BLD: 0.3 %
LYMPHOCYTES # BLD AUTO: 6.1 10E9/L (ref 0.8–5.3)
LYMPHOCYTES NFR BLD AUTO: 52.6 %
MCH RBC QN AUTO: 29 PG (ref 26.5–33)
MCHC RBC AUTO-ENTMCNC: 32.7 G/DL (ref 31.5–36.5)
MCV RBC AUTO: 89 FL (ref 78–100)
MONOCYTES # BLD AUTO: 1 10E9/L (ref 0–1.3)
MONOCYTES NFR BLD AUTO: 8.9 %
NEUTROPHILS # BLD AUTO: 4.3 10E9/L (ref 1.6–8.3)
NEUTROPHILS NFR BLD AUTO: 36.5 %
NRBC # BLD AUTO: 0 10*3/UL
NRBC BLD AUTO-RTO: 0 /100
PLATELET # BLD AUTO: 220 10E9/L (ref 150–450)
PLATELET # BLD EST: ABNORMAL 10*3/UL
POTASSIUM SERPL-SCNC: 4.3 MMOL/L (ref 3.4–5.3)
PROT SERPL-MCNC: 7.3 G/DL (ref 6.8–8.8)
RBC # BLD AUTO: 5.25 10E12/L (ref 4.4–5.9)
RBC MORPH BLD: NORMAL
SODIUM SERPL-SCNC: 140 MMOL/L (ref 133–144)
WBC # BLD AUTO: 11.7 10E9/L (ref 4–11)

## 2017-08-11 PROCEDURE — 99232 SBSQ HOSP IP/OBS MODERATE 35: CPT | Performed by: PSYCHIATRY & NEUROLOGY

## 2017-08-11 RX ADMIN — LURASIDONE HYDROCHLORIDE 120 MG: 80 TABLET, FILM COATED ORAL at 08:20

## 2017-08-11 RX ADMIN — POLYMYXIN B SULFATE, TRIMETHOPRIM SULFATE 2 DROP: 10000; 1 SOLUTION/ DROPS OPHTHALMIC at 11:58

## 2017-08-11 RX ADMIN — PREDNISONE 50 MG: 50 TABLET ORAL at 08:20

## 2017-08-11 RX ADMIN — POLYMYXIN B SULFATE, TRIMETHOPRIM SULFATE 2 DROP: 10000; 1 SOLUTION/ DROPS OPHTHALMIC at 11:35

## 2017-08-11 RX ADMIN — POLYMYXIN B SULFATE, TRIMETHOPRIM SULFATE 2 DROP: 10000; 1 SOLUTION/ DROPS OPHTHALMIC at 16:14

## 2017-08-11 RX ADMIN — POLYMYXIN B SULFATE, TRIMETHOPRIM SULFATE 2 DROP: 10000; 1 SOLUTION/ DROPS OPHTHALMIC at 20:09

## 2017-08-11 RX ADMIN — MULTIPLE VITAMINS W/ MINERALS TAB 1 TABLET: TAB at 08:20

## 2017-08-11 RX ADMIN — LISINOPRIL 20 MG: 20 TABLET ORAL at 08:20

## 2017-08-11 NOTE — PROGRESS NOTES
08/10/17 9131   Behavioral Health   Hallucinations denies / not responding to hallucinations   Thinking poor concentration;distractable   Orientation place: oriented;person: oriented   Memory baseline memory   Insight poor   Judgement impaired   Eye Contact at examiner   Affect tense   Mood mood is calm   Physical Appearance/Attire untidy   Hygiene neglected grooming - unclean body, hair, teeth   Suicidality other (see comments)  (denies currently)   Self Injury other (see comment)  (denies currently)   Elopement (no value)   Activity withdrawn   Speech coherent;clear   Medication Sensitivity no observed side effects;no stated side effects   Psychomotor / Gait balanced;steady     Pt was intermittently visible in the milieu. Pt's mood was calm and affect tense. No SI/SIB. Pt appears to be continuing to respond to internal stimuli. Pt has been preoccupied with what appears to be an art project. Pt spent a majority of his time in the lounge area coloring and requesting to make copies of sheets he colored. Minimal interaction with peers and staff. Pt was calm and cooperative. No behavioral issues this shift.

## 2017-08-11 NOTE — PLAN OF CARE
Problem: Psychotic Symptoms  Goal: Psychotic Symptoms  Signs and symptoms of listed problems will be absent or manageable.   Outcome: No Change  RN Assessment     Patient has alert, orient x3. He attends scheduled groups. He continues to have odd behaviors such as filling water cups with ice and leaving them by the ice machine. He has a unit supplied notebook that he filled with used tissue and lip balm. He carries it around rolled up and pretends to jab it at staff as if were a sword. He is medication compliant. He attends select groups. He is able to make his needs known. He is eating and drinking in good amounts. He denies any depression/SI/SIB. Denies AH/VH. He is hoping for discharge soon.

## 2017-08-11 NOTE — PROGRESS NOTES
"[facility], [place]   Psychiatric Progress Note        Interim History:   The patient's care was discussed with the treatment team during the daily team meeting and/or staff's chart notes were reviewed.  Staff reports patient slept 5hrs last night. He is more visible and a bit more engaged. Continue to engaged in some odd behavior, suchs as filling cups of water with ice and leaving them around. He also switched items on meal try with a peer. Accepting of redirections. No SI, LOYDA or HI. Distracted and keep to self at times. Compliant with medications and cooperative with care. Attending groups sporadically. Eating well. Independent with ADL.     The patient noted that he is feeling \"much better. The medications are working\". Denied all symptoms and noted that he is sleeping well and \"thinking better\". Denied dep, irritability and racing thoughts. Anxiety resolving and denied current. He was future oriented and denied SI, LOYDA and HI. He noted that he feels safe here and denied hallucinations and racing thoughts. Interested in discharge home and hesitant about IRT because he does not like to live in the [facility].     Dicussed medications and care plan.          trimethoprim-polymyxin b  2 drop Right Eye 4x Daily     lurasidone  120 mg Oral Daily     predniSONE  50 mg Oral Daily    Followed by     [START ON 8/17/2017] predniSONE  40 mg Oral Daily    Followed by     [START ON 8/27/2017] predniSONE  30 mg Oral Daily    Followed by     [START ON 9/6/2017] predniSONE  20 mg Oral Daily    Followed by     [START ON 9/16/2017] predniSONE  10 mg Oral Daily    Followed by     [START ON 9/26/2017] predniSONE  5 mg Oral Daily     lisinopril  20 mg Oral Daily     multivitamin, therapeutic  1 tablet Oral Daily     triamcinolone   Topical BID          Allergies:     Allergies   Allergen Reactions     Carbamazepine Rash     Carbamazepine (Tegretol),.....Rash.          Labs:     Recent Results (from the " past 24 hour(s))   Comprehensive metabolic panel    Collection Time: 08/11/17  8:05 AM   Result Value Ref Range    Sodium 140 133 - 144 mmol/L    Potassium 4.3 3.4 - 5.3 mmol/L    Chloride 104 94 - 109 mmol/L    Carbon Dioxide 26 20 - 32 mmol/L    Anion Gap 10 3 - 14 mmol/L    Glucose 95 70 - 99 mg/dL    Urea Nitrogen 12 7 - 30 mg/dL    Creatinine 0.76 0.66 - 1.25 mg/dL    GFR Estimate >90  Non  GFR Calc   >60 mL/min/1.7m2    GFR Estimate If Black >90   GFR Calc   >60 mL/min/1.7m2    Calcium 9.3 8.5 - 10.1 mg/dL    Bilirubin Total 0.6 0.2 - 1.3 mg/dL    Albumin 4.0 3.4 - 5.0 g/dL    Protein Total 7.3 6.8 - 8.8 g/dL    Alkaline Phosphatase 73 40 - 150 U/L    ALT 41 0 - 70 U/L    AST 12 0 - 45 U/L   CBC with platelets differential    Collection Time: 08/11/17  8:05 AM   Result Value Ref Range    WBC 11.7 (H) 4.0 - 11.0 10e9/L    RBC Count 5.25 4.4 - 5.9 10e12/L    Hemoglobin 15.2 13.3 - 17.7 g/dL    Hematocrit 46.5 40.0 - 53.0 %    MCV 89 78 - 100 fl    MCH 29.0 26.5 - 33.0 pg    MCHC 32.7 31.5 - 36.5 g/dL    RDW 13.2 10.0 - 15.0 %    Platelet Count 220 150 - 450 10e9/L    Diff Method Automated Method     % Neutrophils 36.5 %    % Lymphocytes 52.6 %    % Monocytes 8.9 %    % Eosinophils 1.4 %    % Basophils 0.3 %    % Immature Granulocytes 0.3 %    Nucleated RBCs 0 0 /100    Absolute Neutrophil 4.3 1.6 - 8.3 10e9/L    Absolute Lymphocytes 6.1 (H) 0.8 - 5.3 10e9/L    Absolute Monocytes 1.0 0.0 - 1.3 10e9/L    Absolute Eosinophils 0.2 0.0 - 0.7 10e9/L    Absolute Basophils 0.0 0.0 - 0.2 10e9/L    Abs Immature Granulocytes 0.0 0 - 0.4 10e9/L    Absolute Nucleated RBC 0.0     RBC Morphology Normal     Platelet Estimate Confirming automated cell count           Psychiatric Examination:     Vitals:    08/08/17 0751 08/09/17 0836 08/10/17 0737 08/11/17 0842   BP:       Pulse:       Resp: 16 16 16 16   Temp: 98.4  F (36.9  C) 98  F (36.7  C) 97.9  F (36.6  C) 97.5  F (36.4  C)   TempSrc:  "Tympanic Tympanic Tympanic Tympanic   Weight:   108.7 kg (239 lb 12 oz)        Weight is 239 lbs 12 oz  Body mass index is 29.97 kg/(m^2).    Appearance: dressed in hospital scrubs, appeared as age stated and no apparent distress  Attitude:  cooperative and engaged and calm  Eye Contact:  good  Mood:  \"better\"  Affect:  more engaged  and and more reactive.   Speech:  clear, coherent and normal prosody  Psychomotor Behavior:  no evidence of tardive dyskinesia, dystonia, or tics and intact station, gait and muscle tone  Throught Process:  linear and goal oriented  Associations:  no loose associations  Thought Content:  no evidence of suicidal ideation or homicidal ideation, no auditory hallucinations present, no visual hallucinations present and patient appears to be responding to internal stimuli subsided and no paranoia detected today.   Insight:  fair  Judgement:  fair  Oriented to:  time, person, and place  Attention Span and Concentration:  intact  Recent and Remote Memory:  intact     Fund of Knowledge: Adequate         Precautions:     Behavioral Orders   Procedures     Assault precautions     Code 1 - Restrict to Unit     Elopement precautions     Routine Programming     As clinically indicated     Status 15     Every 15 minutes.          Diagnoses:     1.  Schizophrenia, paranoid type. (r/o schizoaffective Bipolar type)   2.  Noncompliance, nonadherence.   3.  Cluster B with antisocial traits.  4.  DRESS, rash improved and LFT normalizing.            Plan:     Medications:  -- Saphris: was tapered off due to lack of efficacy.  -- PRNs: Vistaril for anxiety and Seroquel for restlessness and haldol with Benadryl for agitation and psychosis. PRN ativan was discontinued. Seroquel 200 mg qhs PRN for racing thoughts and insomnia.   -- Seroquel 200 mg qHS but later but continued -200 mg BID PRN for psychosis, restlessness and insomnia.    -- Latuda: started and initially titrated to 120 mg daily. The " medication was helpful initially but later mood lability and paranoia re-emerged. Medications was gradually lowered to 40 mg daily with plan to cross taper it with Risperdal. Trial with Risperdal was not successful due to rash and it was discontinued. Latuda was re-titrating to 120 mg daily. May consider increasing further to 140-160 mg if clinically indicated.    -- Risperdal was started and showed great efficacy but patient later developed a skin rash. Risperdal was eventually discontinued.  The patient declined to reconsider Risperdal in future.    -- Tegretol: started and titrated to 200 mg BID, level was 5.1 on 6/11 and 6.9 on 6/16 and 5.5 on 7/12. The patient developed a rash which failed to resolved after d/c'ing Risperdal. Tegretol was suspected and discontinued on 7/20.   -- Cogentin was added at 0.5-1 mg BID PRN for EPS.     -- IM and Dermatology teams continues to follow and monitor re: DRESS.     Legal Status and Disposition:  1.  Commitment with [facility] for Saphris, Zyprexa, haloperidol, Latuda, and Risperidone. Letter was sent to add Seroquel and remove Risperdal and Saphris from [facility]  2.  Patient is currently is on [facility] list. Given his statements about homicidal thoughts toward neighbor. May consider discharge home if safety established. [facility] informed local police department of the threats he made earlier in the hospital stay.  May consider discharge home with increased services if safety established.     -- may ask Dr Gaviria to evaluate patient and review care received and records next week for a second opinion to determine appropriate disposition.

## 2017-08-12 RX ADMIN — POLYMYXIN B SULFATE, TRIMETHOPRIM SULFATE 2 DROP: 10000; 1 SOLUTION/ DROPS OPHTHALMIC at 12:28

## 2017-08-12 RX ADMIN — ACETAMINOPHEN 650 MG: 325 TABLET, FILM COATED ORAL at 09:45

## 2017-08-12 RX ADMIN — POLYMYXIN B SULFATE, TRIMETHOPRIM SULFATE 2 DROP: 10000; 1 SOLUTION/ DROPS OPHTHALMIC at 09:05

## 2017-08-12 RX ADMIN — PREDNISONE 50 MG: 50 TABLET ORAL at 09:04

## 2017-08-12 RX ADMIN — LISINOPRIL 20 MG: 20 TABLET ORAL at 09:04

## 2017-08-12 RX ADMIN — POLYMYXIN B SULFATE, TRIMETHOPRIM SULFATE 2 DROP: 10000; 1 SOLUTION/ DROPS OPHTHALMIC at 15:57

## 2017-08-12 RX ADMIN — MULTIPLE VITAMINS W/ MINERALS TAB 1 TABLET: TAB at 09:04

## 2017-08-12 RX ADMIN — POLYMYXIN B SULFATE, TRIMETHOPRIM SULFATE 2 DROP: 10000; 1 SOLUTION/ DROPS OPHTHALMIC at 20:23

## 2017-08-12 RX ADMIN — LURASIDONE HYDROCHLORIDE 120 MG: 80 TABLET, FILM COATED ORAL at 09:04

## 2017-08-12 ASSESSMENT — ACTIVITIES OF DAILY LIVING (ADL)
GROOMING: INDEPENDENT
LAUNDRY: UNABLE TO COMPLETE
GROOMING: INDEPENDENT
DRESS: SCRUBS (BEHAVIORAL HEALTH)
ORAL_HYGIENE: INDEPENDENT
LAUNDRY: UNABLE TO COMPLETE
ORAL_HYGIENE: INDEPENDENT
DRESS: SCRUBS (BEHAVIORAL HEALTH)

## 2017-08-12 NOTE — PROGRESS NOTES
Jv spent much of the shift in his room with a cup of ice on his eye. Despite being withdrawn and isolative, he rated his mood a 10/10, 10 being the best mood possible; his affect, however, is incongruent - he appears and sounds very flat. He has had good interactions with his peers today, with no issues noted. He required little redirection.    Jv denies any SI/SIB/HI and denies any AVH. He says both his appetite and sleep are good, and that his eye is feeling better.        08/12/17 4392   Behavioral Health   Hallucinations denies / not responding to hallucinations   Thinking poor concentration   Orientation person: oriented;place: oriented;time: oriented   Memory baseline memory   Insight poor   Judgement impaired   Eye Contact at examiner   Affect blunted, flat   Mood mood is calm   Physical Appearance/Attire attire appropriate to age and situation   Hygiene other (see comment)  (adequate)   Suicidality other (see comments)  (denies)   Self Injury other (see comment)  (denies, none observed)   Elopement (nothing stated nor observed)   Activity isolative;withdrawn;other (see comment)  (spent much of his time in room icing his eye)   Speech clear;coherent   Medication Sensitivity no stated side effects;no observed side effects   Psychomotor / Gait balanced;steady   Activities of Daily Living   Hygiene/Grooming independent   Oral Hygiene independent   Dress scrubs (behavioral health)   Laundry unable to complete   Room Organization independent   Activity   Activity Level of Assistance independent   Behavioral Health Interventions   Psychotic Symptoms maintain safety precautions;simple, clear language;redirection of intrusive behaviors;provide emotional support   Social and Therapeutic Interventions (Psychotic Symptoms) encourage effective boundaries with peers

## 2017-08-12 NOTE — PROGRESS NOTES
Brief Medicine Note, 8/11/17:    Internal Medicine following for monitoring CBC and CMP while on steroid therapy.  Jv Pate is a 32 year old male with a past medical history of paranoid schizophrenia, cluster B w/ antisocial personality traits, Lyme disease, and HTN.  He was recently treated for DRESS syndrome after developing a morbilliform rash following treatment with carbamazepine.       WBC 11.7, LFTs wnl.     No complaints today.  Medicine will continue to follow labs.  Please contact IM immediately if patient refuses steroid dose.  Please notify IM with any updates to discharge planning, as dermatology will need to be notified to arrange OP follow up.         Ligia Cabrera CNP  Hospitalist Service   Pager: [phone]

## 2017-08-12 NOTE — PROGRESS NOTES
Pt was pleasant and calm throughout the shift. He spent most of the evening drawing in the lounge and talking with another pt. His mother came to visit, and that seemed to go well. Pt is anxious to discharge. There are no behavioral issues to report.

## 2017-08-12 NOTE — SIGNIFICANT EVENT
"During VS assessment this morning, without segue pt states, \"I'd like to add someone to my emergency contacts. US Luis Chester.\" Pt then walks out with no further explanation.  "

## 2017-08-13 RX ADMIN — CARBOXYMETHYLCELLULOSE SODIUM 1 DROP: 10 GEL OPHTHALMIC at 22:12

## 2017-08-13 RX ADMIN — NICOTINE POLACRILEX 2 MG: 2 GUM, CHEWING ORAL at 10:02

## 2017-08-13 RX ADMIN — MULTIPLE VITAMINS W/ MINERALS TAB 1 TABLET: TAB at 09:45

## 2017-08-13 RX ADMIN — BENZOCAINE, MENTHOL 1 LOZENGE: 15; 3.6 LOZENGE ORAL at 16:33

## 2017-08-13 RX ADMIN — PREDNISONE 50 MG: 50 TABLET ORAL at 09:45

## 2017-08-13 RX ADMIN — POLYMYXIN B SULFATE, TRIMETHOPRIM SULFATE 2 DROP: 10000; 1 SOLUTION/ DROPS OPHTHALMIC at 09:50

## 2017-08-13 RX ADMIN — LISINOPRIL 20 MG: 20 TABLET ORAL at 09:45

## 2017-08-13 RX ADMIN — CARBOXYMETHYLCELLULOSE SODIUM 1 DROP: 10 GEL OPHTHALMIC at 16:30

## 2017-08-13 RX ADMIN — LURASIDONE HYDROCHLORIDE 120 MG: 80 TABLET, FILM COATED ORAL at 09:45

## 2017-08-13 RX ADMIN — BENZOCAINE, MENTHOL 1 LOZENGE: 15; 3.6 LOZENGE ORAL at 13:44

## 2017-08-13 ASSESSMENT — ACTIVITIES OF DAILY LIVING (ADL)
DRESS: SCRUBS (BEHAVIORAL HEALTH)
GROOMING: INDEPENDENT
DRESS: SCRUBS (BEHAVIORAL HEALTH);INDEPENDENT
ORAL_HYGIENE: INDEPENDENT
ORAL_HYGIENE: INDEPENDENT
GROOMING: INDEPENDENT

## 2017-08-13 NOTE — PLAN OF CARE
"Problem: Psychotic Symptoms  Goal: Psychotic Symptoms  Signs and symptoms of listed problems will be absent or manageable.   Outcome: No Change  Patient presents as guarded, paranoid, and disorganized with bizarre behaviors.  He denies that he is experiencing any psychotic symptoms; however, he frequently engages in inappropriate laughter and odd grimacing, and it appears that he is attending to internal stimuli with AH.  He is calm, pleasant, and mostly cooperative on approach.  He continues to mix food and fluids on his meal tray in a bizarre manner, but he is no longer prompting other patients to consume the \"food\" he has prepared.  He closely inspected all of his medications- intact in their packaging, per his request- prior to accepting his scheduled morning medications.  He took notes on what he was taking, complete with indications for each medication, and he handed writer this note with a specific request: \"Give this to the doctor, this ought to work as a prescription for all of my medications, so [facility]".  He has been compliant with all of his scheduled medications- with the exception of his Kenalog cream, which he has been consistently refusing for the last few days- and no adverse side effects have been reported or observed.  He has not had any episodes of agitation or aggressive behavior this shift, and he denies any aggressive ideation.  He denies any acute physical concerns.  He was uncooperative with multiple prompts to have his vital signs assessed.    Addendum 15:08:  A female psychiatric associate brought it to the attention of writer that patient approached her from behind and suddenly grabbed her shoulder and began to rub it.  He was immediately redirected for this disorganized, intrusive behavior. He was receptive to redirection and apologetic.  Will continue to monitor closely.  "

## 2017-08-13 NOTE — PROGRESS NOTES
Jv was visible in the lounge and appeared interactive with peers and staff. Pt seemed restless and was engaging in weird activity where he was mixing some food items and eating it then.  Pt didn't take a shower this evening shift. Pt seemed to be  giggling to himself, mostly when he is in his rose. Pt still does have a pink eye; and he was at times rubbing with a wet paper towel on it. Pt had no significant event this evening.        08/12/17 2100   Behavioral Health   Hallucinations appears responding   Thinking delusional;confused;poor concentration;distractable   Orientation person, disoriented;situation, disoriented   Memory baseline memory   Insight poor   Judgement impaired   Eye Contact at examiner   Affect blunted, flat   Mood labile;depressed   Physical Appearance/Attire disheveled;untidy   Hygiene neglected grooming - unclean body, hair, teeth   Suicidality other (see comments)  (none)   Self Injury (none)   Activity restless   Speech tangential   Psychomotor / Gait steady;balanced   Activities of Daily Living   Hygiene/Grooming independent   Oral Hygiene independent   Dress scrubs (behavioral health)   Laundry unable to complete   Room Organization independent   Behavioral Health Interventions   Psychotic Symptoms maintain safety precautions;monitor need to revise level of observation;maintain safe secure environment;reality orientation;simple, clear language;decrease environmental stimulation;redirection of intrusive behaviors;redirection of aggressive behaviors;encourage participation / independence with adls;provide emotional support;establish therapeutic relationship;build upon strengths;assess patient response to medication;assess medication adherance;monitor need for prn medication   Social and Therapeutic Interventions (Psychotic Symptoms) encourage socialization with peers;encourage effective boundaries with peers;encourage participation in therapeutic groups and milieu activities

## 2017-08-13 NOTE — PROGRESS NOTES
"Pt approached this staff while entering the office. Pt slowly placed his hand upon staffs shoulder, squeezing it a little bit. Staff redirected patient about boundaries and how it was important that we remember to use them in dealing with others. Pt seemed to understand what this staff was saying, although he appeared to stare straight forward during the conversation and replied with \"ramirez Noble\"! Pt was also seen mixing all of his food from his breakfast trays together as well as repeating these behaviors again at lunchtime. Pt is filling up glasses of water, however is keeping these to himself and not pushing them on others.   "

## 2017-08-14 LAB
ALBUMIN SERPL-MCNC: 3.8 G/DL (ref 3.4–5)
ALP SERPL-CCNC: 64 U/L (ref 40–150)
ALT SERPL W P-5'-P-CCNC: 35 U/L (ref 0–70)
ANION GAP SERPL CALCULATED.3IONS-SCNC: 10 MMOL/L (ref 3–14)
AST SERPL W P-5'-P-CCNC: 10 U/L (ref 0–45)
BASOPHILS # BLD AUTO: 0.1 10E9/L (ref 0–0.2)
BASOPHILS NFR BLD AUTO: 0.9 %
BILIRUB SERPL-MCNC: 0.7 MG/DL (ref 0.2–1.3)
BUN SERPL-MCNC: 10 MG/DL (ref 7–30)
CALCIUM SERPL-MCNC: 8.9 MG/DL (ref 8.5–10.1)
CHLORIDE SERPL-SCNC: 106 MMOL/L (ref 94–109)
CO2 SERPL-SCNC: 23 MMOL/L (ref 20–32)
CREAT SERPL-MCNC: 0.65 MG/DL (ref 0.66–1.25)
DIFFERENTIAL METHOD BLD: NORMAL
EOSINOPHIL # BLD AUTO: 0.1 10E9/L (ref 0–0.7)
EOSINOPHIL NFR BLD AUTO: 0.9 %
ERYTHROCYTE [DISTWIDTH] IN BLOOD BY AUTOMATED COUNT: 13.1 % (ref 10–15)
GFR SERPL CREATININE-BSD FRML MDRD: ABNORMAL ML/MIN/1.7M2
GLUCOSE SERPL-MCNC: 96 MG/DL (ref 70–99)
HCT VFR BLD AUTO: 43.7 % (ref 40–53)
HGB BLD-MCNC: 14.6 G/DL (ref 13.3–17.7)
LYMPHOCYTES # BLD AUTO: 5.2 10E9/L (ref 0.8–5.3)
LYMPHOCYTES NFR BLD AUTO: 54.7 %
MCH RBC QN AUTO: 29.4 PG (ref 26.5–33)
MCHC RBC AUTO-ENTMCNC: 33.4 G/DL (ref 31.5–36.5)
MCV RBC AUTO: 88 FL (ref 78–100)
MONOCYTES # BLD AUTO: 0.7 10E9/L (ref 0–1.3)
MONOCYTES NFR BLD AUTO: 7 %
NEUTROPHILS # BLD AUTO: 3.5 10E9/L (ref 1.6–8.3)
NEUTROPHILS NFR BLD AUTO: 36.5 %
PLATELET # BLD AUTO: 199 10E9/L (ref 150–450)
PLATELET # BLD EST: NORMAL 10*3/UL
POTASSIUM SERPL-SCNC: 3.6 MMOL/L (ref 3.4–5.3)
PROT SERPL-MCNC: 7.2 G/DL (ref 6.8–8.8)
RBC # BLD AUTO: 4.97 10E12/L (ref 4.4–5.9)
RBC MORPH BLD: NORMAL
SODIUM SERPL-SCNC: 139 MMOL/L (ref 133–144)
WBC # BLD AUTO: 9.5 10E9/L (ref 4–11)

## 2017-08-14 PROCEDURE — 99232 SBSQ HOSP IP/OBS MODERATE 35: CPT | Performed by: PSYCHIATRY & NEUROLOGY

## 2017-08-14 RX ADMIN — CARBOXYMETHYLCELLULOSE SODIUM 1 DROP: 10 GEL OPHTHALMIC at 17:59

## 2017-08-14 RX ADMIN — ACETAMINOPHEN 162 MG: 325 TABLET, FILM COATED ORAL at 18:01

## 2017-08-14 RX ADMIN — PREDNISONE 50 MG: 50 TABLET ORAL at 09:18

## 2017-08-14 RX ADMIN — MULTIPLE VITAMINS W/ MINERALS TAB 1 TABLET: TAB at 09:18

## 2017-08-14 RX ADMIN — LURASIDONE HYDROCHLORIDE 120 MG: 80 TABLET, FILM COATED ORAL at 09:18

## 2017-08-14 RX ADMIN — NICOTINE POLACRILEX 2 MG: 2 GUM, CHEWING ORAL at 17:59

## 2017-08-14 RX ADMIN — LISINOPRIL 20 MG: 20 TABLET ORAL at 09:19

## 2017-08-14 NOTE — PROGRESS NOTES
08/14/17 1446   Behavioral Health   Thoughts/Cognition (WDL) WDL   Hallucinations denies / not responding to hallucinations   Thinking distractable   Orientation person: oriented;place: oriented   Memory baseline memory   Insight poor   Judgement impaired   Eye Contact at examiner;at floor   Affect/Mood (WDL) Ex.   Affect blunted, flat   Mood mood is calm   ADL Assessment (WDL) WDL   Physical Appearance/Attire attire appropriate to age and situation   Hygiene neglected grooming - unclean body, hair, teeth   Suicidality (WDL) WDL   Suicidality other (see comments)  (none stated or observed)   Self Injury (WDL) WDL   Self Injury other (see comment)  (none stated or observed)   Elopement (WDL) WDL   Elopement (no problems on unit)   Activity (WDL) WDL   Safety   Assault other (see comment)  (no issues on unit)     Pt was calm, cooperative on this shift. Attending groups. Sometimes multiple requests. Writing and reading throughout the shift. No other safety concerns (SI/SIB) stated or observed.

## 2017-08-14 NOTE — PROGRESS NOTES
Patient approached [facility] asking for eye drops, nicotine gum, and Tylenol. He looked over each package to check where they were made and what the expiration date was. He then only took 1 half of a 325 mg tablet of Tylenol. He then requested to get a prescription of Tylenol that comes individually packaged like we have here when he leaves.

## 2017-08-14 NOTE — PLAN OF CARE
Problem: Psychotic Symptoms  Goal: Psychotic Symptoms  Signs and symptoms of listed problems will be absent or manageable.   Outcome: No Change  Pt spent much of the evening laughing by himself and continues to appear to be responding to internal stimuli.  He is still mixing odd foods and drinks, but did a better job of not offering the creations to other patients.  He is overall pleasant and cooperative with staff.  Pt denies SI/SIB or any psychotic symptoms.

## 2017-08-14 NOTE — PROGRESS NOTES
"[facility], [place]   Psychiatric Progress Note        Interim History:   The patient's care was discussed with the treatment team during the daily team meeting and/or staff's chart notes were reviewed.  Staff reports patient slept 6.5hrs last night. Some poor boundaries with a selected staff but receptive to redirection. He is visible and more engaged. Some odd behavior persist. Compliant with medications and care.  No SI, LOYDA or HI. Internal stimuli suspected and distracted.  Attending groups sporadically. Eating well. Independent with ADL.     The patient was pleasant and cooperative. Spoke about wanting to go back to school and major in Art. He denied all symptoms and noted that mood is \"a lot better\". He denied hallucinations and racing thoughts. Irritability resolved and anxiety subsided. Denied dep, SI, LOYDA and HI. He was less guarded but dismissive. He noted that he is tolerating medications well.  Appetite is \"better\" and noted that he is sleeping well.     Dicussed medications and care plan.          lurasidone  120 mg Oral Daily     predniSONE  50 mg Oral Daily    Followed by     [START ON 8/17/2017] predniSONE  40 mg Oral Daily    Followed by     [START ON 8/27/2017] predniSONE  30 mg Oral Daily    Followed by     [START ON 9/6/2017] predniSONE  20 mg Oral Daily    Followed by     [START ON 9/16/2017] predniSONE  10 mg Oral Daily    Followed by     [START ON 9/26/2017] predniSONE  5 mg Oral Daily     lisinopril  20 mg Oral Daily     multivitamin, therapeutic  1 tablet Oral Daily     triamcinolone   Topical BID          Allergies:     Allergies   Allergen Reactions     Carbamazepine Rash     Carbamazepine (Tegretol),.....Rash.          Labs:     Recent Results (from the past 24 hour(s))   Comprehensive metabolic panel    Collection Time: 08/14/17  8:55 AM   Result Value Ref Range    Sodium 139 133 - 144 mmol/L    Potassium 3.6 3.4 - 5.3 mmol/L    Chloride 106 94 - 109 mmol/L " "   Carbon Dioxide 23 20 - 32 mmol/L    Anion Gap 10 3 - 14 mmol/L    Glucose 96 70 - 99 mg/dL    Urea Nitrogen 10 7 - 30 mg/dL    Creatinine 0.65 (L) 0.66 - 1.25 mg/dL    GFR Estimate >90  Non  GFR Calc   >60 mL/min/1.7m2    GFR Estimate If Black >90   GFR Calc   >60 mL/min/1.7m2    Calcium 8.9 8.5 - 10.1 mg/dL    Bilirubin Total 0.7 0.2 - 1.3 mg/dL    Albumin 3.8 3.4 - 5.0 g/dL    Protein Total 7.2 6.8 - 8.8 g/dL    Alkaline Phosphatase 64 40 - 150 U/L    ALT 35 0 - 70 U/L    AST 10 0 - 45 U/L   CBC with platelets differential    Collection Time: 08/14/17  8:55 AM   Result Value Ref Range    WBC 9.5 4.0 - 11.0 10e9/L    RBC Count 4.97 4.4 - 5.9 10e12/L    Hemoglobin 14.6 13.3 - 17.7 g/dL    Hematocrit 43.7 40.0 - 53.0 %    MCV 88 78 - 100 fl    MCH 29.4 26.5 - 33.0 pg    MCHC 33.4 31.5 - 36.5 g/dL    RDW 13.1 10.0 - 15.0 %    Platelet Count 199 150 - 450 10e9/L    Diff Method Manual Differential     % Neutrophils 36.5 %    % Lymphocytes 54.7 %    % Monocytes 7.0 %    % Eosinophils 0.9 %    % Basophils 0.9 %    Absolute Neutrophil 3.5 1.6 - 8.3 10e9/L    Absolute Lymphocytes 5.2 0.8 - 5.3 10e9/L    Absolute Monocytes 0.7 0.0 - 1.3 10e9/L    Absolute Eosinophils 0.1 0.0 - 0.7 10e9/L    Absolute Basophils 0.1 0.0 - 0.2 10e9/L    RBC Morphology Normal     Platelet Estimate Confirming automated cell count           Psychiatric Examination:     Vitals:    08/10/17 0737 08/11/17 0842 08/12/17 0732 08/14/17 0828   BP:       Pulse:       Resp: 16 16 16 16   Temp: 97.9  F (36.6  C) 97.5  F (36.4  C) 97.6  F (36.4  C) 97  F (36.1  C)   TempSrc: Tympanic Tympanic Tympanic Tympanic   Weight: 108.7 kg (239 lb 12 oz)  105.7 kg (233 lb)        Weight is 233 lbs 0 oz  Body mass index is 29.12 kg/(m^2).    Appearance: dressed in hospital scrubs, appeared as age stated and no apparent distress  Attitude:  cooperative and less guarded, and engaged.   Eye Contact:  good  Mood:  \"better\"  Affect:  more " engaged  and and more reactive.   Speech:  clear, coherent and normal prosody  Psychomotor Behavior:  no evidence of tardive dyskinesia, dystonia, or tics and intact station, gait and muscle tone  Throught Process:  linear and goal oriented  Associations:  no loose associations  Thought Content:  no evidence of suicidal ideation or homicidal ideation, no auditory hallucinations present, no visual hallucinations present and patient appears to be responding to internal stimuli and paranoia subsided.    Insight:  fair  Judgement:  fair  Oriented to:  time, person, and place  Attention Span and Concentration:  intact  Recent and Remote Memory:  intact     Fund of Knowledge: Adequate         Precautions:     Behavioral Orders   Procedures     Assault precautions     Code 1 - Restrict to Unit     Elopement precautions     Routine Programming     As clinically indicated     Status 15     Every 15 minutes.          Diagnoses:     1.  Schizophrenia, paranoid type. (r/o schizoaffective Bipolar type)   2.  Noncompliance, nonadherence.   3.  Cluster B with antisocial traits.  4.  DRESS, rash improved and LFT normalizing.            Plan:     Medications:  -- Saphris: was tapered off due to lack of efficacy.  -- PRNs: Vistaril for anxiety and Seroquel for restlessness and haldol with Benadryl for agitation and psychosis. PRN ativan was discontinued. Seroquel 200 mg qhs PRN for racing thoughts and insomnia.   -- Seroquel 200 mg qHS but later but continued -200 mg BID PRN for psychosis, restlessness and insomnia.    -- Latuda: started and initially titrated to 120 mg daily. The medication was helpful initially but later mood lability and paranoia re-emerged. Medications was gradually lowered to 40 mg daily with plan to cross taper it with Risperdal. Trial with Risperdal was not successful due to rash and it was discontinued. Latuda was re-titrating to 120 mg daily. May consider increasing further to 140-160 mg if clinically  indicated.    -- Risperdal was started and showed great efficacy but patient later developed a skin rash. Risperdal was eventually discontinued.  The patient declined to reconsider Risperdal in future.    -- Tegretol: started and titrated to 200 mg BID, level was 5.1 on 6/11 and 6.9 on 6/16 and 5.5 on 7/12. The patient developed a rash which failed to resolved after d/c'ing Risperdal. Tegretol was suspected and discontinued on 7/20.   -- Cogentin was added at 0.5-1 mg BID PRN for EPS.     -- IM and Dermatology teams continues to follow and monitor re: DRESS.     Legal Status and Disposition:  1.  Commitment with [facility] for Saphris, Zyprexa, haloperidol, Latuda, and Risperidone. Letter was sent to add Seroquel and remove Risperdal and Saphris from [facility]  2.  Patient is currently is on [facility] list. Given his statements about homicidal thoughts toward neighbor. May consider discharge home if safety established. [facility] informed local police department of the threats he made earlier in the hospital stay.  May consider discharge home with increased services if safety established.     -- may ask Dr Gaviria to evaluate patient and review care plan and records later this week for a second opinion to determine appropriate disposition.

## 2017-08-14 NOTE — PROGRESS NOTES
Brief Medicine Note:     Jv Pate is a 32 year old male with history of paranoid schizophrenia, cluster B w/ antisocial personality traits, Lyme disease, and HTN.  He was recently treated for DRESS syndrome after developing a morbilliform rash following treatment with carbamazepine.    Internal Medicine following CBC and CMP while on steroid therapy.  - CBC and CMP completely normal  - Continue labs qM/F while on steroid taper  - Contact medicine if patient refusing steroids  - Notify medicine and dermatology if patient discharging      Vicki Finnegan PA-C  Internal Medicine [facility]  [phone]

## 2017-08-14 NOTE — PLAN OF CARE
"Problem: Psychotic Symptoms  Intervention: Social and Therapeutic Interv (Psychotic Symptoms)     Pt attended 2 of 3 OT groups today.  Continues to ask writer to copy his stack of papers full of drawings and writings, had writer keep one where he wrote \"just because we make it look safe, the world is not safe.  It is not my right to choose what to believe\".  Other papers say things such as \"[place] [employer]\" with a picture of a knife and pts signature below, while the next page says \"[employer]\" and the website for the company.  Pt will then ask peers to look through his papers and ask them what they think, and will offer copies to another psychotic peer, who readily takes them.        "

## 2017-08-15 PROCEDURE — 99232 SBSQ HOSP IP/OBS MODERATE 35: CPT | Performed by: PSYCHIATRY & NEUROLOGY

## 2017-08-15 RX ADMIN — LURASIDONE HYDROCHLORIDE 120 MG: 80 TABLET, FILM COATED ORAL at 09:05

## 2017-08-15 RX ADMIN — MULTIPLE VITAMINS W/ MINERALS TAB 1 TABLET: TAB at 09:05

## 2017-08-15 RX ADMIN — NICOTINE POLACRILEX 2 MG: 2 GUM, CHEWING ORAL at 21:09

## 2017-08-15 RX ADMIN — QUETIAPINE FUMARATE 100 MG: 100 TABLET, FILM COATED ORAL at 21:27

## 2017-08-15 RX ADMIN — PREDNISONE 50 MG: 50 TABLET ORAL at 09:05

## 2017-08-15 RX ADMIN — ACETAMINOPHEN 325 MG: 325 TABLET, FILM COATED ORAL at 21:09

## 2017-08-15 RX ADMIN — LISINOPRIL 20 MG: 20 TABLET ORAL at 09:05

## 2017-08-15 RX ADMIN — BENZOCAINE, MENTHOL 1 LOZENGE: 15; 3.6 LOZENGE ORAL at 21:27

## 2017-08-15 ASSESSMENT — ACTIVITIES OF DAILY LIVING (ADL)
DRESS: SCRUBS (BEHAVIORAL HEALTH)
GROOMING: INDEPENDENT
LAUNDRY: UNABLE TO COMPLETE
ORAL_HYGIENE: INDEPENDENT

## 2017-08-15 NOTE — PLAN OF CARE
"Problem: Psychotic Symptoms  Intervention: Social and Therapeutic Interv (Psychotic Symptoms)     Pt attended 2 of 3 OT groups. Appeared disorganized with his task work during OT clinic, though acts as if he is doing very specific placement of different things - ie, his random drawings and how he organized random beads to make a necklace.  Seemed very pleased with his project, though said he wanted writer to keep it and use it as a sample for others.     Later during a group game, pt would constantly read the answers aloud to the opposing team despite writer continually telling him no to do so until the timer was out.     Later began making loose associations between the answers and his life.  For example, the team was to identify \"male names that could be shortened\", and pt asked that I make note in his chart that he requests \"Harish Silva\" to be his police escort next time he goes to court.        "

## 2017-08-15 NOTE — PROGRESS NOTES
"Writer updated patient's mother, Silvana Pate [phone] over the phone. Mother would like to note that she noticed a \"constant fine tremor\" when she was visiting her son. She also inquired about his WBC, which are WDL. Mother proceeded to inquire about lymes disease and was referred to discuss this with the medical team. She verbalized frustration about not receiving a phone call from them  "

## 2017-08-15 NOTE — PROGRESS NOTES
"Patient is putting Carmex and lotion in his right eye. He had a cup with a wash cloth with carmex and lotion pressed against his eye. He was asked to stop and he did. All items were removed from room. Patient refuses to wash his eye out. He states \"This is treatment for a sports injury\" He then presses paper against his eye. Dangers of putting random things in his eye were explained. He then asked to fire this nurse. Dr Rollins was notified and no orders besides irrigating his eye. Patient still refuses at this time. Staff will monitor for unsafe behaviors. Order was added not to give patient these items at this time.   "

## 2017-08-15 NOTE — PROGRESS NOTES
Awake at start of shift. Incongruent mood/affect. Laughing inappropriately, significant speech latency, appears to be responding to internal stimuli. Repeatedly approaching multiple staff and insisting they read/look at his collection of drawings etc. The material appears nonsensical, mostly loose associations. Responding to staff questions with loose associations. Was coop[erative with vitals this A.M.

## 2017-08-15 NOTE — PROGRESS NOTES
[facility], [place]   Psychiatric Progress Note        Interim History:   The patient's care was discussed with the treatment team during the daily team meeting and/or staff's chart notes were reviewed.  Staff reports patient slept 6.5hrs last night and more visible and engaged during the day. Somewhat irritable last evening but generally calm and attending groups. Odd behavior persist. Rubbing chapstick and lotion on his eyes. No SI, LOYDA or HI. Compliant with medications and care. Eating well. Independent with ADL.     The patient was pleasant but somewhat guarded. Denied all symptoms, including dep, anx, racing thoughts and hallucinations. Stated that he is sleeping well and appetite is intact. He denied putting Chapstick or lotion in his eye. He noted tolerating medications well and agreed to increase the dose. He was future oriented. Spoke about wanting to return home. He tells me that he is willing to work closely with his outpatient provider. He was informed that he has been assigned a CCM and willing to work with an ACT team int he future. He denied SI, LOYDA and HI     Dicussed medications and care plan.          lurasidone  120 mg Oral Daily     predniSONE  50 mg Oral Daily    Followed by     [START ON 8/17/2017] predniSONE  40 mg Oral Daily    Followed by     [START ON 8/27/2017] predniSONE  30 mg Oral Daily    Followed by     [START ON 9/6/2017] predniSONE  20 mg Oral Daily    Followed by     [START ON 9/16/2017] predniSONE  10 mg Oral Daily    Followed by     [START ON 9/26/2017] predniSONE  5 mg Oral Daily     lisinopril  20 mg Oral Daily     multivitamin, therapeutic  1 tablet Oral Daily     triamcinolone   Topical BID          Allergies:     Allergies   Allergen Reactions     Carbamazepine Rash     Carbamazepine (Tegretol),.....Rash.          Labs:     No results found for this or any previous visit (from the past 24 hour(s)).       Psychiatric Examination:     Vitals:     "08/11/17 0842 08/12/17 0732 08/14/17 0828 08/15/17 0700   BP:       Pulse:       Resp: 16 16 16 18   Temp: 97.5  F (36.4  C) 97.6  F (36.4  C) 97  F (36.1  C) 97.8  F (36.6  C)   TempSrc: Tympanic Tympanic Tympanic Tympanic   Weight:  105.7 kg (233 lb)  100.2 kg (221 lb)       Weight is 221 lbs 0 oz  Body mass index is 27.62 kg/(m^2).    Appearance: dressed in hospital scrubs, appeared as age stated and no apparent distress  Attitude:  cooperative and less guarded but dismissive of symptoms   Eye Contact:  good  Mood:  \"better\"  Affect:  more engaged   Speech:  clear, coherent and normal prosody  Psychomotor Behavior:  no evidence of tardive dyskinesia, dystonia, or tics and intact station, gait and muscle tone  Throught Process:  linear and goal oriented  Associations:  no loose associations  Thought Content:  no evidence of suicidal ideation or homicidal ideation, no auditory hallucinations present, no visual hallucinations present and patient appears to be responding to internal stimuli. Dismissive of symptoms but paranoia is less intense.     Insight:  fair  Judgement:  fair  Oriented to:  time, person, and place  Attention Span and Concentration:  intact  Recent and Remote Memory:  intact     Fund of Knowledge: Adequate         Precautions:     Behavioral Orders   Procedures     Assault precautions     Code 1 - Restrict to Unit     Elopement precautions     Routine Programming     As clinically indicated     Status 15     Every 15 minutes.          Diagnoses:     1.  Schizophrenia, paranoid type. (r/o schizoaffective Bipolar type)   2.  Noncompliance, nonadherence.   3.  Cluster B with antisocial traits.  4.  DRESS, rash improved and LFT normalizing.            Plan:     Medications:  -- Saphris: was tapered off due to lack of efficacy.  -- PRNs: Vistaril for anxiety and Seroquel for restlessness and haldol with Benadryl for agitation and psychosis. PRN ativan was discontinued. Seroquel 200 mg qhs PRN for " racing thoughts and insomnia.   -- Seroquel 200 mg qHS but later but continued -200 mg BID PRN for psychosis, restlessness and insomnia.    -- Latuda: started and initially titrated to 120 mg daily. The medication was helpful initially but later mood lability and paranoia re-emerged. Medications was gradually lowered to 40 mg daily with plan to cross taper it with Risperdal. Trial with Risperdal was not successful due to rash and it was discontinued. Latuda was re-titrating to 140 mg daily. May consider increasing further to 140-160 mg if clinically indicated.    -- Risperdal was started and showed great efficacy but patient later developed a skin rash. Risperdal was eventually discontinued.  The patient declined to reconsider Risperdal in future.    -- Tegretol: started and titrated to 200 mg BID, level was 5.1 on 6/11 and 6.9 on 6/16 and 5.5 on 7/12. The patient developed a rash which failed to resolved after d/c'ing Risperdal. Tegretol was suspected and discontinued on 7/20.   -- Cogentin was added at 0.5-1 mg BID PRN for EPS.     -- IM and Dermatology teams continues to follow and monitor re: DRESS.     Legal Status and Disposition:  1.  Commitment with [facility] for Saphris, Zyprexa, haloperidol, Latuda, and Risperidone. Letter was sent to add Seroquel and remove Risperdal and Saphris from [facility]  2.  Patient is currently is on [facility] list. Given his statements about homicidal thoughts toward neighbor. May consider discharge home if safety established. [facility] informed local police department of the threats he made earlier in the hospital stay.  May consider discharge home with increased services if safety established.     -- may ask Dr Gaviria to evaluate patient and review care plan and records later this week for a second opinion to determine appropriate disposition.

## 2017-08-16 PROCEDURE — 99232 SBSQ HOSP IP/OBS MODERATE 35: CPT | Performed by: PSYCHIATRY & NEUROLOGY

## 2017-08-16 RX ADMIN — TRAZODONE HYDROCHLORIDE 50 MG: 50 TABLET ORAL at 19:54

## 2017-08-16 RX ADMIN — MULTIPLE VITAMINS W/ MINERALS TAB 1 TABLET: TAB at 08:55

## 2017-08-16 RX ADMIN — LURASIDONE HYDROCHLORIDE 140 MG: 80 TABLET, FILM COATED ORAL at 08:55

## 2017-08-16 RX ADMIN — QUETIAPINE FUMARATE 200 MG: 100 TABLET, FILM COATED ORAL at 19:54

## 2017-08-16 RX ADMIN — ACETAMINOPHEN 650 MG: 325 TABLET, FILM COATED ORAL at 10:14

## 2017-08-16 RX ADMIN — BENZOCAINE, MENTHOL 1 LOZENGE: 15; 3.6 LOZENGE ORAL at 10:14

## 2017-08-16 RX ADMIN — HALOPERIDOL 10 MG: 5 TABLET ORAL at 17:37

## 2017-08-16 RX ADMIN — DIPHENHYDRAMINE HYDROCHLORIDE 50 MG: 25 CAPSULE ORAL at 17:37

## 2017-08-16 RX ADMIN — PREDNISONE 50 MG: 50 TABLET ORAL at 08:55

## 2017-08-16 RX ADMIN — LISINOPRIL 20 MG: 20 TABLET ORAL at 08:55

## 2017-08-16 ASSESSMENT — ACTIVITIES OF DAILY LIVING (ADL)
GROOMING: INDEPENDENT
DRESS: SCRUBS (BEHAVIORAL HEALTH)
HYGIENE/GROOMING: INDEPENDENT
ORAL_HYGIENE: INDEPENDENT
ORAL_HYGIENE: INDEPENDENT
LAUNDRY: UNABLE TO COMPLETE
DRESS: SCRUBS (BEHAVIORAL HEALTH);INDEPENDENT
LAUNDRY: UNABLE TO COMPLETE
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
## 2017-08-16 NOTE — PROGRESS NOTES
"Jv's affect remains very flat, as does the tonality of his voice. He is very polite, and makes requests with a please, or with a bit of very dry  humor, such as \"Denver Harmon, how about a nice fresh hot cup of coffee.\" He observes all the rules of the unit, such as remaining in his room during shift change. I heard him say something to a nurse that seemed a bit short,\"I'm not talking to you.  You're not my nurse!\" Otherwise he seemed calm. He ate meals.  "
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
## 2017-08-16 NOTE — PROGRESS NOTES
This writer met with the pt to let him know that we are still working to find placement for him.  He thanked me, but was tense.  We continue to have him on the wait list at [facility].  When his symptoms lessen they can look at [facility], but until then he remains on the [facility] list.

## 2017-08-16 NOTE — PROGRESS NOTES
"Pt was present in the milieu, he appeared tense, agitated at times. Pt could heard speaking aggressively with someone on the phone, stating \" you need to come visit me when I tell you too\". Pt would write letters that made little sense and repeatedly ask for copies of those letters. Pt appears confused at times, and can be seen talking and laughing to himself. No other concerns to report.      08/15/17 2000   Behavioral Health   Hallucinations appears responding   Thinking confused;distractable;delusional   Orientation situation, disoriented   Insight denial of illness   Judgement impaired   Affect tense;irritable   Physical Appearance/Attire disheveled   Suicidality other (see comments)  (none reported )   Self Injury other (see comment)  (none reported )   Elopement (no attempts to elope)   Activity hyperactive (agitated, impulsive)   Psychomotor / Gait balanced;steady   Activities of Daily Living   Hygiene/Grooming independent   Oral Hygiene independent   Dress scrubs (behavioral health)   Laundry unable to complete   Room Organization independent   Behavioral Health Interventions   Psychotic Symptoms maintain safety precautions;monitor need to revise level of observation;maintain safe secure environment;reality orientation;simple, clear language;decrease environmental stimulation;redirection of intrusive behaviors;redirection of aggressive behaviors;assist patient in developing safety plan;assist patient in following safety plan;encourage nutrition and hydration;encourage participation / independence with adls;provide emotional support;establish therapeutic relationship;assist with developing & utilizing healthy coping strategies;build upon strengths;assess patient response to medication;assess medication adherance;monitor need for prn medication;monitor confusion, memory loss, decision making ability and reorient / intervent as needed   Social and Therapeutic Interventions (Psychotic Symptoms) encourage " socialization with peers;encourage effective boundaries with peers;encourage participation in therapeutic groups and milieu activities

## 2017-08-16 NOTE — PROGRESS NOTES
Charge RN spoke with pt's mother. She was insistent that she needed to speak with the attending doctor to tell him about her son's diagnosis of Lyme's disease. RN writer explained that this was not the diagnosis that her son (the pt) has been given at this time and that the doctor was aware that she would like to speak with him. RN writer eventually had to terminate the call because she continued to ask questions about lyme's disease and antibiotics that could not be answered within the scope of practice of an RN.

## 2017-08-16 NOTE — PROGRESS NOTES
"Patient was very tense. Being odd and somewhat threatening to other patients. Other patients have expressed fear. He is also intensely staring and talking to staff. He was redirected to his room with no success. Hew was offered a PRN of benadryl and haldol that he accepted. He was again asked to be in his room for dinner. He is becoming upset that he is unable to put soap, lotion, or Carmex in his eye. He is requesting copies of odd thing like \" behavioral item menu\" and upset when staff doesn't understand.    He again was unable to remain in his room. He has since come back out of his room twice, each time becoming more agitated. Staff is monitoring behavior.   "

## 2017-08-16 NOTE — PROGRESS NOTES
"   08/16/17 1830   Seclusion or Restraint Order   In Person Face to Face Assessment Conducted Yes-Eval of pt's immediate situation, reaction to intervention, complete review of systems assessment, behavioral assessment & review/assessment of hx, drugs & meds, recent labs, etc, behavioral condition, need to continue/terminate restraint/seclusion   Patient Experienced No adverse physical outcome from seclusion/restraint initiation   Continuation of Seclus/Restraint indicated at this time Yes   Pt denies any injury or pain after walking to seclusion with staff assist; states \"I'm fine, thank you.\" Pt does not appear to be in any physical distress. Provider notified of face to face results.   "

## 2017-08-17 RX ADMIN — ACETAMINOPHEN 650 MG: 325 TABLET, FILM COATED ORAL at 09:43

## 2017-08-17 RX ADMIN — MULTIPLE VITAMINS W/ MINERALS TAB 1 TABLET: TAB at 08:21

## 2017-08-17 RX ADMIN — LURASIDONE HYDROCHLORIDE 140 MG: 80 TABLET, FILM COATED ORAL at 08:20

## 2017-08-17 RX ADMIN — NICOTINE POLACRILEX 2 MG: 2 GUM, CHEWING ORAL at 09:43

## 2017-08-17 RX ADMIN — HALOPERIDOL 10 MG: 5 TABLET ORAL at 16:28

## 2017-08-17 RX ADMIN — DIPHENHYDRAMINE HYDROCHLORIDE 50 MG: 25 CAPSULE ORAL at 06:28

## 2017-08-17 RX ADMIN — LISINOPRIL 20 MG: 20 TABLET ORAL at 08:20

## 2017-08-17 RX ADMIN — DIPHENHYDRAMINE HYDROCHLORIDE 50 MG: 25 CAPSULE ORAL at 16:28

## 2017-08-17 RX ADMIN — BENZOCAINE, MENTHOL 1 LOZENGE: 15; 3.6 LOZENGE ORAL at 10:11

## 2017-08-17 RX ADMIN — BENZOCAINE, MENTHOL 1 LOZENGE: 15; 3.6 LOZENGE ORAL at 04:28

## 2017-08-17 RX ADMIN — HALOPERIDOL 10 MG: 5 TABLET ORAL at 09:46

## 2017-08-17 RX ADMIN — PREDNISONE 40 MG: 20 TABLET ORAL at 08:21

## 2017-08-17 ASSESSMENT — ACTIVITIES OF DAILY LIVING (ADL)
DRESS: SCRUBS (BEHAVIORAL HEALTH);INDEPENDENT
LAUNDRY: UNABLE TO COMPLETE
HYGIENE/GROOMING: INDEPENDENT
ORAL_HYGIENE: INDEPENDENT
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
## 2017-08-17 NOTE — PROGRESS NOTES
Brief Medicine Note    Jv Pate is a 32 year old male with a hx of paranoid schizophrenia, cluster B w/ antisocial personality traits, Lyme disease, and HTN. He was recently treated for DRESS syndrome after devloping a morbilliform rash following treatment with carbamazepine.     Internal Medicine following CBC and CMP while on steroid therapy.   - CBC and CMP today WNL  - Continue labs qM/F while on steroid taper  - Contact medicine if patient refusing steroids  - Notify medicine and dermatology if patient discharging      /81 (BP Location: Right arm)  Pulse 77  Temp 97.1  F (36.2  C) (Tympanic)  Resp 18  Wt 107 kg (236 lb)  BMI 29.5 kg/m2      Fatmata Grijalva PA-C  Hospitalist Service  Pager [phone]

## 2017-08-17 NOTE — PLAN OF CARE
"Problem: Psychotic Symptoms  Goal: Psychotic Symptoms  Signs and symptoms of listed problems will be absent or manageable.   Outcome: No Change  48 hour nursing assessment completed. Patient observed to be responding to internal stimuli throughout the shift. Patient seen laughing and talking to himself often. Patient making odd requests from staff. He asks this writer for his \"nicotiene gum patch lozenge\". Patient is requesting doses of medications that do not correspond with any medications he is on, and unable to process that the requests he is making do not make sense. Patient is requesting many copies again today. He is mixing many things into one cup, claiming they are ailments for various diseases. Patient is medication compliant, however he is disoriented to which medications he is actually taking. Denies SI/SIB. Continue to monitor and assess.       "

## 2017-08-17 NOTE — PROGRESS NOTES
Pt's mood was irritable. Pt had poor boundaries with staff and others. Pt ate dinner in the milieu. Pt was put into seclusion after becoming agitated and staff was unable to redirect him. Pt spent the rest of the shift in his room.      08/16/17 2119   Behavioral Health   Hallucinations appears responding   Thinking delusional;poor concentration;distractable   Orientation person: oriented;place: oriented;date: oriented;time: oriented   Memory confabulation   Insight poor   Judgement impaired   Eye Contact at examiner   Affect full range affect;irritable   Mood irritable;labile   Physical Appearance/Attire attire appropriate to age and situation   Hygiene neglected grooming - unclean body, hair, teeth   Suicidality other (see comments)  (none reported)   Self Injury other (see comment)  (none reported/none observed)   Elopement (no observed behaviors)   Activity restless;hyperactive (agitated, impulsive)   Speech coherent;clear;rambling   Medication Sensitivity no observed side effects;no stated side effects   Psychomotor / Gait balanced;steady   Activities of Daily Living   Hygiene/Grooming independent   Oral Hygiene independent   Dress scrubs (behavioral health);independent   Laundry unable to complete   Room Organization independent   Activity   Activity Level of Assistance independent   Behavioral Health Interventions   Psychotic Symptoms maintain safety precautions;monitor need to revise level of observation;maintain safe secure environment;reality orientation;simple, clear language;decrease environmental stimulation;redirection of intrusive behaviors;redirection of aggressive behaviors;assist patient in developing safety plan;assist patient in following safety plan;encourage nutrition and hydration;encourage participation / independence with adls;provide emotional support;establish therapeutic relationship;assist with developing & utilizing healthy coping strategies;build upon strengths;monitor confusion,  memory loss, decision making ability and reorient / intervent as needed;monitor need for prn medication   Social and Therapeutic Interventions (Psychotic Symptoms) encourage socialization with peers;encourage effective boundaries with peers;encourage participation in therapeutic groups and milieu activities

## 2017-08-17 NOTE — PLAN OF CARE
Problem: Psychotic Symptoms  Intervention: Social and Therapeutic Interv (Psychotic Symptoms)     Pt attended the morning OT group.  Per his usual lately, makes odd requests - asked for a new journal yesterday, then wanted it put in his locker soon after; asked for a new one today, though was receptive when told I would get him the one from his locker.  Wants things like a word find copied on poster size paper for no reason (not a vision issue).  Will ask for poster to color, then just rolls it up and folds it.  During group, took out a bunch of small beads, then wanted his project from yesterday (beads on fishing line) and tied it up in different intricate knots (that he appeared to be making up).

## 2017-08-18 LAB
ALBUMIN SERPL-MCNC: 3.9 G/DL (ref 3.4–5)
ALP SERPL-CCNC: 61 U/L (ref 40–150)
ALT SERPL W P-5'-P-CCNC: 30 U/L (ref 0–70)
ANION GAP SERPL CALCULATED.3IONS-SCNC: 12 MMOL/L (ref 3–14)
AST SERPL W P-5'-P-CCNC: 11 U/L (ref 0–45)
BASOPHILS # BLD AUTO: 0 10E9/L (ref 0–0.2)
BASOPHILS NFR BLD AUTO: 0.1 %
BILIRUB SERPL-MCNC: 0.3 MG/DL (ref 0.2–1.3)
BUN SERPL-MCNC: 11 MG/DL (ref 7–30)
CALCIUM SERPL-MCNC: 9.4 MG/DL (ref 8.5–10.1)
CHLORIDE SERPL-SCNC: 107 MMOL/L (ref 94–109)
CO2 SERPL-SCNC: 22 MMOL/L (ref 20–32)
CREAT SERPL-MCNC: 0.61 MG/DL (ref 0.66–1.25)
DIFFERENTIAL METHOD BLD: NORMAL
EOSINOPHIL # BLD AUTO: 0.3 10E9/L (ref 0–0.7)
EOSINOPHIL NFR BLD AUTO: 3.3 %
ERYTHROCYTE [DISTWIDTH] IN BLOOD BY AUTOMATED COUNT: 13.4 % (ref 10–15)
GFR SERPL CREATININE-BSD FRML MDRD: >90 ML/MIN/1.7M2
GLUCOSE SERPL-MCNC: 94 MG/DL (ref 70–99)
HCT VFR BLD AUTO: 44.1 % (ref 40–53)
HGB BLD-MCNC: 14.6 G/DL (ref 13.3–17.7)
IMM GRANULOCYTES # BLD: 0 10E9/L (ref 0–0.4)
IMM GRANULOCYTES NFR BLD: 0.4 %
LYMPHOCYTES # BLD AUTO: 4.1 10E9/L (ref 0.8–5.3)
LYMPHOCYTES NFR BLD AUTO: 49.9 %
MCH RBC QN AUTO: 29.1 PG (ref 26.5–33)
MCHC RBC AUTO-ENTMCNC: 33.1 G/DL (ref 31.5–36.5)
MCV RBC AUTO: 88 FL (ref 78–100)
MONOCYTES # BLD AUTO: 0.7 10E9/L (ref 0–1.3)
MONOCYTES NFR BLD AUTO: 8.6 %
NEUTROPHILS # BLD AUTO: 3.1 10E9/L (ref 1.6–8.3)
NEUTROPHILS NFR BLD AUTO: 37.7 %
NRBC # BLD AUTO: 0 10*3/UL
NRBC BLD AUTO-RTO: 0 /100
PLATELET # BLD AUTO: 193 10E9/L (ref 150–450)
POTASSIUM SERPL-SCNC: 3.7 MMOL/L (ref 3.4–5.3)
PROT SERPL-MCNC: 7.2 G/DL (ref 6.8–8.8)
RBC # BLD AUTO: 5.02 10E12/L (ref 4.4–5.9)
SODIUM SERPL-SCNC: 141 MMOL/L (ref 133–144)
WBC # BLD AUTO: 8.3 10E9/L (ref 4–11)

## 2017-08-18 PROCEDURE — 99232 SBSQ HOSP IP/OBS MODERATE 35: CPT | Performed by: PSYCHIATRY & NEUROLOGY

## 2017-08-18 RX ORDER — BENZTROPINE MESYLATE 0.5 MG/1
0.5 TABLET ORAL 2 TIMES DAILY
Status: DISCONTINUED | OUTPATIENT
Start: 2017-08-18 | End: 2017-09-05 | Stop reason: HOSPADM

## 2017-08-18 RX ORDER — HALOPERIDOL 10 MG/1
10 TABLET ORAL 2 TIMES DAILY
Status: DISCONTINUED | OUTPATIENT
Start: 2017-08-18 | End: 2017-09-05 | Stop reason: HOSPADM

## 2017-08-18 RX ADMIN — MULTIPLE VITAMINS W/ MINERALS TAB 1 TABLET: TAB at 09:00

## 2017-08-18 RX ADMIN — TRAZODONE HYDROCHLORIDE 50 MG: 50 TABLET ORAL at 00:40

## 2017-08-18 RX ADMIN — QUETIAPINE FUMARATE 200 MG: 100 TABLET, FILM COATED ORAL at 21:40

## 2017-08-18 RX ADMIN — HALOPERIDOL 10 MG: 10 TABLET ORAL at 20:08

## 2017-08-18 RX ADMIN — BENZTROPINE MESYLATE 0.5 MG: 0.5 TABLET ORAL at 20:08

## 2017-08-18 RX ADMIN — LURASIDONE HYDROCHLORIDE 140 MG: 80 TABLET, FILM COATED ORAL at 09:00

## 2017-08-18 RX ADMIN — LISINOPRIL 20 MG: 20 TABLET ORAL at 09:01

## 2017-08-18 RX ADMIN — BENZOCAINE, MENTHOL 1 LOZENGE: 15; 3.6 LOZENGE ORAL at 00:40

## 2017-08-18 RX ADMIN — PREDNISONE 40 MG: 20 TABLET ORAL at 09:01

## 2017-08-18 ASSESSMENT — ACTIVITIES OF DAILY LIVING (ADL)
DRESS: SCRUBS (BEHAVIORAL HEALTH)
LAUNDRY: UNABLE TO COMPLETE
DRESS: SCRUBS (BEHAVIORAL HEALTH)
HYGIENE/GROOMING: INDEPENDENT
ORAL_HYGIENE: INDEPENDENT
GROOMING: INDEPENDENT
ORAL_HYGIENE: INDEPENDENT

## 2017-08-18 NOTE — PROGRESS NOTES
This writer left  for [employer] [facility] @ [phone]- want to set up follow-up Psychiatry appointment.    Also left  for Jenna Khalil - [phone]- asked her to call me to discuss case and options for this patient.

## 2017-08-18 NOTE — PROGRESS NOTES
"Pt's mood was tense. Pt appears to be responding to internal stimuli. Pt needed redirection from staff about multiple requests to make copies of his drawings and lists. Pt needed reminding to not put toiletries into his eyes. Pt was claiming another pt on the unit of \"raping and stealing his missing child.\" Pt had poor boundaries with staff and others. Pt neglected ADLs. Pt ate dinner and a snack.      08/17/17 2117   Behavioral Health   Hallucinations appears responding   Thinking paranoid;delusional;poor concentration   Orientation time: oriented;date: oriented;place: oriented;person: oriented   Memory confabulation   Insight poor   Judgement impaired   Eye Contact at examiner   Affect irritable;full range affect;tense   Mood irritable;labile   Physical Appearance/Attire attire appropriate to age and situation   Hygiene neglected grooming - unclean body, hair, teeth   Suicidality other (see comments)  (none reported)   Self Injury other (see comment)  (none reported/none observed)   Elopement (no observed behaviors)   Activity restless;hyperactive (agitated, impulsive)   Speech clear;coherent;rambling   Medication Sensitivity no observed side effects;no stated side effects   Psychomotor / Gait balanced;steady   Activities of Daily Living   Hygiene/Grooming independent   Oral Hygiene independent   Dress scrubs (behavioral health);independent   Laundry unable to complete   Room Organization independent   Activity   Activity Level of Assistance independent   Behavioral Health Interventions   Psychotic Symptoms maintain safety precautions;monitor need to revise level of observation;maintain safe secure environment;reality orientation;simple, clear language;decrease environmental stimulation;redirection of intrusive behaviors;redirection of aggressive behaviors;assist patient in developing safety plan;assist patient in following safety plan;encourage nutrition and hydration;encourage participation / independence with " adls;establish therapeutic relationship;provide emotional support;assist with developing & utilizing healthy coping strategies;build upon strengths;monitor confusion, memory loss, decision making ability and reorient / intervent as needed;monitor need for prn medication   Social and Therapeutic Interventions (Psychotic Symptoms) encourage participation in therapeutic groups and milieu activities;encourage effective boundaries with peers;encourage socialization with peers

## 2017-08-19 RX ADMIN — BENZTROPINE MESYLATE 0.5 MG: 0.5 TABLET ORAL at 08:40

## 2017-08-19 RX ADMIN — HALOPERIDOL 10 MG: 10 TABLET ORAL at 08:40

## 2017-08-19 RX ADMIN — LISINOPRIL 20 MG: 20 TABLET ORAL at 08:40

## 2017-08-19 RX ADMIN — MULTIPLE VITAMINS W/ MINERALS TAB 1 TABLET: TAB at 08:40

## 2017-08-19 RX ADMIN — QUETIAPINE FUMARATE 200 MG: 100 TABLET, FILM COATED ORAL at 20:29

## 2017-08-19 RX ADMIN — HALOPERIDOL 10 MG: 10 TABLET ORAL at 20:29

## 2017-08-19 RX ADMIN — LURASIDONE HYDROCHLORIDE 140 MG: 80 TABLET, FILM COATED ORAL at 08:40

## 2017-08-19 RX ADMIN — NICOTINE POLACRILEX 2 MG: 2 GUM, CHEWING ORAL at 14:50

## 2017-08-19 RX ADMIN — BENZOCAINE, MENTHOL 1 LOZENGE: 15; 3.6 LOZENGE ORAL at 16:44

## 2017-08-19 RX ADMIN — PREDNISONE 40 MG: 20 TABLET ORAL at 08:41

## 2017-08-19 RX ADMIN — BENZTROPINE MESYLATE 0.5 MG: 0.5 TABLET ORAL at 20:29

## 2017-08-19 ASSESSMENT — ACTIVITIES OF DAILY LIVING (ADL)
ORAL_HYGIENE: INDEPENDENT
ORAL_HYGIENE: INDEPENDENT
HYGIENE/GROOMING: INDEPENDENT
DRESS: SCRUBS (BEHAVIORAL HEALTH)
GROOMING: INDEPENDENT
DRESS: SCRUBS (BEHAVIORAL HEALTH)

## 2017-08-19 NOTE — PROGRESS NOTES
Pt approached a male staff member and showed him the [employer] catalog, pointing to two rifles, two handguns, and two sleeping bags, stating that he would be purchasing those items.

## 2017-08-19 NOTE — PROGRESS NOTES
"Patient requested ginger ale for an upset stomach. He said he thinks he ate too much.  There was no ginger ale available. Pt was encouraged to take some Maalox which he declined.  While waiting to see about ginger ale he started a conversation about a regular nurse on this unit.  He asked staff if they liked working with this nurse and when staff answered he said \"She could be my uncle if she had the right anatomical part.\"  When staff did not respond to his comments he specifically asked this writer if writer heard what he said. He then walked away.  Jv was up for majority of shift.  He wandered in and out of his room not staying in one place for very long.    "

## 2017-08-19 NOTE — PROGRESS NOTES
Pt appears responding this evening, looking at the ceiling for long periods of time. When asked what pt is looking at pt becomes guarded. Pt requests toiletries and becomes agitated when you remind of him he cannot have access to them currently due to putting them in his eyes. Pt has said nonsensical things this evening suggesting staff are seeing each other and has a hard time concentrating tonight. Pts affect is tense, mood is labile.      08/18/17 2004   Behavioral Health   Hallucinations appears responding   Thinking poor concentration;delusional;distractable   Orientation person: oriented;place: oriented   Memory confabulation   Insight poor   Judgement impaired   Eye Contact at examiner   Affect blunted, flat   Mood labile   Physical Appearance/Attire attire appropriate to age and situation   Hygiene neglected grooming - unclean body, hair, teeth   Suicidality other (see comments)  (none noted)   Self Injury other (see comment)  ( none noted)   Elopement Hypervigilance to activities on and off the unit   Activity restless   Speech clear   Medication Sensitivity no observed side effects   Psychomotor / Gait balanced   Activities of Daily Living   Hygiene/Grooming independent   Oral Hygiene independent   Dress scrubs (behavioral health)   Laundry unable to complete   Room Organization independent   Behavioral Health Interventions   Psychotic Symptoms maintain safety precautions;monitor need to revise level of observation;maintain safe secure environment;simple, clear language;decrease environmental stimulation;redirection of intrusive behaviors;provide emotional support;establish therapeutic relationship;assist with developing & utilizing healthy coping strategies;build upon strengths;monitor need for prn medication   Social and Therapeutic Interventions (Psychotic Symptoms) encourage socialization with peers;encourage effective boundaries with peers;encourage participation in therapeutic groups and milieu  activities

## 2017-08-19 NOTE — PLAN OF CARE
Problem: Psychotic Symptoms  Goal: Psychotic Symptoms  Signs and symptoms of listed problems will be absent or manageable.   Outcome: No Change  Patient has had a flat affect.  He has had to have numerous items like anti-perspirant and essential oils taken from him because he was rubbing those items into his eyes.  His eyes look good presently but we will continue to monitor closely.  He currently is having limited interaction with most other patients. He was observed talking with a new patient who is angry and wants to leave.  Jv was overheard giving patient advice about how to talk to the doctor about getting out.  He will talk with staff but much less so than previously.  Patient continues to express frustration at the length of stay in the hospital.  Pt is eating well.  He denies suicidal and homicidal ideation.  Pt continues to be on prednisone due to the recent episode of DRESS.

## 2017-08-19 NOTE — PROGRESS NOTES
"[facility], [place]   Psychiatric Progress Note        Interim History:   The patient's care was discussed with the treatment team during the daily team meeting and/or staff's chart notes were reviewed.  Staff reports patient has continued to make odd statements, expresses paranoid ideations at times, can be irritable/hostile but mostly is cooperative with staff, asks for copies of things to be made.  He says today he feels \"great\" and \"ready to go home.\"  He denies SE's.  He feels the meds are working well.  He says he has been sleeping well at night.  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  He says he would be willing to do a Day Tx program at [facility] in [facility], has done the program in [place] and liked it but [facility] is closer.  He admits to a h/o using marijuana and alcohol, does not feel these have really caused him problems but understands that during his commitment he cannot use these.  He says he will comply with this.  He says he will take his meds as written and will allow his mom to monitor the meds.    I spoke with pt's mother and she is almost entirely focused on getting pt started on antibiotics for chronic lyme's dz which she feels is the cause of his psychosis.  She says pt has had extended periods of stability on antibiotics alone but virtually no stability on antipsychotics alone.  She understands that pt must be on an antipsychotic while the Ross order is active but also wants him on an antibiotic like minocycline that treats Lyme's dz. She asks me to call Dr. Monte at [facility], who has never seen the pt but is a psychiatrist who specializes in Lyme's related psychosis.  She has worked with Dr. Cueva of the [facility] here in [place] as well.  Pt used to see Dr. Banuelos here at the , who is an infectious dz doctor.  (I " was able to find past notes from Dr. Banuelos and indeed he was treating pt for Lyme's dz with Minocycline and at other times with IV Rocephin.)  Pt's mother is adamant that each time abx have been started, pt's symptoms improve over the course of several days or 1-2 weeks and then abx are continued for 1 year and pt remains stable until the abx are stopped.  He then has a relapse of psychosis within several weeks.  She says the doctors usually want to stop the abx d/t a belief that pt's immune system can maintain control of the Lyme's.  She says pt has not been on abx for the past 4 years.  It was difficult to get much of a history from her for this time-period.             haloperidol  10 mg Oral BID     benztropine  0.5 mg Oral BID     lurasidone  140 mg Oral Daily     predniSONE  40 mg Oral Daily    Followed by     [START ON 8/27/2017] predniSONE  30 mg Oral Daily    Followed by     [START ON 9/6/2017] predniSONE  20 mg Oral Daily    Followed by     [START ON 9/16/2017] predniSONE  10 mg Oral Daily    Followed by     [START ON 9/26/2017] predniSONE  5 mg Oral Daily     lisinopril  20 mg Oral Daily     multivitamin, therapeutic  1 tablet Oral Daily          Allergies:     Allergies   Allergen Reactions     Carbamazepine Rash     Carbamazepine (Tegretol),.....Rash.          Labs:     Recent Results (from the past 24 hour(s))   Comprehensive metabolic panel    Collection Time: 08/18/17  7:47 AM   Result Value Ref Range    Sodium 141 133 - 144 mmol/L    Potassium 3.7 3.4 - 5.3 mmol/L    Chloride 107 94 - 109 mmol/L    Carbon Dioxide 22 20 - 32 mmol/L    Anion Gap 12 3 - 14 mmol/L    Glucose 94 70 - 99 mg/dL    Urea Nitrogen 11 7 - 30 mg/dL    Creatinine 0.61 (L) 0.66 - 1.25 mg/dL    GFR Estimate >90 >60 mL/min/1.7m2    GFR Estimate If Black >90 >60 mL/min/1.7m2    Calcium 9.4 8.5 - 10.1 mg/dL    Bilirubin Total 0.3 0.2 - 1.3 mg/dL    Albumin 3.9 3.4 - 5.0 g/dL    Protein Total 7.2 6.8 - 8.8 g/dL    Alkaline  "Phosphatase 61 40 - 150 U/L    ALT 30 0 - 70 U/L    AST 11 0 - 45 U/L   CBC with platelets differential    Collection Time: 08/18/17  7:47 AM   Result Value Ref Range    WBC 8.3 4.0 - 11.0 10e9/L    RBC Count 5.02 4.4 - 5.9 10e12/L    Hemoglobin 14.6 13.3 - 17.7 g/dL    Hematocrit 44.1 40.0 - 53.0 %    MCV 88 78 - 100 fl    MCH 29.1 26.5 - 33.0 pg    MCHC 33.1 31.5 - 36.5 g/dL    RDW 13.4 10.0 - 15.0 %    Platelet Count 193 150 - 450 10e9/L    Diff Method Automated Method     % Neutrophils 37.7 %    % Lymphocytes 49.9 %    % Monocytes 8.6 %    % Eosinophils 3.3 %    % Basophils 0.1 %    % Immature Granulocytes 0.4 %    Nucleated RBCs 0 0 /100    Absolute Neutrophil 3.1 1.6 - 8.3 10e9/L    Absolute Lymphocytes 4.1 0.8 - 5.3 10e9/L    Absolute Monocytes 0.7 0.0 - 1.3 10e9/L    Absolute Eosinophils 0.3 0.0 - 0.7 10e9/L    Absolute Basophils 0.0 0.0 - 0.2 10e9/L    Abs Immature Granulocytes 0.0 0 - 0.4 10e9/L    Absolute Nucleated RBC 0.0           Psychiatric Examination:     Vitals:    08/15/17 0700 08/16/17 0700 08/17/17 0743 08/18/17 0732   BP:   137/81    BP Location:   Right arm    Pulse:   77    Resp: 18 16 18 18   Temp: 97.8  F (36.6  C) 97.5  F (36.4  C) 97.1  F (36.2  C) 97.5  F (36.4  C)   TempSrc: Tympanic Tympanic Tympanic Tympanic   Weight: 100.2 kg (221 lb)  107 kg (236 lb)        Weight is 236 lbs 0 oz  Body mass index is 29.5 kg/(m^2).    Appearance: dressed in hospital scrubs, appeared as age stated and no apparent distress  Attitude:  cooperative and less guarded but dismissive of symptoms   Eye Contact:  good  Mood:  \"better\"  Affect:  more engaged   Speech:  clear, coherent and normal prosody  Psychomotor Behavior:  no evidence of tardive dyskinesia, dystonia, or tics and intact station, gait and muscle tone  Throught Process:  linear and goal oriented  Associations:  no loose associations  Thought Content:  no evidence of suicidal ideation or homicidal ideation, no auditory hallucinations present, " no visual hallucinations present and patient appears to be responding to internal stimuli. Dismissive of symptoms but paranoia is less intense.     Insight:  fair  Judgement:  fair  Oriented to:  time, person, and place  Attention Span and Concentration:  intact  Recent and Remote Memory:  intact     Fund of Knowledge: Adequate         Precautions:     Behavioral Orders   Procedures     Assault precautions     Code 1 - Restrict to Unit     Elopement precautions     Routine Programming     As clinically indicated     Status 15     Every 15 minutes.          Diagnoses:     1.  Schizophrenia, paranoid type, is likely  2.  Psychosis r/t chronic Lyme's probably cannot be definitively ruled out   3.  Cluster B with antisocial traits.  4.  DRESS, rash improved and LFT normalizing.            Plan:     Medications:  --Continue Latuda as there has clearly been some benefit from this; pt's paranoia in the past has included lots of agitation and aggression which is not occurring now  --Add Haldol 10 mg BID scheduled as this has appeared to have been helpful when given as a prn    -- IM and Dermatology teams continues to follow and monitor re: DRESS.  Prednisone taper is ongoing and will conclude in early October.    Legal Status and Disposition:  --Commitment with [facility] for Saphris, Zyprexa, haloperidol, Latuda, and Risperidone. Letter was sent to add Seroquel and remove Risperdal and Saphris from [facility]    --Pt's mother wants him to return home after discharge.  Pt is willing to do Day Tx at [facility] which has gone well for him in the past.  Will need to see reduced psychosis and irritability, improved organization of thought before discharge can safely occur.      --I will speak with Dr. Monte on Monday per mother's request and will consider restarting an oral abx for chronic Lyme's dz based on the following assumption/theory: there is evidence that certain infectious processes can induce inflammation and/or immune  response in the brain that mimics primary psychotic illnesses (in fact, many cases of Schizophrenia are believed to be caused by in utero exposure to influenza A), and antibiotics decrease inflammation and affect immune response in ways that are independent from their specific action on bacteria, so that it is possible that an abx could affect a dz process that no longer includes active bacterial infection.  As always, potential risks and benefits need to be weighed, and given this pt's complex hx and severe mental health sxs which are currently preventing him from living in the community, the risk/benefit analysis may tip towards retrial of an abx.  Antipsychotic medication will be continued regardless, however, given presence of Ross order and a history of adequate response to these meds evidenced in our own medical record.

## 2017-08-20 RX ADMIN — PREDNISONE 40 MG: 20 TABLET ORAL at 08:15

## 2017-08-20 RX ADMIN — BENZTROPINE MESYLATE 0.5 MG: 0.5 TABLET ORAL at 08:14

## 2017-08-20 RX ADMIN — TRAZODONE HYDROCHLORIDE 50 MG: 50 TABLET ORAL at 22:28

## 2017-08-20 RX ADMIN — MULTIPLE VITAMINS W/ MINERALS TAB 1 TABLET: TAB at 08:14

## 2017-08-20 RX ADMIN — NICOTINE POLACRILEX 2 MG: 2 GUM, CHEWING ORAL at 11:03

## 2017-08-20 RX ADMIN — BENZOCAINE, MENTHOL 1 LOZENGE: 15; 3.6 LOZENGE ORAL at 04:49

## 2017-08-20 RX ADMIN — LURASIDONE HYDROCHLORIDE 140 MG: 80 TABLET, FILM COATED ORAL at 08:15

## 2017-08-20 RX ADMIN — QUETIAPINE FUMARATE 200 MG: 100 TABLET, FILM COATED ORAL at 20:38

## 2017-08-20 RX ADMIN — BENZTROPINE MESYLATE 0.5 MG: 0.5 TABLET ORAL at 20:38

## 2017-08-20 RX ADMIN — DIPHENHYDRAMINE HYDROCHLORIDE 50 MG: 25 CAPSULE ORAL at 19:15

## 2017-08-20 RX ADMIN — ACETAMINOPHEN 650 MG: 325 TABLET, FILM COATED ORAL at 04:49

## 2017-08-20 RX ADMIN — HALOPERIDOL 10 MG: 10 TABLET ORAL at 20:38

## 2017-08-20 RX ADMIN — LISINOPRIL 20 MG: 20 TABLET ORAL at 08:15

## 2017-08-20 RX ADMIN — HALOPERIDOL 10 MG: 10 TABLET ORAL at 08:14

## 2017-08-20 ASSESSMENT — ACTIVITIES OF DAILY LIVING (ADL)
ORAL_HYGIENE: INDEPENDENT
DRESS: INDEPENDENT
HYGIENE/GROOMING: SHOWER;INDEPENDENT
ORAL_HYGIENE: INDEPENDENT
GROOMING: INDEPENDENT;HANDWASHING;SHOWER
LAUNDRY: WITH SUPERVISION
LAUNDRY: UNABLE TO COMPLETE
DRESS: SCRUBS (BEHAVIORAL HEALTH)

## 2017-08-21 LAB
ALBUMIN SERPL-MCNC: 3.7 G/DL (ref 3.4–5)
ALP SERPL-CCNC: 57 U/L (ref 40–150)
ALT SERPL W P-5'-P-CCNC: 33 U/L (ref 0–70)
ANION GAP SERPL CALCULATED.3IONS-SCNC: 12 MMOL/L (ref 3–14)
AST SERPL W P-5'-P-CCNC: 12 U/L (ref 0–45)
BASOPHILS # BLD AUTO: 0 10E9/L (ref 0–0.2)
BASOPHILS NFR BLD AUTO: 0.2 %
BILIRUB SERPL-MCNC: 0.4 MG/DL (ref 0.2–1.3)
BUN SERPL-MCNC: 15 MG/DL (ref 7–30)
CALCIUM SERPL-MCNC: 8.9 MG/DL (ref 8.5–10.1)
CHLORIDE SERPL-SCNC: 107 MMOL/L (ref 94–109)
CO2 SERPL-SCNC: 21 MMOL/L (ref 20–32)
CREAT SERPL-MCNC: 0.6 MG/DL (ref 0.66–1.25)
DIFFERENTIAL METHOD BLD: NORMAL
EOSINOPHIL # BLD AUTO: 0.2 10E9/L (ref 0–0.7)
EOSINOPHIL NFR BLD AUTO: 2.5 %
ERYTHROCYTE [DISTWIDTH] IN BLOOD BY AUTOMATED COUNT: 13.4 % (ref 10–15)
GFR SERPL CREATININE-BSD FRML MDRD: >90 ML/MIN/1.7M2
GLUCOSE SERPL-MCNC: 118 MG/DL (ref 70–99)
HCT VFR BLD AUTO: 45.8 % (ref 40–53)
HGB BLD-MCNC: 14.9 G/DL (ref 13.3–17.7)
IMM GRANULOCYTES # BLD: 0 10E9/L (ref 0–0.4)
IMM GRANULOCYTES NFR BLD: 0.5 %
LYMPHOCYTES # BLD AUTO: 5.3 10E9/L (ref 0.8–5.3)
LYMPHOCYTES NFR BLD AUTO: 60.4 %
MCH RBC QN AUTO: 29.3 PG (ref 26.5–33)
MCHC RBC AUTO-ENTMCNC: 32.5 G/DL (ref 31.5–36.5)
MCV RBC AUTO: 90 FL (ref 78–100)
MONOCYTES # BLD AUTO: 0.6 10E9/L (ref 0–1.3)
MONOCYTES NFR BLD AUTO: 7 %
NEUTROPHILS # BLD AUTO: 2.6 10E9/L (ref 1.6–8.3)
NEUTROPHILS NFR BLD AUTO: 29.4 %
NRBC # BLD AUTO: 0 10*3/UL
NRBC BLD AUTO-RTO: 0 /100
PLATELET # BLD AUTO: 193 10E9/L (ref 150–450)
PLATELET # BLD EST: NORMAL 10*3/UL
POTASSIUM SERPL-SCNC: 3.8 MMOL/L (ref 3.4–5.3)
PROT SERPL-MCNC: 7 G/DL (ref 6.8–8.8)
RBC # BLD AUTO: 5.09 10E12/L (ref 4.4–5.9)
RBC MORPH BLD: NORMAL
SODIUM SERPL-SCNC: 140 MMOL/L (ref 133–144)
WBC # BLD AUTO: 8.8 10E9/L (ref 4–11)

## 2017-08-21 PROCEDURE — 99232 SBSQ HOSP IP/OBS MODERATE 35: CPT | Performed by: PSYCHIATRY & NEUROLOGY

## 2017-08-21 RX ORDER — MINOCYCLINE HYDROCHLORIDE 100 MG/1
100 CAPSULE ORAL EVERY 12 HOURS SCHEDULED
Status: DISCONTINUED | OUTPATIENT
Start: 2017-08-21 | End: 2017-09-05 | Stop reason: HOSPADM

## 2017-08-21 RX ORDER — SACCHAROMYCES BOULARDII 250 MG
250 CAPSULE ORAL 2 TIMES DAILY
Status: DISCONTINUED | OUTPATIENT
Start: 2017-08-21 | End: 2017-09-05 | Stop reason: HOSPADM

## 2017-08-21 RX ADMIN — LISINOPRIL 20 MG: 20 TABLET ORAL at 08:29

## 2017-08-21 RX ADMIN — CARBOXYMETHYLCELLULOSE SODIUM 1 DROP: 10 GEL OPHTHALMIC at 02:30

## 2017-08-21 RX ADMIN — MULTIPLE VITAMINS W/ MINERALS TAB 1 TABLET: TAB at 08:30

## 2017-08-21 RX ADMIN — BENZTROPINE MESYLATE 0.5 MG: 0.5 TABLET ORAL at 20:02

## 2017-08-21 RX ADMIN — QUETIAPINE FUMARATE 200 MG: 100 TABLET, FILM COATED ORAL at 03:28

## 2017-08-21 RX ADMIN — HALOPERIDOL 10 MG: 10 TABLET ORAL at 08:29

## 2017-08-21 RX ADMIN — PREDNISONE 40 MG: 20 TABLET ORAL at 08:29

## 2017-08-21 RX ADMIN — ACETAMINOPHEN 650 MG: 325 TABLET, FILM COATED ORAL at 03:28

## 2017-08-21 RX ADMIN — HALOPERIDOL 10 MG: 10 TABLET ORAL at 20:02

## 2017-08-21 RX ADMIN — BENZTROPINE MESYLATE 0.5 MG: 0.5 TABLET ORAL at 08:29

## 2017-08-21 RX ADMIN — LURASIDONE HYDROCHLORIDE 140 MG: 80 TABLET, FILM COATED ORAL at 08:30

## 2017-08-21 RX ADMIN — MINOCYCLINE HYDROCHLORIDE 100 MG: 100 CAPSULE ORAL at 20:02

## 2017-08-21 RX ADMIN — Medication 250 MG: at 20:02

## 2017-08-21 RX ADMIN — BENZOCAINE, MENTHOL 1 LOZENGE: 15; 3.6 LOZENGE ORAL at 03:28

## 2017-08-21 ASSESSMENT — ACTIVITIES OF DAILY LIVING (ADL)
ORAL_HYGIENE: INDEPENDENT
DRESS: SCRUBS (BEHAVIORAL HEALTH);INDEPENDENT
GROOMING: INDEPENDENT

## 2017-08-21 NOTE — PROGRESS NOTES
Patient isolative to room majority of the evening working on artwork and a journal. On numerous occasions throughout the evening he asked to make copies of his work that staff had already completed for him which he did not have memory of. 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 When Socializing with staff he was calm and approachable though much of his conversation seemed confused and disorganized to the topic. He was independent with ADL's and diet appeared normal.     08/20/17 7293   Behavioral Health   Hallucinations appears responding   Thinking poor concentration;distractable;delusional   Orientation person: oriented;place: oriented;date: oriented;time: oriented   Memory new learning, recall loss   Insight poor   Judgement intact   Eye Contact at examiner   Affect blunted, flat;tense   Mood mood is calm   Physical Appearance/Attire attire appropriate to age and situation   Hygiene well groomed   Suicidality other (see comments)  (none stated or observed)   Self Injury other (see comment)  (none stated or observed)   Elopement (no statements or behaviors noted)   Activity isolative;withdrawn   Speech clear;coherent   Medication Sensitivity no stated side effects;no observed side effects   Psychomotor / Gait balanced;steady   Overt Aggression Scale   Verbal Aggression 0   Aggression against Property 0   Auto-Aggression 0   Physical Aggression 0   Overt Aggression Total Score 0   Sleep/Rest/Relaxation   Day/Evening Time Hours up all shift   Coping/Psychosocial   Verbalized Emotional State hopefulness   Supportive Measures active listening utilized;decision-making supported;positive reinforcement provided;self-responsibility promoted;verbalization of feelings encouraged;relaxation techniques promoted   Trust Relationship/Rapport questions answered;questions encouraged;reassurance provided;thoughts/feelings acknowledged   Daily Care   Activity up ad jomar   Patient  Performed Hygiene shower   Activities of Daily Living   Hygiene/Grooming independent;handwashing;shower   Oral Hygiene independent   Dress scrubs (behavioral health)   Laundry unable to complete   Room Organization independent   Activity   Activity Level of Assistance independent   Behavioral Health Interventions   Psychotic Symptoms maintain safety precautions;maintain safe secure environment;simple, clear language;monitor confusion, memory loss, decision making ability and reorient / intervent as needed        08/20/17 3598   Behavioral Health   Hallucinations appears responding   Thinking poor concentration;distractable;delusional   Orientation person: oriented;place: oriented;date: oriented;time: oriented   Memory new learning, recall loss   Insight poor   Judgement intact   Eye Contact at examiner   Affect blunted, flat;tense   Mood mood is calm   Physical Appearance/Attire attire appropriate to age and situation   Hygiene well groomed   Suicidality other (see comments)  (none stated or observed)   Self Injury other (see comment)  (none stated or observed)   Elopement (no statements or behaviors noted)   Activity isolative;withdrawn   Speech clear;coherent   Medication Sensitivity no stated side effects;no observed side effects   Psychomotor / Gait balanced;steady   Overt Aggression Scale   Verbal Aggression 0   Aggression against Property 0   Auto-Aggression 0   Physical Aggression 0   Overt Aggression Total Score 0   Sleep/Rest/Relaxation   Day/Evening Time Hours up all shift   Coping/Psychosocial   Verbalized Emotional State hopefulness   Supportive Measures active listening utilized;decision-making supported;positive reinforcement provided;self-responsibility promoted;verbalization of feelings encouraged;relaxation techniques promoted   Trust Relationship/Rapport questions answered;questions encouraged;reassurance provided;thoughts/feelings acknowledged   Daily Care   Activity up ad jomar   Patient Performed  Hygiene shower   Activities of Daily Living   Hygiene/Grooming independent;handwashing;shower   Oral Hygiene independent   Dress scrubs (behavioral health)   Laundry unable to complete   Room Organization independent   Activity   Activity Level of Assistance independent   Behavioral Health Interventions   Psychotic Symptoms maintain safety precautions;maintain safe secure environment;simple, clear language;monitor confusion, memory loss, decision making ability and reorient / intervent as needed

## 2017-08-21 NOTE — PROGRESS NOTES
Brief Medicine Note    Medicine following for lab monitoring while patient receiving steroid therapy. 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 He is currently on a steroid taper. Please see initial consult note dated 7/17/17 by Radha Fregoso PA-C for more information.    Repeat CBC and CMP today WNL. Plan to continue monitoring labs qMF while on steroid taper. Please contact medicine if patient is refusing steroids. Notify medicine and dermatology if patient discharging.    Sarah Grove PA-C  Hospitalist Service  [phone]

## 2017-08-21 NOTE — PLAN OF CARE
Problem: Psychotic Symptoms  Intervention: Social and Therapeutic Interv (Psychotic Symptoms)     Pt attended the morning OT groups, kept to himself, worked on his beading project.  Project appears somewhat disorganized, though pt appears very specific in what he is doing - selecting certain beads, tying knots, using crimp beads to attach things together. Quiet, limited interactions with 2 peers or writer.

## 2017-08-22 PROCEDURE — 99232 SBSQ HOSP IP/OBS MODERATE 35: CPT | Performed by: PSYCHIATRY & NEUROLOGY

## 2017-08-22 RX ADMIN — MINOCYCLINE HYDROCHLORIDE 100 MG: 100 CAPSULE ORAL at 08:50

## 2017-08-22 RX ADMIN — LISINOPRIL 20 MG: 20 TABLET ORAL at 08:50

## 2017-08-22 RX ADMIN — PREDNISONE 40 MG: 20 TABLET ORAL at 08:51

## 2017-08-22 RX ADMIN — HALOPERIDOL 10 MG: 10 TABLET ORAL at 08:51

## 2017-08-22 RX ADMIN — HALOPERIDOL 10 MG: 10 TABLET ORAL at 19:41

## 2017-08-22 RX ADMIN — Medication 250 MG: at 19:41

## 2017-08-22 RX ADMIN — Medication 250 MG: at 08:52

## 2017-08-22 RX ADMIN — BENZTROPINE MESYLATE 0.5 MG: 0.5 TABLET ORAL at 19:41

## 2017-08-22 RX ADMIN — BENZTROPINE MESYLATE 0.5 MG: 0.5 TABLET ORAL at 08:51

## 2017-08-22 RX ADMIN — BENZOCAINE, MENTHOL 1 LOZENGE: 15; 3.6 LOZENGE ORAL at 16:46

## 2017-08-22 RX ADMIN — LURASIDONE HYDROCHLORIDE 140 MG: 80 TABLET, FILM COATED ORAL at 08:51

## 2017-08-22 RX ADMIN — BENZOCAINE, MENTHOL 1 LOZENGE: 15; 3.6 LOZENGE ORAL at 05:44

## 2017-08-22 RX ADMIN — MULTIPLE VITAMINS W/ MINERALS TAB 1 TABLET: TAB at 08:51

## 2017-08-22 RX ADMIN — MINOCYCLINE HYDROCHLORIDE 100 MG: 100 CAPSULE ORAL at 19:41

## 2017-08-22 RX ADMIN — ACETAMINOPHEN 650 MG: 325 TABLET, FILM COATED ORAL at 19:41

## 2017-08-22 RX ADMIN — ACETAMINOPHEN 650 MG: 325 TABLET, FILM COATED ORAL at 05:44

## 2017-08-22 ASSESSMENT — ACTIVITIES OF DAILY LIVING (ADL)
LAUNDRY: UNABLE TO COMPLETE
ORAL_HYGIENE: INDEPENDENT
ORAL_HYGIENE: INDEPENDENT
GROOMING: INDEPENDENT
DRESS: SCRUBS (BEHAVIORAL HEALTH)
LAUNDRY: UNABLE TO COMPLETE
GROOMING: INDEPENDENT;PROMPTS
DRESS: SCRUBS (BEHAVIORAL HEALTH)

## 2017-08-22 NOTE — PROGRESS NOTES
Pt awake early this AM requesting Tylenol and Throat Lozenge.  He also c/o patchy scalp redness/rash which is noted.  He says that he has had this in the past and says he uses a dandruff shampoo for treating it.

## 2017-08-22 NOTE — PROGRESS NOTES
This writer called and spoke to Jenna Khalil - pt's -[phone] (cell).  Explained about the psychiatry appointemnt he has coming up.  [facility] her [facility]   The OP CM Jenna is making the referral for this program and he will be placed on the wait list. I told her the pt's new CM moving forward is Beckie Landaverde.

## 2017-08-22 NOTE — PLAN OF CARE
Problem: Psychotic Symptoms  Intervention: Social and Therapeutic Interv (Psychotic Symptoms)     Pt attended 3 of 3 OT groups.    Pt attended the OT discussion group, which involved abstract thought as patients were given a list of 40 road signs with instruction to select one each to represent their past, present,and future.  Pt could only take signs for their literal meaning, and told stories of when he remembered seeing particular signs.     Limited, though appropriate interactions with writer and peers.

## 2017-08-22 NOTE — PLAN OF CARE
Problem: Psychotic Symptoms  Goal: Psychotic Symptoms  Signs and symptoms of listed problems will be absent or manageable.   Outcome: No Change    08/22/17 1417   Psychotic Symptoms   Psychotic Symptoms Assessed all   Psychotic Symptoms Present none   48 hour nursing assessment:  Patient evaluation continues. Assessed mood,anxiety,thoughts and behavior. Is progressing towards goals. Encourage participation in groups and developing healthy coping skills. Will continue to assess. Patient denies auditory or visual  Hallucinations but appears to be responding at times. Refer to daily team meeting notes for individualized plan of care.     Pt mom called during morning med administration. Writer returned call at 0950 with no answer. Message left. Pt requested toiletries and was explained he was unable to receive items.   Pt attended and participated in groups.   Pt was med compliant.   Pt stated he feels he is ready for d/c.

## 2017-08-22 NOTE — PROGRESS NOTES
Pt had a good shift this evening.  Respectful toward staff and other patients.  Spent most of time in lounge having conversations with peers.  PT ate complete dinner and snack this evening.  Nothing else to report at this time.         08/21/17 2307   Behavioral Health   Hallucinations appears responding   Thinking distractable;poor concentration   Orientation date: oriented;person: oriented;place: oriented;time: oriented   Memory baseline memory   Insight poor   Judgement impaired   Eye Contact at examiner   Affect full range affect   Mood mood is calm   Physical Appearance/Attire appears stated age;attire appropriate to age and situation   Hygiene well groomed   Suicidality other (see comments)  (nothing observed)   Self Injury other (see comment)  (nothing observed)   Activity other (see comment)  (social and active in milieu)   Speech clear;coherent   Medication Sensitivity no observed side effects   Psychomotor / Gait balanced;steady   Psycho Education   Type of Intervention 1:1 intervention   Response participates, initiates socially appropriate   Hours 0.5   Treatment Detail (check in)   Activities of Daily Living   Hygiene/Grooming independent   Oral Hygiene independent   Dress scrubs (behavioral health);independent   Room Organization independent   Activity   Activity Level of Assistance independent

## 2017-08-22 NOTE — PROGRESS NOTES
"[facility], [place]   Psychiatric Progress Note        Interim History:   The patient's care was discussed with the treatment team during the daily team meeting and/or staff's chart notes were reviewed.  Staff report pt continues to be disorganized, tense, paranoid, has shown trouble remembering names of staff he knows well, which is unusual for him.  He says today \"I'm feeling better\" and when I ask him to elaborate he says \"more like myself.\"  He says he talked to his mom who told him he needs to get restarted on abx before he goes home and I ask him how he feels about that.  He says \"I feel good about it; it's always worked out in the past.\"  He guesses correctly that I expect him to be here for about 2 more weeks and says he is okay with that as well.  He says \"I'll warn you, my psyche gets weird at first but then it levels out\" referring to starting abx.  After discussion of the indications, risks, benefits, alternatives and consequences of no treatment, the patient is agreeable to starting minocycline 100 mg BID as he has taken in the past.  He agrees that a probiotic would be a good idea to prevent stomach upset.  He denies having any SE's from current meds and says \"I think they're working pretty good.\"  He says he has been sleeping well at night lately.  The patient had no further complaints or requests.            minocycline  100 mg Oral Q12H DONTRELL     saccharomyces boulardii  250 mg Oral BID     haloperidol  10 mg Oral BID     benztropine  0.5 mg Oral BID     lurasidone  140 mg Oral Daily     predniSONE  40 mg Oral Daily    Followed by     [START ON 8/27/2017] predniSONE  30 mg Oral Daily    Followed by     [START ON 9/6/2017] predniSONE  20 mg Oral Daily    Followed by     [START ON 9/16/2017] predniSONE  10 mg Oral Daily    Followed by     [START ON 9/26/2017] predniSONE  5 mg Oral Daily     lisinopril  20 mg Oral Daily     multivitamin, therapeutic  1 tablet Oral Daily       "    Allergies:     Allergies   Allergen Reactions     Carbamazepine Rash     Carbamazepine (Tegretol),.....Rash.          Labs:     Recent Results (from the past 24 hour(s))   Comprehensive metabolic panel    Collection Time: 08/21/17  8:24 AM   Result Value Ref Range    Sodium 140 133 - 144 mmol/L    Potassium 3.8 3.4 - 5.3 mmol/L    Chloride 107 94 - 109 mmol/L    Carbon Dioxide 21 20 - 32 mmol/L    Anion Gap 12 3 - 14 mmol/L    Glucose 118 (H) 70 - 99 mg/dL    Urea Nitrogen 15 7 - 30 mg/dL    Creatinine 0.60 (L) 0.66 - 1.25 mg/dL    GFR Estimate >90 >60 mL/min/1.7m2    GFR Estimate If Black >90 >60 mL/min/1.7m2    Calcium 8.9 8.5 - 10.1 mg/dL    Bilirubin Total 0.4 0.2 - 1.3 mg/dL    Albumin 3.7 3.4 - 5.0 g/dL    Protein Total 7.0 6.8 - 8.8 g/dL    Alkaline Phosphatase 57 40 - 150 U/L    ALT 33 0 - 70 U/L    AST 12 0 - 45 U/L   CBC with platelets differential    Collection Time: 08/21/17  8:24 AM   Result Value Ref Range    WBC 8.8 4.0 - 11.0 10e9/L    RBC Count 5.09 4.4 - 5.9 10e12/L    Hemoglobin 14.9 13.3 - 17.7 g/dL    Hematocrit 45.8 40.0 - 53.0 %    MCV 90 78 - 100 fl    MCH 29.3 26.5 - 33.0 pg    MCHC 32.5 31.5 - 36.5 g/dL    RDW 13.4 10.0 - 15.0 %    Platelet Count 193 150 - 450 10e9/L    Diff Method Automated Method     % Neutrophils 29.4 %    % Lymphocytes 60.4 %    % Monocytes 7.0 %    % Eosinophils 2.5 %    % Basophils 0.2 %    % Immature Granulocytes 0.5 %    Nucleated RBCs 0 0 /100    Absolute Neutrophil 2.6 1.6 - 8.3 10e9/L    Absolute Lymphocytes 5.3 0.8 - 5.3 10e9/L    Absolute Monocytes 0.6 0.0 - 1.3 10e9/L    Absolute Eosinophils 0.2 0.0 - 0.7 10e9/L    Absolute Basophils 0.0 0.0 - 0.2 10e9/L    Abs Immature Granulocytes 0.0 0 - 0.4 10e9/L    Absolute Nucleated RBC 0.0     RBC Morphology Normal     Platelet Estimate Confirming automated cell count           Psychiatric Examination:     Vitals:    08/18/17 0732 08/19/17 0818 08/20/17 0817 08/21/17 0700   BP:       BP Location:       Pulse:      "  Resp: 18 16 16 16   Temp: 97.5  F (36.4  C) 97.2  F (36.2  C) 97.1  F (36.2  C) 98.4  F (36.9  C)   TempSrc: Tympanic Tympanic Tympanic Tympanic   Weight:  112.9 kg (249 lb)         Weight is 249 lbs 0 oz  Body mass index is 31.12 kg/(m^2).    Appearance: dressed in hospital scrubs, appeared as age stated and no apparent distress  Attitude:  cooperative and less guarded but dismissive of symptoms   Eye Contact:  good  Mood:  \"better\"  Affect:  more engaged   Speech:  clear, coherent and normal prosody  Psychomotor Behavior:  no evidence of tardive dyskinesia, dystonia, or tics and intact station, gait and muscle tone  Throught Process:  linear and goal oriented  Associations:  no loose associations  Thought Content:  no evidence of suicidal ideation or homicidal ideation, no auditory hallucinations present, no visual hallucinations present and patient appears to be responding to internal stimuli. Dismissive of symptoms but paranoia is less intense.     Insight:  fair  Judgement:  fair  Oriented to:  time, person, and place  Attention Span and Concentration:  intact  Recent and Remote Memory:  intact     Fund of Knowledge: Adequate         Precautions:     Behavioral Orders   Procedures     Assault precautions     Code 1 - Restrict to Unit     Elopement precautions     Routine Programming     As clinically indicated     Status 15     Every 15 minutes.          Diagnoses:     1.  Schizophrenia, paranoid type, is likely  2.  Psychosis r/t chronic Lyme's probably cannot be definitively ruled out   3.  Cluster B with antisocial traits.  4.  DRESS, rash improved and LFT normalizing.            Plan:     Medications:  --Continue Latuda and Haldol for psychosis  --Start minocycline for suspected chronic lyme's manifesting in cognitive/psychotic sxs  --Add probiotic to prevent stomach upset that pt has had in the past on abx.    -- IM and Dermatology teams continues to follow and monitor re: DRESS.  Prednisone taper is " ongoing and will conclude in early October.    Legal Status and Disposition:  --Commitment with [facility] for Saphris, Zyprexa, haloperidol, Latuda, and Risperidone. Letter was sent to add Seroquel and remove Risperdal and Saphris from [facility]    --Pt's mother wants pt to return home after discharge and this is pt's desire as well.  Pt is willing to do Day Tx at [facility] which has gone well for him in the past.  Will need to see reduced psychosis and irritability, improved organization of thought before discharge can safely occur.      --I called and attempted to speak to Dr. Monte today per mother's request but our call was interrupted and he did not call back.  Will try him again tomorrow.    Oral abx for chronic Lyme's dz is being started despite absence of objective findings supporting active infection based on the following assumption/theory: there is evidence that certain infectious processes can induce inflammation and/or immune response in the brain that mimics primary psychotic illnesses (in fact, many cases of Schizophrenia are believed to be caused by in utero exposure to influenza A), and antibiotics decrease inflammation and affect immune response in ways that are independent from their specific action on bacteria, so that it is possible that an abx could affect a dz process that no longer includes active bacterial infection.  As always, potential risks and benefits need to be weighed, and given this pt's complex hx and severe mental health sxs which are currently preventing him from living in the community, the risk/benefit analysis may tip towards retrial of an abx.  Antipsychotic medication will be continued regardless, however, given presence of [facility] order and a history of adequate response to these meds evidenced in our own medical record.

## 2017-08-23 RX ADMIN — Medication 250 MG: at 19:44

## 2017-08-23 RX ADMIN — MINOCYCLINE HYDROCHLORIDE 100 MG: 100 CAPSULE ORAL at 19:44

## 2017-08-23 RX ADMIN — LURASIDONE HYDROCHLORIDE 140 MG: 80 TABLET, FILM COATED ORAL at 08:04

## 2017-08-23 RX ADMIN — NICOTINE POLACRILEX 2 MG: 2 GUM, CHEWING ORAL at 20:28

## 2017-08-23 RX ADMIN — BENZOCAINE, MENTHOL 1 LOZENGE: 15; 3.6 LOZENGE ORAL at 19:49

## 2017-08-23 RX ADMIN — DIPHENHYDRAMINE HYDROCHLORIDE 50 MG: 25 CAPSULE ORAL at 08:07

## 2017-08-23 RX ADMIN — LISINOPRIL 20 MG: 20 TABLET ORAL at 08:04

## 2017-08-23 RX ADMIN — PREDNISONE 40 MG: 20 TABLET ORAL at 08:05

## 2017-08-23 RX ADMIN — HALOPERIDOL 10 MG: 10 TABLET ORAL at 19:44

## 2017-08-23 RX ADMIN — BENZTROPINE MESYLATE 0.5 MG: 0.5 TABLET ORAL at 19:44

## 2017-08-23 RX ADMIN — HALOPERIDOL 10 MG: 10 TABLET ORAL at 08:04

## 2017-08-23 RX ADMIN — MINOCYCLINE HYDROCHLORIDE 100 MG: 100 CAPSULE ORAL at 08:04

## 2017-08-23 RX ADMIN — NICOTINE POLACRILEX 2 MG: 2 GUM, CHEWING ORAL at 08:07

## 2017-08-23 RX ADMIN — MULTIPLE VITAMINS W/ MINERALS TAB 1 TABLET: TAB at 08:04

## 2017-08-23 RX ADMIN — HYDROXYZINE HYDROCHLORIDE 50 MG: 25 TABLET ORAL at 06:29

## 2017-08-23 RX ADMIN — BENZTROPINE MESYLATE 0.5 MG: 0.5 TABLET ORAL at 08:05

## 2017-08-23 RX ADMIN — Medication 250 MG: at 08:04

## 2017-08-23 RX ADMIN — BENZOCAINE, MENTHOL 1 LOZENGE: 15; 3.6 LOZENGE ORAL at 06:29

## 2017-08-23 ASSESSMENT — ACTIVITIES OF DAILY LIVING (ADL)
DRESS: SCRUBS (BEHAVIORAL HEALTH)
ORAL_HYGIENE: INDEPENDENT
LAUNDRY: UNABLE TO COMPLETE
GROOMING: INDEPENDENT
HYGIENE/GROOMING: INDEPENDENT
DRESS: SCRUBS (BEHAVIORAL HEALTH)
ORAL_HYGIENE: INDEPENDENT

## 2017-08-23 NOTE — PROGRESS NOTES
Jv was calm, pleasant and respectful towards both peers and staff. He was visible in the lounge and interactive with peers appropriately. Pt had dinner, snacks and  lots of coffee during evening shift. Pt appeared responding internal stimuli as he was observed by staff giggling to himself. Pt was redirectable with his demands such copying and he respected when staff reminded about copy making agreements (1 copy per shift). Nothing else to report.        08/22/17 7156   Behavioral Health   Hallucinations appears responding   Thinking poor concentration   Orientation person: oriented;place: oriented   Memory baseline memory   Insight poor   Judgement impaired   Eye Contact at examiner   Affect full range affect   Mood mood is calm   Physical Appearance/Attire disheveled;untidy   Hygiene (adequate;  no body odor)   Suicidality (None)   Self Injury (None)   Activity other (see comment)  (Interactive)   Speech clear;coherent   Psychomotor / Gait steady;balanced   Activities of Daily Living   Hygiene/Grooming independent;prompts   Oral Hygiene independent   Dress scrubs (behavioral health)   Laundry unable to complete   Room Organization independent   Behavioral Health Interventions   Psychotic Symptoms maintain safety precautions;monitor need to revise level of observation;maintain safe secure environment;reality orientation;simple, clear language;redirection of aggressive behaviors;redirection of intrusive behaviors;decrease environmental stimulation;provide emotional support;establish therapeutic relationship;build upon strengths;assess medication adherance;assess patient response to medication;monitor need for prn medication   Social and Therapeutic Interventions (Psychotic Symptoms) encourage effective boundaries with peers;encourage participation in therapeutic groups and milieu activities

## 2017-08-23 NOTE — PROGRESS NOTES
"[facility], [place]   Psychiatric Progress Note        Interim History:   The patient's care was discussed with the treatment team during the daily team meeting and/or staff's chart notes were reviewed.  Staff report pt has been med compliant, has been social, more calm and cooperative this AM.  He c/o itchy scalp and asked for dandruff shampoo.  He says his mood is \"really good\" today and he says he slept well.  He denies having any SE's including stomach upset.  He shows me his scalp which has dry, slightly reddened skin and dandruff, nothing appearing to be a concerning rash.  He says it feels like his usual dandruff, which tends to worsen when he is sick or stressed.  The patient had no further complaints or requests.            minocycline  100 mg Oral Q12H DONTRELL     saccharomyces boulardii  250 mg Oral BID     haloperidol  10 mg Oral BID     benztropine  0.5 mg Oral BID     lurasidone  140 mg Oral Daily     predniSONE  40 mg Oral Daily    Followed by     [START ON 8/27/2017] predniSONE  30 mg Oral Daily    Followed by     [START ON 9/6/2017] predniSONE  20 mg Oral Daily    Followed by     [START ON 9/16/2017] predniSONE  10 mg Oral Daily    Followed by     [START ON 9/26/2017] predniSONE  5 mg Oral Daily     lisinopril  20 mg Oral Daily     multivitamin, therapeutic  1 tablet Oral Daily          Allergies:     Allergies   Allergen Reactions     Carbamazepine Rash     Carbamazepine (Tegretol),.....Rash.          Labs:     No results found for this or any previous visit (from the past 24 hour(s)).       Psychiatric Examination:     Vitals:    08/19/17 0818 08/20/17 0817 08/21/17 0700 08/22/17 0802   BP:       BP Location:       Pulse:       Resp: 16 16 16 16   Temp: 97.2  F (36.2  C) 97.1  F (36.2  C) 98.4  F (36.9  C) 97.3  F (36.3  C)   TempSrc: Tympanic Tympanic Tympanic Tympanic   Weight: 112.9 kg (249 lb)   108.9 kg (240 lb)       Weight is 240 lbs 0 oz  Body mass index is 30 " "kg/(m^2).    Appearance: dressed in hospital scrubs, appeared as age stated and no apparent distress  Attitude:  cooperative and less guarded but dismissive of symptoms   Eye Contact:  good  Mood:  \"better\"  Affect:  more engaged   Speech:  clear, coherent and normal prosody  Psychomotor Behavior:  no evidence of tardive dyskinesia, dystonia, or tics and intact station, gait and muscle tone  Throught Process:  linear and goal oriented  Associations:  no loose associations  Thought Content:  no evidence of suicidal ideation or homicidal ideation, no auditory hallucinations present, no visual hallucinations present and patient appears to be responding to internal stimuli. Dismissive of symptoms but paranoia is less intense.     Insight:  fair  Judgement:  fair  Oriented to:  time, person, and place  Attention Span and Concentration:  intact  Recent and Remote Memory:  intact     Fund of Knowledge: Adequate         Precautions:     Behavioral Orders   Procedures     Assault precautions     Code 1 - Restrict to Unit     Elopement precautions     Routine Programming     As clinically indicated     Status 15     Every 15 minutes.          Diagnoses:     1.  Schizophrenia, paranoid type, is likely  2.  Psychosis r/t chronic Lyme's probably cannot be definitively ruled out   3.  Cluster B with antisocial traits.  4.  DRESS, rash improved and LFT normalizing.            Plan:     Medications:  --Continue Latuda and Haldol for psychosis  --Continue minocycline for suspected chronic lyme's manifesting in cognitive/psychotic sxs, started 8/21  --Continue probiotic to prevent stomach upset that pt has had in the past on abx.    -- IM and Dermatology teams continue to follow and monitor re: DRESS.  Prednisone taper is ongoing and will conclude in early October.    Legal Status and Disposition:  --Commitment with [facility] for Saphris, Zyprexa, haloperidol, Latuda, and Risperidone. Letter was sent to add Seroquel and remove Risperdal " and [facility]    --Pt's mother wants pt to return home after discharge and this is pt's desire as well.  Pt is willing to do Day Tx at [facility] which has gone well for him in the past.  Will need to see reduced psychosis and irritability, improved organization of thought before discharge can safely occur.

## 2017-08-23 NOTE — PLAN OF CARE
"Problem: Psychotic Symptoms  Intervention: Social and Therapeutic Interv (Psychotic Symptoms)     Pt attended 1 of 3 OT groups today.  Has continued to be more appropriate in group - on topic today, did not make any awkward comments or make odd requests.     Pt independently brought up he will have both mental health and medical appointments for a chronic condition he has, states he'll be on \"lots of meds for an uncertain amount of time, which causes some anxiety\".               "

## 2017-08-23 NOTE — PLAN OF CARE
Problem: General Plan of Care (Inpatient Behavioral)  Goal: Team Discussion  Team Plan:   BEHAVIORAL TEAM DISCUSSION     Participants: patrick rodriguez rn, harriett roth [facility]  Progress: Pt is making some progress.   He is med compliant.   He engages a little more with others.   He does appear to be responding to internal stimuli.   He is disorganized.   There is decreased intensity with his paranoia.   He does appear tense.  There is some memory impairment.     Continued Stay Criteria/Rationale:  As describe by above symptoms.  Medical/Physical: per dr mahmood and internal medicine  Precautions:   Behavioral Orders   Procedures     Assault precautions     Code 1 - Restrict to Unit     Elopement precautions     Routine Programming       As clinically indicated     Status 15       Every 15 minutes.     Plan: Pt's  Mother wants patient to be discharged back to her home.   We're working on arranging Day Tx at [facility].  Working on arranging outpatient psychiatry.  He is on the list for [facility].   A [facility] can be considered if pt improves enough.  Rationale for change in precautions or plan: no change at this moment

## 2017-08-24 PROCEDURE — 99232 SBSQ HOSP IP/OBS MODERATE 35: CPT | Mod: GC | Performed by: PSYCHIATRY & NEUROLOGY

## 2017-08-24 RX ORDER — IBUPROFEN 400 MG/1
400 TABLET, FILM COATED ORAL EVERY 6 HOURS PRN
Status: DISCONTINUED | OUTPATIENT
Start: 2017-08-24 | End: 2017-09-05 | Stop reason: HOSPADM

## 2017-08-24 RX ADMIN — BENZOCAINE, MENTHOL 1 LOZENGE: 15; 3.6 LOZENGE ORAL at 21:21

## 2017-08-24 RX ADMIN — ACETAMINOPHEN 650 MG: 325 TABLET, FILM COATED ORAL at 08:45

## 2017-08-24 RX ADMIN — QUETIAPINE FUMARATE 200 MG: 100 TABLET, FILM COATED ORAL at 03:44

## 2017-08-24 RX ADMIN — MINOCYCLINE HYDROCHLORIDE 100 MG: 100 CAPSULE ORAL at 19:12

## 2017-08-24 RX ADMIN — RANITIDINE 150 MG: 150 TABLET ORAL at 19:13

## 2017-08-24 RX ADMIN — LURASIDONE HYDROCHLORIDE 140 MG: 80 TABLET, FILM COATED ORAL at 07:34

## 2017-08-24 RX ADMIN — MINOCYCLINE HYDROCHLORIDE 100 MG: 100 CAPSULE ORAL at 07:34

## 2017-08-24 RX ADMIN — Medication 250 MG: at 19:12

## 2017-08-24 RX ADMIN — PREDNISONE 40 MG: 20 TABLET ORAL at 07:35

## 2017-08-24 RX ADMIN — NICOTINE 1 PATCH: 7 PATCH TRANSDERMAL at 13:13

## 2017-08-24 RX ADMIN — HALOPERIDOL 10 MG: 10 TABLET ORAL at 19:13

## 2017-08-24 RX ADMIN — HALOPERIDOL 10 MG: 10 TABLET ORAL at 07:35

## 2017-08-24 RX ADMIN — Medication 250 MG: at 07:34

## 2017-08-24 RX ADMIN — BENZOCAINE, MENTHOL 1 LOZENGE: 15; 3.6 LOZENGE ORAL at 03:44

## 2017-08-24 RX ADMIN — BENZOCAINE, MENTHOL 1 LOZENGE: 15; 3.6 LOZENGE ORAL at 05:19

## 2017-08-24 RX ADMIN — TRAZODONE HYDROCHLORIDE 50 MG: 50 TABLET ORAL at 21:43

## 2017-08-24 RX ADMIN — BENZTROPINE MESYLATE 0.5 MG: 0.5 TABLET ORAL at 07:34

## 2017-08-24 RX ADMIN — LISINOPRIL 20 MG: 20 TABLET ORAL at 07:34

## 2017-08-24 RX ADMIN — RANITIDINE 150 MG: 150 TABLET ORAL at 13:10

## 2017-08-24 RX ADMIN — NICOTINE POLACRILEX 2 MG: 2 GUM, CHEWING ORAL at 16:44

## 2017-08-24 RX ADMIN — BENZOCAINE, MENTHOL 1 LOZENGE: 15; 3.6 LOZENGE ORAL at 19:12

## 2017-08-24 RX ADMIN — NICOTINE POLACRILEX 2 MG: 2 GUM, CHEWING ORAL at 19:28

## 2017-08-24 RX ADMIN — BENZTROPINE MESYLATE 0.5 MG: 0.5 TABLET ORAL at 19:12

## 2017-08-24 RX ADMIN — BENZOCAINE, MENTHOL 1 LOZENGE: 15; 3.6 LOZENGE ORAL at 13:10

## 2017-08-24 RX ADMIN — NICOTINE POLACRILEX 2 MG: 2 GUM, CHEWING ORAL at 08:45

## 2017-08-24 RX ADMIN — MULTIPLE VITAMINS W/ MINERALS TAB 1 TABLET: TAB at 07:34

## 2017-08-24 ASSESSMENT — ACTIVITIES OF DAILY LIVING (ADL)
ORAL_HYGIENE: INDEPENDENT
DRESS: SCRUBS (BEHAVIORAL HEALTH)
DRESS: STREET CLOTHES
ORAL_HYGIENE: INDEPENDENT
LAUNDRY: UNABLE TO COMPLETE
LAUNDRY: UNABLE TO COMPLETE
HYGIENE/GROOMING: INDEPENDENT
GROOMING: INDEPENDENT

## 2017-08-24 NOTE — PROGRESS NOTES
Pt was present and active in milieu. Pt was pleasant and cooperative and made several remarks about looking forward to discharging from the hospital. Pt was not responding to internal stimuli as frequently tonight but did have moments where he was laughing to self and would not answer questions about what he was experiencing. Pts affect was full range today, mood was calm. No behavioral concerns to report this evening.      08/23/17 4518   Behavioral Health   Hallucinations appears responding   Thinking poor concentration   Orientation person: oriented;place: oriented;date: oriented;time: oriented   Memory baseline memory   Insight poor   Judgement impaired   Eye Contact at examiner   Affect full range affect   Mood mood is calm   Physical Appearance/Attire disheveled   Hygiene well groomed   Suicidality other (see comments)  (none noted)   Self Injury other (see comment)  (none noted)   Activity other (see comment)  (present and active in milieu)   Speech clear;coherent   Medication Sensitivity no observed side effects;no stated side effects   Psychomotor / Gait balanced;steady   Activities of Daily Living   Hygiene/Grooming independent   Oral Hygiene independent   Dress scrubs (behavioral health)   Laundry unable to complete   Room Organization independent   Behavioral Health Interventions   Psychotic Symptoms maintain safety precautions;monitor need to revise level of observation;maintain safe secure environment;reality orientation;simple, clear language;redirection of intrusive behaviors;decrease environmental stimulation;provide emotional support;establish therapeutic relationship;assist with developing & utilizing healthy coping strategies;build upon strengths;monitor need for prn medication   Social and Therapeutic Interventions (Psychotic Symptoms) encourage socialization with peers;encourage participation in therapeutic groups and milieu activities;encourage effective boundaries with peers

## 2017-08-24 NOTE — PROGRESS NOTES
"[facility], [place]   Psychiatric Progress Note        Interim History:   The patient's care was discussed with the treatment team during the daily team meeting and/or staff's chart notes were reviewed.  Per staff report, pt was calm, redirectable, pleasant with staff.  Interested in participating in daily programming.  Attended 1 of 3 OT groups.  More on-topic, no awkward comments or odd requests.  Pt expressed concern for being \"on lots of meds for an uncertain amount of time, which causes some anxiety.\"  Pt looks forward to d/c.  Pt noted to have reduced RIS, but some laughing to himself w/o explanation.  Slept 4 hours, difficulty falling asleep.    Pt was seen today in his room.  Pt expresses interest in medical marijuana, wonders whether treatment team will mind if he seeks it while committed.  Team explains that MJ has very limited indications in the MH context, would not be appropriate.  Pt requests additional pain reliever options eg. Ibuprofen.  Pt has been experiencing some stomach discomfort of unknown origin, is amenable to a trial of Zantac.  Pt requests nicoderm patch, would minimize his dependence upon PO nicotine.  The patient had no further complaints or requests.            ranitidine  150 mg Oral BID     nicotine   Transdermal Q8H     [START ON 8/25/2017] nicotine   Transdermal Daily     nicotine  1 patch Transdermal Daily     minocycline  100 mg Oral Q12H DONTRELL     saccharomyces boulardii  250 mg Oral BID     haloperidol  10 mg Oral BID     benztropine  0.5 mg Oral BID     lurasidone  140 mg Oral Daily     predniSONE  40 mg Oral Daily    Followed by     [START ON 8/27/2017] predniSONE  30 mg Oral Daily    Followed by     [START ON 9/6/2017] predniSONE  20 mg Oral Daily    Followed by     [START ON 9/16/2017] predniSONE  10 mg Oral Daily    Followed by     [START ON 9/26/2017] predniSONE  5 mg Oral Daily     lisinopril  20 mg Oral Daily     multivitamin, therapeutic  1 " tablet Oral Daily          Allergies:     Allergies   Allergen Reactions     Carbamazepine Rash     Carbamazepine (Tegretol),.....Rash.          Labs:     No results found for this or any previous visit (from the past 24 hour(s)).       Psychiatric Examination:     Vitals:    08/21/17 0700 08/22/17 0802 08/23/17 0724 08/24/17 0800   BP:       BP Location:       Pulse:       Resp: 16 16 16 18   Temp: 98.4  F (36.9  C) 97.3  F (36.3  C) 97.9  F (36.6  C) 98.4  F (36.9  C)   TempSrc: Tympanic Tympanic Tympanic    Weight:  108.9 kg (240 lb)  112.5 kg (248 lb)       Weight is 248 lbs 0 oz  Body mass index is 31 kg/(m^2).    Appearance: dressed in hospital scrubs, appeared as age stated and no apparent distress  Attitude:  cooperative and less guarded but dismissive of symptoms   Eye Contact:  good, sometimes intense  Mood:  good  Affect:  more engaged   Speech:  clear, coherent and normal prosody  Psychomotor Behavior:  no evidence of tardive dyskinesia, dystonia, or tics and intact station, gait and muscle tone  Throught Process:  linear, goal oriented and slightly perseverative on medical MJ  Associations:  no loose associations  Thought Content:  no evidence of suicidal ideation or homicidal ideation, no auditory hallucinations present and no visual hallucinations present. Dismissive of symptoms but paranoia is less intense.     Insight:  fair  Judgement:  fair  Oriented to:  time, person, and place  Attention Span and Concentration:  intact  Recent and Remote Memory:  intact     Fund of Knowledge: Adequate         Precautions:     Behavioral Orders   Procedures     Assault precautions     Code 1 - Restrict to Unit     Routine Programming     As clinically indicated     Status 15     Every 15 minutes.          Diagnoses:     1.  Schizophrenia, paranoid type, is likely  2.  Psychosis r/t chronic Lyme's probably cannot be definitively ruled out   3.  Cluster B with antisocial traits.  4.  DRESS, rash improved and LFT  normalizing.            Plan:     Medications:  --Continue Latuda and Haldol for psychosis  --Continue minocycline for suspected chronic Lyme's manifesting in cognitive/psychotic sxs, started 8/21  --Continue probiotic to prevent stomach upset that pt has had in the past on abx.  --Started Zantac, nicoderm, ibuprofen per request.  -- IM and Dermatology teams continue to follow and monitor re: DRESS.  Prednisone taper is ongoing and will conclude in early October.    Legal Status and Disposition:  --Commitment with [facility] for Saphris, Zyprexa, haloperidol, Latuda, and Risperidone. Letter was sent to add Seroquel and remove Risperdal and Saphris from [facility]    --Pt's mother wants pt to return home after discharge and this is pt's desire as well.  Pt is willing to do Day Tx at [facility] which has gone well for him in the past.  Will need to see reduced psychosis and irritability, improved organization of thought before discharge can safely occur.        Attestation:  This documentation accurately reflects the services I personally performed and treatment decisions made by me in consultation with the attending physician.    Boubacar Gilmore MD, PGY-2 Psychiatry resident  Pager [phone]       This patient has been seen and evaluated by me, Denny Gaviria MD on the date of this note. I have discussed this patient with the the resident and I agree with the findings and plan in this note. I have reviewed today's vital signs, medications, labs and imaging.

## 2017-08-25 LAB
ALBUMIN SERPL-MCNC: 3.7 G/DL (ref 3.4–5)
ALP SERPL-CCNC: 55 U/L (ref 40–150)
ALT SERPL W P-5'-P-CCNC: 42 U/L (ref 0–70)
ANION GAP SERPL CALCULATED.3IONS-SCNC: 10 MMOL/L (ref 3–14)
AST SERPL W P-5'-P-CCNC: 13 U/L (ref 0–45)
BASOPHILS # BLD AUTO: 0 10E9/L (ref 0–0.2)
BASOPHILS NFR BLD AUTO: 0.2 %
BILIRUB SERPL-MCNC: 0.4 MG/DL (ref 0.2–1.3)
BUN SERPL-MCNC: 13 MG/DL (ref 7–30)
CALCIUM SERPL-MCNC: 8.8 MG/DL (ref 8.5–10.1)
CHLORIDE SERPL-SCNC: 107 MMOL/L (ref 94–109)
CO2 SERPL-SCNC: 25 MMOL/L (ref 20–32)
CREAT SERPL-MCNC: 0.59 MG/DL (ref 0.66–1.25)
DIFFERENTIAL METHOD BLD: NORMAL
EOSINOPHIL # BLD AUTO: 0.1 10E9/L (ref 0–0.7)
EOSINOPHIL NFR BLD AUTO: 1.3 %
ERYTHROCYTE [DISTWIDTH] IN BLOOD BY AUTOMATED COUNT: 13.5 % (ref 10–15)
GFR SERPL CREATININE-BSD FRML MDRD: >90 ML/MIN/1.7M2
GLUCOSE SERPL-MCNC: 83 MG/DL (ref 70–99)
HCT VFR BLD AUTO: 44 % (ref 40–53)
HGB BLD-MCNC: 14.5 G/DL (ref 13.3–17.7)
IMM GRANULOCYTES # BLD: 0 10E9/L (ref 0–0.4)
IMM GRANULOCYTES NFR BLD: 0.4 %
LYMPHOCYTES # BLD AUTO: 4.9 10E9/L (ref 0.8–5.3)
LYMPHOCYTES NFR BLD AUTO: 52.4 %
MCH RBC QN AUTO: 29.1 PG (ref 26.5–33)
MCHC RBC AUTO-ENTMCNC: 33 G/DL (ref 31.5–36.5)
MCV RBC AUTO: 88 FL (ref 78–100)
MONOCYTES # BLD AUTO: 0.8 10E9/L (ref 0–1.3)
MONOCYTES NFR BLD AUTO: 8.8 %
NEUTROPHILS # BLD AUTO: 3.4 10E9/L (ref 1.6–8.3)
NEUTROPHILS NFR BLD AUTO: 36.9 %
NRBC # BLD AUTO: 0 10*3/UL
NRBC BLD AUTO-RTO: 0 /100
PLATELET # BLD AUTO: 202 10E9/L (ref 150–450)
POTASSIUM SERPL-SCNC: 4 MMOL/L (ref 3.4–5.3)
PROT SERPL-MCNC: 7 G/DL (ref 6.8–8.8)
RBC # BLD AUTO: 4.98 10E12/L (ref 4.4–5.9)
SODIUM SERPL-SCNC: 142 MMOL/L (ref 133–144)
WBC # BLD AUTO: 9.3 10E9/L (ref 4–11)

## 2017-08-25 PROCEDURE — 99232 SBSQ HOSP IP/OBS MODERATE 35: CPT | Mod: GC | Performed by: PSYCHIATRY & NEUROLOGY

## 2017-08-25 RX ADMIN — NICOTINE POLACRILEX 2 MG: 2 GUM, CHEWING ORAL at 08:20

## 2017-08-25 RX ADMIN — Medication 250 MG: at 08:11

## 2017-08-25 RX ADMIN — BENZOCAINE, MENTHOL 1 LOZENGE: 15; 3.6 LOZENGE ORAL at 08:59

## 2017-08-25 RX ADMIN — ACETAMINOPHEN 650 MG: 325 TABLET, FILM COATED ORAL at 18:55

## 2017-08-25 RX ADMIN — LURASIDONE HYDROCHLORIDE 140 MG: 80 TABLET, FILM COATED ORAL at 08:10

## 2017-08-25 RX ADMIN — BENZOCAINE, MENTHOL 1 LOZENGE: 15; 3.6 LOZENGE ORAL at 10:09

## 2017-08-25 RX ADMIN — BENZTROPINE MESYLATE 0.5 MG: 0.5 TABLET ORAL at 08:11

## 2017-08-25 RX ADMIN — HALOPERIDOL 10 MG: 10 TABLET ORAL at 18:56

## 2017-08-25 RX ADMIN — PREDNISONE 40 MG: 20 TABLET ORAL at 08:10

## 2017-08-25 RX ADMIN — RANITIDINE 150 MG: 150 TABLET ORAL at 08:11

## 2017-08-25 RX ADMIN — Medication 250 MG: at 18:55

## 2017-08-25 RX ADMIN — MINOCYCLINE HYDROCHLORIDE 100 MG: 100 CAPSULE ORAL at 18:56

## 2017-08-25 RX ADMIN — HALOPERIDOL 10 MG: 10 TABLET ORAL at 08:11

## 2017-08-25 RX ADMIN — SELENIUM SULFIDE 1 ML: 10 SHAMPOO TOPICAL at 19:15

## 2017-08-25 RX ADMIN — BENZTROPINE MESYLATE 0.5 MG: 0.5 TABLET ORAL at 18:56

## 2017-08-25 RX ADMIN — MULTIPLE VITAMINS W/ MINERALS TAB 1 TABLET: TAB at 08:11

## 2017-08-25 RX ADMIN — LISINOPRIL 20 MG: 20 TABLET ORAL at 08:10

## 2017-08-25 RX ADMIN — MINOCYCLINE HYDROCHLORIDE 100 MG: 100 CAPSULE ORAL at 08:11

## 2017-08-25 RX ADMIN — NICOTINE 1 PATCH: 7 PATCH TRANSDERMAL at 08:10

## 2017-08-25 RX ADMIN — RANITIDINE 150 MG: 150 TABLET ORAL at 18:55

## 2017-08-25 ASSESSMENT — ACTIVITIES OF DAILY LIVING (ADL)
GROOMING: INDEPENDENT
DRESS: SCRUBS (BEHAVIORAL HEALTH)
ORAL_HYGIENE: INDEPENDENT

## 2017-08-25 NOTE — PROGRESS NOTES
"[facility], [place]   Psychiatric Progress Note        Interim History:   The patient's care was discussed with the treatment team during the daily team meeting and/or staff's chart notes were reviewed.  Per staff report, pt was visible in the milieu, socializing w/ peers, no agitation, was med-compliant, no paranoid or delusional statements, no SI/SIB.    Pt was seen today in his room.  Pt reports \"doing fine\" today, slept well, and the Zantac \"helped a lot.\"  Pt reports states that he thinks he had a Jarisch-Herxheimer reaction last night, experienced as increased irritability and increased cough; no sweating, chills or body aches.  Pt also stated that he thought he needed hyper-oxygenated saline or nitrous saline, and this would help pt's condition.  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  Pt has thought of this problem for years and thinks this solution will help reduced dependence on poor lungs.  Pt notes that when he wakes in the morning, he needs to do deep breathing and mindfulness exercises to wake, so this oxygenation would help his breathing.   The patient had no further complaints or requests.     This afternoon this writer spoke with patient's mother.  Pt's mother was concerned that pt was upset with her yesterday for letting her phone battery run out of power.  Mom asks whether Dr. Gaviria has yet contacted a particular  at [facility] who is an expert in tick-borne illnesses.  Mom reports that she's learned that Babesiosis is also becoming prevalent in this area, and she thinks her son could be co-infected with both Lyme and Babesia.  Mom explains that pt's psychiatric Sx worsened despite being on steroids and Lyme antibiotics; babesiosis requires different specific meds, otherwise the infection would never go away, would just remit during Lyme Txs.  Mom notes that atovaquone compounds (commonly used for malaria) and azithromycin are " needed to treat babesiosis.  Mom would like pt tested for babesiosis if possible.  Mom also attributes pt's irritability last night, in the setting of 3+ days of minocycline, to be evidence of possible Jarisch-Herxheimer reaction.    Mom is concerned that the package insert for lurasidone includes possible S/E of dementia; mom think's pt's irritability could be due to lurasidone-induced dementia, is curious whether this dementia is permanent, and whether we can MRI pt's brain to detect this.  Finally, mom reports that the pt stated he was going to be d/c'ed on Wednesday, was looking for confirmation.  This writer said that the team would never promise some future d/c date like this; the pt is assessed daily to see whether the pt meets the hospitalization criteria of harm to self or others.           ranitidine  150 mg Oral BID     nicotine   Transdermal Q8H     nicotine   Transdermal Daily     nicotine  1 patch Transdermal Daily     minocycline  100 mg Oral Q12H [facility]     saccharomyces boulardii  250 mg Oral BID     haloperidol  10 mg Oral BID     benztropine  0.5 mg Oral BID     lurasidone  140 mg Oral Daily     predniSONE  40 mg Oral Daily    Followed by     [START ON 8/27/2017] predniSONE  30 mg Oral Daily    Followed by     [START ON 9/6/2017] predniSONE  20 mg Oral Daily    Followed by     [START ON 9/16/2017] predniSONE  10 mg Oral Daily    Followed by     [START ON 9/26/2017] predniSONE  5 mg Oral Daily     lisinopril  20 mg Oral Daily     multivitamin, therapeutic  1 tablet Oral Daily          Allergies:     Allergies   Allergen Reactions     Carbamazepine Rash     Carbamazepine (Tegretol),.....Rash.          Labs:     Recent Results (from the past 24 hour(s))   Comprehensive metabolic panel    Collection Time: 08/25/17  7:36 AM   Result Value Ref Range    Sodium 142 133 - 144 mmol/L    Potassium 4.0 3.4 - 5.3 mmol/L    Chloride 107 94 - 109 mmol/L    Carbon Dioxide 25 20 - 32 mmol/L    Anion Gap 10 3 - 14  mmol/L    Glucose 83 70 - 99 mg/dL    Urea Nitrogen 13 7 - 30 mg/dL    Creatinine 0.59 (L) 0.66 - 1.25 mg/dL    GFR Estimate >90 >60 mL/min/1.7m2    GFR Estimate If Black >90 >60 mL/min/1.7m2    Calcium 8.8 8.5 - 10.1 mg/dL    Bilirubin Total 0.4 0.2 - 1.3 mg/dL    Albumin 3.7 3.4 - 5.0 g/dL    Protein Total 7.0 6.8 - 8.8 g/dL    Alkaline Phosphatase 55 40 - 150 U/L    ALT 42 0 - 70 U/L    AST 13 0 - 45 U/L   CBC with platelets differential    Collection Time: 08/25/17  7:36 AM   Result Value Ref Range    WBC 9.3 4.0 - 11.0 10e9/L    RBC Count 4.98 4.4 - 5.9 10e12/L    Hemoglobin 14.5 13.3 - 17.7 g/dL    Hematocrit 44.0 40.0 - 53.0 %    MCV 88 78 - 100 fl    MCH 29.1 26.5 - 33.0 pg    MCHC 33.0 31.5 - 36.5 g/dL    RDW 13.5 10.0 - 15.0 %    Platelet Count 202 150 - 450 10e9/L    Diff Method Automated Method     % Neutrophils 36.9 %    % Lymphocytes 52.4 %    % Monocytes 8.8 %    % Eosinophils 1.3 %    % Basophils 0.2 %    % Immature Granulocytes 0.4 %    Nucleated RBCs 0 0 /100    Absolute Neutrophil 3.4 1.6 - 8.3 10e9/L    Absolute Lymphocytes 4.9 0.8 - 5.3 10e9/L    Absolute Monocytes 0.8 0.0 - 1.3 10e9/L    Absolute Eosinophils 0.1 0.0 - 0.7 10e9/L    Absolute Basophils 0.0 0.0 - 0.2 10e9/L    Abs Immature Granulocytes 0.0 0 - 0.4 10e9/L    Absolute Nucleated RBC 0.0           Psychiatric Examination:     Vitals:    08/22/17 0802 08/23/17 0724 08/24/17 0800 08/25/17 0700   BP:       BP Location:       Pulse:       Resp: 16 16 18 16   Temp: 97.3  F (36.3  C) 97.9  F (36.6  C) 98.4  F (36.9  C) 97.7  F (36.5  C)   TempSrc: Tympanic Tympanic  Tympanic   Weight: 108.9 kg (240 lb)  112.5 kg (248 lb)        Weight is 248 lbs 0 oz  Body mass index is 31 kg/(m^2).    Appearance: dressed in hospital scrubs, appeared as age stated and no apparent distress  Attitude:  cooperative and less guarded but dismissive of symptoms   Eye Contact:  good, sometimes intense  Mood:  good  Affect:  more engaged   Speech:  clear, coherent  and normal prosody  Psychomotor Behavior:  no evidence of tardive dyskinesia, dystonia, or tics and intact station, gait and muscle tone  Throught Process:  linear, goal oriented and slightly perseverative on medical MJ  Associations:  no loose associations  Thought Content:  no evidence of suicidal ideation or homicidal ideation, no auditory hallucinations present and no visual hallucinations present. Dismissive of symptoms but paranoia is less intense.     Insight:  fair  Judgement:  fair  Oriented to:  time, person, and place  Attention Span and Concentration:  intact  Recent and Remote Memory:  intact     Fund of Knowledge: Adequate         Precautions:     Behavioral Orders   Procedures     Assault precautions     Code 1 - Restrict to Unit     Routine Programming     As clinically indicated     Status 15     Every 15 minutes.          Diagnoses:     1.  Schizophrenia, paranoid type, is likely  2.  Psychosis r/t chronic Lyme's probably cannot be definitively ruled out   3.  Cluster B with antisocial traits.  4.  DRESS, rash improved and LFT normalizing.            Plan:     Medications:  --Continue Latuda and Haldol for psychosis  --Continue minocycline for suspected chronic Lyme's manifesting in cognitive/psychotic sxs, started 8/21  --Continue probiotic to prevent stomach upset that pt has had in the past on abx.  --Started Zantac, nicoderm, ibuprofen per request.  -- IM and Dermatology teams continue to follow and monitor re: DRESS.  Prednisone taper is ongoing and will conclude in early October.    Legal Status and Disposition:  --Commitment with [facility] for Saphris, Zyprexa, haloperidol, Latuda, and Risperidone. Letter was sent to add Seroquel and remove Risperdal and Saphris from [facility]    --Pt's mother wants pt to return home after discharge and this is pt's desire as well.  Pt is willing to do Day Tx at [email or web address] which has gone well for him in the past.  Will need to see reduced psychosis and  irritability, improved organization of thought before discharge can safely occur.        Attestation:  This documentation accurately reflects the services I personally performed and treatment decisions made by me in consultation with the attending physician.    Boubacar Gilmore MD, PGY-2 Psychiatry resident  Pager [phone]       This patient has been seen and evaluated by me, Denny Gaviria MD on the date of this note. I have discussed this patient with the the resident and I agree with the findings and plan in this note. I have reviewed today's vital signs, medications, labs and imaging.

## 2017-08-25 NOTE — PROGRESS NOTES
Brief Medicine Note     Medicine following for lab monitoring while patient receiving steroid therapy.     Repeat CBC and CMP today unremarkable. Plan to continue monitoring labs qMF while on steroid taper. Please contact medicine if patient is refusing steroids. Notify medicine and dermatology if patient discharging.     Sarah Grove PA-C  Hospitalist Service  [phone]

## 2017-08-25 NOTE — PLAN OF CARE
Problem: Psychotic Symptoms  Goal: Psychotic Symptoms  Signs and symptoms of listed problems will be absent or manageable.   Outcome: No Change  Jv was visible in milieu socializing with peers. No agitating behaviors observed. He took his scheduled medication without any decision. No paranoia or delusional statement this evening. No SI/SIB.

## 2017-08-26 RX ADMIN — MINOCYCLINE HYDROCHLORIDE 100 MG: 100 CAPSULE ORAL at 08:34

## 2017-08-26 RX ADMIN — HALOPERIDOL 10 MG: 10 TABLET ORAL at 19:57

## 2017-08-26 RX ADMIN — PREDNISONE 40 MG: 20 TABLET ORAL at 08:34

## 2017-08-26 RX ADMIN — NICOTINE POLACRILEX 2 MG: 2 GUM, CHEWING ORAL at 17:25

## 2017-08-26 RX ADMIN — BENZOCAINE, MENTHOL 1 LOZENGE: 15; 3.6 LOZENGE ORAL at 06:29

## 2017-08-26 RX ADMIN — NICOTINE POLACRILEX 2 MG: 2 GUM, CHEWING ORAL at 20:40

## 2017-08-26 RX ADMIN — Medication 250 MG: at 19:57

## 2017-08-26 RX ADMIN — BENZTROPINE MESYLATE 0.5 MG: 0.5 TABLET ORAL at 08:34

## 2017-08-26 RX ADMIN — QUETIAPINE FUMARATE 100 MG: 100 TABLET, FILM COATED ORAL at 22:44

## 2017-08-26 RX ADMIN — BENZOCAINE, MENTHOL 1 LOZENGE: 15; 3.6 LOZENGE ORAL at 21:39

## 2017-08-26 RX ADMIN — MINOCYCLINE HYDROCHLORIDE 100 MG: 100 CAPSULE ORAL at 19:57

## 2017-08-26 RX ADMIN — BENZTROPINE MESYLATE 0.5 MG: 0.5 TABLET ORAL at 19:57

## 2017-08-26 RX ADMIN — MULTIPLE VITAMINS W/ MINERALS TAB 1 TABLET: TAB at 08:35

## 2017-08-26 RX ADMIN — TRAZODONE HYDROCHLORIDE 50 MG: 50 TABLET ORAL at 21:39

## 2017-08-26 RX ADMIN — NICOTINE 1 PATCH: 7 PATCH TRANSDERMAL at 08:33

## 2017-08-26 RX ADMIN — Medication 250 MG: at 08:34

## 2017-08-26 RX ADMIN — LISINOPRIL 20 MG: 20 TABLET ORAL at 08:35

## 2017-08-26 RX ADMIN — RANITIDINE 150 MG: 150 TABLET ORAL at 08:35

## 2017-08-26 RX ADMIN — HALOPERIDOL 10 MG: 10 TABLET ORAL at 08:35

## 2017-08-26 RX ADMIN — RANITIDINE 150 MG: 150 TABLET ORAL at 19:57

## 2017-08-26 RX ADMIN — BENZOCAINE, MENTHOL 1 LOZENGE: 15; 3.6 LOZENGE ORAL at 20:40

## 2017-08-26 RX ADMIN — BENZOCAINE, MENTHOL 1 LOZENGE: 15; 3.6 LOZENGE ORAL at 17:20

## 2017-08-26 RX ADMIN — LURASIDONE HYDROCHLORIDE 140 MG: 80 TABLET, FILM COATED ORAL at 08:34

## 2017-08-26 ASSESSMENT — ACTIVITIES OF DAILY LIVING (ADL)
LAUNDRY: UNABLE TO COMPLETE
DRESS: SCRUBS (BEHAVIORAL HEALTH)
ORAL_HYGIENE: PROMPTS
GROOMING: INDEPENDENT

## 2017-08-26 NOTE — PLAN OF CARE
"Problem: Psychotic Symptoms  Goal: Psychotic Symptoms  Signs and symptoms of listed problems will be absent or manageable.   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 Affect is blunted and at times incongruent as pt appears to have a grin on his face stating \"i'm good. All good.\" Denies physical health concerns and SEs to medication. Denies further questions or concerns this shift. Will continue to monitor closely and reassess.       "

## 2017-08-26 NOTE — PLAN OF CARE
"Problem: Psychotic Symptoms  Goal: Psychotic Symptoms  Signs and symptoms of listed problems will be absent or manageable.   Outcome: No Change  Pt spent most of the evening in his room.  When he was present in the milieu he was overall pleasant and cooperative. Pt was verbally intrusive with staff, giving them medical advice about what foods they need to eat, offering to write down his recommendations.  He insisted with one staff that she needed to add \"chlorophyll\" to her coffee.   He denies SI/SIB or any psychotic symptoms and reports that he's feeling much better.        "

## 2017-08-26 NOTE — PLAN OF CARE
Problem: Psychotic Symptoms  Goal: Psychotic Symptoms  Signs and symptoms of listed problems will be absent or manageable.      Attended OT group. Was quick to be involved in activity focused on using the 5 senses for calming. He elaborated on comments, introduced himself to author and was social with others. He offered to speak up first and offered multiple answers and ideas in context and pertinent to topic. Pleasant.

## 2017-08-27 RX ADMIN — HALOPERIDOL 10 MG: 10 TABLET ORAL at 08:51

## 2017-08-27 RX ADMIN — MINOCYCLINE HYDROCHLORIDE 100 MG: 100 CAPSULE ORAL at 19:51

## 2017-08-27 RX ADMIN — BENZOCAINE, MENTHOL 1 LOZENGE: 15; 3.6 LOZENGE ORAL at 21:27

## 2017-08-27 RX ADMIN — BENZOCAINE, MENTHOL 1 LOZENGE: 15; 3.6 LOZENGE ORAL at 17:21

## 2017-08-27 RX ADMIN — TRAZODONE HYDROCHLORIDE 50 MG: 50 TABLET ORAL at 20:01

## 2017-08-27 RX ADMIN — PREDNISONE 30 MG: 20 TABLET ORAL at 08:51

## 2017-08-27 RX ADMIN — BENZOCAINE, MENTHOL 1 LOZENGE: 15; 3.6 LOZENGE ORAL at 13:04

## 2017-08-27 RX ADMIN — RANITIDINE 150 MG: 150 TABLET ORAL at 08:51

## 2017-08-27 RX ADMIN — BENZTROPINE MESYLATE 0.5 MG: 0.5 TABLET ORAL at 08:51

## 2017-08-27 RX ADMIN — BENZOCAINE, MENTHOL 1 LOZENGE: 15; 3.6 LOZENGE ORAL at 07:12

## 2017-08-27 RX ADMIN — LISINOPRIL 20 MG: 20 TABLET ORAL at 08:51

## 2017-08-27 RX ADMIN — NICOTINE POLACRILEX 2 MG: 2 GUM, CHEWING ORAL at 07:12

## 2017-08-27 RX ADMIN — QUETIAPINE FUMARATE 200 MG: 100 TABLET, FILM COATED ORAL at 20:01

## 2017-08-27 RX ADMIN — NICOTINE 1 PATCH: 7 PATCH TRANSDERMAL at 08:50

## 2017-08-27 RX ADMIN — MULTIPLE VITAMINS W/ MINERALS TAB 1 TABLET: TAB at 08:51

## 2017-08-27 RX ADMIN — HALOPERIDOL 10 MG: 10 TABLET ORAL at 19:51

## 2017-08-27 RX ADMIN — DIPHENHYDRAMINE HYDROCHLORIDE 50 MG: 25 CAPSULE ORAL at 22:09

## 2017-08-27 RX ADMIN — MINOCYCLINE HYDROCHLORIDE 100 MG: 100 CAPSULE ORAL at 08:51

## 2017-08-27 RX ADMIN — NICOTINE POLACRILEX 2 MG: 2 GUM, CHEWING ORAL at 13:04

## 2017-08-27 RX ADMIN — Medication 250 MG: at 08:51

## 2017-08-27 RX ADMIN — LURASIDONE HYDROCHLORIDE 140 MG: 80 TABLET, FILM COATED ORAL at 08:51

## 2017-08-27 RX ADMIN — Medication 250 MG: at 19:51

## 2017-08-27 RX ADMIN — RANITIDINE 150 MG: 150 TABLET ORAL at 19:51

## 2017-08-27 RX ADMIN — NICOTINE POLACRILEX 2 MG: 2 GUM, CHEWING ORAL at 17:21

## 2017-08-27 RX ADMIN — BENZTROPINE MESYLATE 0.5 MG: 0.5 TABLET ORAL at 19:51

## 2017-08-27 ASSESSMENT — ACTIVITIES OF DAILY LIVING (ADL)
DRESS: SCRUBS (BEHAVIORAL HEALTH)
GROOMING: INDEPENDENT
GROOMING: INDEPENDENT
ORAL_HYGIENE: INDEPENDENT
ORAL_HYGIENE: PROMPTS
LAUNDRY: UNABLE TO COMPLETE
LAUNDRY: UNABLE TO COMPLETE
DRESS: SCRUBS (BEHAVIORAL HEALTH)

## 2017-08-28 LAB
ALBUMIN SERPL-MCNC: 3.7 G/DL (ref 3.4–5)
ALP SERPL-CCNC: 58 U/L (ref 40–150)
ALT SERPL W P-5'-P-CCNC: 43 U/L (ref 0–70)
ANION GAP SERPL CALCULATED.3IONS-SCNC: 8 MMOL/L (ref 3–14)
AST SERPL W P-5'-P-CCNC: 15 U/L (ref 0–45)
BASOPHILS # BLD AUTO: 0 10E9/L (ref 0–0.2)
BASOPHILS NFR BLD AUTO: 0.1 %
BILIRUB SERPL-MCNC: 0.5 MG/DL (ref 0.2–1.3)
BUN SERPL-MCNC: 13 MG/DL (ref 7–30)
CALCIUM SERPL-MCNC: 8.6 MG/DL (ref 8.5–10.1)
CHLORIDE SERPL-SCNC: 106 MMOL/L (ref 94–109)
CO2 SERPL-SCNC: 25 MMOL/L (ref 20–32)
CREAT SERPL-MCNC: 0.68 MG/DL (ref 0.66–1.25)
DIFFERENTIAL METHOD BLD: NORMAL
EOSINOPHIL # BLD AUTO: 0.1 10E9/L (ref 0–0.7)
EOSINOPHIL NFR BLD AUTO: 1.3 %
ERYTHROCYTE [DISTWIDTH] IN BLOOD BY AUTOMATED COUNT: 13.4 % (ref 10–15)
GFR SERPL CREATININE-BSD FRML MDRD: >90 ML/MIN/1.7M2
GLUCOSE SERPL-MCNC: 87 MG/DL (ref 70–99)
HCT VFR BLD AUTO: 42.9 % (ref 40–53)
HGB BLD-MCNC: 14.3 G/DL (ref 13.3–17.7)
IMM GRANULOCYTES # BLD: 0 10E9/L (ref 0–0.4)
IMM GRANULOCYTES NFR BLD: 0.3 %
LYMPHOCYTES # BLD AUTO: 4.8 10E9/L (ref 0.8–5.3)
LYMPHOCYTES NFR BLD AUTO: 53.3 %
MCH RBC QN AUTO: 29.1 PG (ref 26.5–33)
MCHC RBC AUTO-ENTMCNC: 33.3 G/DL (ref 31.5–36.5)
MCV RBC AUTO: 87 FL (ref 78–100)
MONOCYTES # BLD AUTO: 0.9 10E9/L (ref 0–1.3)
MONOCYTES NFR BLD AUTO: 9.8 %
NEUTROPHILS # BLD AUTO: 3.1 10E9/L (ref 1.6–8.3)
NEUTROPHILS NFR BLD AUTO: 35.2 %
NRBC # BLD AUTO: 0 10*3/UL
NRBC BLD AUTO-RTO: 0 /100
PLATELET # BLD AUTO: 195 10E9/L (ref 150–450)
POTASSIUM SERPL-SCNC: 4.1 MMOL/L (ref 3.4–5.3)
PROT SERPL-MCNC: 6.9 G/DL (ref 6.8–8.8)
RBC # BLD AUTO: 4.92 10E12/L (ref 4.4–5.9)
SODIUM SERPL-SCNC: 139 MMOL/L (ref 133–144)
WBC # BLD AUTO: 8.9 10E9/L (ref 4–11)

## 2017-08-28 PROCEDURE — 99232 SBSQ HOSP IP/OBS MODERATE 35: CPT | Mod: GC | Performed by: PSYCHIATRY & NEUROLOGY

## 2017-08-28 RX ORDER — HALOPERIDOL DECANOATE 100 MG/ML
100 INJECTION INTRAMUSCULAR
Status: DISCONTINUED | OUTPATIENT
Start: 2017-08-28 | End: 2017-08-30

## 2017-08-28 RX ADMIN — MINOCYCLINE HYDROCHLORIDE 100 MG: 100 CAPSULE ORAL at 19:08

## 2017-08-28 RX ADMIN — MINOCYCLINE HYDROCHLORIDE 100 MG: 100 CAPSULE ORAL at 08:16

## 2017-08-28 RX ADMIN — PREDNISONE 30 MG: 20 TABLET ORAL at 08:17

## 2017-08-28 RX ADMIN — NICOTINE POLACRILEX 2 MG: 2 GUM, CHEWING ORAL at 08:27

## 2017-08-28 RX ADMIN — RANITIDINE 150 MG: 150 TABLET ORAL at 19:08

## 2017-08-28 RX ADMIN — BENZTROPINE MESYLATE 0.5 MG: 0.5 TABLET ORAL at 19:08

## 2017-08-28 RX ADMIN — LURASIDONE HYDROCHLORIDE 140 MG: 80 TABLET, FILM COATED ORAL at 08:16

## 2017-08-28 RX ADMIN — HALOPERIDOL 10 MG: 10 TABLET ORAL at 19:08

## 2017-08-28 RX ADMIN — LISINOPRIL 20 MG: 20 TABLET ORAL at 08:16

## 2017-08-28 RX ADMIN — NICOTINE POLACRILEX 2 MG: 2 GUM, CHEWING ORAL at 20:54

## 2017-08-28 RX ADMIN — MULTIPLE VITAMINS W/ MINERALS TAB 1 TABLET: TAB at 08:16

## 2017-08-28 RX ADMIN — HALOPERIDOL 10 MG: 10 TABLET ORAL at 08:16

## 2017-08-28 RX ADMIN — HALOPERIDOL DECANOATE 100 MG: 100 INJECTION INTRAMUSCULAR at 14:34

## 2017-08-28 RX ADMIN — Medication 250 MG: at 19:08

## 2017-08-28 RX ADMIN — BENZOCAINE, MENTHOL 1 LOZENGE: 15; 3.6 LOZENGE ORAL at 13:44

## 2017-08-28 RX ADMIN — NICOTINE 1 PATCH: 7 PATCH TRANSDERMAL at 08:16

## 2017-08-28 RX ADMIN — BENZTROPINE MESYLATE 0.5 MG: 0.5 TABLET ORAL at 08:16

## 2017-08-28 RX ADMIN — RANITIDINE 150 MG: 150 TABLET ORAL at 08:17

## 2017-08-28 RX ADMIN — BENZOCAINE, MENTHOL 1 LOZENGE: 15; 3.6 LOZENGE ORAL at 16:56

## 2017-08-28 RX ADMIN — BENZOCAINE, MENTHOL 1 LOZENGE: 15; 3.6 LOZENGE ORAL at 22:51

## 2017-08-28 RX ADMIN — Medication 250 MG: at 08:17

## 2017-08-28 RX ADMIN — BENZOCAINE, MENTHOL 1 LOZENGE: 15; 3.6 LOZENGE ORAL at 08:27

## 2017-08-28 RX ADMIN — TRAZODONE HYDROCHLORIDE 50 MG: 50 TABLET ORAL at 22:34

## 2017-08-28 RX ADMIN — QUETIAPINE FUMARATE 200 MG: 100 TABLET, FILM COATED ORAL at 22:34

## 2017-08-28 RX ADMIN — BENZOCAINE, MENTHOL 1 LOZENGE: 15; 3.6 LOZENGE ORAL at 20:54

## 2017-08-28 ASSESSMENT — ACTIVITIES OF DAILY LIVING (ADL)
ORAL_HYGIENE: INDEPENDENT
GROOMING: INDEPENDENT
DRESS: SCRUBS (BEHAVIORAL HEALTH)
LAUNDRY: UNABLE TO COMPLETE
DRESS: SCRUBS (BEHAVIORAL HEALTH);INDEPENDENT
LAUNDRY: UNABLE TO COMPLETE
ORAL_HYGIENE: INDEPENDENT
GROOMING: INDEPENDENT

## 2017-08-28 NOTE — PROGRESS NOTES
"Pt was in the milieu.  His goal is to d/c to home but is not aware of his actual tx plan.  Pt denies anx, dep, SI, SIB.  \"I don't really understand why I can't go home.  There is no reason why I should not be allowed to go home.  I never hurt myself or anyone else.\"  Pt denies any mental health issues at all.  "

## 2017-08-28 NOTE — PROGRESS NOTES
"Pt had a good shift. Pt denies SI and SIB. Pt denies depression. Pt is anxious about discharging but stateds it is \"optimism for the future\". Pt states he has a concern that PRNs may not be working but the pt is unsure.       08/27/17 9317   Behavioral Health   Hallucinations denies / not responding to hallucinations   Thinking poor concentration   Orientation person: oriented;place: oriented;date: oriented;time: oriented   Memory baseline memory   Insight poor   Judgement impaired   Eye Contact at examiner;staring   Affect full range affect;other (see comments)   Mood mood is calm   Physical Appearance/Attire attire appropriate to age and situation   Hygiene well groomed   Suicidality other (see comments)  (pt denies)   Self Injury other (see comment)  (pt denies)   Elopement (none stated or observed)   Activity other (see comment);restless  (social in milieu)   Speech clear;circumstantial   Medication Sensitivity no stated side effects;no observed side effects   Psychomotor / Gait balanced;steady   Activities of Daily Living   Hygiene/Grooming independent   Oral Hygiene independent   Dress scrubs (behavioral health)   Laundry unable to complete   Room Organization independent     "

## 2017-08-28 NOTE — PROGRESS NOTES
"[facility], [place]   Psychiatric Progress Note        Interim History:   The patient's care was discussed with the treatment team during the daily team meeting and/or staff's chart notes were reviewed.  Per staff report, was cooperative and pleasant over the weekend, however was verbally intrusive with staff, giving advice about foods to eat, need to put chlorophyll in their coffee.  Denies psychiatric Sx, SI/SIB; is feeling much better.  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  Anxious re: d/c but stated it's \"optimism for the future.\"  Has concern that PRNs may not be working, not sure.    Pt was seen today in his room. Pt requests veggie snacks after supper but doesn't want to \"cause any problems\".  Pt agrees to start haldol Dec IM with plan to eventually taper off PO haldol; pt has used haldol Dec in the past w/o issues.  Pt would like to d/c Latuda at some point, but team recommended he continue it for the next few months to achieve stability, then can negotiate stopping it w/ his outpatient psychiatrist.  Pt has no further Qs.    Dr. Gaviria had contacted the [facility] tick-borne dz researcher, who had hanged up on Dr. Gaviria.  Pt's mom had asked Friday about Babesia testing.  IM was curbsided and reports that unless pt had a recent tick exposure, such testing is not warranted in a hospital setting.  Pt does not endorse any of the usual acute tick-borne-illness Sx such as fever, chills, diaphoresis, weakness, muscle aches.  Babesia tends to cause hemolysis and multi-organ failure, not CNS Sx.  Babesia is not very common; tick-borne Anaplasma is much more common. If team still wanted to test for Babesia, they could first order a blood smear for Babesia, and if negative, order a Babesia PCR.           haloperidol decanoate  100 mg Intramuscular Q30 Days     ranitidine  150 mg Oral BID     nicotine   Transdermal Q8H     nicotine   Transdermal " Daily     nicotine  1 patch Transdermal Daily     minocycline  100 mg Oral Q12H [facility]     saccharomyces boulardii  250 mg Oral BID     haloperidol  10 mg Oral BID     benztropine  0.5 mg Oral BID     lurasidone  140 mg Oral Daily     predniSONE  30 mg Oral Daily    Followed by     [START ON 9/6/2017] predniSONE  20 mg Oral Daily    Followed by     [START ON 9/16/2017] predniSONE  10 mg Oral Daily    Followed by     [START ON 9/26/2017] predniSONE  5 mg Oral Daily     lisinopril  20 mg Oral Daily     multivitamin, therapeutic  1 tablet Oral Daily          Allergies:     Allergies   Allergen Reactions     Carbamazepine Rash     Carbamazepine (Tegretol),.....Rash.          Labs:     Recent Results (from the past 24 hour(s))   Comprehensive metabolic panel    Collection Time: 08/28/17  7:59 AM   Result Value Ref Range    Sodium 139 133 - 144 mmol/L    Potassium 4.1 3.4 - 5.3 mmol/L    Chloride 106 94 - 109 mmol/L    Carbon Dioxide 25 20 - 32 mmol/L    Anion Gap 8 3 - 14 mmol/L    Glucose 87 70 - 99 mg/dL    Urea Nitrogen 13 7 - 30 mg/dL    Creatinine 0.68 0.66 - 1.25 mg/dL    GFR Estimate >90 >60 mL/min/1.7m2    GFR Estimate If Black >90 >60 mL/min/1.7m2    Calcium 8.6 8.5 - 10.1 mg/dL    Bilirubin Total 0.5 0.2 - 1.3 mg/dL    Albumin 3.7 3.4 - 5.0 g/dL    Protein Total 6.9 6.8 - 8.8 g/dL    Alkaline Phosphatase 58 40 - 150 U/L    ALT 43 0 - 70 U/L    AST 15 0 - 45 U/L   CBC with platelets differential    Collection Time: 08/28/17  7:59 AM   Result Value Ref Range    WBC 8.9 4.0 - 11.0 10e9/L    RBC Count 4.92 4.4 - 5.9 10e12/L    Hemoglobin 14.3 13.3 - 17.7 g/dL    Hematocrit 42.9 40.0 - 53.0 %    MCV 87 78 - 100 fl    MCH 29.1 26.5 - 33.0 pg    MCHC 33.3 31.5 - 36.5 g/dL    RDW 13.4 10.0 - 15.0 %    Platelet Count 195 150 - 450 10e9/L    Diff Method Automated Method     % Neutrophils 35.2 %    % Lymphocytes 53.3 %    % Monocytes 9.8 %    % Eosinophils 1.3 %    % Basophils 0.1 %    % Immature Granulocytes 0.3 %     "Nucleated RBCs 0 0 /100    Absolute Neutrophil 3.1 1.6 - 8.3 10e9/L    Absolute Lymphocytes 4.8 0.8 - 5.3 10e9/L    Absolute Monocytes 0.9 0.0 - 1.3 10e9/L    Absolute Eosinophils 0.1 0.0 - 0.7 10e9/L    Absolute Basophils 0.0 0.0 - 0.2 10e9/L    Abs Immature Granulocytes 0.0 0 - 0.4 10e9/L    Absolute Nucleated RBC 0.0           Psychiatric Examination:     Vitals:    08/25/17 0700 08/26/17 0700 08/27/17 0800 08/28/17 0700   BP:       BP Location:       Pulse:       Resp: 16 18 16 16   Temp: 97.7  F (36.5  C) 97.7  F (36.5  C) 98.2  F (36.8  C) 98  F (36.7  C)   TempSrc: Tympanic Tympanic Tympanic Tympanic   Weight:  107.5 kg (237 lb) 106.6 kg (235 lb)        Weight is 235 lbs 0 oz  Body mass index is 29.37 kg/(m^2).    Appearance: dressed in hospital scrubs, appeared as age stated and no apparent distress  Attitude:  cooperative and less guarded but dismissive of symptoms   Eye Contact:  good, sometimes intense  Mood:  good and \"fine\"  Affect:  intensity is blunted and more engaged   Speech:  clear, coherent and normal prosody  Psychomotor Behavior:  no evidence of tardive dyskinesia, dystonia, or tics and intact station, gait and muscle tone  Throught Process:  linear and goal oriented  Associations:  no loose associations  Thought Content:  no evidence of suicidal ideation or homicidal ideation, no auditory hallucinations present and no visual hallucinations present. Dismissive of symptoms but paranoia is less intense.   Focused on nutrition pyramid and eating chlorophyll  Insight:  fair  Judgement:  fair  Oriented to:  time, person, and place  Attention Span and Concentration:  intact  Recent and Remote Memory:  intact     Fund of Knowledge: Adequate         Precautions:     Behavioral Orders   Procedures     Assault precautions     Code 1 - Restrict to Unit     Routine Programming     As clinically indicated     Status 15     Every 15 minutes.          Diagnoses:     1.  Schizophrenia, paranoid type, is " likely  2.  Psychosis r/t chronic Lyme's probably cannot be definitively ruled out   3.  Cluster B with antisocial traits.  4.  DRESS, rash improved and LFT normalizing.            Plan:     Medications:  --Continue Latuda and Haldol for psychosis  --Haldol decanoate 100mg IM started 8/28, next dose 100mg IM 9/4 (NOTE, this is Labor Day, could be a timing problem?);  D/c sometime after 2nd IM;  1 month PO overlap, will address haldol PO taper prior to d/c.  --Continue minocycline for suspected chronic Lyme's manifesting in cognitive/psychotic sxs, started 8/21  --Continue probiotic to prevent stomach upset that pt has had in the past on abx.  --Started Zantac, nicoderm, ibuprofen per request.  -- IM and Dermatology teams continue to follow and monitor re: DRESS.  Prednisone taper is ongoing and will conclude in early October.    Legal Status and Disposition:  --Commitment with [employer] for Saphris, Zyprexa, haloperidol, Latuda, and Risperidone. Letter was sent to add Seroquel and remove Risperdal and Saphris from [facility]  --Pt's mother wants pt to return home after discharge and this is pt's desire as well.  Pt is willing to do Day Tx at [facility] which has gone well for him in the past.  Will need to see reduced psychosis and irritability, improved organization of thought before discharge can safely occur.    --IM and Dermatology to re-assess pt's state of recovery from DRESS syndrome before d/c.  --D/C sometime after 2nd haldol Dec IM shot, with plans for outpt continuation.      Attestation:  This documentation accurately reflects the services I personally performed and treatment decisions made by me in consultation with the attending physician.    Boubacar Gilmore MD, PGY-2 Psychiatry resident  Pager [phone]    This patient has been seen and evaluated by me, Denny Gaviria MD on the date of this note. I have discussed this patient with the the resident and I agree with the findings and plan in this note. I have  reviewed today's vital signs, medications, labs and imaging.

## 2017-08-28 NOTE — PROGRESS NOTES
Brief Medicine Note      Medicine following CBC and CMP while on steroid taper for DRESS. Labs normal on 8/28 (see below).  - Cont monitoring CBC and CMP every M/F.  - Please contact Medicine and Dermatology prior to discharge  - Please contact Medicine if patient refusing steroids        Recent Labs  Lab 08/28/17  0759 08/25/17  0736    142   POTASSIUM 4.1 4.0   CHLORIDE 106 107   CO2 25 25   ANIONGAP 8 10   GLC 87 83   BUN 13 13   CR 0.68 0.59*   EB 8.6 8.8   PROTTOTAL 6.9 7.0   ALBUMIN 3.7 3.7   BILITOTAL 0.5 0.4   ALKPHOS 58 55   AST 15 13   ALT 43 42      Recent Labs  Lab 08/28/17  0759 08/25/17  0736   WBC 8.9 9.3   RBC 4.92 4.98   HGB 14.3 14.5   HCT 42.9 44.0   MCV 87 88   MCH 29.1 29.1   MCHC 33.3 33.0   RDW 13.4 13.5    202     No lab results found in last 7 days.           Pedro Sheffield PA-C  Internal Medicine [facility] Hospitalist  Pager [phone]

## 2017-08-28 NOTE — PLAN OF CARE
"Problem: Psychotic Symptoms  Intervention: Social and Therapeutic Interv (Psychotic Symptoms)     Pt attended 2 of 2 OT groups.  During goal planning discussion group, pt acknowledged feeling anxious about going home, though states it's \"an anxious, like waiting for Eliot, anxious\".     From the worksheet:  States to reduce stress he will: \"relax, art, speaking wit people, getting to know people's names\"  An activity to avoid: \"overusing hospital services/people/staff. Sorry about that\"  Steps to maintain mental health stability are: \"groups, medication, mindfulness, exercise, aromatherapy, relaxation, trusting people\".            "

## 2017-08-29 PROCEDURE — 99232 SBSQ HOSP IP/OBS MODERATE 35: CPT | Mod: GC | Performed by: PSYCHIATRY & NEUROLOGY

## 2017-08-29 RX ADMIN — BENZTROPINE MESYLATE 0.5 MG: 0.5 TABLET ORAL at 09:03

## 2017-08-29 RX ADMIN — Medication 250 MG: at 20:15

## 2017-08-29 RX ADMIN — BENZOCAINE, MENTHOL 1 LOZENGE: 15; 3.6 LOZENGE ORAL at 11:07

## 2017-08-29 RX ADMIN — Medication 250 MG: at 08:55

## 2017-08-29 RX ADMIN — BENZOCAINE, MENTHOL 1 LOZENGE: 15; 3.6 LOZENGE ORAL at 20:59

## 2017-08-29 RX ADMIN — NICOTINE POLACRILEX 2 MG: 2 GUM, CHEWING ORAL at 16:50

## 2017-08-29 RX ADMIN — DIPHENHYDRAMINE HYDROCHLORIDE 50 MG: 25 CAPSULE ORAL at 00:15

## 2017-08-29 RX ADMIN — LURASIDONE HYDROCHLORIDE 140 MG: 80 TABLET, FILM COATED ORAL at 09:05

## 2017-08-29 RX ADMIN — NICOTINE 1 PATCH: 7 PATCH TRANSDERMAL at 11:08

## 2017-08-29 RX ADMIN — NICOTINE POLACRILEX 2 MG: 2 GUM, CHEWING ORAL at 00:15

## 2017-08-29 RX ADMIN — BENZTROPINE MESYLATE 0.5 MG: 0.5 TABLET ORAL at 20:15

## 2017-08-29 RX ADMIN — MINOCYCLINE HYDROCHLORIDE 100 MG: 100 CAPSULE ORAL at 09:00

## 2017-08-29 RX ADMIN — HALOPERIDOL 10 MG: 10 TABLET ORAL at 20:15

## 2017-08-29 RX ADMIN — MULTIPLE VITAMINS W/ MINERALS TAB 1 TABLET: TAB at 09:01

## 2017-08-29 RX ADMIN — PREDNISONE 30 MG: 20 TABLET ORAL at 09:01

## 2017-08-29 RX ADMIN — TRAZODONE HYDROCHLORIDE 50 MG: 50 TABLET ORAL at 20:18

## 2017-08-29 RX ADMIN — RANITIDINE 150 MG: 150 TABLET ORAL at 20:15

## 2017-08-29 RX ADMIN — MINOCYCLINE HYDROCHLORIDE 100 MG: 100 CAPSULE ORAL at 20:15

## 2017-08-29 RX ADMIN — BENZOCAINE, MENTHOL 1 LOZENGE: 15; 3.6 LOZENGE ORAL at 16:51

## 2017-08-29 RX ADMIN — IBUPROFEN 400 MG: 400 TABLET ORAL at 00:15

## 2017-08-29 RX ADMIN — HALOPERIDOL 10 MG: 10 TABLET ORAL at 09:01

## 2017-08-29 RX ADMIN — BENZOCAINE, MENTHOL 1 LOZENGE: 15; 3.6 LOZENGE ORAL at 00:15

## 2017-08-29 RX ADMIN — LISINOPRIL 20 MG: 20 TABLET ORAL at 09:00

## 2017-08-29 RX ADMIN — RANITIDINE 150 MG: 150 TABLET ORAL at 09:01

## 2017-08-29 ASSESSMENT — ACTIVITIES OF DAILY LIVING (ADL)
LAUNDRY: UNABLE TO COMPLETE
DRESS: SCRUBS (BEHAVIORAL HEALTH)
LAUNDRY: UNABLE TO COMPLETE
DRESS: SCRUBS (BEHAVIORAL HEALTH)
ORAL_HYGIENE: INDEPENDENT
HYGIENE/GROOMING: INDEPENDENT
GROOMING: INDEPENDENT
ORAL_HYGIENE: INDEPENDENT

## 2017-08-29 NOTE — PROGRESS NOTES
"\"I wanna check my height to see how the antibiotics might have affected my height adjustment. That's why I had an emotional reaction last night because the antibiotics are clearing up my Lyme's disease and it's adjusting to normal.\"  "

## 2017-08-29 NOTE — PLAN OF CARE
"Problem: Psychotic Symptoms  Goal: Psychotic Symptoms  Signs and symptoms of listed problems will be absent or manageable.   Outcome: No Change  Mood is generally calm throughout the shift however pt had one period of irritability after being redirected for giving peers \"medical advice\" in the lounge. Pt appears focused on the medications others may be taking, and when redirected became tense and irritable. Shortly after however pt was able to regulate his emotions, demonstrating improvements and insight into managing his behavior. Pt is medication complaint. Denies SEs to medication and denies physical health concerns this shift. Affect is blunted and tense at times. Appears internally preoccupied at times but does not appear to be actively responding. Will continue to monitor closely.       "

## 2017-08-29 NOTE — PROGRESS NOTES
"At approximately 1630, patient was overheard in the lounge becoming verbally intrusive towards a peer and giving peer directives on medications he should take and advice from the doctor which patient instructed peer to ignore. When these behaviors were redirected, patient became agitated and vaguely threatening, staring intensely at writer and telling writer to \"be quiet and sit down\".  Patient continues to inappropriately instruct peers on their care and is not receptive to redirection. Will continue to monitor and assess.  "

## 2017-08-30 PROCEDURE — 99232 SBSQ HOSP IP/OBS MODERATE 35: CPT | Mod: GC | Performed by: PSYCHIATRY & NEUROLOGY

## 2017-08-30 RX ORDER — HALOPERIDOL DECANOATE 100 MG/ML
100 INJECTION INTRAMUSCULAR ONCE
Status: COMPLETED | OUTPATIENT
Start: 2017-09-04 | End: 2017-09-04

## 2017-08-30 RX ORDER — NICOTINE 21 MG/24HR
1 PATCH, TRANSDERMAL 24 HOURS TRANSDERMAL DAILY
Status: DISCONTINUED | OUTPATIENT
Start: 2017-08-30 | End: 2017-09-02 | Stop reason: DRUGHIGH

## 2017-08-30 RX ADMIN — BENZTROPINE MESYLATE 0.5 MG: 0.5 TABLET ORAL at 20:16

## 2017-08-30 RX ADMIN — Medication 250 MG: at 08:58

## 2017-08-30 RX ADMIN — BENZOCAINE, MENTHOL 1 LOZENGE: 15; 3.6 LOZENGE ORAL at 17:59

## 2017-08-30 RX ADMIN — MINOCYCLINE HYDROCHLORIDE 100 MG: 100 CAPSULE ORAL at 20:15

## 2017-08-30 RX ADMIN — BENZOCAINE, MENTHOL 1 LOZENGE: 15; 3.6 LOZENGE ORAL at 20:16

## 2017-08-30 RX ADMIN — NICOTINE POLACRILEX 2 MG: 2 GUM, CHEWING ORAL at 18:47

## 2017-08-30 RX ADMIN — MINOCYCLINE HYDROCHLORIDE 100 MG: 100 CAPSULE ORAL at 08:59

## 2017-08-30 RX ADMIN — BENZOCAINE, MENTHOL 1 LOZENGE: 15; 3.6 LOZENGE ORAL at 16:31

## 2017-08-30 RX ADMIN — MULTIPLE VITAMINS W/ MINERALS TAB 1 TABLET: TAB at 08:59

## 2017-08-30 RX ADMIN — LISINOPRIL 20 MG: 20 TABLET ORAL at 08:59

## 2017-08-30 RX ADMIN — RANITIDINE 150 MG: 150 TABLET ORAL at 20:15

## 2017-08-30 RX ADMIN — HALOPERIDOL 10 MG: 10 TABLET ORAL at 20:16

## 2017-08-30 RX ADMIN — LURASIDONE HYDROCHLORIDE 140 MG: 80 TABLET, FILM COATED ORAL at 08:57

## 2017-08-30 RX ADMIN — BENZTROPINE MESYLATE 0.5 MG: 0.5 TABLET ORAL at 08:59

## 2017-08-30 RX ADMIN — NICOTINE POLACRILEX 2 MG: 2 GUM, CHEWING ORAL at 12:41

## 2017-08-30 RX ADMIN — HALOPERIDOL 10 MG: 10 TABLET ORAL at 08:58

## 2017-08-30 RX ADMIN — NICOTINE 1 PATCH: 7 PATCH TRANSDERMAL at 09:04

## 2017-08-30 RX ADMIN — NICOTINE 1 PATCH: 14 PATCH, EXTENDED RELEASE TRANSDERMAL at 17:59

## 2017-08-30 RX ADMIN — Medication 250 MG: at 20:15

## 2017-08-30 RX ADMIN — ACETAMINOPHEN 650 MG: 325 TABLET, FILM COATED ORAL at 18:47

## 2017-08-30 RX ADMIN — RANITIDINE 150 MG: 150 TABLET ORAL at 09:02

## 2017-08-30 RX ADMIN — BENZOCAINE, MENTHOL 1 LOZENGE: 15; 3.6 LOZENGE ORAL at 15:00

## 2017-08-30 RX ADMIN — PREDNISONE 30 MG: 20 TABLET ORAL at 08:58

## 2017-08-30 ASSESSMENT — ACTIVITIES OF DAILY LIVING (ADL)
DRESS: SCRUBS (BEHAVIORAL HEALTH)
LAUNDRY: UNABLE TO COMPLETE
ORAL_HYGIENE: INDEPENDENT
GROOMING: INDEPENDENT
DRESS: SCRUBS (BEHAVIORAL HEALTH)
ORAL_HYGIENE: INDEPENDENT
GROOMING: INDEPENDENT
LAUNDRY: UNABLE TO COMPLETE

## 2017-08-30 NOTE — PROGRESS NOTES
"[facility], [place]   Psychiatric Progress Note        Interim History:   The patient's care was discussed with the treatment team during the daily team meeting and/or staff's chart notes were reviewed.  Per staff report, pt was in lounge, verbally intrusive twds peer, giving directives on medications he should take, and advice from doctor which pt instructed peer to ignore.  Redirection of pt resulted in agitation and vaguely threatening, glaring, \"be quiet and sit down.\"  Wants height re-check to see how antibiotics have affected him.  \"That's why [pt] had an emotional reaction last night because antibiotics are clearing [his] Lyme's and it's adjusting to normal.\"  Pt also concerned about lung cancer, tells staff he's not on birth control because he's here in hospital.    Pt was seen today in his room. Pt endorses mild dyslexia which he attributes to the haldol dec injection, but otherwise tolerating it well.  Pt shares theory that new patients would be better integrated into milieu if they were part of a smaller group, and splitting into groups would improve group dynamics.  Pt asks about lung cancer, shows a magazine advertisement for a monoclonal Ab to treat respiratory conditions, asks whether psychiatric drugs are also a kind of immunotherapy drug.    Team states that pt's next haldol dec injection is earmarked for Monday 9/4, and if all goes well, pt might be ready for d/c shortly thereafter.  Pt had no further questions.           haloperidol decanoate  100 mg Intramuscular Q30 Days     ranitidine  150 mg Oral BID     nicotine   Transdermal Q8H     nicotine   Transdermal Daily     nicotine  1 patch Transdermal Daily     minocycline  100 mg Oral Q12H DONTRELL     saccharomyces boulardii  250 mg Oral BID     haloperidol  10 mg Oral BID     benztropine  0.5 mg Oral BID     lurasidone  140 mg Oral Daily     predniSONE  30 mg Oral Daily    Followed by     [START ON 9/6/2017] predniSONE  " "20 mg Oral Daily    Followed by     [START ON 9/16/2017] predniSONE  10 mg Oral Daily    Followed by     [START ON 9/26/2017] predniSONE  5 mg Oral Daily     lisinopril  20 mg Oral Daily     multivitamin, therapeutic  1 tablet Oral Daily          Allergies:     Allergies   Allergen Reactions     Carbamazepine Rash     Carbamazepine (Tegretol),.....Rash.          Labs:     No results found for this or any previous visit (from the past 24 hour(s)).       Psychiatric Examination:     Vitals:    08/26/17 0700 08/27/17 0800 08/28/17 0700 08/29/17 0700   BP:       BP Location:       Pulse:       Resp: 18 16 16 18   Temp: 97.7  F (36.5  C) 98.2  F (36.8  C) 98  F (36.7  C) 97.8  F (36.6  C)   TempSrc: Tympanic Tympanic Tympanic    Weight: 107.5 kg (237 lb) 106.6 kg (235 lb)  104.3 kg (230 lb)       Weight is 230 lbs 0 oz  Body mass index is 28.75 kg/(m^2).    Appearance: dressed in hospital scrubs, appeared as age stated and no apparent distress  Attitude:  cooperative and less guarded but dismissive of symptoms   Eye Contact:  good, sometimes intense  Mood:  good and \"great\"  Affect:  intensity is blunted and more engaged   Speech:  clear, coherent and normal prosody  Psychomotor Behavior:  no evidence of tardive dyskinesia, dystonia, or tics and intact station, gait and muscle tone  Throught Process:  linear and goal oriented  Associations:  no loose associations  Thought Content:  no evidence of suicidal ideation or homicidal ideation, no auditory hallucinations present and no visual hallucinations present. Dismissive of symptoms but paranoia is less intense.   Focused on cancer treatments to treat psychosis  Insight:  fair  Judgement:  fair  Oriented to:  time, person, and place  Attention Span and Concentration:  intact  Recent and Remote Memory:  intact     Fund of Knowledge: Adequate         Precautions:     Behavioral Orders   Procedures     Assault precautions     Code 1 - Restrict to Unit     Routine Programming "     As clinically indicated     Status 15     Every 15 minutes.          Diagnoses:     1.  Schizophrenia, paranoid type, is likely  2.  Psychosis r/t chronic Lyme's probably cannot be definitively ruled out   3.  Cluster B with antisocial traits.  4.  DRESS, rash improved and LFT normalizing.            Plan:     Medications:  --Continue Latuda and Haldol for psychosis  --Haldol decanoate 100mg IM started 8/28, next dose 100mg IM 9/4 (NOTE, this is Labor Day, could be a timing problem?);  D/c sometime after 2nd IM;  1 month PO overlap, will address haldol PO taper prior to d/c.  --Continue minocycline for suspected chronic Lyme's manifesting in cognitive/psychotic sxs, started 8/21  --Continue probiotic to prevent stomach upset that pt has had in the past on abx.  --Started Zantac, nicoderm, ibuprofen per request.  -- IM and Dermatology teams continue to follow and monitor re: DRESS.  Prednisone taper is ongoing and will conclude in early October.    Legal Status and Disposition:  --Commitment with [employer] for Saphris, Zyprexa, haloperidol, Latuda, and Risperidone. Letter was sent to add Seroquel and remove Risperdal and Saphris from [facility]  --Pt's mother wants pt to return home after discharge and this is pt's desire as well.  Pt is willing to do Day Tx at [facility] which has gone well for him in the past.  Will need to see reduced psychosis and irritability, improved organization of thought before discharge can safely occur.    --IM and Dermatology to re-assess pt's state of recovery from DRESS syndrome before d/c.  --D/C sometime after 2nd haldol Dec IM shot, with plans for outpt continuation.      Attestation:  This documentation accurately reflects the services I personally performed and treatment decisions made by me in consultation with the attending physician.    Boubacar Gilmore MD, PGY-2 Psychiatry resident  Pager [phone]    This patient has been seen and evaluated by me, Denny Gaviria MD on the date  of this note. I have discussed this patient with the the resident and I agree with the findings and plan in this note. I have reviewed today's vital signs, medications, labs and imaging.

## 2017-08-30 NOTE — PROGRESS NOTES
Patient's thinking intact regarding day-to-day activities.  He gets humor, is aware of social cues and behaves very politely, even overly cautious in making sure he didn't offend. He was present on the milieu on and off during the shift, socializing with peers and attending groups.

## 2017-08-30 NOTE — PLAN OF CARE
Brief medicine note  August 30, 2017      Received page from Dr Gilmore regarding patient discharging early next week. Medicine will see patient on Friday 9/1 to re-examine patient to ensure rash continues to be absent and given final recs for discharge. I will pass this along to my colleague Kwame ADAMS who will be covering this service on Friday.    Taya Levy PA-C

## 2017-08-30 NOTE — PROGRESS NOTES
Pt has been pleasant and cooperative this evening. Pt did go to bed shortly after dinner this evening. Pt made remarks that he is glad this writer is feeling better then asked this writer how my job search was going. This writer is not looking for a job and has not discussed anything of this nature with the writer. This writer tried to reality orient the pt, pt was not receptive. No behavioral concerns to report.  Pts mood was calm and affect was full range.      08/29/17 2011   Behavioral Health   Hallucinations appears responding   Thinking poor concentration;delusional;distractable   Orientation person: oriented;place: oriented;date: oriented;time: oriented   Memory baseline memory   Insight poor   Judgement impaired   Eye Contact at examiner   Affect full range affect   Mood mood is calm   Physical Appearance/Attire attire appropriate to age and situation   Hygiene well groomed   Suicidality other (see comments)  (none noted)   Self Injury other (see comment)  (none noted)   Activity isolative;other (see comment)  (asleep after dinner)   Speech clear;rambling   Medication Sensitivity no observed side effects;no stated side effects   Psychomotor / Gait balanced;steady   Activities of Daily Living   Hygiene/Grooming independent   Oral Hygiene independent   Dress scrubs (behavioral health)   Laundry unable to complete   Room Organization independent   Behavioral Health Interventions   Psychotic Symptoms maintain safety precautions;monitor need to revise level of observation;maintain safe secure environment;redirection of intrusive behaviors;provide emotional support;establish therapeutic relationship;assist with developing & utilizing healthy coping strategies;build upon strengths;monitor need for prn medication   Social and Therapeutic Interventions (Psychotic Symptoms) encourage socialization with peers;encourage effective boundaries with peers;encourage participation in therapeutic groups and milieu activities

## 2017-08-30 NOTE — PLAN OF CARE
Problem: General Plan of Care (Inpatient Behavioral)  Goal: Team Discussion  Team Plan:   BEHAVIORAL TEAM DISCUSSION     Participants: dr mahmood, resident dr merino, pilar sousa rn, harriett roth  Progress: pt has made pretty good progress.  He is calm and cooperative.   At times, he make delusional or bizarre statements this is also improving.   He is med compliant.   He is looking forward to discharging to his mother's home next week.  No behavioral problems.  Continued Stay Criteria/Rationale: still addressing some psychosis  Medical/Physical: per internal medicine  Precautions:   Behavioral Orders   Procedures     Assault precautions     Code 1 - Restrict to Unit     Routine Programming       As clinically indicated     Status 15       Every 15 minutes.     Plan:  The plan is to Provisionally discharge patient early next week, to his mother's home in [place], [place].    Coordinate with his [facility] CM, Jenna Khalil.    All outpatient follow up/plans have already been made (see DC instructions.)  Rationale for change in precautions or plan:  No change at this moment

## 2017-08-30 NOTE — PLAN OF CARE
Problem: Psychotic Symptoms  Goal: Psychotic Symptoms  Signs and symptoms of listed problems will be absent or manageable.      Attended afternoon OT group. Was social and discussed cooking and gardening with peer. He offered direct eye contact. Took an active role in activity focused on the topic of identifying positives in one's life. Comments were in context. 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 Seemed interested and involved.

## 2017-08-30 NOTE — PROGRESS NOTES
"[facility], [place]   Psychiatric Progress Note    Contacts:  Silvana (mom): h: [phone]        Interim History:   The patient's care was discussed with the treatment team during the daily team meeting and/or staff's chart notes were reviewed.  Per staff report, pt was pleasant, cooperative.  Engaged in unusual conversation, Told staff he's glad they're feeling better, asks how their job search is going (pt not searching for job.)  Pt not receptive to reality re-orientation.  Calm, full-range.    Pt was seen today in his room. Pt feels \"good\", denies any new discomfort or med S/E.  Pt sleeping well, good appetite.  Pt asks for increased-dose nicoderm patch so he can use fewer lozenges/gum.  Pt is focused on when he can discharge and go home.  Team reports that sometime shortly after next haldol Dec injection (Mon 9/4) pt might be ready.  Pt understands that he will be assessed on a daily basis and needs to be doing well through that time in order for that timing to happen.  Pt asks team to communicate with his mom to coordinate a ride.  Pt had no further questions.    This afternoon this writer called Silvana (pt's mom) for an update.  Mom is glad pt is coming home with her sometime next week if all goes well.  Mom is convinced that pt's Sx are strictly 2/2 Lyme dz, and the minocycline is what helped.  \"Wouldn't it be great if you get him off of those toxic antipsychotics?  Because he clearly doesn't need them.  Whenever he gets antibiotics, he gets better.  It's all Lyme, it's clearly obvious.  I was hoping that YOU will start taking him off of antipsychotics right now; how does that sound?\"  Mom is convinced that pt had a Jarisch-Herxheimer reaction exactly 3 days after starting the antibiotic, \"and I know that Herxheimer rxns occur exactly 3d after starting an antibiotic.  But antibiotics never kill all the Lyme, Lyme burrows into the flesh, not circulating in the bloodstream, so the " "antibiotics don't reach them.\"  Mom pleads for team to consider using Jv as a case study to inspire team to discover that all institutionalized schizophrenics don't need antipsychotics, they only need antibiotics.  Mom thanked this writer for the update.           nicotine  1 patch Transdermal Daily     [START ON 9/4/2017] haloperidol decanoate  100 mg Intramuscular Once     ranitidine  150 mg Oral BID     nicotine   Transdermal Q8H     nicotine   Transdermal Daily     minocycline  100 mg Oral Q12H DONTRELL     saccharomyces boulardii  250 mg Oral BID     haloperidol  10 mg Oral BID     benztropine  0.5 mg Oral BID     lurasidone  140 mg Oral Daily     predniSONE  30 mg Oral Daily    Followed by     [START ON 9/6/2017] predniSONE  20 mg Oral Daily    Followed by     [START ON 9/16/2017] predniSONE  10 mg Oral Daily    Followed by     [START ON 9/26/2017] predniSONE  5 mg Oral Daily     lisinopril  20 mg Oral Daily     multivitamin, therapeutic  1 tablet Oral Daily          Allergies:     Allergies   Allergen Reactions     Carbamazepine Rash     Carbamazepine (Tegretol),.....Rash.          Labs:     No results found for this or any previous visit (from the past 24 hour(s)).       Psychiatric Examination:     Vitals:    08/27/17 0800 08/28/17 0700 08/29/17 0700 08/30/17 0900   BP:       BP Location:       Pulse:       Resp: 16 16 18 16   Temp: 98.2  F (36.8  C) 98  F (36.7  C) 97.8  F (36.6  C) 97.1  F (36.2  C)   TempSrc: Tympanic Tympanic     Weight: 106.6 kg (235 lb)  104.3 kg (230 lb)        Weight is 230 lbs 0 oz  Body mass index is 28.75 kg/(m^2).    Appearance: dressed in hospital scrubs, appeared as age stated and no apparent distress  Attitude:  cooperative and less guarded but dismissive of symptoms   Eye Contact:  good, sometimes intense  Mood:  good  Affect:  intensity is blunted and more engaged   Speech:  clear, coherent and normal prosody  Psychomotor Behavior:  no evidence of tardive dyskinesia, " dystonia, or tics and intact station, gait and muscle tone  Throught Process:  linear and goal oriented  Associations:  no loose associations  Thought Content:  no evidence of suicidal ideation or homicidal ideation, no auditory hallucinations present and no visual hallucinations present. Dismissive of symptoms but paranoia is less intense.   Focused on timing of his discharge  Insight:  fair  Judgement:  fair  Oriented to:  time, person, and place  Attention Span and Concentration:  intact  Recent and Remote Memory:  intact     Fund of Knowledge: Adequate         Precautions:     Behavioral Orders   Procedures     Assault precautions     Code 1 - Restrict to Unit     Routine Programming     As clinically indicated     Status 15     Every 15 minutes.          Diagnoses:     1.  Schizophrenia, paranoid type, is likely  2.  Psychosis r/t chronic Lyme's probably cannot be definitively ruled out   3.  Cluster B with antisocial traits.  4.  DRESS, rash improved and LFT normalizing.            Plan:     Medications:  --Continue Latuda and Haldol for psychosis  --Haldol decanoate 100mg IM started 8/28, next dose 100mg IM 9/4 (NOTE, this is Labor Day, could be a timing problem?);  D/c sometime soon after 2nd IM;  1 month PO overlap taper, will decrease by 25% per week (i.e. 8/28 giving 10mg daily for 1 wk; then 9/4 give 7.5mg PO daily for 1 wk; then 9/11 give 5mg for 1 wk; then 9/18 give 2.5mg for 1 wk; then stop).  --Continue minocycline for potential decreased inflammation d/t potential chronic Lyme's manifesting in cognitive/psychotic sxs, started 8/21  --Continue probiotic to prevent stomach upset that pt has had in the past on abx.  --Started Zantac, nicoderm, ibuprofen per request.  -- IM and Dermatology teams continue to follow and monitor re: DRESS.  Prednisone taper is ongoing and will conclude in early October.  --Nicoderm CQ: 8/30 increased to 14 mg.    Legal Status and Disposition:  --Commitment with [facility] for  Saphris, Zyprexa, haloperidol, Latuda, and Risperidone. Letter was sent to add Seroquel and remove Risperdal and Saphris from [facility]  --Pt's mother wants pt to return home after discharge and this is pt's desire as well.  [facility] the past.  Will need to see reduced psychosis and irritability, improved organization of thought before discharge can safely occur.    --IM and Dermatology to re-assess pt's state of recovery from DRESS syndrome before d/c.  --D/C sometime after 2nd haldol Dec IM shot, with plans for outpatient IM continuation.      Attestation:  This documentation accurately reflects the services I personally performed and treatment decisions made by me in consultation with the attending physician.    Boubacar Gilmore MD, PGY-2 Psychiatry resident  Pager [phone]     This patient has been seen and evaluated by me, Denny Gaviria MD on the date of this note. I have discussed this patient with the the resident and I agree with the findings and plan in this note. I have reviewed today's vital signs, medications, labs and imaging.

## 2017-08-31 PROCEDURE — 99207 ZZC CDG-MDM COMPONENT: MEETS LOW - DOWN CODED: CPT | Performed by: PHYSICIAN ASSISTANT

## 2017-08-31 PROCEDURE — 99232 SBSQ HOSP IP/OBS MODERATE 35: CPT | Performed by: PHYSICIAN ASSISTANT

## 2017-08-31 RX ADMIN — MULTIPLE VITAMINS W/ MINERALS TAB 1 TABLET: TAB at 08:32

## 2017-08-31 RX ADMIN — DIPHENHYDRAMINE HYDROCHLORIDE 50 MG: 25 CAPSULE ORAL at 12:46

## 2017-08-31 RX ADMIN — Medication 250 MG: at 08:32

## 2017-08-31 RX ADMIN — BENZOCAINE, MENTHOL 1 LOZENGE: 15; 3.6 LOZENGE ORAL at 16:09

## 2017-08-31 RX ADMIN — BENZTROPINE MESYLATE 0.5 MG: 0.5 TABLET ORAL at 19:46

## 2017-08-31 RX ADMIN — HALOPERIDOL 10 MG: 10 TABLET ORAL at 19:46

## 2017-08-31 RX ADMIN — NICOTINE 1 PATCH: 14 PATCH, EXTENDED RELEASE TRANSDERMAL at 08:33

## 2017-08-31 RX ADMIN — MINOCYCLINE HYDROCHLORIDE 100 MG: 100 CAPSULE ORAL at 08:33

## 2017-08-31 RX ADMIN — Medication 250 MG: at 19:46

## 2017-08-31 RX ADMIN — RANITIDINE 150 MG: 150 TABLET ORAL at 08:33

## 2017-08-31 RX ADMIN — LURASIDONE HYDROCHLORIDE 140 MG: 80 TABLET, FILM COATED ORAL at 08:33

## 2017-08-31 RX ADMIN — PREDNISONE 30 MG: 20 TABLET ORAL at 08:32

## 2017-08-31 RX ADMIN — NICOTINE POLACRILEX 2 MG: 2 GUM, CHEWING ORAL at 09:51

## 2017-08-31 RX ADMIN — QUETIAPINE FUMARATE 200 MG: 100 TABLET, FILM COATED ORAL at 11:27

## 2017-08-31 RX ADMIN — BENZTROPINE MESYLATE 0.5 MG: 0.5 TABLET ORAL at 08:33

## 2017-08-31 RX ADMIN — LISINOPRIL 20 MG: 20 TABLET ORAL at 08:32

## 2017-08-31 RX ADMIN — NICOTINE POLACRILEX 2 MG: 2 GUM, CHEWING ORAL at 16:09

## 2017-08-31 RX ADMIN — MINOCYCLINE HYDROCHLORIDE 100 MG: 100 CAPSULE ORAL at 19:46

## 2017-08-31 RX ADMIN — HALOPERIDOL 10 MG: 10 TABLET ORAL at 08:33

## 2017-08-31 RX ADMIN — NICOTINE POLACRILEX 2 MG: 2 GUM, CHEWING ORAL at 12:47

## 2017-08-31 RX ADMIN — RANITIDINE 150 MG: 150 TABLET ORAL at 19:46

## 2017-08-31 RX ADMIN — HYDROXYZINE HYDROCHLORIDE 50 MG: 25 TABLET ORAL at 10:52

## 2017-08-31 ASSESSMENT — ACTIVITIES OF DAILY LIVING (ADL)
ORAL_HYGIENE: INDEPENDENT
LAUNDRY: WITH SUPERVISION
DRESS: SCRUBS (BEHAVIORAL HEALTH)
HYGIENE/GROOMING: INDEPENDENT

## 2017-08-31 NOTE — PLAN OF CARE
Problem: Psychotic Symptoms  Goal: Psychotic Symptoms  Signs and symptoms of listed problems will be absent or manageable.   Outcome: Improving        Presents as calm, polite, pleasant and cooperative this evening. Good eye contact, full range affect. In the milieu majority of the evening, social, participating. Denies hallucinations and does not appear to be responding to internal stimuli when passively observed. Denies suicidal ideation or thoughts to harm self and/or others. Encouraged to voice needs, questions, and concerns.

## 2017-08-31 NOTE — PROGRESS NOTES
[facility]  Daily Progress Note  Jv Pate  [id number]  August 31, 2017     Interval History:   Jv is doing well today. He denies any fevers or chills. Has not noticed any new rashes. Denies any chest pain, sob, or dyspnea. No abdominal pain. No medical concerns at this time.    ROS:   Please see HPI, otherwise, complete 10 point review of systems is negative.      Physical Exam:   Vitals were reviewed  Blood pressure 137/81, pulse 77, temperature 97.7  F (36.5  C), temperature source Tympanic, resp. rate 16, weight 104.3 kg (230 lb).  General:alert, NAD, WDWN, pleasant and cooperative  HEENT: NCAT, anicteric sclera, EOMI, mucous membranes pink and moist, no oral lesions appreciated  Cardiovascular: RRR, S1S2, no m/r/g  Lungs:CTAB, no wheezing or crackles  Abdomen: normoactive BS, soft with no distention and no tenderness   Vascular: no peripheral edema  Neurologic: AAO X 3, no focal deficits  Skin: no jaundice, rashes, or lesions on exposed areas of skin. I do not appreciate any morbiliform rash on arms, legs, or trunk.      Assessment and Plan:   Jv Pate is a 32 year old male who was transferred form inpatient psychiatry to medicine on 7/21/2017. He has a pmh of paranoid schizophrenia, cluster B w/ antisocial traits, Lyme disease, and HTN who was admitted to medicine after developing a morbilliform rash concerning for DRESS.       DRESS syndrome. Developed morbilliform rash around 7/15. Involving extremities, trunk and back, spares mucous membranes. Associated with transaminitis and eosinophilia (2.6). Most likely culprit carbamazepine, which was stopped 7/21. Started on prednisone 40mg BID 7/21, started tapering slowly on 7/28. Eosinophilia resolved 7/26. LFTs returned to normal 8/4. Rash resolved on exam today.   - Continue steroid taper as ordered: currently on prednisone 30mg qd x10d (8/27-9/5), then 20mg qd x 10d (9/6-9/15), then 10mg qd x10d (9/16-9/25), then 5mg qd x10d  (9/26-10/5), then stop  - Notified dermatology of patients discharge- they will arrange follow up as outpatient  - Educated patient on importance of continuing to take steroids due to risk of adrenal insufficiency and that stopping abruptly could result in death or permanent injury.   - Educated patient on importance of seeking medical care if any rash were to recur  - No need to continue to trend labs at this time      Elevated LFTs. New onset 7/21. No hx of liver disease. Likely 2/2 DRESS as significant improvement correlating with initiation of prednisone and rash improving. LFTS returned to normal 8/4.       HTN. BPs stable. Cont lisinopril.       Attestation:  I have reviewed today's vital signs, notes, medications, labs and imaging.        Taya Butt [facility]  Internal Medicine [facility] Hospitalist  [phone]  August 31, 2017

## 2017-09-01 PROCEDURE — 99232 SBSQ HOSP IP/OBS MODERATE 35: CPT | Mod: GC | Performed by: PSYCHIATRY & NEUROLOGY

## 2017-09-01 RX ORDER — MINOCYCLINE HYDROCHLORIDE 100 MG/1
100 CAPSULE ORAL EVERY 12 HOURS
Qty: 60 CAPSULE | Refills: 1 | Status: SHIPPED | OUTPATIENT
Start: 2017-09-01

## 2017-09-01 RX ORDER — SACCHAROMYCES BOULARDII 250 MG
250 CAPSULE ORAL 2 TIMES DAILY
Qty: 60 CAPSULE | Refills: 1 | Status: ON HOLD | OUTPATIENT
Start: 2017-09-01 | End: 2019-03-04

## 2017-09-01 RX ORDER — BENZTROPINE MESYLATE 0.5 MG/1
0.5 TABLET ORAL 2 TIMES DAILY
Qty: 60 TABLET | Refills: 1 | Status: ON HOLD | OUTPATIENT
Start: 2017-09-01 | End: 2019-03-04

## 2017-09-01 RX ORDER — PREDNISONE 10 MG/1
20 TABLET ORAL DAILY
Qty: 30 TABLET | Refills: 0 | Status: ON HOLD | OUTPATIENT
Start: 2017-09-06 | End: 2019-03-04

## 2017-09-01 RX ORDER — LISINOPRIL 20 MG/1
20 TABLET ORAL DAILY
Qty: 30 TABLET | Refills: 1 | Status: SHIPPED | OUTPATIENT
Start: 2017-09-01

## 2017-09-01 RX ORDER — QUETIAPINE FUMARATE 200 MG/1
100-200 TABLET, FILM COATED ORAL 2 TIMES DAILY PRN
Qty: 30 TABLET | Refills: 1 | Status: ON HOLD | OUTPATIENT
Start: 2017-09-01 | End: 2019-03-04

## 2017-09-01 RX ORDER — HALOPERIDOL DECANOATE 100 MG/ML
200 INJECTION INTRAMUSCULAR
Qty: 2 ML | Refills: 0 | Status: ON HOLD | OUTPATIENT
Start: 2017-09-25 | End: 2019-03-04

## 2017-09-01 RX ORDER — LURASIDONE HYDROCHLORIDE 120 MG/1
120 TABLET, FILM COATED ORAL DAILY
Qty: 30 TABLET | Refills: 1 | Status: ON HOLD | OUTPATIENT
Start: 2017-09-01 | End: 2019-03-04

## 2017-09-01 RX ORDER — HALOPERIDOL 10 MG/1
10 TABLET ORAL 2 TIMES DAILY
Qty: 60 TABLET | Refills: 1 | Status: ON HOLD | OUTPATIENT
Start: 2017-09-01 | End: 2019-03-04

## 2017-09-01 RX ADMIN — Medication 250 MG: at 19:13

## 2017-09-01 RX ADMIN — NICOTINE POLACRILEX 2 MG: 2 GUM, CHEWING ORAL at 09:47

## 2017-09-01 RX ADMIN — Medication 250 MG: at 08:10

## 2017-09-01 RX ADMIN — RANITIDINE 150 MG: 150 TABLET ORAL at 19:13

## 2017-09-01 RX ADMIN — LISINOPRIL 20 MG: 20 TABLET ORAL at 08:10

## 2017-09-01 RX ADMIN — NICOTINE 1 PATCH: 14 PATCH, EXTENDED RELEASE TRANSDERMAL at 08:12

## 2017-09-01 RX ADMIN — PREDNISONE 30 MG: 20 TABLET ORAL at 08:11

## 2017-09-01 RX ADMIN — HALOPERIDOL 10 MG: 10 TABLET ORAL at 08:11

## 2017-09-01 RX ADMIN — ACETAMINOPHEN 650 MG: 325 TABLET, FILM COATED ORAL at 23:12

## 2017-09-01 RX ADMIN — MINOCYCLINE HYDROCHLORIDE 100 MG: 100 CAPSULE ORAL at 19:13

## 2017-09-01 RX ADMIN — MULTIPLE VITAMINS W/ MINERALS TAB 1 TABLET: TAB at 08:11

## 2017-09-01 RX ADMIN — HALOPERIDOL 10 MG: 10 TABLET ORAL at 19:13

## 2017-09-01 RX ADMIN — MINOCYCLINE HYDROCHLORIDE 100 MG: 100 CAPSULE ORAL at 08:11

## 2017-09-01 RX ADMIN — BENZTROPINE MESYLATE 0.5 MG: 0.5 TABLET ORAL at 08:11

## 2017-09-01 RX ADMIN — LURASIDONE HYDROCHLORIDE 140 MG: 80 TABLET, FILM COATED ORAL at 08:11

## 2017-09-01 RX ADMIN — TRAZODONE HYDROCHLORIDE 50 MG: 50 TABLET ORAL at 23:12

## 2017-09-01 RX ADMIN — QUETIAPINE FUMARATE 200 MG: 100 TABLET, FILM COATED ORAL at 23:12

## 2017-09-01 RX ADMIN — DIPHENHYDRAMINE HYDROCHLORIDE 50 MG: 25 CAPSULE ORAL at 14:05

## 2017-09-01 RX ADMIN — DIPHENHYDRAMINE HYDROCHLORIDE 50 MG: 25 CAPSULE ORAL at 19:13

## 2017-09-01 RX ADMIN — QUETIAPINE FUMARATE 200 MG: 100 TABLET, FILM COATED ORAL at 09:47

## 2017-09-01 RX ADMIN — BENZTROPINE MESYLATE 0.5 MG: 0.5 TABLET ORAL at 19:13

## 2017-09-01 RX ADMIN — RANITIDINE 150 MG: 150 TABLET ORAL at 08:11

## 2017-09-01 ASSESSMENT — ACTIVITIES OF DAILY LIVING (ADL)
GROOMING: INDEPENDENT
DRESS: SCRUBS (BEHAVIORAL HEALTH)
DRESS: SCRUBS (BEHAVIORAL HEALTH)
ORAL_HYGIENE: INDEPENDENT
GROOMING: HANDWASHING;SHOWER;INDEPENDENT
ORAL_HYGIENE: INDEPENDENT
LAUNDRY: UNABLE TO COMPLETE

## 2017-09-01 NOTE — PLAN OF CARE
Problem: Psychotic Symptoms  Goal: Psychotic Symptoms  Signs and symptoms of listed problems will be absent or manageable.   Outcome: Improving  Patient appears to be improving slowly.  He has stopped the strange behaviors that he had been doing in the past two weeks that revolved around beverages and setting up numerous ice water filled cups in the kitchen. He appears slightly less flat.  With writer he has been pleasant on approach.  He is taking his scheduled medications cooperatively. He requested assistance completing some forms and he was told that staff would help him.  He has been observed talking briefly with peers but has spent a fair amount of free time in his room.

## 2017-09-01 NOTE — PROGRESS NOTES
[facility], [place]   Psychiatric Progress Note    Contacts:  Silvana (mom): h: [phone]  Outpatient med provider: Dr. An Barahona, [facility], [phone]        Interim History:   The patient's care was discussed with the treatment team during the daily team meeting and/or staff's chart notes were reviewed.  Per staff report, pt had no overnight events.  IM re-examined pt for resolution of DRESS rash, reports no rash remains, recommends continue prednisone taper, ends 10/6; pt to F/U with outpt derm;  On d/c will continue steroids & lisinopril.  LFTs have normalized, further indicating recovery.    Pt was seen today in his room. Pt has no physical discomfort, no medication S/E.  Pt asks whether he can be discharged early and get his 2nd haldol dec injection as outpatient;  Team reports they need to see him successfully tolerate 2nd injection, and have stability over the weekend, then will likely d/c Tuesday.  Pt endorses some anxiety, likely related to discharge, thought atarax wasn't very useful, but Quetiapine was very helpful.  Pt asks for supply of PRN seroquel upon d/c; team agrees.   Pt had no further questions or concerns.    This afternoon this writer called pt's outpt med manager Dr. An Barahona to confirm upcoming 9/13 9:30am appt, and to alert provider to concern for mom's interference in pt's adherence of antipsychotic meds, recommending ongoing monitoring of drug levels.  Left message on nurse triage line's answering service.           nicotine  1 patch Transdermal Daily     [START ON 9/4/2017] haloperidol decanoate  100 mg Intramuscular Once     ranitidine  150 mg Oral BID     nicotine   Transdermal Q8H     nicotine   Transdermal Daily     minocycline  100 mg Oral Q12H DONTRELL     saccharomyces boulardii  250 mg Oral BID     haloperidol  10 mg Oral BID     benztropine  0.5 mg Oral BID     lurasidone  140 mg Oral Daily     predniSONE  30 mg Oral Daily    Followed by      [START ON 9/6/2017] predniSONE  20 mg Oral Daily    Followed by     [START ON 9/16/2017] predniSONE  10 mg Oral Daily    Followed by     [START ON 9/26/2017] predniSONE  5 mg Oral Daily     lisinopril  20 mg Oral Daily     multivitamin, therapeutic  1 tablet Oral Daily          Allergies:     Allergies   Allergen Reactions     Carbamazepine Rash     Carbamazepine (Tegretol),.....Rash.          Labs:     No results found for this or any previous visit (from the past 24 hour(s)).       Psychiatric Examination:     Vitals:    08/29/17 0700 08/30/17 0900 08/31/17 0700 09/01/17 0808   BP:       BP Location:       Pulse:       Resp: 18 16 16 16   Temp: 97.8  F (36.6  C) 97.1  F (36.2  C) 97.7  F (36.5  C) 97.8  F (36.6  C)   TempSrc:   Tympanic Tympanic   Weight: 104.3 kg (230 lb)          Weight is 230 lbs 0 oz  Body mass index is 28.75 kg/(m^2).    Appearance: dressed in hospital scrubs, appeared as age stated and no apparent distress  Attitude:  cooperative and less guarded but dismissive of symptoms   Eye Contact:  good, sometimes intense  Mood:  good  Affect:  intensity is blunted and more engaged   Speech:  clear, coherent and normal prosody  Psychomotor Behavior:  no evidence of tardive dyskinesia, dystonia, or tics and intact station, gait and muscle tone  Throught Process:  linear and goal oriented  Associations:  no loose associations  Thought Content:  no evidence of suicidal ideation or homicidal ideation, no auditory hallucinations present and no visual hallucinations present. Dismissive of symptoms but paranoia is less intense.   Focused on timing of his discharge  Insight:  fair  Judgement:  fair  Oriented to:  time, person, and place  Attention Span and Concentration:  intact  Recent and Remote Memory:  intact     Fund of Knowledge: Adequate         Precautions:     Behavioral Orders   Procedures     Assault precautions     Code 1 - Restrict to Unit     Routine Programming     As clinically indicated      Status 15     Every 15 minutes.          Diagnoses:     1.  Schizophrenia, paranoid type, is likely  2.  Psychosis r/t chronic Lyme's probably cannot be definitively ruled out   3.  Cluster B with antisocial traits.  4.  DRESS, rash improved and LFT normalizing.            Plan:     Medications:  --Continue Latuda and Haldol for psychosis  --Haldol decanoate 100mg IM started 8/28, next dose 100mg IM 9/4; oral dose will be tapered by pt's outpatient psychiatrist  --Continue minocycline for potential to decrease inflammation r/t potential chronic Lyme's manifesting in cognitive/psychotic sxs, started 8/21  --Continue probiotic to prevent stomach upset that pt has had in the past on abx.  --Started Zantac, nicoderm, ibuprofen per request.  -- IM and Dermatology teams continue to follow and monitor re: DRESS.  Prednisone taper is ongoing and will conclude in early October.    Legal Status and Disposition:  --Commitment with [facility] for Saphris, Zyprexa, haloperidol, Latuda, and Risperidone. Letter was sent to add Seroquel and remove Risperdal and Saphris from [facility]  --Pt's mother wants pt to return home after discharge and this is pt's desire as well.  Pt is willing to do Day Tx at [facility] which has gone well for him in the past.  Will need to see stable reduction in psychosis and irritability, improved organization of thought before discharge can safely occur.  Based on improvements seen in the last 2 weeks, planning for discharge early next week.    --D/C sometime after 2nd haldol Dec IM shot, with plans for outpatient IM continuation.  --Planning on meeting with mom Tue 9/5 11am face-to-face, then proceed with d/c.  --Treatment team has significant concern for mom causing or contributing to med non-adherence, especially of pt's antipsychotics.  Contacted Dr. Barahona to share this concern and recommend monitoring serum/urine levels of pt's PO psych meds, where feasible.    Attestation:  This documentation  accurately reflects the services I personally performed and treatment decisions made by me in consultation with the attending physician.    Boubacar Gilmore MD, PGY-2 Psychiatry resident  Pager [phone]     This patient has been seen and evaluated by me, Denny Gaviria MD on the date of this note. I have discussed this patient with the the resident and I agree with the findings and plan in this note. I have reviewed today's vital signs, medications, labs and imaging.

## 2017-09-01 NOTE — PROGRESS NOTES
Patient has been out of his room about 60% of shift.  He has been minimally interactive with other patients.  He did request PRN x2 for complaints of anxiety.  He was given Seroquel in the morning and Benadryl in the afternoon.  He took all of his scheduled medications cooperatively.  He did have some paperwork from the [facility] Human Services that he didn't understand.  When staff attempted to help him he finally said he was just going to throw them away.  He was encouraged to call the worker who was listed on the papers.  He said that he did so and had to leave a message.  The focus of the papers appeared to be regarding an address change.

## 2017-09-01 NOTE — PLAN OF CARE
Problem: Psychotic Symptoms  Goal: Social and Therapeutic (Psychotic Symptoms)  Signs and symptoms of listed problems will be absent or manageable.         Pt. Attended and actively participated in scheduled OT goal directed task group.  Pt asked directly for needed materials.  Pt worked independently and did not ask for assistance even when problems arose.  Pt.was cooperative and pleasant throughout session.

## 2017-09-02 RX ADMIN — RANITIDINE 150 MG: 150 TABLET ORAL at 19:58

## 2017-09-02 RX ADMIN — LISINOPRIL 20 MG: 20 TABLET ORAL at 08:26

## 2017-09-02 RX ADMIN — DIPHENHYDRAMINE HYDROCHLORIDE 50 MG: 25 CAPSULE ORAL at 19:58

## 2017-09-02 RX ADMIN — PREDNISONE 30 MG: 20 TABLET ORAL at 08:27

## 2017-09-02 RX ADMIN — HALOPERIDOL 10 MG: 10 TABLET ORAL at 19:58

## 2017-09-02 RX ADMIN — NICOTINE 1 PATCH: 7 PATCH TRANSDERMAL at 14:36

## 2017-09-02 RX ADMIN — BENZTROPINE MESYLATE 0.5 MG: 0.5 TABLET ORAL at 19:58

## 2017-09-02 RX ADMIN — MINOCYCLINE HYDROCHLORIDE 100 MG: 100 CAPSULE ORAL at 08:26

## 2017-09-02 RX ADMIN — MINOCYCLINE HYDROCHLORIDE 100 MG: 100 CAPSULE ORAL at 19:58

## 2017-09-02 RX ADMIN — BENZTROPINE MESYLATE 0.5 MG: 0.5 TABLET ORAL at 08:27

## 2017-09-02 RX ADMIN — Medication 250 MG: at 19:58

## 2017-09-02 RX ADMIN — LURASIDONE HYDROCHLORIDE 140 MG: 80 TABLET, FILM COATED ORAL at 08:25

## 2017-09-02 RX ADMIN — Medication 250 MG: at 08:26

## 2017-09-02 RX ADMIN — RANITIDINE 150 MG: 150 TABLET ORAL at 08:27

## 2017-09-02 RX ADMIN — MULTIPLE VITAMINS W/ MINERALS TAB 1 TABLET: TAB at 08:27

## 2017-09-02 RX ADMIN — QUETIAPINE FUMARATE 200 MG: 100 TABLET, FILM COATED ORAL at 15:48

## 2017-09-02 RX ADMIN — HALOPERIDOL 10 MG: 10 TABLET ORAL at 08:27

## 2017-09-02 ASSESSMENT — ACTIVITIES OF DAILY LIVING (ADL)
LAUNDRY: UNABLE TO COMPLETE
GROOMING: INDEPENDENT
ORAL_HYGIENE: INDEPENDENT
DRESS: SCRUBS (BEHAVIORAL HEALTH)
ORAL_HYGIENE: INDEPENDENT
HYGIENE/GROOMING: INDEPENDENT
DRESS: SCRUBS (BEHAVIORAL HEALTH)

## 2017-09-02 NOTE — PROGRESS NOTES
Patient was visible in the milieu majority of the evening social with staff and peers and interactive playing chess. He was pleasant and calm and seemed to be a calming presence for other more tense patients by inviting them for games of chess. Patient reported to staff that he feels his medications sometimes cloud his thoughts which he notices when playing chess.      09/01/17 2121   Behavioral Health   Hallucinations denies / not responding to hallucinations   Thinking intact   Orientation person: oriented;place: oriented;date: oriented;time: oriented   Memory baseline memory   Insight admits / accepts;poor   Judgement impaired   Eye Contact at examiner   Affect full range affect   Mood mood is calm   Physical Appearance/Attire attire appropriate to age and situation   Hygiene well groomed   Suicidality other (see comments)  (none stated or observed)   Self Injury other (see comment)  (none stated or observed)   Elopement (no behaviors or statement noted)   Activity other (see comment)  (calm, social, interactive in miliue)   Speech clear;coherent   Medication Sensitivity no stated side effects;no observed side effects   Psychomotor / Gait balanced;steady   Overt Aggression Scale   Verbal Aggression 0   Aggression against Property 0   Auto-Aggression 0   Physical Aggression 0   Overt Aggression Total Score 0   Sleep/Rest/Relaxation   Sleep/Rest/Relaxation appears asleep   Day/Evening Time Hours resting in bed;up all shift   Number of hours resting in bed 2.5 hours   Coping/Psychosocial   Verbalized Emotional State hopefulness   Supportive Measures active listening utilized;decision-making supported;positive reinforcement provided;relaxation techniques promoted;self-care encouraged;verbalization of feelings encouraged   Trust Relationship/Rapport care explained;choices provided;emotional support provided;empathic listening provided;questions answered;reassurance provided;thoughts/feelings acknowledged   Daily Care    Activity up ad jomar   Patient Performed Hygiene teeth brushed;shower   Activities of Daily Living   Hygiene/Grooming handwashing;shower;independent   Oral Hygiene independent   Dress scrubs (behavioral health)   Laundry unable to complete   Room Organization independent   Activity   Activity Level of Assistance independent   Behavioral Health Interventions   Psychotic Symptoms maintain safety precautions;maintain safe secure environment;simple, clear language;monitor confusion, memory loss, decision making ability and reorient / intervent as needed

## 2017-09-02 NOTE — PLAN OF CARE
"Problem: Psychotic Symptoms  Goal: Psychotic Symptoms  Signs and symptoms of listed problems will be absent or manageable.   Outcome: No Change     48 Hour Nurse Note:  Met with pt for 1:1.  Pt reports he feels \"better\" and has \"good anxiety\".  Pt affect is slightly flat, but appears improved from yesterday.  Pt is excited to discharge (possible Tuesday).  Pt was visible in the milieu and was calm when talking with staff.  Pt speech was clear, coherent, and soft.  Thinking was logical.  Insight fair.  Pt stated he would work on using non-pharmalogical interventions for anxiety - requested aroma therapy (used appropriately) and did breathing exercises.       Pt spent a good portion of the interview discussing coping mechanisms for once he discharges - cooking, gardening, walking, etc.     Pt denied SI, SIB, AH, VH.  No behaviors observed.    Pt compliant with HS medications.  Pt reports he slept well (7 hours per chart), is eating well, and showered during the day shift.     Will continue to monitor and assess.      "

## 2017-09-03 RX ADMIN — NICOTINE POLACRILEX 2 MG: 2 GUM, CHEWING ORAL at 17:36

## 2017-09-03 RX ADMIN — RANITIDINE 150 MG: 150 TABLET ORAL at 19:23

## 2017-09-03 RX ADMIN — MULTIPLE VITAMINS W/ MINERALS TAB 1 TABLET: TAB at 08:06

## 2017-09-03 RX ADMIN — RANITIDINE 150 MG: 150 TABLET ORAL at 08:07

## 2017-09-03 RX ADMIN — HALOPERIDOL 10 MG: 10 TABLET ORAL at 19:23

## 2017-09-03 RX ADMIN — LURASIDONE HYDROCHLORIDE 140 MG: 80 TABLET, FILM COATED ORAL at 08:06

## 2017-09-03 RX ADMIN — TRAZODONE HYDROCHLORIDE 50 MG: 50 TABLET ORAL at 19:34

## 2017-09-03 RX ADMIN — Medication 250 MG: at 08:06

## 2017-09-03 RX ADMIN — BENZTROPINE MESYLATE 0.5 MG: 0.5 TABLET ORAL at 08:06

## 2017-09-03 RX ADMIN — SELENIUM SULFIDE 1 ML: 10 SHAMPOO TOPICAL at 20:23

## 2017-09-03 RX ADMIN — PREDNISONE 30 MG: 20 TABLET ORAL at 08:07

## 2017-09-03 RX ADMIN — MINOCYCLINE HYDROCHLORIDE 100 MG: 100 CAPSULE ORAL at 08:06

## 2017-09-03 RX ADMIN — Medication 250 MG: at 19:23

## 2017-09-03 RX ADMIN — BENZTROPINE MESYLATE 0.5 MG: 0.5 TABLET ORAL at 19:23

## 2017-09-03 RX ADMIN — NICOTINE 1 PATCH: 7 PATCH TRANSDERMAL at 08:06

## 2017-09-03 RX ADMIN — HALOPERIDOL 10 MG: 10 TABLET ORAL at 08:06

## 2017-09-03 RX ADMIN — MINOCYCLINE HYDROCHLORIDE 100 MG: 100 CAPSULE ORAL at 19:23

## 2017-09-03 RX ADMIN — LISINOPRIL 20 MG: 20 TABLET ORAL at 08:06

## 2017-09-03 ASSESSMENT — ACTIVITIES OF DAILY LIVING (ADL)
ORAL_HYGIENE: INDEPENDENT
HYGIENE/GROOMING: INDEPENDENT
ORAL_HYGIENE: INDEPENDENT
LAUNDRY: UNABLE TO COMPLETE
DRESS: SCRUBS (BEHAVIORAL HEALTH)
DRESS: SCRUBS (BEHAVIORAL HEALTH)
LAUNDRY: UNABLE TO COMPLETE
GROOMING: SHOWER;INDEPENDENT

## 2017-09-03 NOTE — PROGRESS NOTES
"   09/03/17 1506   Behavioral Health   Thinking distractable;delusional;other (see comment)   Orientation person: oriented;place: oriented;date: oriented   Memory baseline memory   Insight poor   Judgement impaired   Eye Contact at examiner   Affect irritable;blunted, flat   Mood labile;mood is calm;irritable   Hygiene other (see comment)   Activities of Daily Living   Hygiene/Grooming independent   Oral Hygiene independent   Dress scrubs (behavioral health)   Laundry unable to complete   Room Organization wero   Jv was in his room for most of this shift. When staff came in this morning, he told staff that there was a dirty spot in the restroom and he asked staff to use bleach wipes to clean it; and staff did. After cleaning the spot, he went and asked another staff to go an clean the same area because he felt \"it wasn't clean enough\". He was overall calm during this shift and he ate all his meals without any issues.   "

## 2017-09-04 VITALS
BODY MASS INDEX: 30.5 KG/M2 | RESPIRATION RATE: 18 BRPM | HEART RATE: 77 BPM | TEMPERATURE: 97.2 F | SYSTOLIC BLOOD PRESSURE: 137 MMHG | WEIGHT: 244 LBS | DIASTOLIC BLOOD PRESSURE: 81 MMHG

## 2017-09-04 RX ADMIN — BENZOCAINE, MENTHOL 1 LOZENGE: 15; 3.6 LOZENGE ORAL at 17:57

## 2017-09-04 RX ADMIN — QUETIAPINE FUMARATE 100 MG: 100 TABLET, FILM COATED ORAL at 19:38

## 2017-09-04 RX ADMIN — MINOCYCLINE HYDROCHLORIDE 100 MG: 100 CAPSULE ORAL at 19:38

## 2017-09-04 RX ADMIN — HALOPERIDOL 10 MG: 10 TABLET ORAL at 19:38

## 2017-09-04 RX ADMIN — LURASIDONE HYDROCHLORIDE 140 MG: 80 TABLET, FILM COATED ORAL at 08:52

## 2017-09-04 RX ADMIN — Medication 250 MG: at 08:52

## 2017-09-04 RX ADMIN — NICOTINE POLACRILEX 2 MG: 2 GUM, CHEWING ORAL at 11:34

## 2017-09-04 RX ADMIN — NICOTINE 1 PATCH: 7 PATCH TRANSDERMAL at 08:53

## 2017-09-04 RX ADMIN — DIPHENHYDRAMINE HYDROCHLORIDE 50 MG: 25 CAPSULE ORAL at 19:39

## 2017-09-04 RX ADMIN — NICOTINE POLACRILEX 2 MG: 2 GUM, CHEWING ORAL at 15:51

## 2017-09-04 RX ADMIN — MULTIPLE VITAMINS W/ MINERALS TAB 1 TABLET: TAB at 08:52

## 2017-09-04 RX ADMIN — LISINOPRIL 20 MG: 20 TABLET ORAL at 08:53

## 2017-09-04 RX ADMIN — HALOPERIDOL 10 MG: 10 TABLET ORAL at 08:52

## 2017-09-04 RX ADMIN — PREDNISONE 30 MG: 20 TABLET ORAL at 08:52

## 2017-09-04 RX ADMIN — MINOCYCLINE HYDROCHLORIDE 100 MG: 100 CAPSULE ORAL at 08:52

## 2017-09-04 RX ADMIN — BENZTROPINE MESYLATE 0.5 MG: 0.5 TABLET ORAL at 08:53

## 2017-09-04 RX ADMIN — HALOPERIDOL DECANOATE 100 MG: 100 INJECTION INTRAMUSCULAR at 11:34

## 2017-09-04 RX ADMIN — RANITIDINE 150 MG: 150 TABLET ORAL at 19:39

## 2017-09-04 RX ADMIN — RANITIDINE 150 MG: 150 TABLET ORAL at 08:52

## 2017-09-04 RX ADMIN — BENZTROPINE MESYLATE 0.5 MG: 0.5 TABLET ORAL at 20:00

## 2017-09-04 RX ADMIN — Medication 250 MG: at 19:38

## 2017-09-04 ASSESSMENT — ACTIVITIES OF DAILY LIVING (ADL)
GROOMING: INDEPENDENT
ORAL_HYGIENE: INDEPENDENT
LAUNDRY: UNABLE TO COMPLETE
DRESS: SCRUBS (BEHAVIORAL HEALTH);INDEPENDENT

## 2017-09-04 NOTE — PROGRESS NOTES
Pt was calm and cooperative with staff. Pt was isolative to his room and whenever he was out in the lounge was not-interactive and withdrawn from peers. Pt was not giggling to himself this shift. Pt spent most of the shift time in his room. Pt took a shower and ate dinner before going to bed early in the evening. Nothing else to report.        09/03/17 2211   Behavioral Health   Hallucinations denies / not responding to hallucinations   Thinking poor concentration   Orientation person: oriented;place: oriented   Memory baseline memory   Insight poor   Judgement impaired   Eye Contact at examiner   Affect other (see comments);blunted, flat   Mood mood is calm   Physical Appearance/Attire attire appropriate to age and situation;neat   Hygiene well groomed   Suicidality other (see comments)  (None)   Self Injury other (see comment)  (None)   Activity isolative;withdrawn   Speech clear   Medication Sensitivity no stated side effects;no observed side effects   Psychomotor / Gait steady;balanced   Activities of Daily Living   Hygiene/Grooming shower;independent   Oral Hygiene independent   Dress scrubs (behavioral health)   Laundry unable to complete   Room Organization independent   Behavioral Health Interventions   Psychotic Symptoms maintain safety precautions;monitor need to revise level of observation;maintain safe secure environment;reality orientation;simple, clear language;decrease environmental stimulation;redirection of intrusive behaviors;assist patient in developing safety plan;redirection of aggressive behaviors;assist patient in following safety plan;establish therapeutic relationship;assist with developing & utilizing healthy coping strategies;assess patient response to medication;build upon strengths;assess medication adherance;monitor need for prn medication   Social and Therapeutic Interventions (Psychotic Symptoms) encourage socialization with peers;encourage participation in therapeutic groups and  milieu activities;encourage effective boundaries with peers

## 2017-09-04 NOTE — DISCHARGE INSTRUCTIONS
Behavioral Discharge Planning and Instructions      Summary:  You were admitted on 7-23-17 due to psychosis.     You were treated on [facility] by Dr Shah and Dr. Gaviria and discharged on 9-5-17 to your mother's home in [place], [place].     You plan to follow-up with your outpatient providers.  You were cooperative with treatment and your symptoms have improved.  On 6-16-17, you were committed as MI [facility] and[facility]or [facility].   There is also a [facility] order for Seroquel,  Saphris, Zyprexa, Haldol, Lutuda, and Risperdal.    Primary Diagnoses:  1.  Schizophrenia, paranoid type, is likely.  2.  Psychosis related to chronic Lyme's probably cannot be definitively ruled out.       Mental Health Follow-Up:     Psychiatrist   Appointment:  Wednesday September 13th at 9:30am with Dr. An Barahona   [address], [place]   Phone: [phone]  Fax: [phone]  HUC TO FAX AVS *and Discharge Summary, when available!     [facility] - Day Treatment  Your [facility]  (Jenna Khalil) has placed your name on the waiting list for the day treatment program.     When your name gets to the top of the list, [facility] will call you to schedule an intake appointment.  Phone: [phone]   Fax: [phone]    [facility]    Jenna Khalil  Phone: [phone]  Fax: [phone]  HUC to FAX AVS      Attend all scheduled appointments with your outpatient providers. Call at least 24 hours in advance if you need to reschedule an appointment to ensure continued access to your outpatient providers.   Major Treatments, Procedures and Findings:  You were provided with: a psychiatric assessment, medication evaluation and/or management, group therapy and milieu management    Symptoms to Report: feeling more aggressive, increased confusion, losing more sleep, mood getting worse or thoughts of suicide    Early warning signs can include: increased depression or anxiety  "sleep disturbances increased thoughts or behaviors of suicide or self-harm  increased unusual thinking, such as paranoia or hearing voices    Safety and Wellness:  Take all medicines as directed.  Make no changes unless your doctor suggests them.      Follow treatment recommendations.  Refrain from alcohol and non-prescribed drugs.  Ask your support system to help you reduce your access to items that could harm yourself or others. Items could include:  Firearms  Medicines (both prescribed and over-the-counter)  Knives and other sharp objects  Ropes and like materials  Car keys  If there is a concern for safety, call 911. If there is a concern for safety, call 911.    Resources:   Crisis Intervention: 869.142.8195 or 312-926-9230 (TTY: 216.655.9800).  Call anytime for help.  National [place] on Mental Illness (www.[place].eileen.org): 679.749.1008 or 316-786-5410.  [place] Association for Children's Mental Health (www.mac.org): 928.506.1991.  Alcoholics Anonymous (www.alcoholics-anonymous.org): Check your phone book for your local chapter.  Suicide Awareness Voices of Education (SAVE) (www.save.org): 782-608-GPUZ (8630)  National Suicide Prevention Line (www.mentalhealthmn.org): 525-200-IVQE (2717)  Mental Health Consumer/Survivor Network of [place] (www.mhcsn.net): 941.622.4657 or 960-368-4872  Mental Health Association of [place] (www.mentalhealth.org): 230.285.5994 or 921-657-4622  [facility] Crisis Response 845 092-3697  [facility] Crisis (COPE) Response - Adult 274 603-7193  [facility] Crisis Response - Adult 536 988-4197  Text 4 Life: txt \"LIFE\" to 40187 for immediate support and crisis intervention  Crisis text line: Text \"START\" to 289-163. Free, confidential, 24/7.  Crisis Intervention: 725.370.6388 or 772-615-0892. Call anytime for help.   [facility] Health Crisis Team - Child: [phone]  [facility] Mental Health Crisis Response Team - Child: [phone]  [facility] " Mental Health Crisis Line: 357.812.6362    The treatment team has appreciated the opportunity to work with you. If you have any questions or concerns our unit number is [phone].

## 2017-09-05 RX ADMIN — HALOPERIDOL 10 MG: 10 TABLET ORAL at 08:34

## 2017-09-05 RX ADMIN — MULTIPLE VITAMINS W/ MINERALS TAB 1 TABLET: TAB at 08:34

## 2017-09-05 RX ADMIN — Medication 250 MG: at 08:34

## 2017-09-05 RX ADMIN — MINOCYCLINE HYDROCHLORIDE 100 MG: 100 CAPSULE ORAL at 08:34

## 2017-09-05 RX ADMIN — QUETIAPINE FUMARATE 200 MG: 100 TABLET, FILM COATED ORAL at 09:14

## 2017-09-05 RX ADMIN — NICOTINE POLACRILEX 2 MG: 2 GUM, CHEWING ORAL at 09:01

## 2017-09-05 RX ADMIN — LISINOPRIL 20 MG: 20 TABLET ORAL at 08:34

## 2017-09-05 RX ADMIN — PREDNISONE 30 MG: 20 TABLET ORAL at 08:34

## 2017-09-05 RX ADMIN — LURASIDONE HYDROCHLORIDE 140 MG: 80 TABLET, FILM COATED ORAL at 08:34

## 2017-09-05 RX ADMIN — BENZTROPINE MESYLATE 0.5 MG: 0.5 TABLET ORAL at 08:34

## 2017-09-05 RX ADMIN — RANITIDINE 150 MG: 150 TABLET ORAL at 08:34

## 2017-09-05 RX ADMIN — NICOTINE 1 PATCH: 7 PATCH TRANSDERMAL at 08:34

## 2017-09-05 NOTE — PROGRESS NOTES
Pt was largely withdrawn throughout the evening. Spent some time out of his room and was socially appropriate with staff and peers. Willingly engaged in conversation when approached. Looking forward to getting out tomorrow and discussed some of what he is looking forward to including cooking and working with cars. Pt appears to have reasonable insight into his condition and what is required of him when he is discharged. No new or worsening mh sx. No safety concerns.          09/04/17 2100   Behavioral Health   Hallucinations denies / not responding to hallucinations   Thinking distractable;poor concentration   Orientation person: oriented;place: oriented;date: oriented;time: oriented   Memory baseline memory   Insight insight appropriate to situation;insight appropriate to events   Judgement impaired   Eye Contact at examiner   Affect blunted, flat   Mood mood is calm   Physical Appearance/Attire appears stated age;attire appropriate to age and situation   Hygiene other (see comment)  (adequate)   Suicidality (denies)   Self Injury (denies)   Elopement (no concerns)   Activity withdrawn   Speech clear;coherent   Medication Sensitivity no stated side effects;no observed side effects   Psychomotor / Gait balanced;steady   Psycho Education   Type of Intervention 1:1 intervention   Response participates, initiates socially appropriate   Hours 0.5   Treatment Detail check in

## 2017-09-05 NOTE — PROGRESS NOTES
Pt was Provisionally Discharged today to his mother's home in [place], [place].   I reviewed the PD Agreement with him and the following received a copy:  Pt's [facility] CM (Jenna Khalil), [facility] [address], patient, pt's medical record (for scanning into EPIC.)

## 2017-09-05 NOTE — PROGRESS NOTES
Patient discharged at this time. 30 day supply of medication sent with patient. D/C instructions reviewed. Patient understands all instructions and follow up conditions of his discharge. He denies depression/SI/SIB. Denies AH/VH. He states is is able to be safe in the community. Mother here at discharge and given all instructions. All belongings returned. Appropriate for d/c at this time.

## 2017-09-13 NOTE — DISCHARGE SUMMARY
PATIENT IDENTIFICATION:  Jv Pate is a 32-year-old single white male.      CHIEF COMPLAINT AND REASON FOR ADMISSION:  The patient was admitted in late May because of an exacerbation of psychosis and homicidal ideation.  He was committed and placed on a Ross order, then developed DRESS syndrome and was transferred from Internal Medicine in mid July.  Three days later, he was medically cleared and transferred back to Psychiatry.  He had to undergo medication changes because of the dangerous rash that he developed and required further psychiatric stabilization.      DISCHARGE DIAGNOSES:   Axis I:     1.  Psychotic disorder, not otherwise specified (schizophrenia and/or psychosis related to chronic Lyme disease).   2.  History of alcohol and marijuana abuse.   Axis II:  History of antisocial traits.   Axis III:  DRESS syndrome during this stay, resolved on oral prednisone which is being tapered, with taper to conclude in early October.   Axis IV:  Stressors are mainly related to his recurrent problems with psychosis and its effect on his functioning.   Axis V:  Global Assessment of Functioning on discharge is 55.      HOSPITAL COURSE:  This patient has had many previous psychiatric hospitalizations.  Throughout the majority of this stay he was under the care of Dr. Shah.  He was experiencing improvement in his symptoms on carbamazepine and risperidone, but then he developed a rash, transaminitis and eosinophilia which was concerning DRESS.  Thus, both the carbamazepine and risperidone were discontinued and the patient was started on a prednisone taper.  The rash did resolve and did not recur throughout the remainder of his stay.  Initially, upon his return to Psychiatry, he was restarted on Latuda and this was titrated up.  He did experience improvement in his psychotic symptoms, however, continued to have bouts of severe irritability and hostility, as well as delusions and bizarre statements.  He was not  considered to be at his psychiatric baseline or stable enough for discharge, but it had been noted that p.r.n. Haldol had been effective for his symptoms of agitation and overt hostility.  Thus, the Haldol was scheduled at 10 mg b.i.d. and around the same time the patient was started on minocycline for suspected chronic Lyme disease.  The patient and his mother both believed that he has never had a prolonged period of stability without being on an antibiotic and that while his symptoms do tend to improve on antipsychotics, they do not typically resulted in prolonged stability on their own.  Given the relative risks and benefits of antibiotic taken orally, and comparing this to the restrictions on his freedom that result from his psychosis, as well as the dangerousness associated with his psychosis, it was felt that the risk/benefit ratio did lean in favor of starting an antibiotic.  After about 1 week of the initiation of scheduled Haldol and scheduled minocycline, it was clear that the patient's symptoms had improved markedly.  He was much more consistently pleasant, organized and insightul.  He had a drastic reduction in the number of delusional statements he was making.  He did continue to have odd preoccupations, such as asking for certain creams or medications to use for physical ailments.  This has long been a part of his psychosis, albeit the most benign of his symptoms.  Even this was much improved in terms of intensity and frequency.  The patient was monitored here for another week or so to ensure stable improvement, and he did continue to have quite mild and stable symptoms throughout the remainder of his stay.  His mother and the patient have both been in favor of him returning home to his mother's house, as IRTS placement in the past has not been successful.  The patient is willing to do a day treatment program at [facility].  This was arranged.  As of 09/05, it was felt that he was showing stable  improvement sufficient to allow safe discharge to home.      Exam from 09/05, vital signs were taken that morning on the unit and were reviewed.  His general appearance was neat, clean and well groomed.  He made good eye contact and had no psychomotor agitation or retardation.  His speech was of normal rate, tone and volume.  His thought process was linear and logical.  He had no suicidal or homicidal ideations.  There were no symptoms of psychosis.  His associations were intact.  His insight and judgment were fair.  He was oriented to time, place and person.  His recent and remote memory are intact.  His attention span and concentration are fair.  His language is appropriate.  His fund of knowledge is average.  His mood is reportedly euthymic and stable and his affect is calm and appropriate.  His muscle strength and tone are normal, as are his gait and station.      LABORATORY DATA:  On 08/28, a CMP was normal, CBC was normal.      DISCHARGE MEDICATIONS:   1.  Haldol decanoate 200 mg IM every 28 days, next dose due on 09/25.   2.  Minocycline 100 mg p.o. b.i.d.   3.  Zantac 150 mg p.o. b.i.d.   4.  Florastor probiotic 250 mg p.o. b.i.d.   5.  Cogentin 0.5 mg p.o. b.i.d.   6.  Haldol 10 mg p.o. b.i.d.   7.  Latuda 120 mg p.o. daily.   8.  Prednisone 20 mg p.o. daily for 10 days, then 10 mg p.o. daily for 10 days, then 5 mg p.o. daily for 5 days, then stop.   9.  Seroquel 100-200 mg p.o. b.i.d. p.r.n. for anxiety and sleep.   10.  Vistaril 25-50 mg p.o. q.4 hours p.r.n. for anxiety.   11.  Lisinopril 20 mg p.o. daily.   12.  Multivitamin 1 tablet p.o. daily.   13.  Kenalog 0.1% ointment applied topically b.i.d.      PLAN:  The patient was discharged on 09/05 on a provisional discharge from his mental health commitment.  He is on a Ross order as well.  He understands he is to take his medications as written, abstain from alcohol and illicit drugs including marijuana, and attend all scheduled followup appointments.   He voiced agreement to all of these elements of the agreement.  He has an appointment scheduled with his psychiatrist, Dr. Barahona, in [place] on .  I spoke with Dr. Barahona by phone for verbal handoff of this case prior to his discharge.  He has followup scheduled at the [facility] treatment program and he will be contacted by his outpatient  with the time of the intake appointment.  The patient has a [facility]  who will monitor his compliance with the terms of his provisional discharge.  The patient was agreeable to all the above plan and contracted for safety.         ESTRELLA BOYD MD             D: 2017 15:13   T: 2017 06:46   MT: SK      Name:     IVETTE VIRGEN   MRN:      [id number]        Account:        [id number]   :      1985           Admit Date:     [id number]                                  Discharge Date: 2017      Document: [id number]       cc: Jenna Barahona

## 2017-11-16 ENCOUNTER — HOSPITAL ENCOUNTER (EMERGENCY)
Facility: CLINIC | Age: 32
Discharge: HOME | End: 2017-11-16
Attending: EMERGENCY MEDICINE | Admitting: EMERGENCY MEDICINE
Payer: MEDICAID

## 2017-11-16 VITALS
TEMPERATURE: 97.9 F | DIASTOLIC BLOOD PRESSURE: 86 MMHG | SYSTOLIC BLOOD PRESSURE: 138 MMHG | RESPIRATION RATE: 16 BRPM | OXYGEN SATURATION: 96 %

## 2017-11-16 DIAGNOSIS — R11.2 NON-INTRACTABLE VOMITING WITH NAUSEA, UNSPECIFIED VOMITING TYPE: ICD-10-CM

## 2017-11-16 LAB
ALBUMIN SERPL-MCNC: 4.2 G/DL (ref 3.4–5)
ALBUMIN UR-MCNC: NEGATIVE MG/DL
ALP SERPL-CCNC: 102 U/L (ref 40–150)
ALT SERPL W P-5'-P-CCNC: 23 U/L (ref 0–70)
ANION GAP SERPL CALCULATED.3IONS-SCNC: 9 MMOL/L (ref 3–14)
APPEARANCE UR: CLEAR
APTT PPP: 27 SEC (ref 22–37)
AST SERPL W P-5'-P-CCNC: 16 U/L (ref 0–45)
BASOPHILS # BLD AUTO: 0 10E9/L (ref 0–0.2)
BASOPHILS NFR BLD AUTO: 0.2 %
BILIRUB SERPL-MCNC: 0.4 MG/DL (ref 0.2–1.3)
BILIRUB UR QL STRIP: NEGATIVE
BUN SERPL-MCNC: 11 MG/DL (ref 7–30)
CALCIUM SERPL-MCNC: 9.4 MG/DL (ref 8.5–10.1)
CHLORIDE SERPL-SCNC: 108 MMOL/L (ref 94–109)
CO2 SERPL-SCNC: 22 MMOL/L (ref 20–32)
COLOR UR AUTO: YELLOW
CREAT SERPL-MCNC: 0.7 MG/DL (ref 0.66–1.25)
DIFFERENTIAL METHOD BLD: NORMAL
EOSINOPHIL # BLD AUTO: 0 10E9/L (ref 0–0.7)
EOSINOPHIL NFR BLD AUTO: 0 %
ERYTHROCYTE [DISTWIDTH] IN BLOOD BY AUTOMATED COUNT: 12.6 % (ref 10–15)
GFR SERPL CREATININE-BSD FRML MDRD: >90 ML/MIN/1.7M2
GLUCOSE SERPL-MCNC: 84 MG/DL (ref 70–99)
GLUCOSE UR STRIP-MCNC: NEGATIVE MG/DL
HCT VFR BLD AUTO: 50.1 % (ref 40–53)
HGB BLD-MCNC: 17 G/DL (ref 13.3–17.7)
HGB UR QL STRIP: NEGATIVE
IMM GRANULOCYTES # BLD: 0 10E9/L (ref 0–0.4)
IMM GRANULOCYTES NFR BLD: 0.2 %
INR PPP: 0.98 (ref 0.86–1.14)
KETONES UR STRIP-MCNC: NEGATIVE MG/DL
LEUKOCYTE ESTERASE UR QL STRIP: NEGATIVE
LIPASE SERPL-CCNC: 92 U/L (ref 73–393)
LYMPHOCYTES # BLD AUTO: 2.6 10E9/L (ref 0.8–5.3)
LYMPHOCYTES NFR BLD AUTO: 29.4 %
MCH RBC QN AUTO: 29 PG (ref 26.5–33)
MCHC RBC AUTO-ENTMCNC: 33.9 G/DL (ref 31.5–36.5)
MCV RBC AUTO: 85 FL (ref 78–100)
MONOCYTES # BLD AUTO: 0.7 10E9/L (ref 0–1.3)
MONOCYTES NFR BLD AUTO: 8 %
NEUTROPHILS # BLD AUTO: 5.4 10E9/L (ref 1.6–8.3)
NEUTROPHILS NFR BLD AUTO: 62.2 %
NITRATE UR QL: NEGATIVE
PH UR STRIP: 7 PH (ref 5–7)
PLATELET # BLD AUTO: 189 10E9/L (ref 150–450)
POTASSIUM SERPL-SCNC: 3.8 MMOL/L (ref 3.4–5.3)
PROT SERPL-MCNC: 7.8 G/DL (ref 6.8–8.8)
RBC # BLD AUTO: 5.87 10E12/L (ref 4.4–5.9)
SODIUM SERPL-SCNC: 139 MMOL/L (ref 133–144)
SOURCE: NORMAL
SP GR UR STRIP: 1 (ref 1–1.03)
UROBILINOGEN UR STRIP-MCNC: NORMAL MG/DL (ref 0–2)
WBC # BLD AUTO: 8.7 10E9/L (ref 4–11)

## 2017-11-16 PROCEDURE — 81003 URINALYSIS AUTO W/O SCOPE: CPT | Performed by: EMERGENCY MEDICINE

## 2017-11-16 PROCEDURE — 85730 THROMBOPLASTIN TIME PARTIAL: CPT | Performed by: EMERGENCY MEDICINE

## 2017-11-16 PROCEDURE — 99284 EMERGENCY DEPT VISIT MOD MDM: CPT | Mod: Z6 | Performed by: EMERGENCY MEDICINE

## 2017-11-16 PROCEDURE — 25000132 ZZH RX MED GY IP 250 OP 250 PS 637: Performed by: EMERGENCY MEDICINE

## 2017-11-16 PROCEDURE — 96374 THER/PROPH/DIAG INJ IV PUSH: CPT | Performed by: EMERGENCY MEDICINE

## 2017-11-16 PROCEDURE — 25000125 ZZHC RX 250: Performed by: EMERGENCY MEDICINE

## 2017-11-16 PROCEDURE — 85025 COMPLETE CBC W/AUTO DIFF WBC: CPT | Performed by: EMERGENCY MEDICINE

## 2017-11-16 PROCEDURE — 96361 HYDRATE IV INFUSION ADD-ON: CPT | Performed by: EMERGENCY MEDICINE

## 2017-11-16 PROCEDURE — 80053 COMPREHEN METABOLIC PANEL: CPT | Performed by: EMERGENCY MEDICINE

## 2017-11-16 PROCEDURE — 99284 EMERGENCY DEPT VISIT MOD MDM: CPT | Mod: 25 | Performed by: EMERGENCY MEDICINE

## 2017-11-16 PROCEDURE — 83690 ASSAY OF LIPASE: CPT | Performed by: EMERGENCY MEDICINE

## 2017-11-16 PROCEDURE — 25000128 H RX IP 250 OP 636: Performed by: EMERGENCY MEDICINE

## 2017-11-16 PROCEDURE — 85610 PROTHROMBIN TIME: CPT | Performed by: EMERGENCY MEDICINE

## 2017-11-16 RX ORDER — FAMOTIDINE 20 MG/1
20 TABLET, FILM COATED ORAL 2 TIMES DAILY
Qty: 60 TABLET | Refills: 1 | Status: ON HOLD | OUTPATIENT
Start: 2017-11-16 | End: 2019-03-04

## 2017-11-16 RX ORDER — DIPHENHYDRAMINE HYDROCHLORIDE 50 MG/ML
25 INJECTION INTRAMUSCULAR; INTRAVENOUS ONCE
Status: COMPLETED | OUTPATIENT
Start: 2017-11-16 | End: 2017-11-16

## 2017-11-16 RX ORDER — ONDANSETRON 2 MG/ML
4 INJECTION INTRAMUSCULAR; INTRAVENOUS ONCE
Status: DISCONTINUED | OUTPATIENT
Start: 2017-11-16 | End: 2017-11-16 | Stop reason: HOSPADM

## 2017-11-16 RX ORDER — ONDANSETRON 4 MG/1
8 TABLET, ORALLY DISINTEGRATING ORAL EVERY 8 HOURS PRN
Qty: 10 TABLET | Refills: 0 | Status: SHIPPED | OUTPATIENT
Start: 2017-11-16 | End: 2017-11-19

## 2017-11-16 RX ADMIN — DIPHENHYDRAMINE HYDROCHLORIDE 25 MG: 50 INJECTION, SOLUTION INTRAMUSCULAR; INTRAVENOUS at 14:38

## 2017-11-16 RX ADMIN — SODIUM CHLORIDE 1000 ML: 9 INJECTION, SOLUTION INTRAVENOUS at 14:07

## 2017-11-16 RX ADMIN — LIDOCAINE HYDROCHLORIDE 30 ML: 20 SOLUTION ORAL; TOPICAL at 14:36

## 2017-11-16 ASSESSMENT — ENCOUNTER SYMPTOMS
VOMITING: 1
NAUSEA: 1
ABDOMINAL PAIN: 1
DIARRHEA: 0
LIGHT-HEADEDNESS: 1

## 2017-11-16 NOTE — ED NOTES
Pt requesting IV out due to tape bothering pt.   Offered to use  Coban.   Pt continues to request IV out.   Provider updated.

## 2017-11-16 NOTE — ED NOTES
Resting comfortably. Nausea is improved, no needs currently. Call light within reach. Pt encouraged to call if anything changes or other needs arise.

## 2017-11-16 NOTE — ED NOTES
Pt reports vomited small amount of blood at 1200 today.   Mother at bedside.   Pt's mother reports at [place] last night and thought maybe bones may have caused pt to vomit blood.   Pt denies dizziness, nausea or difficulty swallowing.

## 2017-11-16 NOTE — ED PROVIDER NOTES
"  History     Chief Complaint   Patient presents with     Nausea & Vomiting     vomited, end  of emesis was blood     HPI  Jv Pate is a 32 year old male who presents with his mother for evaluation of nausea and vomiting. The patient reports that he felt nauseous last night, which continued into today. Around 12:00 PM today he started to feel very dizzy and light headed. Shortly after this he began to vomit. Initially his vomit was his stomach contents, but by then end the contents appeared to be blood. His mother reports that he vomited about half a cup of \"thick and intensely red\" vomit into the sink. He has had an ache in his epigastric region since this event. He did not have any abdominal pain leading up this event, only some acid reflux. Denies any diarrhea. He is currently being treated for Lyme's disease. He denies any pain at this time.      Problem List:    Patient Active Problem List    Diagnosis Date Noted     Schizophrenia (H) 07/23/2017     Priority: Medium     DRESS syndrome 07/21/2017     Priority: Medium     Psychosis 05/27/2017     Priority: Medium        Past Medical History:    Past Medical History:   Diagnosis Date     Cluster B personality disorder      H/o Lyme disease      Hypertension      Schizophrenia (H)        Past Surgical History:    Past Surgical History:   Procedure Laterality Date     NO HISTORY OF SURGERY         Family History:    Family History   Problem Relation Age of Onset     Neurologic Disorder Father      HEART DISEASE Paternal Grandfather      attack       Social History:  Marital Status:  Single [1]  Social History   Substance Use Topics     Smoking status: Current Every Day Smoker     Packs/day: 1.50     Smokeless tobacco: Not on file     Alcohol use No        Medications:      saccharomyces boulardii (FLORASTOR) 250 MG capsule   lisinopril (PRINIVIL/ZESTRIL) 20 MG tablet   benztropine (COGENTIN) 0.5 MG tablet   haloperidol (HALDOL) 10 MG tablet   haloperidol " decanoate (HALDOL DECANOATE) 100 MG/ML injection   lurasidone (LATUDA) 120 MG TABS tablet   QUEtiapine (SEROQUEL) 200 MG tablet   predniSONE (DELTASONE) 10 MG tablet   minocycline (MINOCIN/DYNACIN) 100 MG capsule   ranitidine (ZANTAC) 150 MG tablet   triamcinolone (KENALOG) 0.1 % ointment   acetaminophen (TYLENOL) 325 MG tablet   hydrOXYzine (ATARAX) 25 MG tablet   sodium chloride (OCEAN) 0.65 % nasal spray   nicotine polacrilex (NICORETTE) 2 MG gum   multivitamin, therapeutic (THERA-VIT) TABS tablet   carboxymethylcellulose (CELLUVISC/REFRESH LIQUIGEL) 1 % ophthalmic solution         Review of Systems   Constitutional: Positive for appetite change. Negative for activity change, chills and fever.   HENT: Negative for congestion, sore throat and trouble swallowing.    Eyes: Negative for visual disturbance.   Respiratory: Negative for chest tightness and shortness of breath.    Cardiovascular: Negative for chest pain and palpitations.   Gastrointestinal: Positive for abdominal pain, nausea and vomiting. Negative for diarrhea.   Genitourinary: Negative for decreased urine volume and dysuria.   Musculoskeletal: Negative for back pain and neck pain.   Skin: Negative for rash.   Neurological: Positive for light-headedness. Negative for dizziness, weakness and numbness.   Hematological: Does not bruise/bleed easily.   All other systems reviewed and are negative.      Physical Exam   BP: 133/88  Heart Rate: 85  Temp: 97.9  F (36.6  C)  Resp: 16  SpO2: 97 %      Physical Exam   Constitutional: He appears well-developed and well-nourished. No distress.   Psychiatric: He has a normal mood and affect.   Nursing note and vitals reviewed.    HENT: Oral mucosa moist. No lesions.posterior pharynx without erythema or edema  Neck: Supple  Pulmonary/Chest: Lungs are clear to auscultation bilaterally.  Cardiovascular: Heart is regular rate and rhythm. No murmur.  Abdomen: Soft, non-distended, non-tender. Bowel sounds are positive.    Musculoskeletal: Moving all extremities well. No peripheral edema.   Neurological: Alert. No focal neurologic deficit.   Skin: No rash.    ED Course     ED Course     Procedures               Critical Care time:  none                   Assessments & Plan (with Medical Decision Making)records were reviewed patient was given ivf's. Patient is sensitive to tegaderm and this was present at the site of the iv and there was mild redness present and patient was given benadryl and a gi cocktail . Labs revealed no acute abnormality. White count was not elevated and hgb was 17. Cmp was without abnormality. Findings discussed with patient and mother. I discussed possible further workup including imaging studies but patient and mother did not think it was necessary at this time. Patient could definitely have a gastritis or the vomiting could have caused the bleeding . He has no abdominal pain at this time and has not vomited again. We will start him on pepcid and have him return if symptoms return or new symptoms develop. Patient and mother are in agreement with this plan.     I have reviewed the nursing notes.    I have reviewed the findings, diagnosis, plan and need for follow up with the patient.            Labs Ordered and Resulted from Time of ED Arrival Up to the Time of Departure from the ED   CBC WITH PLATELETS DIFFERENTIAL   COMPREHENSIVE METABOLIC PANEL   LIPASE   URINE MACROSCOPIC WITH REFLEX TO MICRO   PARTIAL THROMBOPLASTIN TIME   INR   ORTHOSTATIC BLOOD PRESSURE AND PULSE         Medications   0.9% sodium chloride BOLUS (0 mLs Intravenous Stopped 11/16/17 1607)   diphenhydrAMINE (BENADRYL) injection 25 mg (25 mg Intravenous Given 11/16/17 1438)   lidocaine (viscous) (XYLOCAINE) 2 % 15 mL, alum & mag hydroxide-simethicone (MYLANTA ES/MAALOX  ES) 15 mL GI Cocktail (30 mLs Oral Given 11/16/17 1436)       1:33 PM Patient Assessed.    Discharge Medication List as of 11/16/2017  4:08 PM      START taking these  medications    Details   ondansetron (ZOFRAN ODT) 4 MG ODT tab Take 2 tablets (8 mg) by mouth every 8 hours as needed, Disp-10 tablet, R-0, Local Print      famotidine (PEPCID) 20 MG tablet Take 1 tablet (20 mg) by mouth 2 times daily, Disp-60 tablet, R-1, Local Print             Final diagnoses:   Non-intractable vomiting with nausea, unspecified vomiting type - with hematemesis     This document serves as a record of the services and decisions personally performed and made by Boubacar Scott MD. It was created on HIS/HER behalf by Alf Velasco, a trained medical scribe. The creation of this document is based the provider's statements to the medical scribe.  Alf Velasco 1:33 PM 11/16/2017    Provider:   The information in this document, created by the medical scribe for me, accurately reflects the services I personally performed and the decisions made by me. I have reviewed and approved this document for accuracy prior to leaving the patient care area.  Boubacar Scott MD 1:33 PM 11/16/2017 11/16/2017   [facility] EMERGENCY DEPARTMENT     Boubacar Scott MD  11/18/17 0828

## 2017-11-16 NOTE — ED AVS SNAPSHOT
[facility] Emergency Department    [address] [address]    Phone:  [phone]    Fax:  [phone]                                       Jv Pate   MRN: [id number]    Department:  [facility] Emergency Department   Date of Visit:  11/16/2017           After Visit Summary Signature Page     I have received my discharge instructions, and my questions have been answered. I have discussed any challenges I see with this plan with the nurse or doctor.    ..........................................................................................................................................  Patient/Patient Representative Signature      ..........................................................................................................................................  Patient Representative Print Name and Relationship to Patient    ..................................................               ................................................  Date                                            Time    ..........................................................................................................................................  Reviewed by Signature/Title    ...................................................              ..............................................  Date                                                            Time

## 2017-11-16 NOTE — DISCHARGE INSTRUCTIONS
" return if symptoms worsen or new symptoms develop.  Follow-up with primary care physician next available.  Drink plenty of fluids.  Take Zofran as needed for nausea.  Take Pepcid twice a day for 2 weeks.  If any further vomiting of blood lightheadedness weakness headaches or other symptoms present please return for further evaluation and care.  * VOMITING [6yr-Adult]  Vomiting is a common symptom that may be due to different causes. These include gastroenteritis (\"stomach-flu\"), food poisoning and gastritis. There are other more serious causes of vomiting which may be hard to diagnose early in the illness. Therefore, it is important to watch for the warning signs listed below.  The main danger from repeated vomiting is \"dehydration\". This is due to excess loss of water and minerals from the body. When this occurs, body fluids must be replaced.`  HOME CARE:    If symptoms are severe, rest at home for the next 24 hours.    You may use acetaminophen (Tylenol) 650-1000 mg every 6 hours to control fever, unless another medicine was prescribed. [ NOTE : If you have chronic liver disease, talk with your doctor before using acetaminophen.] (Aspirin should never be used in anyone under 18 years of age who is ill with a fever. It may cause severe liver damage.)    Avoid tobacco and alcohol use, which may worsen your symptoms.    If medicines for vomiting were prescribed, take as directed.  DURING THE FIRST 12-24 HOURS follow the diet below. Try to take frequent small sips even if you vomit occasionally:    FRUIT JUICES: Apple, grape juice, clear fruit drinks, electrolyte replacement and sports drinks.    BEVERAGES: Sport drinks such as Gatorade, soft drinks without caffeine; mineral water (plain or flavored), decaffeinated tea and coffee.    SOUPS: Clear broth, consommé and bouillon    DESSERTS: Plain gelatin (Jell-O), popsicles and fruit juice bars.  DURING THE NEXT 24 HOURS you may add the following to the above:    Hot " cereal, plain toast, bread, rolls, crackers    Plain noodles, rice, mashed potatoes, chicken noodle or rice soup    Unsweetened canned fruit (avoid pineapple), bananas    Avoid dairy products    Limit caffeine and chocolate. No spices or seasonings except salt.  DURING THE NEXT 24 HOURS  Slowly go back to a normal diet, as you feel better and your symptoms lessen.  FOLLOW UP with your doctor as advised if you are not improving over the next 2-3 days.  GET PROMPT MEDICAL ATTENTION if any of the following occur:    Constant abdominal pain that stays in the same spot or gets worse    Continued vomiting (unable to keep liquids down) for 24 hours    Frequent diarrhea (more than 5 times a day); blood (red or black color) in diarrhea    No urine output for 12 hours or extreme thirst    Weakness, dizziness or fainting    Unusually drowsy or confused    Fever over 101.0  F (38.3  C) for more than 3 days    Yellow color of the eyes or skin    9332-2616 [employer]. [address] [place] [address], [place] [address]. All rights reserved. This information is not intended as a substitute for professional medical care. Always follow your healthcare professional's instructions.  This information has been modified by your health care provider with permission from the publisher.

## 2017-11-18 ASSESSMENT — ENCOUNTER SYMPTOMS
CHILLS: 0
ACTIVITY CHANGE: 0
TROUBLE SWALLOWING: 0
NECK PAIN: 0
WEAKNESS: 0
SHORTNESS OF BREATH: 0
PALPITATIONS: 0
SORE THROAT: 0
BRUISES/BLEEDS EASILY: 0
DIZZINESS: 0
BACK PAIN: 0
DYSURIA: 0
APPETITE CHANGE: 1
NUMBNESS: 0
FEVER: 0
CHEST TIGHTNESS: 0

## 2019-02-13 ENCOUNTER — HOSPITAL ENCOUNTER (EMERGENCY)
Facility: CLINIC | Age: 34
Discharge: HOME | End: 2019-02-13
Attending: EMERGENCY MEDICINE | Admitting: EMERGENCY MEDICINE
Payer: MEDICAID

## 2019-02-13 VITALS
OXYGEN SATURATION: 98 % | RESPIRATION RATE: 16 BRPM | HEART RATE: 80 BPM | DIASTOLIC BLOOD PRESSURE: 67 MMHG | TEMPERATURE: 98.1 F | SYSTOLIC BLOOD PRESSURE: 139 MMHG

## 2019-02-13 DIAGNOSIS — F29 PSYCHOSIS, UNSPECIFIED PSYCHOSIS TYPE (H): ICD-10-CM

## 2019-02-13 LAB
AMPHETAMINES UR QL SCN: NEGATIVE
BARBITURATES UR QL: NEGATIVE
BENZODIAZ UR QL: NEGATIVE
CANNABINOIDS UR QL SCN: NEGATIVE
COCAINE UR QL: NEGATIVE
ETHANOL UR QL SCN: NEGATIVE
OPIATES UR QL SCN: NEGATIVE

## 2019-02-13 PROCEDURE — 25000125 ZZHC RX 250: Performed by: EMERGENCY MEDICINE

## 2019-02-13 PROCEDURE — 25000128 H RX IP 250 OP 636: Performed by: EMERGENCY MEDICINE

## 2019-02-13 PROCEDURE — 80307 DRUG TEST PRSMV CHEM ANLYZR: CPT | Performed by: EMERGENCY MEDICINE

## 2019-02-13 PROCEDURE — 96372 THER/PROPH/DIAG INJ SC/IM: CPT | Performed by: EMERGENCY MEDICINE

## 2019-02-13 PROCEDURE — 80320 DRUG SCREEN QUANTALCOHOLS: CPT | Performed by: EMERGENCY MEDICINE

## 2019-02-13 PROCEDURE — 99285 EMERGENCY DEPT VISIT HI MDM: CPT | Mod: 25 | Performed by: EMERGENCY MEDICINE

## 2019-02-13 PROCEDURE — 90791 PSYCH DIAGNOSTIC EVALUATION: CPT

## 2019-02-13 PROCEDURE — 99284 EMERGENCY DEPT VISIT MOD MDM: CPT | Mod: Z6 | Performed by: EMERGENCY MEDICINE

## 2019-02-13 RX ORDER — CEFTRIAXONE 2 G/1
2 INJECTION, POWDER, FOR SOLUTION INTRAMUSCULAR; INTRAVENOUS ONCE
Status: DISCONTINUED | OUTPATIENT
Start: 2019-02-13 | End: 2019-02-13

## 2019-02-13 RX ADMIN — LIDOCAINE HYDROCHLORIDE 2 G: 10 INJECTION, SOLUTION EPIDURAL; INFILTRATION; INTRACAUDAL; PERINEURAL at 06:33

## 2019-02-13 ASSESSMENT — ENCOUNTER SYMPTOMS
SHORTNESS OF BREATH: 0
FEVER: 0
ABDOMINAL PAIN: 0
COUGH: 1

## 2019-02-13 NOTE — ED AVS SNAPSHOT
[facility], [place], Emergency Department  [address]  [place] [address]  Phone:  [phone]  Fax:  [phone]                                    Jv Pate   MRN: [id number]    Department:  [facility], Emergency Department   Date of Visit:  2/13/2019           After Visit Summary Signature Page    I have received my discharge instructions, and my questions have been answered. I have discussed any challenges I see with this plan with the nurse or doctor.    ..........................................................................................................................................  Patient/Patient Representative Signature      ..........................................................................................................................................  Patient Representative Print Name and Relationship to Patient    ..................................................               ................................................  Date                                   Time    ..........................................................................................................................................  Reviewed by Signature/Title    ...................................................              ..............................................  Date                                               Time          22EPIC Rev 08/18

## 2019-02-13 NOTE — ED NOTES
Bed: ED08  Expected date: 2/13/19  Expected time: 3:11 AM  Means of arrival: Ambulance  Comments:  Raphael [id number]  33 M  Behavioral Issues

## 2019-02-13 NOTE — ED NOTES
"Patient states having visual cues and signals for the past few weeks. Patient states the cues \"May be from God about like moral stuff.\" Patient states that the cues are like lights and visual things. Patient states he feels like he is having some psychosis. Patient states he doesn't think they are hallucinations.  "

## 2019-02-13 NOTE — ED PROVIDER NOTES
History     Chief Complaint   Patient presents with     Hallucinations     Patient states having visual cues and signals for the past few weeks.     HPI  Jv Pate is a 33 year old male who presents for mental health evaluation. He's previously been diagnosed with psychotic disorder. The pt called police tonight, because his gold lighter and part of a video game were missing. His mother spoke with the police, which led to them bringing him in for eval. His mother reports that he was walking around tonight in the cold, so she was worried he was disorganized. Last week he sprayed the bathtub and floor, because he states he saw rust spots. No SI/HI. He is on disability. He does have a psychiatrist.    He does have a diagnosis of possible chronic Lyme's disease, and his mother is very concerned that him missing his Minocycline has caused his recent decline, as he seems to do well when on his antibiotics.  The patient states that he has missed doses in the past, but not for several months.  He states he is sure he is taking all of his doses.    He tells me that he is noticed lately that colors seem somewhat brighter than normal.  He suspects that this is related to a change in his pupillary response.  He thinks that he may be exposed to some sort of chemical that is causing his pupillary response to be quicker.  He thinks it may be the hydrangea plant that is outside his door.  He also reports to me that his tobacco was wet recently, wonders if someone may crushed up some hydrangea) in the tobacco.    He states that he has had a little bit of a cough for a couple of months, on and off, suspects it is from his lisinopril.  He reports that he had cough previously with lisinopril, so had been taken off of it for a while.  No fevers.  He does report one episode of diarrhea today.  He also reports some intermittent nausea, said he vomited yesterday.  No current abdominal complaints.  No dysuria.    Past Medical  History:   Diagnosis Date     Cluster B personality disorder (H)     antisocial traits.      H/o Lyme disease     Working with Dr. Karan Caputo from [place], [place]. [facility] with diagnosis of neurologic lyme disease.      Hypertension      Schizophrenia (H)     paranoid type       Past Surgical History:   Procedure Laterality Date     NO HISTORY OF SURGERY         Family History   Problem Relation Age of Onset     Neurologic Disorder Father      Heart Disease Paternal Grandfather         attack       Social History     Tobacco Use     Smoking status: Current Every Day Smoker     Packs/day: 1.50     Smokeless tobacco: Never Used   Substance Use Topics     Alcohol use: Yes     Comment: occasinal.         I have reviewed the Medications, Allergies, Past Medical and Surgical History, and Social History in the Epic system.    Review of Systems   Constitutional: Negative for fever.   Respiratory: Positive for cough. Negative for shortness of breath.    Cardiovascular: Negative for chest pain.   Gastrointestinal: Negative for abdominal pain.   Psychiatric/Behavioral:        Bizarre thoughts/behaviors.   All other systems reviewed and are negative.      Physical Exam   BP: 134/81  Pulse: 104  Temp: 98.8  F (37.1  C)  Resp: 15  SpO2: 99 %      Physical Exam   Constitutional: No distress.   HENT:   Head: Atraumatic.   Mouth/Throat: Oropharynx is clear and moist.   Eyes: Pupils are equal, round, and reactive to light. No scleral icterus.   Cardiovascular: Normal heart sounds and intact distal pulses.   Pulmonary/Chest: Breath sounds normal. No respiratory distress.   Abdominal: Soft. There is no tenderness.   Musculoskeletal: He exhibits no edema or tenderness.   Skin: Skin is warm. No rash noted. He is not diaphoretic.   Psychiatric:   Flat affect       ED Course        Procedures             Critical Care time:  none             Labs Ordered and Resulted from Time of ED Arrival Up to the Time of Departure from the ED   DRUG ABUSE  SCREEN 6 CHEM DEP URINE ([facility])            Assessments & Plan (with Medical Decision Making)   The patient clearly does seem to have some psychosis, seems delusional and paranoid in regards to color seem brighter to him.  However, he feels his mental health is doing well, and his mother reported to the behavioral  that she also felt he was overall doing well.  He does not wish to stay in the hospital, I certainly do not think he is holdable.  His mother called repeatedly, insisting he be given IV Rocephin for his chronic Lyme disease.  In chart review I do see mention of this possible chronic Lyme disease, as well as mentioned that his psychiatric symptoms have indeed improved with antibiotics in the past.  He states he has been compliant with his minocycline, though she question whether he may have missed some doses.  Regardless, he did agree to take a dose of Rocephin.  He refused an IV, though did take 2 g IM.  He refused lab draw.  I do think he is safe for discharge home at this point, though strongly recommend he follow-up with his psychiatrist as well as a therapist.  He is encouraged to return with new or worsening symptoms.  His mother was very comfortable with this plan as well.    Dictation Disclaimer: Some of this Note has been completed with voice-recognition dictation software. Although errors are generally corrected real-time, there is the potential for a rare error to be present in the completed chart.      I have reviewed the nursing notes.    I have reviewed the findings, diagnosis, plan and need for follow up with the patient.       Medication List      There are no discharge medications for this visit.         Final diagnoses:   Psychosis, unspecified psychosis type (H)       2/13/2019   [facility], [place], EMERGENCY DEPARTMENT     Elo Clancy MD  02/13/19 0714

## 2019-02-13 NOTE — DISCHARGE INSTRUCTIONS
Continue with your normal medications, and follow up with your psychiatrist as soon as possible. Follow up with your therapist as planned. Return with new or worsening symptoms.

## 2019-02-27 ENCOUNTER — HOSPITAL ENCOUNTER (INPATIENT)
Facility: CLINIC | Age: 34
LOS: 4 days | Discharge: HOME | End: 2019-03-04
Attending: EMERGENCY MEDICINE | Admitting: PSYCHIATRY & NEUROLOGY
Payer: MEDICAID

## 2019-02-27 DIAGNOSIS — T33.90XA SUPERFICIAL FROSTBITE, INITIAL ENCOUNTER: ICD-10-CM

## 2019-02-27 DIAGNOSIS — F29 PSYCHOSIS, UNSPECIFIED PSYCHOSIS TYPE (H): ICD-10-CM

## 2019-02-27 DIAGNOSIS — Z86.59 HISTORY OF SCHIZOPHRENIA: ICD-10-CM

## 2019-02-27 DIAGNOSIS — R46.89 DISORGANIZED BEHAVIOR: ICD-10-CM

## 2019-02-27 LAB
ALBUMIN SERPL-MCNC: 3.5 G/DL (ref 3.4–5)
ALP SERPL-CCNC: 116 U/L (ref 40–150)
ALT SERPL W P-5'-P-CCNC: 34 U/L (ref 0–70)
AMPHETAMINES UR QL SCN: NEGATIVE
ANION GAP SERPL CALCULATED.3IONS-SCNC: 9 MMOL/L (ref 3–14)
AST SERPL W P-5'-P-CCNC: 23 U/L (ref 0–45)
BARBITURATES UR QL: NEGATIVE
BASOPHILS # BLD AUTO: 0 10E9/L (ref 0–0.2)
BASOPHILS NFR BLD AUTO: 0.3 %
BENZODIAZ UR QL: NEGATIVE
BILIRUB SERPL-MCNC: 0.4 MG/DL (ref 0.2–1.3)
BUN SERPL-MCNC: 10 MG/DL (ref 7–30)
CALCIUM SERPL-MCNC: 8.7 MG/DL (ref 8.5–10.1)
CANNABINOIDS UR QL SCN: NEGATIVE
CHLORIDE SERPL-SCNC: 106 MMOL/L (ref 94–109)
CO2 SERPL-SCNC: 26 MMOL/L (ref 20–32)
COCAINE UR QL: NEGATIVE
CREAT SERPL-MCNC: 0.62 MG/DL (ref 0.66–1.25)
DIFFERENTIAL METHOD BLD: NORMAL
EOSINOPHIL # BLD AUTO: 0.3 10E9/L (ref 0–0.7)
EOSINOPHIL NFR BLD AUTO: 2.6 %
ERYTHROCYTE [DISTWIDTH] IN BLOOD BY AUTOMATED COUNT: 13.3 % (ref 10–15)
ETHANOL UR QL SCN: NEGATIVE
GFR SERPL CREATININE-BSD FRML MDRD: >90 ML/MIN/{1.73_M2}
GLUCOSE SERPL-MCNC: 96 MG/DL (ref 70–99)
HCT VFR BLD AUTO: 44.9 % (ref 40–53)
HGB BLD-MCNC: 14.7 G/DL (ref 13.3–17.7)
IMM GRANULOCYTES # BLD: 0 10E9/L (ref 0–0.4)
IMM GRANULOCYTES NFR BLD: 0.2 %
LYMPHOCYTES # BLD AUTO: 3.6 10E9/L (ref 0.8–5.3)
LYMPHOCYTES NFR BLD AUTO: 35.2 %
MCH RBC QN AUTO: 29.9 PG (ref 26.5–33)
MCHC RBC AUTO-ENTMCNC: 32.7 G/DL (ref 31.5–36.5)
MCV RBC AUTO: 91 FL (ref 78–100)
MONOCYTES # BLD AUTO: 1 10E9/L (ref 0–1.3)
MONOCYTES NFR BLD AUTO: 9.9 %
NEUTROPHILS # BLD AUTO: 5.4 10E9/L (ref 1.6–8.3)
NEUTROPHILS NFR BLD AUTO: 51.8 %
NRBC # BLD AUTO: 0 10*3/UL
NRBC BLD AUTO-RTO: 0 /100
OPIATES UR QL SCN: NEGATIVE
PLATELET # BLD AUTO: 190 10E9/L (ref 150–450)
POTASSIUM SERPL-SCNC: 3.4 MMOL/L (ref 3.4–5.3)
PROT SERPL-MCNC: 6.9 G/DL (ref 6.8–8.8)
RBC # BLD AUTO: 4.91 10E12/L (ref 4.4–5.9)
SODIUM SERPL-SCNC: 141 MMOL/L (ref 133–144)
WBC # BLD AUTO: 10.4 10E9/L (ref 4–11)

## 2019-02-27 PROCEDURE — 80307 DRUG TEST PRSMV CHEM ANLYZR: CPT | Performed by: EMERGENCY MEDICINE

## 2019-02-27 PROCEDURE — 80053 COMPREHEN METABOLIC PANEL: CPT | Performed by: EMERGENCY MEDICINE

## 2019-02-27 PROCEDURE — 85025 COMPLETE CBC W/AUTO DIFF WBC: CPT | Performed by: EMERGENCY MEDICINE

## 2019-02-27 PROCEDURE — 99285 EMERGENCY DEPT VISIT HI MDM: CPT | Mod: 25 | Performed by: EMERGENCY MEDICINE

## 2019-02-27 PROCEDURE — 99285 EMERGENCY DEPT VISIT HI MDM: CPT | Mod: Z6 | Performed by: EMERGENCY MEDICINE

## 2019-02-27 PROCEDURE — 80320 DRUG SCREEN QUANTALCOHOLS: CPT | Performed by: EMERGENCY MEDICINE

## 2019-02-28 PROBLEM — R46.89 DISORGANIZED BEHAVIOR: Status: ACTIVE | Noted: 2019-02-28

## 2019-02-28 PROCEDURE — 12400001 ZZH R&B MH UMMC

## 2019-02-28 PROCEDURE — 25000132 ZZH RX MED GY IP 250 OP 250 PS 637: Performed by: PSYCHIATRY & NEUROLOGY

## 2019-02-28 PROCEDURE — 99223 1ST HOSP IP/OBS HIGH 75: CPT | Mod: AI | Performed by: PSYCHIATRY & NEUROLOGY

## 2019-02-28 RX ORDER — MULTIVITAMIN,THERAPEUTIC
1 TABLET ORAL DAILY
Status: DISCONTINUED | OUTPATIENT
Start: 2019-02-28 | End: 2019-03-04 | Stop reason: HOSPADM

## 2019-02-28 RX ORDER — SACCHAROMYCES BOULARDII 250 MG
250 CAPSULE ORAL 2 TIMES DAILY
Status: DISCONTINUED | OUTPATIENT
Start: 2019-02-28 | End: 2019-03-04 | Stop reason: HOSPADM

## 2019-02-28 RX ORDER — LISINOPRIL 20 MG/1
20 TABLET ORAL DAILY
Status: DISCONTINUED | OUTPATIENT
Start: 2019-02-28 | End: 2019-03-04 | Stop reason: HOSPADM

## 2019-02-28 RX ORDER — HALOPERIDOL 10 MG/1
10 TABLET ORAL 2 TIMES DAILY
Status: DISCONTINUED | OUTPATIENT
Start: 2019-02-28 | End: 2019-03-04 | Stop reason: HOSPADM

## 2019-02-28 RX ORDER — HALOPERIDOL 5 MG/1
10 TABLET ORAL 2 TIMES DAILY
Status: DISCONTINUED | OUTPATIENT
Start: 2019-02-28 | End: 2019-02-28

## 2019-02-28 RX ORDER — MINOCYCLINE HYDROCHLORIDE 100 MG/1
100 CAPSULE ORAL EVERY 12 HOURS
Status: DISCONTINUED | OUTPATIENT
Start: 2019-02-28 | End: 2019-03-04 | Stop reason: HOSPADM

## 2019-02-28 RX ORDER — BENZTROPINE MESYLATE 0.5 MG/1
0.5 TABLET ORAL 2 TIMES DAILY
Status: DISCONTINUED | OUTPATIENT
Start: 2019-02-28 | End: 2019-02-28

## 2019-02-28 RX ORDER — HYDROXYZINE HYDROCHLORIDE 25 MG/1
25-50 TABLET, FILM COATED ORAL EVERY 4 HOURS PRN
Status: DISCONTINUED | OUTPATIENT
Start: 2019-02-28 | End: 2019-03-04 | Stop reason: HOSPADM

## 2019-02-28 RX ADMIN — NICOTINE POLACRILEX 2 MG: 2 GUM, CHEWING BUCCAL at 12:40

## 2019-02-28 RX ADMIN — RANITIDINE 150 MG: 150 TABLET, FILM COATED ORAL at 08:06

## 2019-02-28 RX ADMIN — MINOCYCLINE HYDROCHLORIDE 100 MG: 100 CAPSULE ORAL at 20:26

## 2019-02-28 RX ADMIN — NICOTINE POLACRILEX 2 MG: 2 GUM, CHEWING BUCCAL at 21:52

## 2019-02-28 RX ADMIN — NICOTINE POLACRILEX 2 MG: 2 GUM, CHEWING BUCCAL at 08:13

## 2019-02-28 RX ADMIN — HALOPERIDOL 10 MG: 10 TABLET ORAL at 20:26

## 2019-02-28 RX ADMIN — RANITIDINE 150 MG: 150 TABLET, FILM COATED ORAL at 20:26

## 2019-02-28 RX ADMIN — NICOTINE POLACRILEX 2 MG: 2 GUM, CHEWING BUCCAL at 17:17

## 2019-02-28 RX ADMIN — MINOCYCLINE HYDROCHLORIDE 100 MG: 100 CAPSULE ORAL at 08:06

## 2019-02-28 RX ADMIN — NICOTINE POLACRILEX 2 MG: 2 GUM, CHEWING BUCCAL at 09:25

## 2019-02-28 RX ADMIN — LISINOPRIL 20 MG: 20 TABLET ORAL at 08:06

## 2019-02-28 RX ADMIN — THERA TABS 1 TABLET: TAB at 08:06

## 2019-02-28 RX ADMIN — HALOPERIDOL 10 MG: 10 TABLET ORAL at 08:07

## 2019-02-28 ASSESSMENT — ACTIVITIES OF DAILY LIVING (ADL)
NUMBER_OF_TIMES_PATIENT_HAS_FALLEN_WITHIN_LAST_SIX_MONTHS: 7
RETIRED_COMMUNICATION: 0-->UNDERSTANDS/COMMUNICATES WITHOUT DIFFICULTY
TRANSFERRING: 0-->INDEPENDENT
WHICH_OF_THE_ABOVE_FUNCTIONAL_RISKS_HAD_A_RECENT_ONSET_OR_CHANGE?: FALL HISTORY
ORAL_HYGIENE: INDEPENDENT
HYGIENE/GROOMING: INDEPENDENT
HYGIENE/GROOMING: INDEPENDENT
DRESS: INDEPENDENT
DRESS: INDEPENDENT
BATHING: 0-->INDEPENDENT
ORAL_HYGIENE: INDEPENDENT
TOILETING: 0-->INDEPENDENT
DRESS: INDEPENDENT
ORAL_HYGIENE: INDEPENDENT
RETIRED_EATING: 0-->INDEPENDENT
SWALLOWING: 0-->SWALLOWS FOODS/LIQUIDS WITHOUT DIFFICULTY
LAUNDRY: WITH SUPERVISION
COGNITION: 0 - NO COGNITION ISSUES REPORTED
LAUNDRY: WITH SUPERVISION
DRESS: 0-->INDEPENDENT
AMBULATION: 0-->INDEPENDENT
FALL_HISTORY_WITHIN_LAST_SIX_MONTHS: YES
HYGIENE/GROOMING: INDEPENDENT

## 2019-02-28 ASSESSMENT — ENCOUNTER SYMPTOMS
DYSPHORIC MOOD: 1
VOMITING: 0
SORE THROAT: 0
DIFFICULTY URINATING: 0
SHORTNESS OF BREATH: 0
FEVER: 0
ARTHRALGIAS: 1
COUGH: 0
CONFUSION: 1
DIARRHEA: 0
NAUSEA: 0
COLOR CHANGE: 1
DECREASED CONCENTRATION: 1
FATIGUE: 1
ABDOMINAL PAIN: 0
NECK STIFFNESS: 0
CHILLS: 0
HEADACHES: 0

## 2019-02-28 ASSESSMENT — MIFFLIN-ST. JEOR
SCORE: 2205.91
SCORE: 2228.59

## 2019-02-28 NOTE — PROGRESS NOTES
"SPIRITUAL HEALTH SERVICES  SPIRITUAL ASSESSMENT Progress Note  [facility] ([facility]) Station 30    REFERRAL SOURCE: I did visit patient Jv this morning per [facility] hospital  referral. After I introduced myself as the unit  to the pt he said, \"I am ok now but if I need your help, I will let you know.\" I did respect pt respond and told him that I am available whenever he needs a  support.    PLAN: I will remain open to provide spiritual care for the pt as needed.    Benito Zazueta M.Div. ([place]lem), M.Th., D.Min., [facility]  Staff   Pager [phone]    "
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
## 2019-02-28 NOTE — PLAN OF CARE
BEHAVIORAL TEAM DISCUSSION    Participants:  Dr. Shanks, Antonietta Larios [facility], Nehemias Ware RN    Progress:   Pt was admitted for disorganized behavior and altered mental status.  Utox is negative.  Primary care doctor has called   Pt is on a 72 hour hold.    Mother is reporting that the pt has neuro-lyme disease and needs to be treated with antibiotics rather than anti-psychotics. She says there is a long history here.    Continued Stay Criteria/Rationale:   The pt will be discharged when he is psychiatrically stable.    Medical/Physical:   Neuro lyme disease per mother's report    Precautions:   Behavioral Orders   Procedures     Assault precautions     Code 1 - Restrict to Unit     Elopement precautions     Fall precautions     Routine Programming     As clinically indicated     Status 15     Every 15 minutes.     Plan:   Multidisciplinary evaluation  Pt has signed in voluntarily.  Medication review and regimen  Review records and consider primary psychotic vs lyme disease      Rationale for change in precautions or plan:   Initial review

## 2019-02-28 NOTE — ED NOTES
ED to Behavioral Floor Handoff    SITUATION  Jv Pate is a 33 year old male who speaks English and lives in a home with family members The patient arrived in the ED by private car from home with a complaint of Altered Mental Status (Neurolyme disease has caused confusion.  Patient's mother brought him here for his increased confusion.  Says IV rocephin helps reduce confusion)  .The patient's current symptoms started/worsened 1 month(s) ago and during this time the symptoms have increased.   In the ED, pt was diagnosed with   Final diagnoses:   Disorganized behavior   History of schizophrenia - ? report of chronic neuro Lyme disease since 2004        Initial vitals were: BP: 145/86  Pulse: 101  Temp: 100.1  F (37.8  C)  Resp: 16  Weight: 121.6 kg (268 lb)  SpO2: 99 %   --------  Is the patient diabetic? No   If yes, last blood glucose? --     If yes, was this treated in the ED? --  --------  Is the patient inebriated (ETOH) No or Impaired on other substances? No  MSSA done? N/A  Last MSSA score: --    Were withdrawal symptoms treated? N/A  Does the patient have a seizure history? No. If yes, date of most recent seizure--  --------  Is the patient patient experiencing suicidal ideation? denies current or recent suicidal ideation     Homicidal ideation? denies current or recent homicidal ideation or behaviors.    Self-injurious behavior/urges? denies current or recent self injurious behavior or ideation.  ------  Was pt aggressive in the ED No  Was a code called No  Is the pt now cooperative? Yes  -------  Meds given in ED: Medications - No data to display   Family present during ED course? Yes  Family currently present? No    BACKGROUND  Does the patient have a cognitive impairment or developmental disability? No  Allergies:   Allergies   Allergen Reactions     Carbamazepine Rash     Carbamazepine (Tegretol),.....Rash.     Tegaderm Transparent Dressing (Informational Only) Rash   .   Social demographics are    Social History     Socioeconomic History     Marital status: Single     Spouse name: None     Number of children: None     Years of education: None     Highest education level: None   Occupational History     None   Social Needs     Financial resource strain: None     Food insecurity:     Worry: None     Inability: None     Transportation needs:     Medical: None     Non-medical: None   Tobacco Use     Smoking status: Current Every Day Smoker     Packs/day: 1.50     Smokeless tobacco: Never Used   Substance and Sexual Activity     Alcohol use: Yes     Comment: occasinal.     Drug use: No     Sexual activity: None   Lifestyle     Physical activity:     Days per week: None     Minutes per session: None     Stress: None   Relationships     Social connections:     Talks on phone: None     Gets together: None     Attends Scientologist service: None     Active member of club or organization: None     Attends meetings of clubs or organizations: None     Relationship status: None     Intimate partner violence:     Fear of current or ex partner: None     Emotionally abused: None     Physically abused: None     Forced sexual activity: None   Other Topics Concern     None   Social History Narrative    Intake 6/1/17:         Information obtained from chart review.               His education, occupation, finances and legal issues:  Not working.  Indicates he did go to high school in [place] in 2004.  Then, [employer] but never finished and the school closed.        Records indicate tobacco use.     Tox on admission + THC.         ASSESSMENT  Labs results   Labs Ordered and Resulted from Time of ED Arrival Up to the Time of Departure from the ED   COMPREHENSIVE METABOLIC PANEL - Abnormal; Notable for the following components:       Result Value    Creatinine 0.62 (*)     All other components within normal limits   DRUG ABUSE SCREEN 6 CHEM DEP URINE ([facility])   CBC WITH PLATELETS DIFFERENTIAL      Imaging Studies:  No results found for this or any previous visit (from the past 24 hour(s)).   Most recent vital signs /86   Pulse 101   Temp 100.1  F (37.8  C) (Oral)   Resp 16   Wt 121.6 kg (268 lb)   SpO2 99%   BMI 33.50 kg/m     Abnormal labs/tests/findings requiring intervention:---   Pain control: pt had none  Nausea control: pt had none    RECOMMENDATION  Are any infection precautions needed (MRSA, VRE, etc.)? No If yes, what infection? --  ---  Does the patient have mobility issues? independently. If yes, what device does the pt use? ---  ---  Is patient on 72 hour hold or commitment? Yes If on 72 hour hold, have hold and rights been given to patient? Yes  Are admitting orders written if after 10 p.m. ?N/A  Tasks needing to be completed:---     Kayla Roldan   ascom-- [phone] [facility]   [phone] [facility]
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
## 2019-02-28 NOTE — PROGRESS NOTES
"   02/28/19 1336   Behavioral Health   Hallucinations denies / not responding to hallucinations   Thinking distractable   Orientation person: oriented;place: oriented;date: oriented;time: oriented   Memory baseline memory   Insight denial of illness;poor   Judgement intact   Eye Contact at examiner   Affect full range affect   Mood mood is calm   Physical Appearance/Attire attire appropriate to age and situation;appears stated age   Hygiene well groomed   Suicidality other (see comments)  (Pt denies. )   Wish to be Dead Description no   Non-Specific Active Suicidal Thought Description no   Self Injury other (see comment)  (Pt denies.)   Elopement (Pt didn't exhibit these behaviors this shift.)   Activity other (see comment)  (active and social in milieu)   Speech coherent;clear   Psychomotor / Gait steady;balanced   Coping/Psychosocial   Verbalized Emotional State other (see comments);hopefulness  (\"I'm feeling good.\")   Activities of Daily Living   Hygiene/Grooming independent   Oral Hygiene independent   Dress independent   Laundry with supervision   Room Organization independent   Significant Event   Significant Event Other (see comments)  (Shift Summary)   Pt denies SI and SIB thoughts. Pt rates both depression and anxiety as a 0. Pt states that he feels good and hopeful. Pt states that he feels good because he has warm clothes and warm food to eat. Pt states that his only reason for admission is due to cold weather exposure; pt states no mental health reasons for being hospitalized. Pt's healthy coping skills include reading and meditating. Pt was calm, cooperative and pleasant this shift.  "
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
## 2019-02-28 NOTE — PROGRESS NOTES
02/28/19 0208   Patient Belongings   Did you bring any home meds/supplements to the hospital?  No   Patient Belongings locker   Patient Belongings Put in Hospital Secure Location (Security or Locker, etc.) clothing;money (see comment);shoes   Belongings Search Yes   Clothing Search Yes   Second Staff Kunal ADAMS   Comment Envelop #[id number] sent to security     Items kept in storage  Jacket, Sweat shirt (hoody), Shoes, Belt, Shorts, Cigarettes and 2 lighters 2 matches, $.60 cents in coins.    Items sent to security  $20.00 ( twenty dollars).    Admission Signature    Patient Signature    ________________    Staff Signature    _______________    Discharge Signature    All my personal items were returned to me    Patient Signature    ________________    Staff Signature    ________________

## 2019-03-01 PROCEDURE — 25000132 ZZH RX MED GY IP 250 OP 250 PS 637: Performed by: PSYCHIATRY & NEUROLOGY

## 2019-03-01 PROCEDURE — 12400001 ZZH R&B MH UMMC

## 2019-03-01 RX ADMIN — THERA TABS 1 TABLET: TAB at 08:16

## 2019-03-01 RX ADMIN — NICOTINE POLACRILEX 2 MG: 2 GUM, CHEWING BUCCAL at 11:06

## 2019-03-01 RX ADMIN — MINOCYCLINE HYDROCHLORIDE 100 MG: 100 CAPSULE ORAL at 19:05

## 2019-03-01 RX ADMIN — NICOTINE POLACRILEX 2 MG: 2 GUM, CHEWING BUCCAL at 06:34

## 2019-03-01 RX ADMIN — NICOTINE POLACRILEX 2 MG: 2 GUM, CHEWING BUCCAL at 08:16

## 2019-03-01 RX ADMIN — RANITIDINE 150 MG: 150 TABLET, FILM COATED ORAL at 19:05

## 2019-03-01 RX ADMIN — HALOPERIDOL 10 MG: 10 TABLET ORAL at 08:15

## 2019-03-01 RX ADMIN — Medication 250 MG: at 08:16

## 2019-03-01 RX ADMIN — NICOTINE POLACRILEX 2 MG: 2 GUM, CHEWING BUCCAL at 12:03

## 2019-03-01 RX ADMIN — RANITIDINE 150 MG: 150 TABLET, FILM COATED ORAL at 08:16

## 2019-03-01 RX ADMIN — LISINOPRIL 20 MG: 20 TABLET ORAL at 08:16

## 2019-03-01 RX ADMIN — Medication 250 MG: at 19:07

## 2019-03-01 RX ADMIN — MINOCYCLINE HYDROCHLORIDE 100 MG: 100 CAPSULE ORAL at 08:15

## 2019-03-01 RX ADMIN — BENZOCAINE, MENTHOL 1 LOZENGE: 15; 3.6 LOZENGE ORAL at 18:55

## 2019-03-01 RX ADMIN — HALOPERIDOL 10 MG: 10 TABLET ORAL at 19:05

## 2019-03-01 RX ADMIN — NICOTINE POLACRILEX 2 MG: 2 GUM, CHEWING BUCCAL at 16:45

## 2019-03-01 ASSESSMENT — ACTIVITIES OF DAILY LIVING (ADL)
LAUNDRY: UNABLE TO COMPLETE
HYGIENE/GROOMING: INDEPENDENT
ORAL_HYGIENE: INDEPENDENT
DRESS: STREET CLOTHES;INDEPENDENT
HYGIENE/GROOMING: INDEPENDENT
DRESS: INDEPENDENT
ORAL_HYGIENE: INDEPENDENT

## 2019-03-01 NOTE — PROGRESS NOTES
Pt states that he was in a good mood this evening. He spent a large majority of his time resting in his room. He attended music therapy for a short while. He half-joked about letting him out to have a smoke a couple times, though he understands we don't let patients off the unit. He did not have a goal for the evening. He claims to have light anxiety over his impending discharge on Monday. He denies any other mental health concerns.      02/28/19 2200   Behavioral Health   Hallucinations denies / not responding to hallucinations   Thinking distractable   Orientation time: oriented;date: oriented;place: oriented;person: oriented   Memory baseline memory   Insight denial of illness;poor   Judgement intact   Eye Contact at examiner   Affect full range affect   Mood mood is calm   Physical Appearance/Attire attire appropriate to age and situation;appears stated age   Hygiene well groomed   Suicidality other (see comments)  (denies)   1. Wish to be Dead No   2. Non-Specific Active Suicidal Thoughts  No   Self Injury other (see comment)  (denies)   Elopement (none)   Activity withdrawn;isolative   Speech coherent;clear   Medication Sensitivity no observed side effects;no stated side effects   Psychomotor / Gait steady;balanced   Activities of Daily Living   Hygiene/Grooming independent   Oral Hygiene independent   Dress independent   Room Organization independent

## 2019-03-01 NOTE — PROGRESS NOTES
"MOM called at 1800. This RN listened for 10 minutes as she discussed that pt needs to be on the antibiotic \"rocephin\" for his \"chronic neuro lyme\". The mother informed this RN 42 times he needs rocephin for his chronic neuro lyme. This RN informed the mom that there was a medical consult placed (15 times I informed this mother) and this would be looked at on Friday. Mom continued to ramble on also about her  and his lyme's. She stated to call Dr Gaviria and \"he will place pt on rocephin\" as pt was on station 12 under the care of Dr Gaviria in 2017, stating this x10. Have passed this info on to HANS Mane who was here tonight.  "

## 2019-03-01 NOTE — PLAN OF CARE
Pt mother called @ around 1330.  Pt is requesting that she speak with Dr Gaviria to advance her request that pt begin IM injections of Rocephin. Pt advised that Dr Shanks will be available on Monday. That his CTC will also be available on Monday.  Pt informed of internal medicine consult and referral for infectious disease consult. Mother was advised that results of that consult may be available on Monday. Pt was more insistent on my communicating with Dr Gaviria.  Mother advised that pt is under Dr Shanks's care and she might discuss a referral with Dr Shanks on Monday. Mother stated she was planning pt being discharged on Monday to bring pt to an appointment in [place].Mother was informed that at this time no discharge orders in place for that.

## 2019-03-01 NOTE — PROGRESS NOTES
"Pt was in the milieu.  Did not attend groups but spent most of the shift pacing in the loya.  He denies anx, dep, SI, SIB.  Talked about wanting to be discharged.  \"I really don't think I can benefit from being here any more.  Pt talked about wanting to have an inhaler.  Pt could not give a good reason for needing one; nevertheless, talked to RN about ordering one.   "

## 2019-03-01 NOTE — PROGRESS NOTES
Consult received.  Pt not yet seen.  Have reviewed chart.  Please see past ID consults from 8/27/2008 and from 1/18/2011 by Dr Washington Valiente.  We can order lyme IgG and IgM screen again today (done).  Will await that result before formal repeat consult.     Note pt was seen for ID in remote past by Dr Jasper Wilkerson and has been treated long term by a physician in [facility].

## 2019-03-02 PROCEDURE — 12400001 ZZH R&B MH UMMC

## 2019-03-02 PROCEDURE — 25000132 ZZH RX MED GY IP 250 OP 250 PS 637: Performed by: PSYCHIATRY & NEUROLOGY

## 2019-03-02 PROCEDURE — 86617 LYME DISEASE ANTIBODY: CPT | Performed by: INTERNAL MEDICINE

## 2019-03-02 PROCEDURE — 86618 LYME DISEASE ANTIBODY: CPT | Performed by: INTERNAL MEDICINE

## 2019-03-02 PROCEDURE — 90853 GROUP PSYCHOTHERAPY: CPT

## 2019-03-02 PROCEDURE — 36415 COLL VENOUS BLD VENIPUNCTURE: CPT | Performed by: INTERNAL MEDICINE

## 2019-03-02 RX ADMIN — RANITIDINE 150 MG: 150 TABLET, FILM COATED ORAL at 08:27

## 2019-03-02 RX ADMIN — HALOPERIDOL 10 MG: 10 TABLET ORAL at 08:27

## 2019-03-02 RX ADMIN — BENZOCAINE, MENTHOL 1 LOZENGE: 15; 3.6 LOZENGE ORAL at 13:06

## 2019-03-02 RX ADMIN — NICOTINE POLACRILEX 2 MG: 2 GUM, CHEWING BUCCAL at 13:43

## 2019-03-02 RX ADMIN — LISINOPRIL 20 MG: 20 TABLET ORAL at 08:27

## 2019-03-02 RX ADMIN — MINOCYCLINE HYDROCHLORIDE 100 MG: 100 CAPSULE ORAL at 19:01

## 2019-03-02 RX ADMIN — NICOTINE POLACRILEX 2 MG: 2 GUM, CHEWING BUCCAL at 01:36

## 2019-03-02 RX ADMIN — BENZOCAINE, MENTHOL 1 LOZENGE: 15; 3.6 LOZENGE ORAL at 01:36

## 2019-03-02 RX ADMIN — THERA TABS 1 TABLET: TAB at 08:27

## 2019-03-02 RX ADMIN — Medication 250 MG: at 19:01

## 2019-03-02 RX ADMIN — NICOTINE POLACRILEX 2 MG: 2 GUM, CHEWING BUCCAL at 18:07

## 2019-03-02 RX ADMIN — Medication 250 MG: at 08:29

## 2019-03-02 RX ADMIN — RANITIDINE 150 MG: 150 TABLET, FILM COATED ORAL at 19:01

## 2019-03-02 RX ADMIN — HALOPERIDOL 10 MG: 10 TABLET ORAL at 19:02

## 2019-03-02 RX ADMIN — HYDROXYZINE HYDROCHLORIDE 25 MG: 25 TABLET ORAL at 19:42

## 2019-03-02 RX ADMIN — NICOTINE POLACRILEX 2 MG: 2 GUM, CHEWING BUCCAL at 09:55

## 2019-03-02 RX ADMIN — MINOCYCLINE HYDROCHLORIDE 100 MG: 100 CAPSULE ORAL at 08:27

## 2019-03-02 RX ADMIN — BENZOCAINE, MENTHOL 1 LOZENGE: 15; 3.6 LOZENGE ORAL at 05:25

## 2019-03-02 RX ADMIN — NICOTINE POLACRILEX 2 MG: 2 GUM, CHEWING BUCCAL at 05:25

## 2019-03-02 ASSESSMENT — ACTIVITIES OF DAILY LIVING (ADL)
HYGIENE/GROOMING: INDEPENDENT
LAUNDRY: WITH SUPERVISION
ORAL_HYGIENE: INDEPENDENT
DRESS: INDEPENDENT

## 2019-03-02 NOTE — PROGRESS NOTES
No observation or report of SI/SIB. Pt isolated to room. Pt was out of room for dinner. Pt wanted cough drops.      03/01/19 4385   Behavioral Health   Hallucinations denies / not responding to hallucinations   Thinking distractable   Orientation person: oriented;place: oriented   Memory baseline memory   Insight denial of illness   Affect blunted, flat   Mood mood is calm   Suicidality other (see comments)  (none reported or observed)   Activity isolative   Activities of Daily Living   Hygiene/Grooming independent   Oral Hygiene independent   Dress independent

## 2019-03-02 NOTE — PROGRESS NOTES
Patient visible and active in the milieu throughout the shift so far.  Peer interactions peripheral and appropriate.  Group engagement selective - Community Meeting plus half of Focus Group.  General presentation restless, pleasant and bright on approach.  Responsive to staff MH status inquiries and cooperative with redirection and limit-setting when needed (minimal).  Observed mood was stable.  Verbalized thoughts mildly disorganized and tangential.  Denied all significant MH problems, concerns, issues, and acuities including depression, anxiety, SI, SIB urges, AH/VH's, racing thoughts, etc.  Has presented no behavioral management issues today.  Primarily focused on discharge arrangements; believes that he will be discharged this coming Monday, 03/04/19.   Akhil Kaur   03/02/2019

## 2019-03-03 LAB
B BURGDOR IGG SER QL IB: NEGATIVE
B BURGDOR IGM SER QL IB: NEGATIVE

## 2019-03-03 PROCEDURE — 25000132 ZZH RX MED GY IP 250 OP 250 PS 637: Performed by: PSYCHIATRY & NEUROLOGY

## 2019-03-03 PROCEDURE — 12400001 ZZH R&B MH UMMC

## 2019-03-03 PROCEDURE — H2032 ACTIVITY THERAPY, PER 15 MIN: HCPCS

## 2019-03-03 RX ORDER — QUETIAPINE FUMARATE 100 MG/1
100-200 TABLET, FILM COATED ORAL 2 TIMES DAILY PRN
Status: DISCONTINUED | OUTPATIENT
Start: 2019-03-03 | End: 2019-03-03

## 2019-03-03 RX ORDER — ACETAMINOPHEN 325 MG/1
650 TABLET ORAL EVERY 4 HOURS PRN
Status: DISCONTINUED | OUTPATIENT
Start: 2019-03-03 | End: 2019-03-04 | Stop reason: HOSPADM

## 2019-03-03 RX ORDER — QUETIAPINE FUMARATE 100 MG/1
100-200 TABLET, FILM COATED ORAL 2 TIMES DAILY PRN
Status: DISCONTINUED | OUTPATIENT
Start: 2019-03-03 | End: 2019-03-04 | Stop reason: HOSPADM

## 2019-03-03 RX ADMIN — ACETAMINOPHEN 650 MG: 325 TABLET, FILM COATED ORAL at 20:16

## 2019-03-03 RX ADMIN — NICOTINE POLACRILEX 2 MG: 2 GUM, CHEWING BUCCAL at 14:11

## 2019-03-03 RX ADMIN — BENZOCAINE, MENTHOL 1 LOZENGE: 15; 3.6 LOZENGE ORAL at 04:34

## 2019-03-03 RX ADMIN — MINOCYCLINE HYDROCHLORIDE 100 MG: 100 CAPSULE ORAL at 07:39

## 2019-03-03 RX ADMIN — RANITIDINE 150 MG: 150 TABLET, FILM COATED ORAL at 07:39

## 2019-03-03 RX ADMIN — Medication 250 MG: at 07:39

## 2019-03-03 RX ADMIN — MINOCYCLINE HYDROCHLORIDE 100 MG: 100 CAPSULE ORAL at 20:03

## 2019-03-03 RX ADMIN — Medication 250 MG: at 20:03

## 2019-03-03 RX ADMIN — NICOTINE POLACRILEX 2 MG: 2 GUM, CHEWING BUCCAL at 10:57

## 2019-03-03 RX ADMIN — NICOTINE POLACRILEX 2 MG: 2 GUM, CHEWING BUCCAL at 20:03

## 2019-03-03 RX ADMIN — QUETIAPINE FUMARATE 100 MG: 100 TABLET ORAL at 20:15

## 2019-03-03 RX ADMIN — HYDROXYZINE HYDROCHLORIDE 50 MG: 25 TABLET ORAL at 18:26

## 2019-03-03 RX ADMIN — HALOPERIDOL 10 MG: 10 TABLET ORAL at 20:03

## 2019-03-03 RX ADMIN — NICOTINE POLACRILEX 2 MG: 2 GUM, CHEWING BUCCAL at 04:34

## 2019-03-03 RX ADMIN — THERA TABS 1 TABLET: TAB at 07:39

## 2019-03-03 RX ADMIN — NICOTINE POLACRILEX 2 MG: 2 GUM, CHEWING BUCCAL at 18:42

## 2019-03-03 RX ADMIN — BENZOCAINE, MENTHOL 1 LOZENGE: 15; 3.6 LOZENGE ORAL at 20:03

## 2019-03-03 RX ADMIN — LISINOPRIL 20 MG: 20 TABLET ORAL at 07:39

## 2019-03-03 RX ADMIN — RANITIDINE 150 MG: 150 TABLET, FILM COATED ORAL at 20:03

## 2019-03-03 RX ADMIN — QUETIAPINE FUMARATE 100 MG: 100 TABLET ORAL at 22:25

## 2019-03-03 RX ADMIN — HYDROXYZINE HYDROCHLORIDE 50 MG: 25 TABLET ORAL at 04:58

## 2019-03-03 RX ADMIN — HALOPERIDOL 10 MG: 10 TABLET ORAL at 07:39

## 2019-03-03 ASSESSMENT — ACTIVITIES OF DAILY LIVING (ADL)
HYGIENE/GROOMING: INDEPENDENT;SHOWER
ORAL_HYGIENE: INDEPENDENT
DRESS: INDEPENDENT;STREET CLOTHES

## 2019-03-03 ASSESSMENT — MIFFLIN-ST. JEOR: SCORE: 2214.98

## 2019-03-03 NOTE — PROGRESS NOTES
Patient has been in and out of sleep overnight. When awake, pt has been quietly pacing the unit. Pt has had some odd requests such as asking staff to copy a variety of coloring pages but then not actually coloring.

## 2019-03-03 NOTE — PROGRESS NOTES
Pt. Presents disorganized and confused this morning. Pt. Standing in the loya pour coffee in the hallway. Pt. Setting coffee cup on the back side of chair, at a phone and walking away, on the railing and walking away. Pt. Appears staring off into space and tense affect. Pt. Pausing often in the hallway appearing confused in thoughts. Pt. Attempted to socialize with a peer this morning but peer did not understand the conversation to engage.

## 2019-03-03 NOTE — PLAN OF CARE
Pt largely withdrawn and isolative, but makes effort at brief interaction with select peers.   Affect is blunted. Cooperative with staff.   Denies SI/SIB/HI. No obvious signs of paranoia. Denies A/V hallucinations or disturbing thoughts.   Denies side effects from medications other than increased appetite.   Reports sleep is somewhat disturbed due to unit activity.   Length of stay: 2     Assessment of mood, thought process, response to medications and potential side effects continues.     Patient encouraged to participate in therapeutic groups and develop self-care skills.     Appropriate precautions maintained.

## 2019-03-03 NOTE — PROGRESS NOTES
Patient visible and active in the milieu throughout the shift today.  Noted to be socially interactive with peers and engaged selectively in programming.  Generally alert, bright, calm, and attentive to his surroundings.  Remains a bit restless and struggles to focus at times.  Has been cooperative with unit and personal care protocols today and responsive to staff inquiries and interventions.  Mood appears generally stable.  Thinking remains disorganized with marked flight of ideas at times.  Speech remains tangential and mildly pressured.  Made no overtly paranoid references today.  Continues to deny all significant MH concerns, issues, and acuities, inclusive of depression, anxiety, SI, SIB urges, AH's VH's, racing thoughts, etc.  Has presented no behavioral management issues on the unit today.  Remains focused on the possibility of being discharged within the next day or two.   Katherine Bledsoe   03/03/2019

## 2019-03-04 VITALS
RESPIRATION RATE: 16 BRPM | BODY MASS INDEX: 32.33 KG/M2 | WEIGHT: 260 LBS | TEMPERATURE: 98 F | HEART RATE: 87 BPM | OXYGEN SATURATION: 99 % | HEIGHT: 75 IN | SYSTOLIC BLOOD PRESSURE: 130 MMHG | DIASTOLIC BLOOD PRESSURE: 84 MMHG

## 2019-03-04 LAB — B BURGDOR IGG+IGM SER QL: <0.01 (ref 0–0.89)

## 2019-03-04 PROCEDURE — 25000132 ZZH RX MED GY IP 250 OP 250 PS 637: Performed by: PSYCHIATRY & NEUROLOGY

## 2019-03-04 RX ORDER — BENZTROPINE MESYLATE 0.5 MG/1
0.5 TABLET ORAL 2 TIMES DAILY
Qty: 60 TABLET | Refills: 0 | Status: SHIPPED | OUTPATIENT
Start: 2019-03-04

## 2019-03-04 RX ORDER — HALOPERIDOL 10 MG/1
10 TABLET ORAL 2 TIMES DAILY
Qty: 60 TABLET | Refills: 0 | Status: SHIPPED | OUTPATIENT
Start: 2019-03-04

## 2019-03-04 RX ADMIN — RANITIDINE 150 MG: 150 TABLET, FILM COATED ORAL at 08:34

## 2019-03-04 RX ADMIN — HYDROXYZINE HYDROCHLORIDE 50 MG: 25 TABLET ORAL at 00:00

## 2019-03-04 RX ADMIN — MINOCYCLINE HYDROCHLORIDE 100 MG: 100 CAPSULE ORAL at 08:34

## 2019-03-04 RX ADMIN — THERA TABS 1 TABLET: TAB at 08:33

## 2019-03-04 RX ADMIN — BENZOCAINE, MENTHOL 1 LOZENGE: 15; 3.6 LOZENGE ORAL at 00:00

## 2019-03-04 RX ADMIN — Medication 250 MG: at 08:33

## 2019-03-04 RX ADMIN — HYDROXYZINE HYDROCHLORIDE 50 MG: 25 TABLET ORAL at 08:34

## 2019-03-04 RX ADMIN — NICOTINE POLACRILEX 2 MG: 2 GUM, CHEWING BUCCAL at 08:33

## 2019-03-04 RX ADMIN — LISINOPRIL 20 MG: 20 TABLET ORAL at 08:34

## 2019-03-04 RX ADMIN — HALOPERIDOL 10 MG: 10 TABLET ORAL at 08:34

## 2019-03-04 RX ADMIN — BENZOCAINE, MENTHOL 1 LOZENGE: 15; 3.6 LOZENGE ORAL at 03:24

## 2019-03-04 RX ADMIN — BENZOCAINE, MENTHOL 1 LOZENGE: 15; 3.6 LOZENGE ORAL at 08:34

## 2019-03-04 RX ADMIN — NICOTINE POLACRILEX 2 MG: 2 GUM, CHEWING BUCCAL at 00:02

## 2019-03-04 ASSESSMENT — ACTIVITIES OF DAILY LIVING (ADL)
DRESS: INDEPENDENT
HYGIENE/GROOMING: INDEPENDENT
ORAL_HYGIENE: INDEPENDENT

## 2019-03-04 NOTE — PROGRESS NOTES
Behavioral Discharge Planning and Instructions      Summary:  You were admitted on 2/27/2019  due to symptoms of psychosis.  You were treated by Dr. Kem Shanks MD. You do still have some symptoms of disorganization but you and your mother wanted you to attend an important appointment in [place] this afternoon.  Your mother wanted treatment for  Lyme disease but we treated you for a primary psychotic disorder.  You have elected to take antipsychotic medications and see a psychiatrist.  You were discharged on 3/4/2019 from [facility] where you live with your mother.      Principal Diagnosis:   Unspecified schizophrenia spectrum disorder (?Neurologic sequela of chronic Lyme disease versus a primary psychotic illness)        Health Care Follow-up Appointments:   You have medical assistance for insurance coverage.You can use this service for transportation to your health care appointments if you do not have a way to get to your appointments.  [facility] - - [phone] or the nurse line if need special accommodations [phone]      You wanted to see a psychiatrist as recommended. You had hoped to go back to [facility] in [place] but they are not taking new patients.  Attend your new patient psychiatric medication management  appointment on Friday,April 5, 2019 from 1:30PM to 3:30PM.  [place] [address]  Ph: [phone]  The Health Unit Coordinator has faxed these discharge instructions to fax: [phone]      Your primary care doctor is:  [facility]  Louis Broussard   [phone]   [address] [place] [address]  This provider can see these records through CareEverywhere in [facility] medical records if you sign the authorizations.      You are seeking consultation today  with a Lyme disease expert:  Fatmata Ennis, FNP-BC  [facility]  [address],  [address]  [place]  [address]  Ph: [phone]        Attend all scheduled appointments with your outpatient providers. Call at least 24 hours in advance if you need to reschedule an appointment to ensure continued access to your outpatient providers.   Major Treatments, Procedures and Findings:  You were provided with: a psychiatric assessment, assessed for medical stability, medication evaluation and/or management, group therapy, milieu management and medical interventions    You were seen by an internal medicine doctor on March 1, 2019.   After review, no recommendation for IM Rocephin at this time.However, would strongly recommend further evaluation/consultation by an Infectious Disease specialist and if clinically warranted, Neurology to weight in on this subject and to assess whether chronic lyme's disease could be attributing to his psychological state.         Symptoms to Report: feeling more aggressive or increased confusion    Early warning signs can include: increased unusual thinking, such as paranoia or hearing voices    Safety and Wellness:  Take all medicines as directed.  Make no changes unless your doctor suggests them.      Follow treatment recommendations.  Refrain from alcohol and non-prescribed drugs.  If there is a concern for safety, call [phone].    Resources:   National [place] on Mental Illness (www.[place].eileen.org): 551.934.6164 or 344-755-0583.      [facility]  CRISIS LINE [phone]       [facility]-   Main Phone: [phone]  Fax: [phone]  Toll Free: [phone]  For Adult Mental Health Services   Phone: [phone]      The treatment team has appreciated the opportunity to work with you.     If you have any questions or concerns our unit number is [phone]  You may be receiving a follow-up phone call within the next three days from a representative from behavioral health.    You have identified the best phone number to reach you as [phone]

## 2019-03-04 NOTE — PROGRESS NOTES
"Pt was in a calm and pleasant mood this evening. He spent his time walking the hallway, talking with peers, and watching television. He occassionally has short conversations where he talks about an appropriate topic, then adds in something that doesn't make any contextual sense (e.g. When talking about shoe sizes, he said, \"better watch out for those shadows\"). He remains pleasant during these interactions. He was polite and cooperative. His goal was to get a good night of sleep. He denies any mental health concerns. He plans to discharge tomorrow to a place that is 4 hours away, though he wouldn't elaborate. Also, right before going to bed, he yelled a few times claiming that he has painful frostbite on his toes.              "

## 2019-03-04 NOTE — DISCHARGE INSTRUCTIONS
Behavioral Discharge Planning and Instructions      Summary:  You were admitted on 2/27/2019  due to symptoms of psychosis.  You were treated by Dr. Kem Shanks MD. You do still have some symptoms of disorganization but you and your mother wanted you to attend an important appointment in [place] this afternoon.  Your mother wanted treatment for  Lyme disease but we treated you for a primary psychotic disorder.  You have elected to take antipsychotic medications and see a psychiatrist.  You were discharged on 3/4/2019 from [facility] where you live with your mother.      Principal Diagnosis:   Unspecified schizophrenia spectrum disorder (?Neurologic sequela of chronic Lyme disease versus a primary psychotic illness)        Health Care Follow-up Appointments:   You have medical assistance for insurance coverage.You can use this service for transportation to your health care appointments if you do not have a way to get to your appointments.  [facility] - - [phone] or the nurse line if need special accommodations [phone]      You wanted to see a psychiatrist as recommended. You had hoped to go back to [facility] in [place] but they are not taking new patients.  Attend your new patient primary care appointment on Friday,April 5, 2019 from 1:30PM to 3:30PM.  Dr. Dania Robin  [address] [address]  Ph: [phone]  The Health Unit Coordinator has faxed these discharge instructions to fax: [phone]      Your primary care doctor is:  [facility]  Louis Broussard   [phone]   [address] [place] [address]  This provider can see these records through CareEverywhere in [facility] medical records if you sign the authorizations.      You are seeking consultation today  with a Lyme disease expert:  Fatmata Ennis, [facility]-BC  [facility]  [address],  [address]  [facility] [address]  Ph:  [phone]        Attend all scheduled appointments with your outpatient providers. Call at least 24 hours in advance if you need to reschedule an appointment to ensure continued access to your outpatient providers.   Major Treatments, Procedures and Findings:  You were provided with: a psychiatric assessment, assessed for medical stability, medication evaluation and/or management, group therapy, milieu management and medical interventions    You were seen by an internal medicine doctor on March 1, 2019.   After review, no recommendation for IM Rocephin at this time.However, would strongly recommend further evaluation/consultation by an Infectious Disease specialist and if clinically warranted, Neurology to weight in on this subject and to assess whether chronic lyme's disease could be attributing to his psychological state.         Symptoms to Report: feeling more aggressive or increased confusion    Early warning signs can include: increased unusual thinking, such as paranoia or hearing voices    Safety and Wellness:  Take all medicines as directed.  Make no changes unless your doctor suggests them.      Follow treatment recommendations.  Refrain from alcohol and non-prescribed drugs.  If there is a concern for safety, call [phone].    Resources:   National [place] on Mental Illness (www.mn.eileen.org): 434.846.3897 or 291-167-6778.      [facility]  CRISIS LINE [phone]       [facility] and Human Services-   Main Phone: [phone]  Fax: [phone]  Toll Free: [phone]  For Adult Mental Health Services   Phone: [phone]      The treatment team has appreciated the opportunity to work with you.     If you have any questions or concerns our unit number is [phone]  You may be receiving a follow-up phone call within the next three days from a representative from behavioral health.    You have identified the best phone number to reach you as [phone]

## 2019-03-04 NOTE — PROGRESS NOTES
Participated in Music Therapy group with focus on mood elevation, validation and decreasing anxiety and improved group cohesiveness. Engaged and cooperative in music listening interventions.  Spoke mostly non-sensically.

## 2019-03-04 NOTE — PROGRESS NOTES
Patient has been displaying disorganized behaviors throughout the night shift. He has been repeatedly wandering to the nurses station and lounge area with requests and making some bizarre statements. At one point he complained of his foot hurting and later when it no longer hurt, he thanked staff for bringing him to the ER (which did not happen). Pt has appeared very sleepy while at the desk and in the lounge; frequently standing in place with his eyes closed. He has required several prompts to return to his room to lay down.

## 2019-03-04 NOTE — PROGRESS NOTES
Patient discharged to mothers care with a plan to follow up a lyme disease specialist later today. Discharge AVS reviewed, as well as discharge medications. They have no further questions and are aware to call the unit or county crisis any time after discharge should further questions arise.     Mother verified that patient does not have any guns at home, that they are all with his aunt.

## 2019-03-23 ENCOUNTER — HOSPITAL ENCOUNTER (EMERGENCY)
Facility: CLINIC | Age: 34
Discharge: HOME | End: 2019-03-23
Attending: FAMILY MEDICINE | Admitting: FAMILY MEDICINE
Payer: MEDICAID

## 2019-03-23 VITALS — TEMPERATURE: 98.5 F | DIASTOLIC BLOOD PRESSURE: 92 MMHG | SYSTOLIC BLOOD PRESSURE: 153 MMHG | OXYGEN SATURATION: 98 %

## 2019-03-23 DIAGNOSIS — F41.9 ANXIETY: ICD-10-CM

## 2019-03-23 PROCEDURE — 99283 EMERGENCY DEPT VISIT LOW MDM: CPT

## 2019-03-23 PROCEDURE — 99283 EMERGENCY DEPT VISIT LOW MDM: CPT | Mod: Z6 | Performed by: FAMILY MEDICINE

## 2019-03-23 PROCEDURE — 80307 DRUG TEST PRSMV CHEM ANLYZR: CPT | Performed by: EMERGENCY MEDICINE

## 2019-03-23 PROCEDURE — 80320 DRUG SCREEN QUANTALCOHOLS: CPT | Performed by: EMERGENCY MEDICINE

## 2019-03-23 ASSESSMENT — ENCOUNTER SYMPTOMS
UNEXPECTED WEIGHT CHANGE: 0
HALLUCINATIONS: 0
NAUSEA: 0
FEVER: 0
VOMITING: 0
CHILLS: 0
CONFUSION: 0
NERVOUS/ANXIOUS: 1
MYALGIAS: 0

## 2019-03-23 NOTE — ED NOTES
Bed: ED16C  Expected date:   Expected time:   Means of arrival:   Comments:  Raphael 33 yr psych eval

## 2019-03-23 NOTE — ED NOTES
Mom called, pt gave consent to share medical information.  Mom believes pt is suicidal. Concerned that new medication is causing psychosis in pt and that rocephin has not done this to him. Mother was upset on the phone and teary. Would like to speak with a doctor concerning pts care.  Mother is Silvana Pate [phone]

## 2019-03-23 NOTE — ED PROVIDER NOTES
[facility] EMERGENCY DEPARTMENT ([facility])    3/23/19     ED 16C   History     Chief Complaint   Patient presents with     Medication Reaction     Wants to get medications looked at. Pt states he received new medications and its too hard to keep up with. Would like one medication.     Mental Health Problem     Wants to get a physical     The history is provided by the patient and medical records.     Jv Pate is a 34 year old male with history of psychosis, depression, generalized anxiety disorder, posttraumatic stress disorder.  He was recently in the hospital up through March 6th.  The patient is here today because he was having anxiety and I thought this might have been a medication reaction.  When I saw the patient he states the anxiety attack has gone and he feels great and he wants to go home.  He reports no SI, no HI, no delusions, no hallucinations.    Patient stated he felt very anxious, felt scared.  This was what brought the patient here.  Again this is resolved.    His present medications include Haldol, Cogentin, hydroxyzine. He does have some hypertension and is on lisinopril. He reports no tobacco, alcohol, or street drugs. He apparently being treated for Lyme disease    This part of the document was transcribed by Santa Cody, Medical Scribe.      I have reviewed the Medications, Allergies, Past Medical and Surgical History, and Social History in the Epic system.    Review of Systems   Constitutional: Negative for chills, fever and unexpected weight change.   Gastrointestinal: Negative for nausea and vomiting.   Musculoskeletal: Negative for myalgias.   Psychiatric/Behavioral: Negative for confusion, hallucinations and suicidal ideas. The patient is nervous/anxious (resolved).        Physical Exam   BP: (!) 153/92  Heart Rate: 129  Temp: 98.5  F (36.9  C)  SpO2: 98 %      Physical Exam   Constitutional: He is oriented to person, place, and time. He appears well-nourished. No  distress.   In no acute distress, sitting calmly  Affect seems flat  There are no delusions noted nor hallucinations  He adamantly denies suicidal ideations or homicidal thoughts   HENT:   Head: Normocephalic and atraumatic.   Cardiovascular: Regular rhythm and normal heart sounds.   Heart rate when I saw pt was 110   Pulmonary/Chest: Effort normal.   Musculoskeletal: He exhibits no edema.   Neurological: He is alert and oriented to person, place, and time.   Skin: He is not diaphoretic.   Psychiatric:   Again no si or hi. No delusions or hallucinations  During the interview the pt was very concerned about my well being as he saw my recent skin excision. We discussed this for a minute or so.     Vitals reviewed.      ED Course        Procedures            Labs Ordered and Resulted from Time of ED Arrival Up to the Time of Departure from the ED - No data to display         Assessments & Plan (with Medical Decision Making)   I reviewed the previous admission. The pt reports an anxiety event that has resolved. He is requesting discharge. At this point there is no indication for admission. I saw the note from intake- he denies his pills are causing any problems.    The pt will be discharged to home. In review of chart - he is his own guardian    I have reviewed the nursing notes.    I have reviewed the findings, diagnosis, plan and need for follow up with the patient.       Medication List      There are no discharge medications for this visit.         Final diagnoses:   Anxiety       3/23/2019   [facility], [place], EMERGENCY DEPARTMENT     Derrick Alvarez MD  03/23/19 8510

## 2019-03-23 NOTE — ED AVS SNAPSHOT
[facility], [place], Emergency Department  [address]  [place] [address]  Phone:  [phone]  Fax:  [phone]                                    Jv Pate   MRN: [id number]    Department:  [facility], Emergency Department   Date of Visit:  3/23/2019           After Visit Summary Signature Page    I have received my discharge instructions, and my questions have been answered. I have discussed any challenges I see with this plan with the nurse or doctor.    ..........................................................................................................................................  Patient/Patient Representative Signature      ..........................................................................................................................................  Patient Representative Print Name and Relationship to Patient    ..................................................               ................................................  Date                                   Time    ..........................................................................................................................................  Reviewed by Signature/Title    ...................................................              ..............................................  Date                                               Time          22EPIC Rev 08/18

## 2019-03-27 ENCOUNTER — NURSE TRIAGE (OUTPATIENT)
Dept: NURSING | Facility: CLINIC | Age: 34
End: 2019-03-27

## 2019-03-27 NOTE — TELEPHONE ENCOUNTER
"Mother calling with patient present. She says \"my sone needs some medication to help him relax inside\"  He is very anxious and I am not able to find any help for him. She is asking if they go to Urgent Care will someone prescribe \"something for his nerves\". I advised that with his mental health history it is not in [facility] Scope of Practice. She said they don't have a a PCP. She said his level of agitation is \"potentially an emergency crisis situation\" but she does not elaborate on this description.  Mary Jo Alfred RN  [facility]    Reason for Disposition    Patient sounds very sick or weak to the triager    Additional Information    Negative: Severe difficulty breathing (e.g., struggling for each breath, speaks in single words)    Negative: Bluish lips, tongue, or face now    Negative: Difficult to awaken or acting confused  (e.g., disoriented, slurred speech)    Negative: Hysterical or combative behavior    Negative: Sounds like a life-threatening emergency to the triager    Negative: Chest pain    Negative: Palpitations, skipped heart beat, or rapid heart beat    Negative: Cough is main symptom    Negative: Suicide thoughts, threats, attempts, or questions    Negative: Depression is main problem or symptom (e.g., feelings of sadness or hopelessness)    Negative: [1] Difficulty breathing AND [2] persists > 10 minutes AND [3] not relieved by reassurance provided by triager    Negative: [1] Lightheadedness or dizziness AND [2] persists > 10 minutes AND [3] not relieved by reassurance provided by triager    Negative: [1] Known or suspected alcohol or drug abuse AND [2] feeling very shaky (i.e., visible tremors of hands)    Negative: Taking medication containing theophylline (e.g., Theodur)    Answer Assessment - Initial Assessment Questions  1. CONCERN: \"What happened that made you call today?\"      Agitation, anxiety  2. ANXIETY SYMPTOM SCREENING: \"Can you describe how you have been feeling?\"  (e.g., tense, " "restless, panicky, anxious, keyed up, trouble sleeping, trouble concentrating)      All of the above   3. ONSET: \"How long have you been feeling this way?\"      Several days/weeks  4. RECURRENT: \"Have you felt this way before?\"  If yes: \"What happened that time?\" \"What helped these feelings go away in the past?\"       yes  5. RISK OF HARM - SUICIDAL IDEATION:  \"Do you ever have thoughts of hurting or killing yourself?\"  (e.g., yes, no, no but preoccupation with thoughts about death)    - INTENT:  \"Do you have thoughts of hurting or killing yourself right NOW?\" (e.g., yes, no, N/A)    - PLAN: \"Do you have a specific plan for how you would do this?\" (e.g., gun, knife, overdose, no plan, N/A)      no  6. RISK OF HARM - HOMICIDAL IDEATION:  \"Do you ever have thoughts of hurting or killing someone else?\"  (e.g., yes, no, no but preoccupation with thoughts about death)    - INTENT:  \"Do you have thoughts of hurting or killing someone right NOW?\" (e.g., yes, no, N/A)    - PLAN: \"Do you have a specific plan for how you would do this?\" (e.g., gun, knife, no plan, N/A)       no  7. FUNCTIONAL IMPAIRMENT: \"How have things been going for you overall in your life? Have you had any more difficulties than usual doing your normal daily activities?\"  (e.g., better, same, worse; self-care, school, work, interactions)      impaired  8. SUPPORT: \"Who is with you now?\" \"Who do you live with?\" \"Do you have family or friends nearby who you can talk to?\"       Mom with him  9. THERAPIST: \"Do you have a counselor or therapist? Name?\"      Not now  10. STRESSORS: \"Has there been any new stress or recent changes in your life?\"        life  11. CAFFEINE ABUSE: \"Do you drink caffeinated beverages, and how much each day?\" (e.g., coffee, tea, nino)        brennan  12. SUBSTANCE ABUSE: \"Do you use any illegal drugs or alcohol?\"        ?  13. OTHER SYMPTOMS: \"Do you have any other physical symptoms right now?\" (e.g., chest pain, palpitations, " "difficulty breathing, fever)        no  14. PREGNANCY: \"Is there any chance you are pregnant?\" \"When was your last menstrual period?\"        no    Protocols used: ANXIETY AND PANIC ATTACK-ADULT-      "

## 2019-03-28 NOTE — DISCHARGE SUMMARY
[facility]  Department of Psychiatry    DATE OF ADMISSION:  2/27/2019    DATE OF DISCHARGE:  3/4/2019    DISCHARGE DIAGNOSES:   Unspecified schizophrenia spectrum disorder (?Neurologic sequela of chronic Lyme disease versus a primary psychotic illness)    HOSPITAL COURSE: (Refer to H&P, progress notes, and consult notes for details)    The patient was admitted to our Behavioral Health Unit under a 72-hour hold for recent worsening of psychotic symptoms.  Treatment was continued voluntarily.  The patient was interested in restarting antipsychotic medications to address symptoms of psychosis.  Noting a history of gaining a beneficial response to Haldol, the medication was restarted.  Targeted symptoms significantly improved although residual psychosis was still present at the time of discharge.  Citing the improvements the patient had gained after restarting Haldol, he requested to be discharged home from the hospital.  Since he did not meet criteria to be placed under a 72-hour hold at the time of his request of discharge, the patient was granted discharge and his care was transitioned to outpatient providers.  He met with our  to obtain resources for outpatient mental health treatment.    On the day of his discharge, the patient and his mother were requesting to be discharged from the hospital citing the importance of following up with an outpatient appointment that had been prearranged for the patient on the day of discharge for medical evaluation related to the possibility of his current presentation being related to neurologic sequelae of chronic Lyme's disease.  Noting that his mother notified the treatment team of this concern earlier in the course of his hospitalization, internal medicine consultation was requested during his hospital stay for further guidance and treatment.  They did not recommend any acute treatment however did recommend further consultation  with the infectious disease specialist.  Consultation was then requested with the infectious disease service.  Dr. Schmidt with infectious disease was able to review the patient's chart and ordered blood tests for further evaluation which included Lyme IgG and IgM.  The results of the testing came back negative.  I spoke with the consultant on the day of discharge regarding the patient's care.  After reviewing his history and recent blood test results, he did not recommend any additional treatment.  Noting that the patient would be following up with an outpatient infectious disease specialist on the day of his discharge, future treatment recommendations were deferred to that provider.    CONDITION AT DISCHARGE:  Improved.  The patients acute suicide risk is low due to the following factors:  improved mood/anxiety symptoms.  Denies suicidal ideations.  Reported a significant reduction of hallucinations and paranoid ideations.  He denied any command hallucinations to harm himself or others.  Not actively intoxicated and plans to abstain from illicit substances and alcohol.  Denies access to guns.  Denies feeling hopeless or helpless. At the time of discharge Jv Pate was determined to not be an immediate danger to herself or others. The patient's acute risk will be higher if noncompliant with treatment plan, medications, follow-up or using illicit substances or alcohol.  These findings along with the risks of noncompliance with medications and treatment plan, which could potentially cause decompensation and increase the risk for suicide, were discussed with the patient.  The patients chronic suicide risk is moderate given the following factors: white race; diagnosis of psychosis and likely schizophrenia, unemployed; Denied a family history of suicide.  Preventative factors include: social supports, stable housing with his mother     MENTAL STATUS EXAMINATION AT TIME OF DISCHARGE:  The patient is 34 year old White  male who appears their stated age and is appropriately dressed with good hygiene.  Calm and cooperative with the interview questions.  No psychomotor abnormalities are noted. Eye contact is appropriate. Speech has normal rate, tone, latency and volume and is not pushed or pressured. Mood is euthymic and affect is full and appropriate.  The patient does not seem overtly depressed, anxious, manic or irritable.  Thought process is linear, logical and future oriented.  Thought process is not tangential, circumstantial or disorganized.  Thought content is not significant for apperant paranoia, delusions, ideas of reference or grandiosity.  The patient denies suicidal and homicidal ideations.  He did not appear to be experiencing any psychotic stimuli resembling hallucinations.  Insight and judgment are fair.  Cognition appears intact to interviewing including orientation, recent and remote memory, fund of knowledge, use of language, attention span and concentration.  Muscle strength, tone and gait appear normal on visual inspection.      DISPOSITION:  The patient is discharged home      FOLLOWUP APPOINTMENTS:  ( per social workers notes and after visit summary)  You wanted to see a psychiatrist as recommended. You had hoped to go back to [facility] in [place] but they are not taking new patients.  Attend your new patient primary care appointment on Friday,April 5, 2019 from 1:30PM to 3:30PM.  [place] [address]  Ph: [phone]  The Health Unit Coordinator has faxed these discharge instructions to fax: [phone]        Your primary care doctor is:  [facility]  Louis Broussard   [phone] [address] [place] [address]  This provider can see these records through CareEverywhere in [facility] medical records if you sign the authorizations.        You are seeking consultation today  with a Lyme disease  expert:  Fatmata Ennis, [facility]  [address],  [address]  [place] [address]  Ph: [phone]    DISCHARGE MEDICATIONS:   Discharge Medication List as of 3/4/2019 11:38 AM      CONTINUE these medications which have CHANGED    Details   benztropine (COGENTIN) 0.5 MG tablet Take 1 tablet (0.5 mg) by mouth 2 times daily, Disp-60 tablet, R-0, E-Prescribe      haloperidol (HALDOL) 10 MG tablet Take 1 tablet (10 mg) by mouth 2 times daily, Disp-60 tablet, R-0, E-Prescribe         CONTINUE these medications which have NOT CHANGED    Details   acetaminophen (TYLENOL) 325 MG tablet Take 2 tablets (650 mg) by mouth every 4 hours as needed for mild pain or fever, Disp-100 tablet, Transitional      carboxymethylcellulose (CELLUVISC/REFRESH LIQUIGEL) 1 % ophthalmic solution Place 1 drop into both eyes 3 times daily as needed for dry eyes, Disp-1 Bottle, Transitional      hydrOXYzine (ATARAX) 25 MG tablet Take 1-2 tablets (25-50 mg) by mouth every 4 hours as needed for anxiety, Disp-120 tablet, Transitional      lisinopril (PRINIVIL/ZESTRIL) 20 MG tablet Take 1 tablet (20 mg) by mouth daily, Disp-30 tablet, R-1, E-Prescribe      minocycline (MINOCIN/DYNACIN) 100 MG capsule Take 1 capsule (100 mg) by mouth every 12 hours, Disp-60 capsule, R-1, E-Prescribe      multivitamin, therapeutic (THERA-VIT) TABS tablet Take 1 tablet by mouth daily, R-0, Transitional      nicotine polacrilex (NICORETTE) 2 MG gum Place 1 each (2 mg) inside cheek every hour as needed for smoking cessation, Disp-30 tablet, Transitional      ranitidine (ZANTAC) 150 MG tablet Take 1 tablet (150 mg) by mouth 2 times daily, Disp-60 tablet, R-1, E-Prescribe         STOP taking these medications       famotidine (PEPCID) 20 MG tablet Comments:   Reason for Stopping:         haloperidol decanoate (HALDOL DECANOATE) 100 MG/ML injection Comments:   Reason for Stopping:         predniSONE (DELTASONE) 10 MG tablet Comments:   Reason for Stopping:          QUEtiapine (SEROQUEL) 200 MG tablet Comments:   Reason for Stopping:         saccharomyces boulardii (FLORASTOR) 250 MG capsule Comments:   Reason for Stopping:                LABORATORY RESULTS: (past 14 days)  Recent Results (from the past 336 hour(s))   Drug abuse screen 6 urine (tox)    Collection Time: 03/23/19 10:46 AM   Result Value Ref Range    Amphetamine Qual Urine Negative NEG^Negative    Barbiturates Qual Urine Negative NEG^Negative    Benzodiazepine Qual Urine Negative NEG^Negative    Cannabinoids Qual Urine Negative NEG^Negative    Cocaine Qual Urine Negative NEG^Negative    Ethanol Qual Urine Negative NEG^Negative    Opiates Qualitative Urine Negative NEG^Negative       >30 minutes was spent on this discharge to allow for reviewing the patient's response to treatment, reviewing plan of care, education on medications and diagnosis, and conducting a risk assessment.

## 2019-03-29 NOTE — PLAN OF CARE
"Problem: Psychotic Symptoms  Goal: Psychotic Symptoms  Signs and symptoms of listed problems will be absent or manageable.   Outcome: No Change  Pt continues to have symptoms of psychosis. Per report no significant change in the pt's presentation in the last 48 hours. Pt has been social and generally appropriate throughout the shift today. Pt has only needed minimal redirection from staff. Pt has had no significant aggressive or agitated behaviors. Pt's affect is irritable overall but his mood remains calm. Pt is generally pleasant during interactions with staff. Pt was compliant when being asked to do things. Pt still presents with paranoia, particularly regarding medication administration and for signing paperwork. Pt is unable to delve deeper into these feelings. Pt is generally unwilling to communicate about his symptoms and simply states, \"I'm doing better.\" Pt reports no physical health concerns and denies any side effects from medications. Pt's BP remains high and he is being followed by internal medicine. Pt's other VS were WDL this shift.       " negative

## 2019-04-15 ENCOUNTER — TRANSFERRED RECORDS (OUTPATIENT)
Dept: HEALTH INFORMATION MANAGEMENT | Facility: CLINIC | Age: 34
End: 2019-04-15

## 2019-05-20 ENCOUNTER — TELEPHONE (OUTPATIENT)
Dept: FAMILY MEDICINE | Facility: CLINIC | Age: 34
End: 2019-05-20

## 2019-05-20 NOTE — TELEPHONE ENCOUNTER
Left message, we are not able to give medications to group home residents unless they see a [facility] provider for their PCP and the order comes from [facility]

## 2019-05-20 NOTE — TELEPHONE ENCOUNTER
Reason for Call:  Other call back    Detailed comments: Mary Carmen Linares calling. Pt will be residing in a group home in [place] and needs Bicillin-LA IM injections 4 times a week. Mon, Wed, Fri, and Saturday. Is this something we can accommodate at [facility]?    Phone Number Patient can be reached at: Other phone number:  Mary Carmen Linares  [phone]    Best Time: any    Can we leave a detailed message on this number? YES    Call taken on 5/20/2019 at 9:44 AM by Misti Avelar

## 2021-01-02 ENCOUNTER — HOSPITAL ENCOUNTER (EMERGENCY)
Facility: CLINIC | Age: 36
Discharge: HOME | End: 2021-01-02
Attending: EMERGENCY MEDICINE | Admitting: EMERGENCY MEDICINE
Payer: MEDICAID

## 2021-01-02 VITALS
RESPIRATION RATE: 16 BRPM | SYSTOLIC BLOOD PRESSURE: 134 MMHG | OXYGEN SATURATION: 98 % | TEMPERATURE: 98.5 F | WEIGHT: 240 LBS | DIASTOLIC BLOOD PRESSURE: 83 MMHG | HEART RATE: 106 BPM | HEIGHT: 75 IN | BODY MASS INDEX: 29.84 KG/M2

## 2021-01-02 DIAGNOSIS — F10.10 ALCOHOL ABUSE: ICD-10-CM

## 2021-01-02 LAB
ALCOHOL BREATH TEST: 0.15 (ref 0–0.01)
AMPHETAMINES UR QL SCN: NEGATIVE
BARBITURATES UR QL: NEGATIVE
BENZODIAZ UR QL: NEGATIVE
CANNABINOIDS UR QL SCN: NEGATIVE
COCAINE UR QL: NEGATIVE
ETHANOL UR QL SCN: POSITIVE
OPIATES UR QL SCN: NEGATIVE

## 2021-01-02 PROCEDURE — U0005 INFEC AGEN DETEC AMPLI PROBE: HCPCS | Performed by: PHYSICIAN ASSISTANT

## 2021-01-02 PROCEDURE — 80320 DRUG SCREEN QUANTALCOHOLS: CPT | Performed by: FAMILY MEDICINE

## 2021-01-02 PROCEDURE — U0003 INFECTIOUS AGENT DETECTION BY NUCLEIC ACID (DNA OR RNA); SEVERE ACUTE RESPIRATORY SYNDROME CORONAVIRUS 2 (SARS-COV-2) (CORONAVIRUS DISEASE [COVID-19]), AMPLIFIED PROBE TECHNIQUE, MAKING USE OF HIGH THROUGHPUT TECHNOLOGIES AS DESCRIBED BY CMS-2020-01-R: HCPCS | Performed by: PHYSICIAN ASSISTANT

## 2021-01-02 PROCEDURE — 80307 DRUG TEST PRSMV CHEM ANLYZR: CPT | Performed by: FAMILY MEDICINE

## 2021-01-02 PROCEDURE — C9803 HOPD COVID-19 SPEC COLLECT: HCPCS | Performed by: EMERGENCY MEDICINE

## 2021-01-02 PROCEDURE — 99283 EMERGENCY DEPT VISIT LOW MDM: CPT | Performed by: EMERGENCY MEDICINE

## 2021-01-02 PROCEDURE — 250N000013 HC RX MED GY IP 250 OP 250 PS 637: Performed by: EMERGENCY MEDICINE

## 2021-01-02 PROCEDURE — 82075 ASSAY OF BREATH ETHANOL: CPT | Performed by: EMERGENCY MEDICINE

## 2021-01-02 PROCEDURE — 99285 EMERGENCY DEPT VISIT HI MDM: CPT | Performed by: EMERGENCY MEDICINE

## 2021-01-02 RX ORDER — ACETAMINOPHEN 325 MG/1
650 TABLET ORAL ONCE
Status: COMPLETED | OUTPATIENT
Start: 2021-01-02 | End: 2021-01-02

## 2021-01-02 RX ORDER — NICOTINE 21 MG/24HR
1 PATCH, TRANSDERMAL 24 HOURS TRANSDERMAL ONCE
Status: DISCONTINUED | OUTPATIENT
Start: 2021-01-02 | End: 2021-01-03 | Stop reason: HOSPADM

## 2021-01-02 RX ORDER — ACETAMINOPHEN 325 MG/1
650 TABLET ORAL EVERY 4 HOURS PRN
Status: DISCONTINUED | OUTPATIENT
Start: 2021-01-02 | End: 2021-01-02

## 2021-01-02 RX ADMIN — ACETAMINOPHEN 650 MG: 325 TABLET, FILM COATED ORAL at 20:32

## 2021-01-02 RX ADMIN — NICOTINE 1 PATCH: 21 PATCH, EXTENDED RELEASE TRANSDERMAL at 20:25

## 2021-01-02 RX ADMIN — NICOTINE POLACRILEX 2 MG: 2 GUM, CHEWING BUCCAL at 20:24

## 2021-01-02 ASSESSMENT — ENCOUNTER SYMPTOMS
EYE REDNESS: 0
ARTHRALGIAS: 0
NECK STIFFNESS: 0
DIFFICULTY URINATING: 0
COLOR CHANGE: 0
ABDOMINAL PAIN: 0
SHORTNESS OF BREATH: 0
HEADACHES: 0
CONFUSION: 0
FEVER: 0

## 2021-01-02 ASSESSMENT — MIFFLIN-ST. JEOR: SCORE: 2114.26

## 2021-01-02 NOTE — ED NOTES
Bed: ED20  Expected date: 1/2/21  Expected time: 5:25 PM  Means of arrival:   Comments:  [place] - 35 y M - psych/ETOH - COVID pos

## 2021-01-02 NOTE — ED AVS SNAPSHOT
[facility]  [address]  [place] [address]  Phone: [phone]  Fax: [phone]                                    Jv Pate   MRN: [id number]    Department: [facility]   Date of Visit: 1/2/2021           After Visit Summary Signature Page    I have received my discharge instructions, and my questions have been answered. I have discussed any challenges I see with this plan with the nurse or doctor.    ..........................................................................................................................................  Patient/Patient Representative Signature      ..........................................................................................................................................  Patient Representative Print Name and Relationship to Patient    ..................................................               ................................................  Date                                   Time    ..........................................................................................................................................  Reviewed by Signature/Title    ...................................................              ..............................................  Date                                               Time          22EPIC Rev 08/18

## 2021-01-02 NOTE — ED TRIAGE NOTES
Presents to ED with suicidal thoughts and states he has been having suicidal thoughts for three months but states symptoms has gotten worst; patient denies suicidal attempts; patient states he is drunk and had has been drinking high quality beer and mone today; patient states he had 3 bottles of beer and quarter bottle of mone. Patient states he smoke marijuana and K2 three days ago and denies all other drug use.

## 2021-01-03 LAB
LABORATORY COMMENT REPORT: NORMAL
SARS-COV-2 RNA SPEC QL NAA+PROBE: NEGATIVE
SPECIMEN SOURCE: NORMAL

## 2021-01-03 NOTE — ED PROVIDER NOTES
"    [facility])    1/02/21      History     Chief Complaint   Patient presents with     Suicidal     Having thoughts of harming self     HPI  Jv Pate is a 35 year old male with a past medical history significant for cluster B personality disorder, hypertension, Lyme disease, schizophrenia, psychosis, and DRESS syndrome who presents to the Emergency Department for evaluation of acute alcohol intoxication, and suicidal ideation. The patient reports that he \"got too drunk\" earlier today. He reports he drank 3 beers, and a quarter bottle of mone today. He also reports recent marijuana, and K2 (synthetic marijuana) use. He states since getting drunk, he has felt increasingly depressed with suicidal ideation. He states he is not often suicidal, but drinking exacerbates his depressive symptoms. Denies any history of suicide attempts.The patient reports he is unhappy living in [place], which is also a stressor for him. The patient does report compliance on his prescribed psychiatric medications, and states that these do help his mental illnesses. The patient endorses associated nausea, but denies any episodes of emesis. No cough, sore throat, or URI symptoms.      I have reviewed the Medications, Allergies, Past Medical and Surgical History, and Social History in the [employer] system.  PAST MEDICAL HISTORY:   Past Medical History:   Diagnosis Date     Cluster B personality disorder (H)     antisocial traits.      H/o Lyme disease     Working with Dr. Karan Caputo from [place], [place]. [facility] with diagnosis of neurologic lyme disease.      Hypertension      Schizophrenia (H)     paranoid type       PAST SURGICAL HISTORY:   Past Surgical History:   Procedure Laterality Date     NO HISTORY OF SURGERY         Past medical history, past surgical history, medications, and allergies were reviewed with the patient. Additional pertinent items: None    FAMILY HISTORY:   Family History   Problem " Relation Age of Onset     Neurologic Disorder Father      Heart Disease Paternal Grandfather         attack       SOCIAL HISTORY:   Social History     Tobacco Use     Smoking status: Current Every Day Smoker     Packs/day: 1.50     Smokeless tobacco: Never Used   Substance Use Topics     Alcohol use: Yes     Alcohol/week: 4.0 standard drinks     Types: 3 Cans of beer, 1 Shots of liquor per week     Comment: occasinal.     Social history was reviewed with the patient. Additional pertinent items: None          Patient's Medications   New Prescriptions    No medications on file   Previous Medications    ACETAMINOPHEN (TYLENOL) 325 MG TABLET    Take 2 tablets (650 mg) by mouth every 4 hours as needed for mild pain or fever    BENZTROPINE (COGENTIN) 0.5 MG TABLET    Take 1 tablet (0.5 mg) by mouth 2 times daily    CARBOXYMETHYLCELLULOSE (CELLUVISC/REFRESH LIQUIGEL) 1 % OPHTHALMIC SOLUTION    Place 1 drop into both eyes 3 times daily as needed for dry eyes    HALOPERIDOL (HALDOL) 10 MG TABLET    Take 1 tablet (10 mg) by mouth 2 times daily    HYDROXYZINE (ATARAX) 25 MG TABLET    Take 1-2 tablets (25-50 mg) by mouth every 4 hours as needed for anxiety    LISINOPRIL (PRINIVIL/ZESTRIL) 20 MG TABLET    Take 1 tablet (20 mg) by mouth daily    MINOCYCLINE (MINOCIN/DYNACIN) 100 MG CAPSULE    Take 1 capsule (100 mg) by mouth every 12 hours    MULTIVITAMIN, THERAPEUTIC (THERA-VIT) TABS TABLET    Take 1 tablet by mouth daily    NICOTINE POLACRILEX (NICORETTE) 2 MG GUM    Place 1 each (2 mg) inside cheek every hour as needed for smoking cessation    RANITIDINE (ZANTAC) 150 MG TABLET    Take 1 tablet (150 mg) by mouth 2 times daily   Modified Medications    No medications on file   Discontinued Medications    No medications on file          Allergies   Allergen Reactions     Carbamazepine Rash     Carbamazepine (Tegretol),.....Rash.     Tegaderm Transparent Dressing (Informational Only) Rash        Review of Systems   Constitutional:  "Negative for fever.   HENT: Negative for congestion.    Eyes: Negative for redness.   Respiratory: Negative for shortness of breath.    Cardiovascular: Negative for chest pain.   Gastrointestinal: Negative for abdominal pain.   Genitourinary: Negative for difficulty urinating.   Musculoskeletal: Negative for arthralgias and neck stiffness.   Skin: Negative for color change.   Neurological: Negative for headaches.   Psychiatric/Behavioral: Positive for suicidal ideas. Negative for confusion.        Acute alcohol intoxication   All other systems reviewed and are negative.        Physical Exam   BP: 133/85  Pulse: 115  Temp: 98.2  F (36.8  C)  Resp: 18  Height: 190.5 cm (6' 3\")  Weight: 108.9 kg (240 lb)  SpO2: 96 %      Physical Exam  Vitals signs and nursing note reviewed.   Constitutional:       General: He is not in acute distress.     Appearance: He is well-developed.   HENT:      Head: Normocephalic.   Eyes:      Extraocular Movements: Extraocular movements intact.   Neck:      Musculoskeletal: Neck supple.   Pulmonary:      Effort: No respiratory distress.   Musculoskeletal:         General: No deformity.   Skin:     General: Skin is dry.   Neurological:      Mental Status: He is alert.      Comments: Oriented, slurred speech consistent with intoxication   Psychiatric:         Behavior: Behavior normal.         ED Course   6:34 PM  The patient was seen and examined by Pietro Dangelo DO in Room ED20.        Procedures        Results for orders placed or performed during the hospital encounter of 01/02/21   Drug abuse screen 6 urine (tox)     Status: Abnormal   Result Value Ref Range    Amphetamine Qual Urine Negative NEG^Negative    Barbiturates Qual Urine Negative NEG^Negative    Benzodiazepine Qual Urine Negative NEG^Negative    Cannabinoids Qual Urine Negative NEG^Negative    Cocaine Qual Urine Negative NEG^Negative    Ethanol Qual Urine Positive (A) NEG^Negative    Opiates Qualitative Urine Negative " NEG^Negative   Alcohol breath test POCT     Status: Abnormal   Result Value Ref Range    Alcohol Breath Test 0.155 (A) 0.00 - 0.01            Results for orders placed or performed during the hospital encounter of 01/02/21 (from the past 24 hour(s))   Drug abuse screen 6 urine (tox)   Result Value Ref Range    Amphetamine Qual Urine Negative NEG^Negative    Barbiturates Qual Urine Negative NEG^Negative    Benzodiazepine Qual Urine Negative NEG^Negative    Cannabinoids Qual Urine Negative NEG^Negative    Cocaine Qual Urine Negative NEG^Negative    Ethanol Qual Urine Positive (A) NEG^Negative    Opiates Qualitative Urine Negative NEG^Negative   Alcohol breath test POCT   Result Value Ref Range    Alcohol Breath Test 0.155 (A) 0.00 - 0.01     Medications   nicotine (NICODERM CQ) 21 MG/24HR 24 hr patch 1 patch (1 patch Transdermal Patch/Med Applied 1/2/21 2025)   nicotine (NICORETTE) gum 2 mg (2 mg Buccal Given 1/2/21 2024)   acetaminophen (TYLENOL) tablet 650 mg (650 mg Oral Given 1/2/21 2032)             Assessments & Plan (with Medical Decision Making)   35-year-old male presents to us with a chief complaint of substance abuse and suicidal thoughts.  Patient was intoxicated upon arrival.  He was observed in the emergency department for approximately 4 and half hours.  Upon reassessment the patient is feeling better.  He states he is sad with where he has to live but he is not currently suicidal.  He is to contract for safety and comfortable with discharge home.    I have reviewed the nursing notes.    I have reviewed the findings, diagnosis, plan and need for follow up with the patient.    New Prescriptions    No medications on file       Final diagnoses:   Alcohol abuse   I, Dimitri Bah, am serving as a trained medical scribe to document services personally performed by  Bernardo Dangelo DO, based on the provider's statements to me.      IBernardo DO, was physically present and have reviewed and  verified the accuracy of this note documented by Dimitri Bah.     1/2/2021   [facility] EMERGENCY DEPARTMENT     Bernardo Dangelo DO  01/02/21 2235

## 2021-01-03 NOTE — ED NOTES
Bed: ED11  Expected date:   Expected time:   Means of arrival:   Comments:  Hold for 20 when clean

## 2021-12-31 NOTE — PROGRESS NOTES
5:20am Code Green and security alert inititated to assist with pt needing to be repositioned and changed to scrubs and toilet use.Pt noting to be calm and cooperative.  5:30am:Appears calm and cooperative.Pt was taken off restraints at this time.All belongings taken off room and brought to locker in unit.Mobile phone and home meds sent to security.   Pt has multiple piercings in bilat ears, right nares, and umbilicus.  Pt states she is unable to remove them.  Explained risk of leaving piercings in place to patient.  Pt agreed to assume risk and all piercings were taped by patient.

## 2022-06-17 NOTE — PROGRESS NOTES
06/04/17 1740   Seclusion or Restraint Order   Length of Order 4   Order Obtained Yes   Attending Physician Notified Yes  (Dr. Madden)   Attending Physician's Name Dr. Shah   Assessment   Less Restrictive Alternative Decreased stimulation;Verbal de-escalation;Reality orientation;Modified programming;Reassurance / Support   Risk Factors N   Justification   Clinical Justification Others       Pt was agitated,verbally threatening, belligerant, demanding to leave, and making threats like toward staff as well as making derogatory statements toward his peers. Pt has been calling his peers names, making them angry and agitated. De-escalation attempted but pt did not change his dysregulated and oppositional behavior in the [facility] area. After all least restrictive interventions failed, such 1:1 attempt toward de-escalation, asking pt to take time-out in his/her room, giving prn medications, having different nursing staff assist in helping patient de-escalate, every attempt was unsuccessful, a code green was called and patient was walked to seclusion.            done

## 2022-08-03 NOTE — CONSULTS
"Brief Medicine Note    Patient is a 34yo male with a hx of schizophrenia, HTN, and hx of chronic lyme's disease (diagnosed in 2008 per chart review and was treated with course of doxycycline, was followed by a physician in [place], [place] for chronic lyme's disease and takes Minocycline daily).     Internal Medicine consulted by Psychiatry team as patient's mother insists that patient's psychiatry symptoms leading to admission are related to neuro sequela of Lyme's disease. Patient's mother is asking for IM Rocephin. IM was consulted to evaluate to determine the necessity of IM Rocephin.    Per up to date, \"Chronic Lyme disease includes the post-Lyme disease syndrome, as well as illnesses and symptom complexes for which there is no convincing scientific evidence of any relationship to B. burgdorferi infection. Regardless of its etiology, this symptom complex can be highly disruptive to day-to-day function, and is poorly understood. However, all objective evidence indicates that these individuals, some of whom suffer from debilitating symptoms, do not have chronic infection with B. burgdorferi, are not benefited by additional antibiotic treatment, and do not have (and have not had) nervous system infection or damage.\"     After review, no recommendation for IM Rocephin at this time. However, would strongly recommend further evaluation/consultation by an Infectious Disease specialist and if clinically warranted, Neurology to weight in on this subject and to assess whether chronic lyme's disease could be attributing to his psychological state. The primary psychiatrist is off today, but this recommendation was discussed with the RN and a staff message and page was sent to ordering provider.    Medicine signing off. Please feel free to call with any questions or concerns or for further discussion.       Ruby Tobias PA-C  Hospitalist Service  Pager [phone]      "  Symptoms

## 2023-01-16 NOTE — PROGRESS NOTES
Date of Service: 2023    Consultation requested by CLARITA Saini.    REASON FOR CONSULTATION:   CKD, \"unspecified.\"    The patient is a 76-year-old female, reports that she was seen by her primary care provider on routine followup and was advised to have further evaluation by me because her kidney function has been compromised which according to the patient is known to have been compromised for some time.  She used to live in [place] and had a doctor there, but never saw a kidney doctor. Her PCP in [place] had told her that the patient has somewhat compromised kidney function, which according to the patient reportedly has declined further when she moved to this [facility] and established care with Linda.  The patient though at this time has no new complaint and feels just fine.  She has no headache, lightheadedness, dizziness, cough, fever or chills.  No shortness of breath at rest.  No nausea, no vomiting, no chest pain or abdominal pain.  Appetite fairly good.  Bowel movements are okay and she has no specific urinary complaint.  No pain, burning during urination reported by patient.    PAST MEDICAL HISTORY:    Significant for hypertension for many years, history of anxiety, depression, arthritis with chronic pain, colonic polyp, hyperlipidemia, history of migraine, obesity, sleep apnea, history of seizure disorder, panic disorder.  Status post right shoulder surgery, right hip replacement, cholecystectomy, endometrial ablation, tubal ligation, sinus surgery.    SYSTEMIC REVIEW:    As indicated above in the history.    PERSONAL SOCIAL HISTORY:    She is , lives with her , has one adult son.  She drinks very little, nonsmoker, retired.  Used to work in a factory.    FAMILY HISTORY:    Both parents are .  Father  at age 83, had mesothelioma.  Mother  at age 100 due to old age.    No history of kidney disease reported by patient or family, though.    CURRENT     08/23/17 1300   Behavioral Health   Hallucinations appears responding   Thinking poor concentration   Orientation person: oriented;place: oriented;date: oriented;time: oriented   Memory baseline memory   Insight poor   Judgement impaired   Eye Contact at examiner   Affect full range affect   Mood mood is calm   Physical Appearance/Attire disheveled   Hygiene well groomed   Suicidality (denies)   Self Injury (denies)   Elopement (none indicated)   Activity (Present in milieu)   Speech clear;coherent   Medication Sensitivity no stated side effects;no observed side effects   Psychomotor / Gait balanced;steady   Activities of Daily Living   Hygiene/Grooming independent   Oral Hygiene independent   Dress scrubs (behavioral health)   Room Organization independent   Behavioral Health Interventions   Psychotic Symptoms maintain safety precautions;monitor need to revise level of observation;maintain safe secure environment;reality orientation;simple, clear language;decrease environmental stimulation;encourage participation / independence with adls;establish therapeutic relationship   Social and Therapeutic Interventions (Psychotic Symptoms) encourage socialization with peers;encourage effective boundaries with peers;encourage participation in therapeutic groups and milieu activities     Patient was present in milieu throughout morning.  Patient was calm, redirectable, pleasant with staff.  Patient showed interest in participating in daily programming.  Appropriate boundaries with peers.  Patient had no visitors and author witnessed no phone calls.  Patient did spend some time after lunch in room resting.   MEDICATIONS:    Reviewed and documented in the EMR include:  Coreg.  Demadex.  Lipitor.  Diovan.  Zoloft.  Requip.  Aspirin.    Imitrex.  Multiple supplements.  Vitamin D.  Magnesium.  Thiamine.  Ferrous Gluconate.  Multivitamin as well.    ALLERGIES:    Documented to be allergic to Metolazone, Hydrocodone, Vicodin, Metoprolol, Triamcinolone.    PHYSICAL EXAMINATION:    GENERAL:  Awake, alert, oriented x3, sitting up comfortably, somewhat anxious, though not in any acute distress or respiratory distress.  VITAL SIGNS:  Blood pressure 124/78, heart rate 64 per minute, weight 117.4 kg.  HEENT:  Head:  Normocephalic, atraumatic.  Eyes:  PERRLA.  Sclerae anicteric.  NECK:  Supple.  Trachea central.  LUNGS:  Essentially clear on auscultation bilaterally, no wheezing, rhonchi or crackles appreciated.  HEART:  S1, S2 audible.  ABDOMEN:  Bowel sounds positive, soft, nontender, no organomegaly appreciated.  No flank tenderness or suprapubic dullness.  No abdominal bruit noticed.  EXTREMITIES:  No clubbing or cyanosis.  Trace bi-ankle edema.  Bilateral radial pulses palpable.  SKIN:  Turgor normal.  No skin rash noticed.  NEUROLOGICAL:  No focal motor deficit noticed.    LABORATORY DATA:    Last available serum chemistry was drawn on 01/03, serum sodium, potassium, calcium within normal limits.  BUN 37, creatinine 1.16.  Previous creatinine was as high as 1.7 back on 11/03/2022.  Prior to that, creatinine was 1.14 in 10/2022.    Hemoglobin 12.5 in October of last year.  PTH level 186 in December of last year.  Random urine and microalbumin reported to be \"unable to calculate.\"    ASSESSMENT AND DIAGNOSES:    1.  Chronic kidney disease, had acute component back in October last year, which has resolved. Chronic component is stage III CKD, GFR estimated to be 49 mL per minute.  Etiology based on history secondary to hypertensive nephrosclerosis with age-related nephrosclerosis with possibility of NSAID-induced nephropathy.  2.   Hypertension, well controlled.  3.  Secondary hyperparathyroidism, mild.    ASSESSMENT:    From nephrology viewpoint is continue current medication.  Avoid nephrotoxic medication. Low-salt diet.  Follow up with me after 4 months for CKD care or sooner if needed and also advised to continue followup with her PCP for other medical needs.    All of the above again was discussed with the patient in length and all her questions answered to her apparent satisfaction.        Dictated By: Mora Stone MD  Signing Provider: MD VICKI Alvares/raymon ([id number])   DD: 01/16/2023 3:29:29 PM TD: 01/16/2023 4:12:36 PM      Copy Sent To:  CLARITA Saini

## 2023-09-02 NOTE — PROGRESS NOTES
"In conversation with this writer Jv stated that he is drinking more water to reduce the effects of his meds. He stated \"If my Depakote level gets too high I start to get allergic.\" This writer encouraged Jv not to drink excessive amounts of water.   " This was a shared visit with the NHAN. I reviewed and verified the documentation and independently performed the documented:

## 2023-10-20 NOTE — PROGRESS NOTES
Patient was withdrawn, spending most of this shift in his room. He intermittently came out of his room making odd statements. Regular staff reported that this frequency of this behavior was notably less than the previous few days. He showered today without encouragement from staff.     08/20/17 1410   Behavioral Health   Hallucinations appears responding   Thinking poor concentration;delusional   Orientation place: oriented;person: oriented   Memory baseline memory   Insight poor   Judgement intact   Eye Contact at examiner   Affect blunted, flat   Mood mood is calm   Physical Appearance/Attire attire appropriate to age and situation   Hygiene well groomed  (Showered)   Suicidality other (see comments)  (None expressed)   Self Injury other (see comment)  (None expressed)   Elopement (None)   Activity withdrawn;isolative   Speech clear   Medication Sensitivity no stated side effects;no observed side effects   Psychomotor / Gait balanced      No
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
## 2025-03-16 ENCOUNTER — HOSPITAL ENCOUNTER (OUTPATIENT)
Facility: CLINIC | Age: 40
Setting detail: OBSERVATION
Discharge: HOME | End: 2025-03-18
Attending: EMERGENCY MEDICINE | Admitting: EMERGENCY MEDICINE
Payer: MEDICAID

## 2025-03-16 DIAGNOSIS — R45.6 VIOLENT BEHAVIOR: ICD-10-CM

## 2025-03-16 DIAGNOSIS — F20.9 SCHIZOPHRENIA, UNSPECIFIED TYPE (H): ICD-10-CM

## 2025-03-16 PROCEDURE — 99223 1ST HOSP IP/OBS HIGH 75: CPT | Performed by: EMERGENCY MEDICINE

## 2025-03-16 PROCEDURE — 99285 EMERGENCY DEPT VISIT HI MDM: CPT | Performed by: EMERGENCY MEDICINE

## 2025-03-16 PROCEDURE — G0378 HOSPITAL OBSERVATION PER HR: HCPCS

## 2025-03-16 PROCEDURE — 250N000013 HC RX MED GY IP 250 OP 250 PS 637: Performed by: EMERGENCY MEDICINE

## 2025-03-16 RX ORDER — LISINOPRIL 20 MG/1
20 TABLET ORAL DAILY
Status: DISCONTINUED | OUTPATIENT
Start: 2025-03-17 | End: 2025-03-18 | Stop reason: HOSPADM

## 2025-03-16 RX ORDER — ACETAMINOPHEN 325 MG/1
650 TABLET ORAL EVERY 4 HOURS PRN
Status: DISCONTINUED | OUTPATIENT
Start: 2025-03-16 | End: 2025-03-18 | Stop reason: HOSPADM

## 2025-03-16 RX ORDER — BENZTROPINE MESYLATE 0.5 MG/1
0.5 TABLET ORAL 2 TIMES DAILY
Status: DISCONTINUED | OUTPATIENT
Start: 2025-03-16 | End: 2025-03-18 | Stop reason: HOSPADM

## 2025-03-16 RX ORDER — IBUPROFEN 600 MG/1
600 TABLET, FILM COATED ORAL EVERY 6 HOURS PRN
Status: DISCONTINUED | OUTPATIENT
Start: 2025-03-16 | End: 2025-03-18 | Stop reason: HOSPADM

## 2025-03-16 RX ORDER — HYDROXYZINE HYDROCHLORIDE 25 MG/1
25 TABLET, FILM COATED ORAL EVERY 4 HOURS PRN
Status: DISCONTINUED | OUTPATIENT
Start: 2025-03-16 | End: 2025-03-18 | Stop reason: HOSPADM

## 2025-03-16 RX ORDER — HALOPERIDOL 5 MG/1
10 TABLET ORAL 2 TIMES DAILY
Status: DISCONTINUED | OUTPATIENT
Start: 2025-03-16 | End: 2025-03-18 | Stop reason: HOSPADM

## 2025-03-16 RX ADMIN — ACETAMINOPHEN 650 MG: 325 TABLET, FILM COATED ORAL at 23:46

## 2025-03-16 RX ADMIN — BENZTROPINE MESYLATE 0.5 MG: 0.5 TABLET ORAL at 23:40

## 2025-03-16 RX ADMIN — NICOTINE POLACRILEX 4 MG: 4 GUM, CHEWING BUCCAL at 23:47

## 2025-03-16 RX ADMIN — HALOPERIDOL 10 MG: 5 TABLET ORAL at 23:40

## 2025-03-16 RX ADMIN — NICOTINE POLACRILEX 4 MG: 4 GUM, CHEWING BUCCAL at 19:34

## 2025-03-16 ASSESSMENT — ACTIVITIES OF DAILY LIVING (ADL)
ADLS_ACUITY_SCORE: 49
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
## 2025-03-16 NOTE — ED TRIAGE NOTES
Pt arrived on a hold, see hold paperwork for more details, collateral leading to pt's admission.

## 2025-03-16 NOTE — ED NOTES
Bed: ED16A  Expected date:   Expected time:   Means of arrival:   Comments:  [facility] [address][age]yo m hi

## 2025-03-17 LAB
BASOPHILS # BLD AUTO: 0 10E3/UL (ref 0–0.2)
BASOPHILS NFR BLD AUTO: 0 %
CRP SERPL-MCNC: 6.95 MG/L
EOSINOPHIL # BLD AUTO: 0.2 10E3/UL (ref 0–0.7)
EOSINOPHIL NFR BLD AUTO: 2 %
ERYTHROCYTE [DISTWIDTH] IN BLOOD BY AUTOMATED COUNT: 12.3 % (ref 10–15)
ERYTHROCYTE [SEDIMENTATION RATE] IN BLOOD BY WESTERGREN METHOD: 10 MM/HR (ref 0–15)
HCT VFR BLD AUTO: 48.3 % (ref 40–53)
HGB BLD-MCNC: 16.2 G/DL (ref 13.3–17.7)
IMM GRANULOCYTES # BLD: 0 10E3/UL
IMM GRANULOCYTES NFR BLD: 0 %
LYMPHOCYTES # BLD AUTO: 3.2 10E3/UL (ref 0.8–5.3)
LYMPHOCYTES NFR BLD AUTO: 43 %
MCH RBC QN AUTO: 31.1 PG (ref 26.5–33)
MCHC RBC AUTO-ENTMCNC: 33.5 G/DL (ref 31.5–36.5)
MCV RBC AUTO: 93 FL (ref 78–100)
MONOCYTES # BLD AUTO: 0.8 10E3/UL (ref 0–1.3)
MONOCYTES NFR BLD AUTO: 11 %
NEUTROPHILS # BLD AUTO: 3.2 10E3/UL (ref 1.6–8.3)
NEUTROPHILS NFR BLD AUTO: 43 %
NRBC # BLD AUTO: 0 10E3/UL
NRBC BLD AUTO-RTO: 0 /100
PLATELET # BLD AUTO: 281 10E3/UL (ref 150–450)
RBC # BLD AUTO: 5.21 10E6/UL (ref 4.4–5.9)
WBC # BLD AUTO: 7.4 10E3/UL (ref 4–11)

## 2025-03-17 PROCEDURE — G0378 HOSPITAL OBSERVATION PER HR: HCPCS

## 2025-03-17 PROCEDURE — 86618 LYME DISEASE ANTIBODY: CPT | Performed by: INTERNAL MEDICINE

## 2025-03-17 PROCEDURE — 85004 AUTOMATED DIFF WBC COUNT: CPT | Performed by: EMERGENCY MEDICINE

## 2025-03-17 PROCEDURE — 86140 C-REACTIVE PROTEIN: CPT | Performed by: EMERGENCY MEDICINE

## 2025-03-17 PROCEDURE — 36415 COLL VENOUS BLD VENIPUNCTURE: CPT | Performed by: EMERGENCY MEDICINE

## 2025-03-17 PROCEDURE — 99255 IP/OBS CONSLTJ NEW/EST HI 80: CPT | Performed by: INTERNAL MEDICINE

## 2025-03-17 PROCEDURE — 99244 OFF/OP CNSLTJ NEW/EST MOD 40: CPT | Performed by: CLINICAL NURSE SPECIALIST

## 2025-03-17 PROCEDURE — 250N000013 HC RX MED GY IP 250 OP 250 PS 637: Performed by: EMERGENCY MEDICINE

## 2025-03-17 PROCEDURE — 85652 RBC SED RATE AUTOMATED: CPT | Performed by: EMERGENCY MEDICINE

## 2025-03-17 PROCEDURE — 85048 AUTOMATED LEUKOCYTE COUNT: CPT | Performed by: EMERGENCY MEDICINE

## 2025-03-17 RX ORDER — CLARITHROMYCIN 500 MG/1
500 TABLET ORAL 2 TIMES DAILY
COMMUNITY

## 2025-03-17 RX ORDER — POLYETHYLENE GLYCOL 3350 17 G
2 POWDER IN PACKET (EA) ORAL
Status: DISCONTINUED | OUTPATIENT
Start: 2025-03-17 | End: 2025-03-18 | Stop reason: HOSPADM

## 2025-03-17 RX ORDER — CLOTRIMAZOLE 1 %
CREAM (GRAM) TOPICAL 2 TIMES DAILY
COMMUNITY

## 2025-03-17 RX ORDER — OMEPRAZOLE 20 MG/1
20 CAPSULE, DELAYED RELEASE ORAL DAILY
COMMUNITY

## 2025-03-17 RX ORDER — CLONAZEPAM 1 MG/1
1 TABLET ORAL 2 TIMES DAILY
COMMUNITY

## 2025-03-17 RX ORDER — LISINOPRIL 20 MG/1
20 TABLET ORAL DAILY
COMMUNITY

## 2025-03-17 RX ORDER — FLUCONAZOLE 200 MG/1
2 TABLET ORAL DAILY
COMMUNITY

## 2025-03-17 RX ORDER — MINOCYCLINE HYDROCHLORIDE 100 MG/1
100 CAPSULE ORAL 2 TIMES DAILY
COMMUNITY

## 2025-03-17 RX ORDER — BENZTROPINE MESYLATE 1 MG/1
1 TABLET ORAL AT BEDTIME
COMMUNITY

## 2025-03-17 RX ORDER — CEFUROXIME AXETIL 500 MG/1
500 TABLET ORAL 2 TIMES DAILY
COMMUNITY

## 2025-03-17 RX ORDER — HALOPERIDOL 5 MG/1
3 TABLET ORAL AT BEDTIME
COMMUNITY

## 2025-03-17 RX ORDER — POLYETHYLENE GLYCOL 3350 17 G/17G
1 POWDER, FOR SOLUTION ORAL 2 TIMES DAILY
COMMUNITY

## 2025-03-17 RX ADMIN — NICOTINE POLACRILEX 2 MG: 2 LOZENGE ORAL at 13:51

## 2025-03-17 RX ADMIN — ACETAMINOPHEN 650 MG: 325 TABLET, FILM COATED ORAL at 23:04

## 2025-03-17 RX ADMIN — HALOPERIDOL 10 MG: 5 TABLET ORAL at 20:36

## 2025-03-17 RX ADMIN — HYDROXYZINE HYDROCHLORIDE 25 MG: 25 TABLET, FILM COATED ORAL at 23:04

## 2025-03-17 RX ADMIN — Medication 3 MG: at 23:04

## 2025-03-17 RX ADMIN — NICOTINE POLACRILEX 2 MG: 2 LOZENGE ORAL at 18:12

## 2025-03-17 RX ADMIN — NICOTINE POLACRILEX 2 MG: 2 LOZENGE ORAL at 10:29

## 2025-03-17 RX ADMIN — NICOTINE POLACRILEX 2 MG: 2 LOZENGE ORAL at 05:22

## 2025-03-17 RX ADMIN — BENZTROPINE MESYLATE 0.5 MG: 0.5 TABLET ORAL at 08:11

## 2025-03-17 RX ADMIN — NICOTINE POLACRILEX 2 MG: 2 LOZENGE ORAL at 12:17

## 2025-03-17 RX ADMIN — LISINOPRIL 20 MG: 20 TABLET ORAL at 08:10

## 2025-03-17 RX ADMIN — NICOTINE POLACRILEX 2 MG: 2 LOZENGE ORAL at 20:43

## 2025-03-17 RX ADMIN — HALOPERIDOL 10 MG: 5 TABLET ORAL at 08:11

## 2025-03-17 RX ADMIN — NICOTINE POLACRILEX 2 MG: 2 LOZENGE ORAL at 08:11

## 2025-03-17 RX ADMIN — BENZTROPINE MESYLATE 0.5 MG: 0.5 TABLET ORAL at 20:35

## 2025-03-17 ASSESSMENT — ACTIVITIES OF DAILY LIVING (ADL)
ADLS_ACUITY_SCORE: 49

## 2025-03-17 NOTE — DISCHARGE INSTRUCTIONS
Discharge Recommendations:  Please follow-up with all upcoming appointments.   If you continue to struggle with your mental health, a  or ACT team might be beneficial to work with, reach out to your county for help with setting this up.   [facility] [email or web address]. [phone]      Scheduled Appointment  Date: Wednesday, 3/19/2025    Time: 9:00 am - 10:00 am  Provider: Mary Fischer MS  [facility]  Location: [facility], [facility], [place] [address]  Phone: [phone]  Type: Teletherapy    Patient instructions  Greetings! Upon receipt of a referral from [facility], our scheduling department will email and text you to confirm the appointment. Once the appointment is confirmed, we will send you intake forms to your email of which need to be completed upon receipt to keep the scheduled appointment. If you do not have an email, we will encourage you to schedule your appointment with us in person. We look forward to working with you! [email or web address]
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
## 2025-03-17 NOTE — MEDICATION SCRIBE - ADMISSION MEDICATION HISTORY
Medication Scribe Admission Medication History    Admission medication history is complete. The information provided in this note is only as accurate as the sources available at the time of the update.    Information Source(s): Facility ([facility]/) medication list/MAR via N/A    Pertinent Information: Patient was recently at [facility] [place] [address]. Called [phone] and received faxed MAR. Patient last doses given on 3/13 & 3/14    Changes made to PTA medication list:  Added: Ceftin, Biaxin, Klonopin, Lotrimin, Fluconazole, Haloperidol, Omeprazole, Miralax  Deleted: Tylenol, Celluvisc, Atarax, Theravite, Nicorette, Zantac  Changed: Cogentin to 1 mg at bedtime, Haldol to 15 at bedtime     Allergies reviewed with patient and updates made in EHR: yes    Medication History Completed By: Mickey Juarez 3/17/2025 8:20 AM    PTA Med List   Medication Sig Last Dose/Taking    benztropine (COGENTIN) 1 MG tablet Take 1 mg by mouth at bedtime. Past Week    cefuroxime (CEFTIN) 500 MG tablet Take 500 mg by mouth 2 times daily. Past Week    clarithromycin (BIAXIN) 500 MG tablet Take 500 mg by mouth 2 times daily. Past Week    clonazePAM (KLONOPIN) 1 MG tablet Take 1 mg by mouth 2 times daily. Past Week    clotrimazole (LOTRIMIN) 1 % external cream Apply topically 2 times daily. Past Week    fluconazole (DIFLUCAN) 200 MG tablet Take 2 tablets by mouth daily. Past Week    haloperidol (HALDOL) 5 MG tablet Take 3 tablets by mouth at bedtime. Past Week    lisinopril (ZESTRIL) 20 MG tablet Take 20 mg by mouth daily. Past Week    minocycline (MINOCIN) 100 MG capsule Take 100 mg by mouth 2 times daily. Past Week    omeprazole (PRILOSEC) 20 MG DR capsule Take 20 mg by mouth daily. Past Week    polyethylene glycol (MIRALAX) 17 GM/Dose powder Take 1 Capful by mouth 2 times daily. Past Week
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
## 2025-03-17 NOTE — ED NOTES
Patient's mother called in and was very concerned the patient's psychosis may be related to the patient's chronic Lyme's disease as the patient is supposed to be on antibiotics for life according to his care providers.  Patient will have labs drawn here for inflammatory markers and will have infectious disease weigh in on the possibility of chronic Lyme's disease causing any of his psychiatric symptoms.    Sherif Bernardo MD, Sherif Pollock MD  03/17/25 1417
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
## 2025-03-18 VITALS
RESPIRATION RATE: 16 BRPM | OXYGEN SATURATION: 98 % | HEART RATE: 109 BPM | DIASTOLIC BLOOD PRESSURE: 91 MMHG | SYSTOLIC BLOOD PRESSURE: 140 MMHG | TEMPERATURE: 97.4 F

## 2025-03-18 LAB
AMPHETAMINES UR QL SCN: NORMAL
B BURGDOR IGG+IGM SER QL: 0.11
BARBITURATES UR QL SCN: NORMAL
BENZODIAZ UR QL SCN: NORMAL
BZE UR QL SCN: NORMAL
CANNABINOIDS UR QL SCN: NORMAL
FENTANYL UR QL: NORMAL
OPIATES UR QL SCN: NORMAL
PCP QUAL URINE (ROCHE): NORMAL

## 2025-03-18 PROCEDURE — 250N000013 HC RX MED GY IP 250 OP 250 PS 637: Performed by: EMERGENCY MEDICINE

## 2025-03-18 PROCEDURE — 80307 DRUG TEST PRSMV CHEM ANLYZR: CPT | Performed by: EMERGENCY MEDICINE

## 2025-03-18 PROCEDURE — G0378 HOSPITAL OBSERVATION PER HR: HCPCS

## 2025-03-18 RX ORDER — CARBOXYMETHYLCELLULOSE SODIUM 5 MG/ML
2 SOLUTION/ DROPS OPHTHALMIC 3 TIMES DAILY PRN
Status: DISCONTINUED | OUTPATIENT
Start: 2025-03-18 | End: 2025-03-18 | Stop reason: HOSPADM

## 2025-03-18 RX ADMIN — Medication 1 LOZENGE: at 12:29

## 2025-03-18 RX ADMIN — NICOTINE POLACRILEX 2 MG: 2 LOZENGE ORAL at 05:17

## 2025-03-18 RX ADMIN — BENZTROPINE MESYLATE 0.5 MG: 0.5 TABLET ORAL at 08:00

## 2025-03-18 RX ADMIN — LISINOPRIL 20 MG: 20 TABLET ORAL at 08:00

## 2025-03-18 RX ADMIN — HALOPERIDOL 10 MG: 5 TABLET ORAL at 07:59

## 2025-03-18 RX ADMIN — NICOTINE POLACRILEX 2 MG: 2 LOZENGE ORAL at 12:29

## 2025-03-18 RX ADMIN — Medication 1 LOZENGE: at 09:38

## 2025-03-18 RX ADMIN — NICOTINE POLACRILEX 2 MG: 2 LOZENGE ORAL at 09:50

## 2025-03-18 RX ADMIN — CARBOXYMETHYLCELLULOSE SODIUM 2 DROP: 5 SOLUTION/ DROPS OPHTHALMIC at 05:31

## 2025-03-18 ASSESSMENT — COLUMBIA-SUICIDE SEVERITY RATING SCALE - C-SSRS
1. SINCE LAST CONTACT, HAVE YOU WISHED YOU WERE DEAD OR WISHED YOU COULD GO TO SLEEP AND NOT WAKE UP?: NO
TOTAL  NUMBER OF ABORTED OR SELF INTERRUPTED ATTEMPTS SINCE LAST CONTACT: NO
TOTAL  NUMBER OF INTERRUPTED ATTEMPTS SINCE LAST CONTACT: NO
SUICIDE, SINCE LAST CONTACT: NO
2. HAVE YOU ACTUALLY HAD ANY THOUGHTS OF KILLING YOURSELF?: NO
ATTEMPT SINCE LAST CONTACT: NO
6. HAVE YOU EVER DONE ANYTHING, STARTED TO DO ANYTHING, OR PREPARED TO DO ANYTHING TO END YOUR LIFE?: NO

## 2025-03-18 ASSESSMENT — ACTIVITIES OF DAILY LIVING (ADL)
ADLS_ACUITY_SCORE: 49
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
## 2025-03-19 ENCOUNTER — TELEPHONE (OUTPATIENT)
Dept: BEHAVIORAL HEALTH | Facility: CLINIC | Age: 40
End: 2025-03-19
Payer: MEDICAID

## 2025-03-19 ENCOUNTER — PATIENT OUTREACH (OUTPATIENT)
Dept: CARE COORDINATION | Facility: CLINIC | Age: 40
End: 2025-03-19
Payer: MEDICAID

## 2025-03-19 NOTE — TELEPHONE ENCOUNTER
Attempted follow-up call. Patient was not home. Will try again later.    Tammy,    [facility]ANJIT [facility] - Behavioral Healthcare Providers

## 2025-03-19 NOTE — PROGRESS NOTES
Clinic Care Coordination Contact  Transitions of Care Outreach  Chief Complaint   Patient presents with    Clinic Care Coordination - Post [facility]       Most Recent Admission Date: 3/16/2025   Most Recent Admission Diagnosis:      Most Recent Discharge Date: 3/18/2025   Most Recent Discharge Diagnosis: Violent behavior - R45.6  Schizophrenia, unspecified type (H) - F20.9     Transitions of Care Assessment    Discharge Assessment  How are you doing now that you are home?: Patient shares that he is doing well. No quesitons/concerns or needs at this time.  How are your symptoms? (Red Flag symptoms escalate to triage hotline per guidelines): Improved  Do you know how to contact your clinic care team if you have future questions or changes to your health status? : Yes  Does the patient have their discharge instructions? : Yes  Does the patient have questions regarding their discharge instructions? : No  Were you started on any new medications or were there changes to any of your previous medications? : Yes  Does the patient have all of their medications?: Yes  Do you have questions regarding any of your medications? : No  Do you have all of your needed medical supplies or equipment (DME)?  (i.e. oxygen tank, CPAP, cane, etc.): Yes    Post-op ([facility] Only)  If the patient had a surgery or procedure, do they have any questions for a nurse?: No    Post-op (Clinicians Only)  Did the patient have surgery or a procedure: No      Follow up Plan     Discharge Follow-Up  Discharge follow up appointment scheduled in alignment with recommended follow up timeframe or Transitions of Risk Category? (Low = within 30 days; Moderate= within 14 days; High= within 7 days): Yes  Discharge Follow Up Appointment Date: 03/19/25  Discharge Follow Up Appointment Scheduled with?: Specialty Care Provider (Therapy)    No future appointments.    Outpatient Plan as outlined on AVS reviewed with patient.    For any urgent concerns, please contact  our 24 hour nurse triage line: [phone] ([phone])       GEOVANNA Adkins